# Patient Record
Sex: FEMALE | Race: BLACK OR AFRICAN AMERICAN | NOT HISPANIC OR LATINO | Employment: OTHER | URBAN - METROPOLITAN AREA
[De-identification: names, ages, dates, MRNs, and addresses within clinical notes are randomized per-mention and may not be internally consistent; named-entity substitution may affect disease eponyms.]

---

## 2017-06-12 LAB — CRC RECOMMENDATION EXT: NORMAL

## 2017-06-25 ENCOUNTER — HOSPITAL ENCOUNTER (EMERGENCY)
Facility: OTHER | Age: 52
Discharge: HOME | End: 2017-06-25
Attending: EMERGENCY MEDICINE
Payer: MEDICAID

## 2017-06-25 VITALS
WEIGHT: 252 LBS | TEMPERATURE: 99 F | BODY MASS INDEX: 41.99 KG/M2 | RESPIRATION RATE: 24 BRPM | SYSTOLIC BLOOD PRESSURE: 130 MMHG | HEIGHT: 65 IN | HEART RATE: 108 BPM | DIASTOLIC BLOOD PRESSURE: 74 MMHG | OXYGEN SATURATION: 99 %

## 2017-06-25 DIAGNOSIS — S90.851A FOREIGN BODY IN FOOT, RIGHT, INITIAL ENCOUNTER: Primary | ICD-10-CM

## 2017-06-25 PROCEDURE — 99283 EMERGENCY DEPT VISIT LOW MDM: CPT | Mod: 25

## 2017-06-25 PROCEDURE — 10120 INC&RMVL FB SUBQ TISS SMPL: CPT

## 2017-06-25 NOTE — ED NOTES
LOC: The patient is awake, alert and aware of environment with an appropriate affect, the patient is oriented x 3 and speaking appropriately.  APPEARANCE: Patient resting comfortably and in no acute distress, patient is clean and well groomed, patient's clothing is properly fastened.  SKIN: The skin is warm and dry, patient has normal skin turgor and moist mucus membranes, skin intact, no breakdown or brusing noted.  MUSKULOSKELETAL: Patient moving all extremities well, no obvious swelling or deformities noted.  RESPIRATORY: Airway is open and patent, respirations are spontaneous, patient has a normal effort and rate. Breath sounds are clear and equal bilaterally.  CARDIAC: Normal heart sounds. No peripheral edema.  ABDOMEN: Soft and non tender to palpation, no distention noted. Bowel sounds present.  NEURO: No neuro deficits, hand grasp equal, no drift noted, no facial droop noted. Speech is clear.

## 2017-06-25 NOTE — ED PROVIDER NOTES
"Encounter Date: 6/25/2017    SCRIBE #1 NOTE: I, Laina Headley, am scribing for, and in the presence of,  Dr. Genao. I have scribed the entire note.       History     Chief Complaint   Patient presents with    Foreign Body     C/o possible foreign body to sole of right foot s/p "stepping on something" while walking barefoot just pta. Hx of diabetes. Pt took Flexeril and Lortab pta for shoulder pain, seen at [facility] this AM for same. No obvious foreign body noted to foot, no redness or drainage.     Time seen by provider: 4:51 PM    This is a 52 y.o. female who presents with complaint of right foot foreign body. She reports onset of complaint was a few minutes ago. The patient states she was walking bare foot in her house when she stepped on something. She is uncertain of what she stepped on. The patient notes associated sharp pain to the sole of the foot. She denies any numbness, tingling, weakness in right foot, redness or bleeding from the right foot. The patient reports her family member attempted to remove the object but was unable to do so. She states the pain in the foot worsens with bearing. The patient denies the use of any medication for the pain.       The history is provided by the patient.     Review of patient's allergies indicates:   Allergen Reactions    Naprosyn [naproxen]      Past Medical History:   Diagnosis Date    Diabetes mellitus     Hypertension      No past surgical history on file.  No family history on file.  Social History   Substance Use Topics    Smoking status: Not on file    Smokeless tobacco: Not on file    Alcohol use Not on file     Review of Systems   Constitutional: Negative for chills and fever.   HENT: Negative for congestion and sore throat.    Eyes: Negative for redness and visual disturbance.   Respiratory: Negative for cough and shortness of breath.    Cardiovascular: Negative for chest pain and palpitations.   Gastrointestinal: Negative for abdominal pain, " diarrhea, nausea and vomiting.   Genitourinary: Negative for dysuria.   Musculoskeletal: Negative for back pain.        Right foot pain   Skin: Negative for rash.        Suspected foreign body in the right foot   Neurological: Negative for weakness and headaches.   Psychiatric/Behavioral: Negative for confusion.       Physical Exam     Initial Vitals [06/25/17 1645]   BP Pulse Resp Temp SpO2   130/74 108 (!) 24 98.6 °F (37 °C) 99 %      MAP       92.67         Physical Exam    Nursing note and vitals reviewed.  Constitutional: She appears well-developed and well-nourished. She is not diaphoretic. No distress.   HENT:   Head: Normocephalic and atraumatic.   Right Ear: External ear normal.   Left Ear: External ear normal.   Oropharynx is clear and intact. Moist mucus membranes   Eyes: EOM are normal.   Conjunctivae clear, pink, and intact.   Neck: Normal range of motion. Neck supple.   Cardiovascular: Intact distal pulses.   Pulmonary/Chest: No respiratory distress.   Musculoskeletal: Normal range of motion. She exhibits no edema or tenderness.   RLE: 2+ DP and PT pulses. Capillary refill < 2 sec. Sensation intact to light touch. 5/5 strength    Lymphadenopathy:     She has no cervical adenopathy.   Neurological: She is alert and oriented to person, place, and time. She has normal strength.   Skin: Skin is warm and dry. No rash noted.   No skin tenting. No lesions.         ED Course   Foreign Body  Date/Time: 6/25/2017 5:03 PM  Performed by: RETA CASAREZ  Authorized by: RETA CASAREZ   Body area: skin  General location: lower extremity  Location details: right foot  Patient sedated: no  Patient restrained: no  Patient cooperative: yes  Localization method: probed  Removal mechanism: scalpel  Tendon involvement: none  Depth: subcutaneous  Complexity: simple  1 objects recovered.  Objects recovered: unknown  Post-procedure assessment: foreign body removed  Patient tolerance: Patient tolerated the procedure  well with no immediate complications      Labs Reviewed - No data to display                     Scribe Attestation:   Scribe #1: I performed the above scribed service and the documentation accurately describes the services I performed. I attest to the accuracy of the note.    Attending Attestation:           Physician Attestation for Scribe:  Physician Attestation Statement for Scribe #1: I, Dr. Coronado, reviewed documentation, as scribed by Laina Headley in my presence, and it is both accurate and complete.         Attending ED Notes:   Urgent evaluation a 52-year-old female foreign-body sensation to foot.  Patient no vastly intact without focal neurologic deficits.  Patient is afebrile, nontoxic-appearing with stable vital signs.  Repeat heart rate by M.D. is 86.  No clinical evidence of infection, cellulitis or abscess formation.  Foreign-body is removed without complication.  The patient is extensively counseled on her diagnosis and treatment, discharged in good condition and directed to follow-up with podiatry in the next 24-48 hours.          ED Course     Clinical Impression:     1. Foreign body in foot, right, initial encounter                                 Shady Coronado MD  06/25/17 [id number]

## 2018-01-02 ENCOUNTER — HOSPITAL ENCOUNTER (EMERGENCY)
Facility: OTHER | Age: 53
Discharge: HOME | End: 2018-01-02
Attending: EMERGENCY MEDICINE
Payer: MEDICARE

## 2018-01-02 VITALS
HEART RATE: 97 BPM | BODY MASS INDEX: 42.49 KG/M2 | OXYGEN SATURATION: 99 % | HEIGHT: 65 IN | TEMPERATURE: 98 F | SYSTOLIC BLOOD PRESSURE: 184 MMHG | DIASTOLIC BLOOD PRESSURE: 76 MMHG | WEIGHT: 255 LBS | RESPIRATION RATE: 17 BRPM

## 2018-01-02 DIAGNOSIS — J20.8 VIRAL BRONCHITIS: Primary | ICD-10-CM

## 2018-01-02 DIAGNOSIS — T50.905A MEDICATION REACTION, INITIAL ENCOUNTER: ICD-10-CM

## 2018-01-02 DIAGNOSIS — R05.9 COUGH: ICD-10-CM

## 2018-01-02 PROCEDURE — 25000003 PHARM REV CODE 250: Performed by: PHYSICIAN ASSISTANT

## 2018-01-02 PROCEDURE — 99283 EMERGENCY DEPT VISIT LOW MDM: CPT

## 2018-01-02 RX ORDER — PANTOPRAZOLE SODIUM 20 MG/1
20 TABLET, DELAYED RELEASE ORAL DAILY
COMMUNITY
End: 2022-09-22 | Stop reason: SDUPTHER

## 2018-01-02 RX ORDER — DIPHENHYDRAMINE HCL 25 MG
50 CAPSULE ORAL
Status: COMPLETED | OUTPATIENT
Start: 2018-01-02 | End: 2018-01-02

## 2018-01-02 RX ORDER — DIPHENHYDRAMINE HCL 50 MG
50 CAPSULE ORAL EVERY 6 HOURS PRN
Qty: 20 CAPSULE | Refills: 0 | Status: SHIPPED | OUTPATIENT
Start: 2018-01-02 | End: 2022-07-20

## 2018-01-02 RX ORDER — ESCITALOPRAM OXALATE 20 MG/1
20 TABLET ORAL DAILY
COMMUNITY
End: 2018-03-04

## 2018-01-02 RX ORDER — SENNOSIDES 25 MG/1
TABLET, FILM COATED ORAL
Status: ON HOLD | COMMUNITY
End: 2018-03-05 | Stop reason: HOSPADM

## 2018-01-02 RX ADMIN — DIPHENHYDRAMINE HYDROCHLORIDE 50 MG: 25 CAPSULE ORAL at 06:01

## 2018-01-02 NOTE — ED PROVIDER NOTES
Encounter Date: 1/2/2018       History     Chief Complaint   Patient presents with    Itching     pt reports I took some Mucinex and now I'm itching all over my body. No rash noted denies any shortness of breath or dyspnea.     URI     pt also reports cough and cold symptoms.     Patient is a 52 y.o. female with a past medical history of hypertension, diabetes, presenting to the emergency department for evaluation of cough and itching.  The patient reports that for 1 week she's had a nonproductive cough with chest congestion.  She states that times she has episodes of coughing fits with posttussive emesis.  She denies shortness of breath or chest pain.  She states she used over-the-counter cough syrup with no relief.  She also states she's tried Tessalon Perles.  She states her PCP recommended she start taking Mucinex.  She states that she started 3 days ago, but has since developed generalized itching.  She denies a rash, but states she is concerned she is having a reaction to Mucinex.  She denies taking any medications today thus far.  She reports she is concerned for possible pneumonia.  She denies fever or chills. She denies new other meds, denies new lotions, soaps, or possible allergens.       The history is provided by the patient.     Review of patient's allergies indicates:   Allergen Reactions    Naprosyn [naproxen]      Past Medical History:   Diagnosis Date    Diabetes mellitus     Hypertension      Past Surgical History:   Procedure Laterality Date    HYSTERECTOMY       History reviewed. No pertinent family history.  Social History   Substance Use Topics    Smoking status: Never Smoker    Smokeless tobacco: Never Used    Alcohol use No     Review of Systems   Constitutional: Negative for activity change, appetite change, chills, fatigue and fever.   HENT: Negative for congestion, rhinorrhea and sore throat.    Eyes: Negative for photophobia and visual disturbance.   Respiratory: Positive for  cough. Negative for shortness of breath and wheezing.    Cardiovascular: Negative for chest pain.   Gastrointestinal: Negative for abdominal pain, diarrhea, nausea and vomiting.   Genitourinary: Negative for dysuria, hematuria and urgency.   Musculoskeletal: Negative for back pain, myalgias and neck pain.   Skin: Negative for color change and wound.        Itching    Neurological: Negative for weakness and headaches.   Psychiatric/Behavioral: Negative for agitation and confusion.       Physical Exam     Initial Vitals [01/02/18 1532]   BP Pulse Resp Temp SpO2   (!) 140/63 104 16 98.4 °F (36.9 °C) 99 %      MAP       88.67         Physical Exam    Nursing note and vitals reviewed.  Constitutional: She appears well-developed and well-nourished. She is not diaphoretic. She is cooperative.  Non-toxic appearance. She does not have a sickly appearance. She does not appear ill. No distress.   Well appearing,  female, unaccompanied in the ED. She is speaking in clear and full sentences, moving all extremities. No acute distress.    HENT:   Head: Normocephalic and atraumatic.   Right Ear: Hearing, tympanic membrane, external ear and ear canal normal.   Left Ear: Hearing, tympanic membrane, external ear and ear canal normal.   Nose: Nose normal.   Mouth/Throat: Oropharynx is clear and moist.   No involvement of the oral mucosa.  Swallowing and handling oral secretions.   Eyes: Conjunctivae and EOM are normal.   Neck: Normal range of motion. Neck supple.   Cardiovascular: Normal rate, regular rhythm and normal heart sounds.   Pulmonary/Chest: Breath sounds normal. No respiratory distress. She has no wheezes.   Musculoskeletal: Normal range of motion.   Neurological: She is alert and oriented to person, place, and time. GCS eye subscore is 4. GCS verbal subscore is 5. GCS motor subscore is 6.   Skin: Skin is warm.   No evidence of skin changes, lesions, urticaria, vesicles, skin sloughing.     Psychiatric: She  has a normal mood and affect. Her behavior is normal. Judgment and thought content normal.         ED Course   Procedures  Labs Reviewed - No data to display     Imaging Results          X-Ray Chest PA And Lateral (Final result)  Result time 01/02/18 17:57:01    Final result by Olena Ortega MD (01/02/18 17:57:01)                 Impression:      No acute cardiopulmonary process identified.      Electronically signed by: OLENA ORTEGA MD  Date:     01/02/18  Time:    17:57              Narrative:    Chest PA and lateral.  Comparison: 4/2010.    Cardiac silhouette is normal in size.  Mediastinal structures are midline.  Lungs are symmetrically expanded.  There is mild interstitial prominence.  Previously visualized abnormal airspace opacity within the left upper lung zone has completely resolved.  No evidence of new focal consolidative process, pneumothorax, or significant effusion.  Bones appear intact.  No free air visualized beneath the diaphragm.                             X-Rays:   Independently Interpreted Readings:   Chest X-Ray: Normal heart size.  No infiltrates.  No acute abnormalities.     Medical Decision Making:   Initial Assessment:   Urgent evaluation of a 52 y.o. female with a past medical history of HTN, DM, presenting to the emergency department complaining of cough and itching. Patient is afebrile, nontoxic appearing and hemodynamically stable. Physical exam reveals regular rate and rhythm, lungs are clear to auscultation bilaterally.  Oxygen saturation 99%.  Will plan to obtain chest x-ray.  No skin changes, lesions, urticaria.  Will plan to treat with oral Benadryl.  Independently Interpreted Test(s):   I have ordered and independently interpreted X-rays - see prior notes.  Clinical Tests:   Radiological Study: Reviewed and Ordered  ED Management:  CXR does not show an acute process. O2 sat is 99%. Signs and symptoms are likely secondary to a viral etiology, no antibiotics are warranted at  this time. The patient was counseled on symptomatic care and treatment. She was also advised to discontinue mucinex, given a prescription for benadryl. She is stable for discharge home. The patient was instructed to follow up with a primary care provider in 2 days or to return to the emergency department for worsening symptoms. The treatment plan was discussed with the patient who demonstrated understanding and comfort with plan. The patient's history, physical exam, and plan of care was discussed with and agreed upon with my supervising physician.    Other:   I have discussed this case with another health care provider.       <> Summary of the Discussion: Dr. Glass   This note was created using Dragon Medical Dictation. There may be typographical errors secondary to dictation.                    ED Course      Clinical Impression:     1. Viral bronchitis    2. Cough    3. Medication reaction, initial encounter         Disposition:   Disposition: Discharged  Condition: Stable                        Briana Caruso PA-C  01/02/18 [id number]

## 2018-01-02 NOTE — ED TRIAGE NOTES
"Pt states "i'm am itching like mad"  States it began last night.  States she started taking mucinex for the cold that she has and thinks it could be from that.  Also states wants to be checked for her cough as well.  States has been taking tessalon pearls and they aren't doing anything for me.  Pt unsure if she has a rash anywhere, states she's just itchy.    "

## 2018-03-04 ENCOUNTER — HOSPITAL ENCOUNTER (INPATIENT)
Facility: OTHER | Age: 53
LOS: 1 days | Discharge: HOME | DRG: 440 | End: 2018-03-05
Attending: EMERGENCY MEDICINE | Admitting: EMERGENCY MEDICINE
Payer: MEDICARE

## 2018-03-04 DIAGNOSIS — R73.9 HYPERGLYCEMIA: ICD-10-CM

## 2018-03-04 DIAGNOSIS — K57.90 DIVERTICULOSIS OF INTESTINE WITHOUT BLEEDING, UNSPECIFIED INTESTINAL TRACT LOCATION: ICD-10-CM

## 2018-03-04 DIAGNOSIS — D64.9 ANEMIA, UNSPECIFIED TYPE: ICD-10-CM

## 2018-03-04 DIAGNOSIS — K86.89 PANCREATIC MASS: Primary | ICD-10-CM

## 2018-03-04 DIAGNOSIS — I10 ESSENTIAL HYPERTENSION: ICD-10-CM

## 2018-03-04 DIAGNOSIS — N28.9 RENAL INSUFFICIENCY: ICD-10-CM

## 2018-03-04 DIAGNOSIS — R74.8 ELEVATED LIVER ENZYMES: ICD-10-CM

## 2018-03-04 DIAGNOSIS — K80.20 CHOLELITHIASIS WITHOUT CHOLECYSTITIS: ICD-10-CM

## 2018-03-04 PROBLEM — Z79.4 TYPE 2 DIABETES MELLITUS WITH COMPLICATION, WITH LONG-TERM CURRENT USE OF INSULIN: Status: ACTIVE | Noted: 2018-03-04

## 2018-03-04 PROBLEM — E78.5 HYPERLIPIDEMIA: Status: ACTIVE | Noted: 2018-03-04

## 2018-03-04 PROBLEM — E11.8 TYPE 2 DIABETES MELLITUS WITH COMPLICATION, WITH LONG-TERM CURRENT USE OF INSULIN: Status: ACTIVE | Noted: 2018-03-04

## 2018-03-04 LAB
ALBUMIN SERPL BCP-MCNC: 3.5 G/DL
ALP SERPL-CCNC: 463 U/L
ALT SERPL W/O P-5'-P-CCNC: 195 U/L
ANION GAP SERPL CALC-SCNC: 12 MMOL/L
AST SERPL-CCNC: 43 U/L
BACTERIA #/AREA URNS HPF: ABNORMAL /HPF
BASOPHILS # BLD AUTO: 0.03 K/UL
BASOPHILS NFR BLD: 0.4 %
BILIRUB SERPL-MCNC: 0.7 MG/DL
BILIRUB UR QL STRIP: NEGATIVE
BUN SERPL-MCNC: 24 MG/DL
CALCIUM SERPL-MCNC: 9.3 MG/DL
CHLORIDE SERPL-SCNC: 103 MMOL/L
CLARITY UR: CLEAR
CO2 SERPL-SCNC: 24 MMOL/L
COLOR UR: YELLOW
CREAT SERPL-MCNC: 1.3 MG/DL
DIFFERENTIAL METHOD: ABNORMAL
EOSINOPHIL # BLD AUTO: 0.2 K/UL
EOSINOPHIL NFR BLD: 2.3 %
ERYTHROCYTE [DISTWIDTH] IN BLOOD BY AUTOMATED COUNT: 15.9 %
EST. GFR  (AFRICAN AMERICAN): 54 ML/MIN/1.73 M^2
EST. GFR  (NON AFRICAN AMERICAN): 47 ML/MIN/1.73 M^2
GLUCOSE SERPL-MCNC: 245 MG/DL
GLUCOSE UR QL STRIP: ABNORMAL
HCT VFR BLD AUTO: 35.5 %
HGB BLD-MCNC: 10.9 G/DL
HGB UR QL STRIP: ABNORMAL
KETONES UR QL STRIP: NEGATIVE
LEUKOCYTE ESTERASE UR QL STRIP: NEGATIVE
LYMPHOCYTES # BLD AUTO: 2.9 K/UL
LYMPHOCYTES NFR BLD: 38.5 %
MCH RBC QN AUTO: 25.4 PG
MCHC RBC AUTO-ENTMCNC: 30.7 G/DL
MCV RBC AUTO: 83 FL
MICROSCOPIC COMMENT: ABNORMAL
MONOCYTES # BLD AUTO: 0.4 K/UL
MONOCYTES NFR BLD: 4.9 %
NEUTROPHILS # BLD AUTO: 4 K/UL
NEUTROPHILS NFR BLD: 53.5 %
NITRITE UR QL STRIP: NEGATIVE
PH UR STRIP: 5 [PH] (ref 5–8)
PLATELET # BLD AUTO: 301 K/UL
PMV BLD AUTO: 9.2 FL
POCT GLUCOSE: 397 MG/DL (ref 70–110)
POTASSIUM SERPL-SCNC: 3.6 MMOL/L
PROT SERPL-MCNC: 7.9 G/DL
PROT UR QL STRIP: NEGATIVE
RBC # BLD AUTO: 4.29 M/UL
RBC #/AREA URNS HPF: 7 /HPF (ref 0–4)
SODIUM SERPL-SCNC: 139 MMOL/L
SP GR UR STRIP: 1.02 (ref 1–1.03)
SQUAMOUS #/AREA URNS HPF: 11 /HPF
URN SPEC COLLECT METH UR: ABNORMAL
UROBILINOGEN UR STRIP-ACNC: NEGATIVE EU/DL
WBC # BLD AUTO: 7.54 K/UL
WBC #/AREA URNS HPF: 3 /HPF (ref 0–5)

## 2018-03-04 PROCEDURE — 25500020 PHARM REV CODE 255: Performed by: EMERGENCY MEDICINE

## 2018-03-04 PROCEDURE — 96361 HYDRATE IV INFUSION ADD-ON: CPT

## 2018-03-04 PROCEDURE — 96374 THER/PROPH/DIAG INJ IV PUSH: CPT

## 2018-03-04 PROCEDURE — 25000003 PHARM REV CODE 250: Performed by: NURSE PRACTITIONER

## 2018-03-04 PROCEDURE — 25000003 PHARM REV CODE 250: Performed by: EMERGENCY MEDICINE

## 2018-03-04 PROCEDURE — 63600175 PHARM REV CODE 636 W HCPCS: Performed by: EMERGENCY MEDICINE

## 2018-03-04 PROCEDURE — 99284 EMERGENCY DEPT VISIT MOD MDM: CPT | Mod: 25

## 2018-03-04 PROCEDURE — 80053 COMPREHEN METABOLIC PANEL: CPT

## 2018-03-04 PROCEDURE — 11000001 HC ACUTE MED/SURG PRIVATE ROOM

## 2018-03-04 PROCEDURE — 82962 GLUCOSE BLOOD TEST: CPT

## 2018-03-04 PROCEDURE — 85025 COMPLETE CBC W/AUTO DIFF WBC: CPT

## 2018-03-04 PROCEDURE — 81000 URINALYSIS NONAUTO W/SCOPE: CPT

## 2018-03-04 RX ORDER — IBUPROFEN 200 MG
24 TABLET ORAL
Status: DISCONTINUED | OUTPATIENT
Start: 2018-03-04 | End: 2018-03-05 | Stop reason: HOSPADM

## 2018-03-04 RX ORDER — HEPARIN SODIUM 5000 [USP'U]/ML
5000 INJECTION, SOLUTION INTRAVENOUS; SUBCUTANEOUS EVERY 8 HOURS
Status: DISCONTINUED | OUTPATIENT
Start: 2018-03-05 | End: 2018-03-05 | Stop reason: HOSPADM

## 2018-03-04 RX ORDER — NAPROXEN SODIUM 220 MG/1
81 TABLET, FILM COATED ORAL DAILY
Status: DISCONTINUED | OUTPATIENT
Start: 2018-03-05 | End: 2018-03-05 | Stop reason: HOSPADM

## 2018-03-04 RX ORDER — PANTOPRAZOLE SODIUM 20 MG/1
20 TABLET, DELAYED RELEASE ORAL DAILY
Status: DISCONTINUED | OUTPATIENT
Start: 2018-03-05 | End: 2018-03-04 | Stop reason: RX

## 2018-03-04 RX ORDER — SODIUM CHLORIDE 9 MG/ML
INJECTION, SOLUTION INTRAVENOUS CONTINUOUS
Status: DISCONTINUED | OUTPATIENT
Start: 2018-03-04 | End: 2018-03-05

## 2018-03-04 RX ORDER — ONDANSETRON 2 MG/ML
4 INJECTION INTRAMUSCULAR; INTRAVENOUS EVERY 8 HOURS PRN
Status: DISCONTINUED | OUTPATIENT
Start: 2018-03-04 | End: 2018-03-05 | Stop reason: HOSPADM

## 2018-03-04 RX ORDER — GLUCAGON 1 MG
1 KIT INJECTION
Status: DISCONTINUED | OUTPATIENT
Start: 2018-03-04 | End: 2018-03-05 | Stop reason: HOSPADM

## 2018-03-04 RX ORDER — LISINOPRIL 20 MG/1
20 TABLET ORAL DAILY
Status: DISCONTINUED | OUTPATIENT
Start: 2018-03-05 | End: 2018-03-05 | Stop reason: HOSPADM

## 2018-03-04 RX ORDER — LISINOPRIL 10 MG/1
20 TABLET ORAL
Status: COMPLETED | OUTPATIENT
Start: 2018-03-04 | End: 2018-03-04

## 2018-03-04 RX ORDER — LISINOPRIL 10 MG/1
20 TABLET ORAL
Status: DISCONTINUED | OUTPATIENT
Start: 2018-03-04 | End: 2018-03-04

## 2018-03-04 RX ORDER — FAMOTIDINE 20 MG/1
20 TABLET, FILM COATED ORAL 2 TIMES DAILY
Status: DISCONTINUED | OUTPATIENT
Start: 2018-03-04 | End: 2018-03-04 | Stop reason: SDUPTHER

## 2018-03-04 RX ORDER — IBUPROFEN 200 MG
16 TABLET ORAL
Status: DISCONTINUED | OUTPATIENT
Start: 2018-03-04 | End: 2018-03-05 | Stop reason: HOSPADM

## 2018-03-04 RX ORDER — GABAPENTIN 300 MG/1
300 CAPSULE ORAL 3 TIMES DAILY
Status: DISCONTINUED | OUTPATIENT
Start: 2018-03-05 | End: 2018-03-05 | Stop reason: HOSPADM

## 2018-03-04 RX ORDER — SIMVASTATIN 10 MG/1
20 TABLET, FILM COATED ORAL NIGHTLY
Status: DISCONTINUED | OUTPATIENT
Start: 2018-03-04 | End: 2018-03-05

## 2018-03-04 RX ORDER — SODIUM CHLORIDE 0.9 % (FLUSH) 0.9 %
5 SYRINGE (ML) INJECTION
Status: DISCONTINUED | OUTPATIENT
Start: 2018-03-04 | End: 2018-03-05 | Stop reason: HOSPADM

## 2018-03-04 RX ORDER — DIPHENHYDRAMINE HYDROCHLORIDE 50 MG/ML
25 INJECTION INTRAMUSCULAR; INTRAVENOUS ONCE
Status: COMPLETED | OUTPATIENT
Start: 2018-03-05 | End: 2018-03-05

## 2018-03-04 RX ORDER — INSULIN ASPART 100 [IU]/ML
1-10 INJECTION, SOLUTION INTRAVENOUS; SUBCUTANEOUS
Status: DISCONTINUED | OUTPATIENT
Start: 2018-03-04 | End: 2018-03-05 | Stop reason: HOSPADM

## 2018-03-04 RX ORDER — METHYLPREDNISOLONE SOD SUCC 125 MG
125 VIAL (EA) INJECTION
Status: COMPLETED | OUTPATIENT
Start: 2018-03-04 | End: 2018-03-04

## 2018-03-04 RX ORDER — PANTOPRAZOLE SODIUM 40 MG/1
40 TABLET, DELAYED RELEASE ORAL DAILY
Status: DISCONTINUED | OUTPATIENT
Start: 2018-03-05 | End: 2018-03-05 | Stop reason: HOSPADM

## 2018-03-04 RX ADMIN — LISINOPRIL 20 MG: 10 TABLET ORAL at 08:03

## 2018-03-04 RX ADMIN — IOHEXOL 100 ML: 350 INJECTION, SOLUTION INTRAVENOUS at 05:03

## 2018-03-04 RX ADMIN — SODIUM CHLORIDE: 0.9 INJECTION, SOLUTION INTRAVENOUS at 11:03

## 2018-03-04 RX ADMIN — SODIUM CHLORIDE 1000 ML: 0.9 INJECTION, SOLUTION INTRAVENOUS at 05:03

## 2018-03-04 RX ADMIN — METHYLPREDNISOLONE SODIUM SUCCINATE 125 MG: 125 INJECTION, POWDER, FOR SOLUTION INTRAMUSCULAR; INTRAVENOUS at 03:03

## 2018-03-04 NOTE — ED TRIAGE NOTES
Pt reports sitting in cloth chair at friends house and starting to itch. Pt reports no relief from benadryl, rubbing alcohol, cortisone cream, and triple antibiotic ointment. Pt reports itching to back, chest, and arms. Denies any new detergent or body products.

## 2018-03-04 NOTE — ED PROVIDER NOTES
"Encounter Date: 3/4/2018    SCRIBE #1 NOTE: I, Milliganana cristina Ortiz, am scribing for, and in the presence of, Dr. Genao.       History     Chief Complaint   Patient presents with    Rash     Pt c/o itching rash on trunl & extremities X 2 days. Pt reports taking benadryl with minimal relief.     Time seen by provider: 2:27 PM    This is a 52 y.o. female with hx of DM, HTN, and high cholesterol who presents with complaint of itching x 2 days. It is to SARTHAK, back, chest, and mildly to BLE. She reports having been in a sick friend's house and sitting on a cloth chair at onset. She states "I felt like stuff was crawling on me," but hadn't seen anything. There has been no relief with taking a shower or applying witch hazel, alcohol, hydrocortisone cream, and other OTC ointments to skin. No relief with benadryl. She denies any new medicine, new soaps/lotions, and recent illnesses. She states that the only new change is that she has begun to drink grapefruit juice. Pt has no fever, chills, N/V/D, bloody/tarry stools, abdominal pain, wound, rash, dysuria, discolored urine, difficulty urinating, trouble swallowing, voice change, facial swelling, and choking. Pt does admit to mild visual difficulties with R eye secondary to kenalog injection to the eye approximately 2 weeks ago. Pt denies any allergy to steroids, she is allergic to naprosyn. Pt was near kidney failure in July 2017 and has followed up with nephrologist. She states that it was secondary to ingestion of two prescription strength ibuprofen. Her blood sugar ran 178mg/dL this morning. Pt denies any smoking, drinking, or illicit drug use. She has a Fhx of HTN and DM.      The history is provided by the patient.     Review of patient's allergies indicates:   Allergen Reactions    Naprosyn [naproxen]      Past Medical History:   Diagnosis Date    Diabetes mellitus     Hypertension      Past Surgical History:   Procedure Laterality Date    HYSTERECTOMY       History " reviewed. No pertinent family history.  Social History   Substance Use Topics    Smoking status: Never Smoker    Smokeless tobacco: Never Used    Alcohol use No     Review of Systems   Constitutional: Negative for chills and fever.   HENT: Negative for congestion, facial swelling, sore throat, trouble swallowing and voice change.    Eyes: Negative for photophobia and redness.   Respiratory: Negative for cough, choking and shortness of breath.    Cardiovascular: Negative for chest pain.   Gastrointestinal: Negative for abdominal pain, blood in stool, diarrhea, nausea and vomiting.   Genitourinary: Negative for dysuria.        No discolored urine.   Musculoskeletal: Negative for back pain.   Skin: Negative for rash.        Itching to extremities, back and chest. No visualized rash.   Neurological: Negative for weakness, light-headedness and headaches.   Psychiatric/Behavioral: Negative for confusion.       Physical Exam     Initial Vitals [03/04/18 1420]   BP Pulse Resp Temp SpO2   (!) 164/74 95 18 98.2 °F (36.8 °C) 100 %      MAP       104         Physical Exam    Nursing note and vitals reviewed.  Constitutional: She appears well-developed and well-nourished. She is not diaphoretic. No distress.   HENT:   Head: Normocephalic and atraumatic.   Right Ear: External ear normal.   Left Ear: External ear normal.   Oropharynx is clear and intact.  Moist mucous membranes.   Eyes: Conjunctivae and EOM are normal. Pupils are equal, round, and reactive to light. Right eye exhibits no discharge. Left eye exhibits no discharge.   Neck: Normal range of motion. Neck supple.   Cardiovascular: Normal rate, regular rhythm and normal heart sounds.   Normal S1, S2. No murmurs, no rubs, no gallops.    Pulmonary/Chest: Breath sounds normal. No respiratory distress. She has no wheezes. She has no rhonchi. She has no rales.   Clear to ascultation bilaterally    Abdominal: Soft. Bowel sounds are normal. She exhibits no distension. There  is no tenderness. There is no rebound and no guarding.   Musculoskeletal: Normal range of motion. She exhibits no edema or tenderness.   Lymphadenopathy:     She has no cervical adenopathy.   Neurological: She is alert and oriented to person, place, and time. She has normal strength. No sensory deficit.   Skin: Skin is warm and dry. No rash noted. No erythema.   No rashes or lesions visualized.   Psychiatric: She has a normal mood and affect. Her behavior is normal.         ED Course   Procedures  Labs Reviewed   CBC W/ AUTO DIFFERENTIAL - Abnormal; Notable for the following:        Result Value    Hemoglobin 10.9 (*)     Hematocrit 35.5 (*)     MCH 25.4 (*)     MCHC 30.7 (*)     RDW 15.9 (*)     All other components within normal limits   COMPREHENSIVE METABOLIC PANEL - Abnormal; Notable for the following:     Glucose 245 (*)     BUN, Bld 24 (*)     Alkaline Phosphatase 463 (*)     AST 43 (*)      (*)     eGFR if  54 (*)     eGFR if non  47 (*)     All other components within normal limits   URINALYSIS - Abnormal; Notable for the following:     Glucose, UA Trace (*)     Occult Blood UA 1+ (*)     All other components within normal limits   URINALYSIS MICROSCOPIC - Abnormal; Notable for the following:     RBC, UA 7 (*)     Bacteria, UA Few (*)     All other components within normal limits   POCT GLUCOSE - Abnormal; Notable for the following:     POCT Glucose 397 (*)     All other components within normal limits              Imaging Results          CT Abdomen Pelvis With Contrast (Final result)  Result time 03/04/18 18:47:30    Final result by Jamison Harp MD (03/04/18 18:47:30)                 Impression:        Multiloculated cystic lesion at the level of pancreatic head as detailed above, 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  This causes mass effect on distal common bile duct with proximal biliary duct dilatation.    Cholelithiasis and sludge without evidence of acute  cholecystitis.    Mild diverticulosis coli without focal diverticulitis.    Few additional findings as above.    ______________________________________     Electronically signed by resident: PRAVEEN MCDONALD MD  Date:     03/04/18  Time:    18:21            As the supervising and teaching physician, I personally reviewed the images and resident's interpretation and I agree with the findings.          Electronically signed by: EDUARDO CONKLIN MD, MD  Date:     03/04/18  Time:    18:47              Narrative:    Procedure comments: The patient was surveyed from the lung bases through the pelvis after the administration of 100 cc Omni 350 IV contrast as well as oral contrast and data was reconstructed for coronal, sagittal, and axial images.    Comparison: None.    Indication: Elevated liver enzymes.  Pruritus    Findings:    The lung bases are unremarkable.  There is no pleural fluid present.  The visualized portions of the heart appear normal.    At the level of pancreatic head there is a multiloculated cystic lesion with internal thin septations, measuring approximately 4.4 x 3.3 cm.  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  There is marked atrophy of pancreatic head.  The pancreatic body and tail is unremarkable.  The pancreatic duct is not dilated.  There is mass effect on distal common bile duct from aforementioned lesion causing proximal common bile duct as well as diffuse mild intrahepatic biliary duct dilatation.    The liver is normal in size and attenuation with no focal hepatic abnormality.  The portal vein is patent.  The gallbladder shows multiple stones and layering sludge within its lumen.  No gallbladder wall thickening or pericholecystic fluid.      The stomach is mildly distended filled with food material. The spleen, and adrenal glands are unremarkable.    The kidneys are normal in size and location and concentrate and excrete contrast properly on delayed imaging.  There is no evidence of  hydronephrosis.  The ureters appear normal in course and caliber without evidence of ureteral dilatation. The urinary bladder, uterus demonstrate no significant abnormalities.  No abnormal adnexal masses are seen.    The abdominal aorta is normal in caliber with minimal atherosclerotic calcifications.    The visualized loops of small and large bowel show no evidence of obstruction or inflammation.  A few scattered colonic diverticula without focal diverticulitis. There is no ascites, free fluid, or intraperitoneal free air. There is no evidence of lymph node enlargement in the abdomen or pelvis. Small fat containing umbilical hernia.    The osseous structures demonstrate no acute process.                              Medical Decision Making:   Clinical Tests:   Lab Tests: Ordered and Reviewed  Radiological Study: Ordered  ED Management:  7:34 PM - Consulted and discussed pt with GI, Dr. Sood. Recommends f/u within 24 to 48 hours with their office for immediate surgical evaluation and treatment.  8:23 PM - Consulted and discussed pt with Marlo Morelos. PATRICIA. Will admit to Dr. Frank.  Other:   I have discussed this case with another health care provider.            Scribe Attestation:   Scribe #1: I performed the above scribed service and the documentation accurately describes the services I performed. I attest to the accuracy of the note.    Attending Attestation:           Physician Attestation for Scribe:  Physician Attestation Statement for Scribe #1: I, Dr. Genao, reviewed documentation, as scribed by Avril Ortiz in my presence, and it is both accurate and complete.         Attending ED Notes:   Emergent evaluation of 52-year-old female with complaint of generalized body itching.  Patient is afebrile, nontoxic-appearing with stable vital signs except for elevation in blood pressure and tachycardia.  No visible rashes or urticaria.  Concern for but not limited to possible uremia or hyperbilirubinemia.  On  CMP patient has alkaline phosphatase of 463.  AST of 43.  ALT of 15.  Blood glucose is 245.  This is no elevation of white blood cell count.  H&H is 10.9 and 35.5.  Urinary analysis reveals trace sugar and 1+ blood.  CT of abdomen and pelvis with contrast reveals multiloculated cystic lesion at the level of the pancreatic head with pancreatic head atrophy.  The lesion is causing a mass effect on the distal common bile duct with proximal biliary dilatation.  Patient also found have cholelithiasis without evidence of acute cholecystitis and diverticulosis without focal diverticulitis.  The patient is extensive the counseled on her diagnosis and treatment including all diagnostic, laboratory and physical exam findings and the patient is admitted in vision.    After admission his blood glucose was found to be 397.  No clinical or diagnostic evidence of diabetic ketoacidosis.  Patient is now nothing by mouth.  The hospitalist and myself and discussed treatment of patient's hyperglycemia.  We agree that we would administer IV fluids and then recheck patient's blood glucose.  Patient's blood glucose is still elevated he will administer insulin.             Clinical Impression:     1. Pancreatic mass                               Shady Coronado MD  03/04/18 8422

## 2018-03-05 VITALS
OXYGEN SATURATION: 97 % | RESPIRATION RATE: 17 BRPM | WEIGHT: 251.31 LBS | SYSTOLIC BLOOD PRESSURE: 186 MMHG | BODY MASS INDEX: 41.87 KG/M2 | HEART RATE: 92 BPM | TEMPERATURE: 96 F | DIASTOLIC BLOOD PRESSURE: 89 MMHG | HEIGHT: 65 IN

## 2018-03-05 LAB
ALBUMIN SERPL BCP-MCNC: 3.4 G/DL
ALP SERPL-CCNC: 431 U/L
ALT SERPL W/O P-5'-P-CCNC: 162 U/L
ANION GAP SERPL CALC-SCNC: 12 MMOL/L
AST SERPL-CCNC: 27 U/L
BASOPHILS # BLD AUTO: 0 K/UL
BASOPHILS NFR BLD: 0 %
BILIRUB SERPL-MCNC: 0.6 MG/DL
BUN SERPL-MCNC: 23 MG/DL
CALCIUM SERPL-MCNC: 9.5 MG/DL
CHLORIDE SERPL-SCNC: 106 MMOL/L
CHOLEST SERPL-MCNC: 181 MG/DL
CHOLEST/HDLC SERPL: 2.5 {RATIO}
CO2 SERPL-SCNC: 19 MMOL/L
CREAT SERPL-MCNC: 1.1 MG/DL
DIFFERENTIAL METHOD: ABNORMAL
EOSINOPHIL # BLD AUTO: 0 K/UL
EOSINOPHIL NFR BLD: 0 %
ERYTHROCYTE [DISTWIDTH] IN BLOOD BY AUTOMATED COUNT: 15.6 %
EST. GFR  (AFRICAN AMERICAN): >60 ML/MIN/1.73 M^2
EST. GFR  (NON AFRICAN AMERICAN): 57 ML/MIN/1.73 M^2
ESTIMATED AVG GLUCOSE: 192 MG/DL
GLUCOSE SERPL-MCNC: 329 MG/DL
HBA1C MFR BLD HPLC: 8.3 %
HCT VFR BLD AUTO: 35.3 %
HDLC SERPL-MCNC: 72 MG/DL
HDLC SERPL: 39.8 %
HGB BLD-MCNC: 11.1 G/DL
LDLC SERPL CALC-MCNC: 101 MG/DL
LYMPHOCYTES # BLD AUTO: 1.4 K/UL
LYMPHOCYTES NFR BLD: 14.1 %
MAGNESIUM SERPL-MCNC: 1.8 MG/DL
MCH RBC QN AUTO: 25.6 PG
MCHC RBC AUTO-ENTMCNC: 31.4 G/DL
MCV RBC AUTO: 81 FL
MONOCYTES # BLD AUTO: 0.2 K/UL
MONOCYTES NFR BLD: 1.8 %
NEUTROPHILS # BLD AUTO: 7.9 K/UL
NEUTROPHILS NFR BLD: 82.6 %
NONHDLC SERPL-MCNC: 109 MG/DL
PHOSPHATE SERPL-MCNC: 2.5 MG/DL
PLATELET # BLD AUTO: 299 K/UL
PMV BLD AUTO: 9.4 FL
POCT GLUCOSE: 239 MG/DL (ref 70–110)
POCT GLUCOSE: 254 MG/DL (ref 70–110)
POCT GLUCOSE: 265 MG/DL (ref 70–110)
POCT GLUCOSE: 303 MG/DL (ref 70–110)
POCT GLUCOSE: 372 MG/DL (ref 70–110)
POTASSIUM SERPL-SCNC: 3.8 MMOL/L
PROT SERPL-MCNC: 7.8 G/DL
RBC # BLD AUTO: 4.34 M/UL
SODIUM SERPL-SCNC: 137 MMOL/L
TRIGL SERPL-MCNC: 40 MG/DL
WBC # BLD AUTO: 9.58 K/UL

## 2018-03-05 PROCEDURE — 25000003 PHARM REV CODE 250: Performed by: NURSE PRACTITIONER

## 2018-03-05 PROCEDURE — 36415 COLL VENOUS BLD VENIPUNCTURE: CPT

## 2018-03-05 PROCEDURE — 83036 HEMOGLOBIN GLYCOSYLATED A1C: CPT

## 2018-03-05 PROCEDURE — 80053 COMPREHEN METABOLIC PANEL: CPT

## 2018-03-05 PROCEDURE — 25000003 PHARM REV CODE 250: Performed by: HOSPITALIST

## 2018-03-05 PROCEDURE — 80061 LIPID PANEL: CPT

## 2018-03-05 PROCEDURE — 99223 1ST HOSP IP/OBS HIGH 75: CPT | Mod: ,,, | Performed by: HOSPITALIST

## 2018-03-05 PROCEDURE — 84100 ASSAY OF PHOSPHORUS: CPT

## 2018-03-05 PROCEDURE — 63600175 PHARM REV CODE 636 W HCPCS: Performed by: NURSE PRACTITIONER

## 2018-03-05 PROCEDURE — 83735 ASSAY OF MAGNESIUM: CPT

## 2018-03-05 PROCEDURE — 85025 COMPLETE CBC W/AUTO DIFF WBC: CPT

## 2018-03-05 RX ORDER — PREDNISOLONE ACETATE 10 MG/ML
1 SUSPENSION/ DROPS OPHTHALMIC EVERY 4 HOURS
Status: DISCONTINUED | OUTPATIENT
Start: 2018-03-05 | End: 2018-03-05

## 2018-03-05 RX ORDER — HYDROCORTISONE 25 MG/G
CREAM TOPICAL 2 TIMES DAILY PRN
Status: DISCONTINUED | OUTPATIENT
Start: 2018-03-05 | End: 2018-03-05 | Stop reason: HOSPADM

## 2018-03-05 RX ORDER — INSULIN ASPART 100 [IU]/ML
20 INJECTION, SOLUTION INTRAVENOUS; SUBCUTANEOUS
COMMUNITY
Start: 2017-12-21 | End: 2022-10-21 | Stop reason: SDUPTHER

## 2018-03-05 RX ORDER — LISINOPRIL 40 MG/1
20 TABLET ORAL DAILY
COMMUNITY
Start: 2015-01-14 | End: 2022-09-08 | Stop reason: SDUPTHER

## 2018-03-05 RX ORDER — HYDROCHLOROTHIAZIDE 25 MG/1
25 TABLET ORAL DAILY
COMMUNITY
Start: 2017-12-21 | End: 2022-10-03 | Stop reason: SDUPTHER

## 2018-03-05 RX ORDER — HYDROCHLOROTHIAZIDE 12.5 MG/1
25 CAPSULE ORAL DAILY
Status: ON HOLD | COMMUNITY
Start: 2015-01-14 | End: 2018-03-05 | Stop reason: HOSPADM

## 2018-03-05 RX ORDER — PANTOPRAZOLE SODIUM 20 MG/1
20 TABLET, DELAYED RELEASE ORAL DAILY
Status: ON HOLD | COMMUNITY
Start: 2017-12-21 | End: 2018-03-05 | Stop reason: HOSPADM

## 2018-03-05 RX ORDER — DIPHENHYDRAMINE HCL 25 MG
25 CAPSULE ORAL EVERY 6 HOURS PRN
Status: DISCONTINUED | OUTPATIENT
Start: 2018-03-05 | End: 2018-03-05 | Stop reason: HOSPADM

## 2018-03-05 RX ORDER — HYDROCORTISONE 25 MG/G
CREAM TOPICAL 2 TIMES DAILY PRN
Qty: 3.5 G | Refills: 0 | Status: SHIPPED | OUTPATIENT
Start: 2018-03-05 | End: 2022-09-22

## 2018-03-05 RX ORDER — SIMVASTATIN 20 MG/1
20 TABLET, FILM COATED ORAL NIGHTLY
Status: ON HOLD | COMMUNITY
Start: 2017-12-21 | End: 2018-03-05 | Stop reason: HOSPADM

## 2018-03-05 RX ORDER — NAPROXEN SODIUM 220 MG/1
81 TABLET, FILM COATED ORAL DAILY
Status: ON HOLD | COMMUNITY
Start: 2017-12-21 | End: 2018-03-05 | Stop reason: HOSPADM

## 2018-03-05 RX ORDER — INSULIN GLARGINE 100 [IU]/ML
30 INJECTION, SOLUTION SUBCUTANEOUS 2 TIMES DAILY
Status: ON HOLD | COMMUNITY
Start: 2015-04-22 | End: 2018-03-05 | Stop reason: HOSPADM

## 2018-03-05 RX ORDER — HYDROGEN PEROXIDE 3 %
20 SOLUTION, NON-ORAL MISCELLANEOUS DAILY
COMMUNITY
End: 2022-07-20

## 2018-03-05 RX ORDER — PREDNISOLONE ACETATE 10 MG/ML
1 SUSPENSION/ DROPS OPHTHALMIC EVERY 4 HOURS
Status: DISCONTINUED | OUTPATIENT
Start: 2018-03-05 | End: 2018-03-05 | Stop reason: HOSPADM

## 2018-03-05 RX ORDER — GABAPENTIN 300 MG/1
300 CAPSULE ORAL 3 TIMES DAILY
Status: ON HOLD | COMMUNITY
Start: 2017-12-21 | End: 2018-03-05 | Stop reason: HOSPADM

## 2018-03-05 RX ORDER — HYDROXYZINE HYDROCHLORIDE 10 MG/1
10 TABLET, FILM COATED ORAL ONCE
Status: COMPLETED | OUTPATIENT
Start: 2018-03-05 | End: 2018-03-05

## 2018-03-05 RX ORDER — METFORMIN HYDROCHLORIDE 1000 MG/1
1000 TABLET ORAL 2 TIMES DAILY
COMMUNITY
Start: 2015-01-14 | End: 2022-11-30

## 2018-03-05 RX ORDER — LISINOPRIL 20 MG/1
20 TABLET ORAL DAILY
Status: ON HOLD | COMMUNITY
Start: 2017-12-21 | End: 2018-03-05 | Stop reason: HOSPADM

## 2018-03-05 RX ADMIN — PREDNISOLONE ACETATE 1 DROP: 10 SUSPENSION/ DROPS OPHTHALMIC at 01:03

## 2018-03-05 RX ADMIN — HEPARIN SODIUM 5000 UNITS: 5000 INJECTION, SOLUTION INTRAVENOUS; SUBCUTANEOUS at 01:03

## 2018-03-05 RX ADMIN — SODIUM CHLORIDE: 0.9 INJECTION, SOLUTION INTRAVENOUS at 02:03

## 2018-03-05 RX ADMIN — HYDROXYZINE HYDROCHLORIDE 10 MG: 10 TABLET, FILM COATED ORAL at 03:03

## 2018-03-05 RX ADMIN — HEPARIN SODIUM 5000 UNITS: 5000 INJECTION, SOLUTION INTRAVENOUS; SUBCUTANEOUS at 06:03

## 2018-03-05 RX ADMIN — PREDNISOLONE ACETATE 1 DROP: 10 SUSPENSION/ DROPS OPHTHALMIC at 06:03

## 2018-03-05 RX ADMIN — DIPHENHYDRAMINE HYDROCHLORIDE 25 MG: 50 INJECTION, SOLUTION INTRAMUSCULAR; INTRAVENOUS at 12:03

## 2018-03-05 RX ADMIN — HYDROCORTISONE: 25 CREAM TOPICAL at 12:03

## 2018-03-05 RX ADMIN — PREDNISOLONE ACETATE 1 DROP: 10 SUSPENSION/ DROPS OPHTHALMIC at 09:03

## 2018-03-05 RX ADMIN — ASPIRIN 81 MG CHEWABLE TABLET 81 MG: 81 TABLET CHEWABLE at 09:03

## 2018-03-05 RX ADMIN — DIPHENHYDRAMINE HYDROCHLORIDE 25 MG: 25 CAPSULE ORAL at 11:03

## 2018-03-05 RX ADMIN — LISINOPRIL 20 MG: 20 TABLET ORAL at 09:03

## 2018-03-05 RX ADMIN — PANTOPRAZOLE SODIUM 40 MG: 40 TABLET, DELAYED RELEASE ORAL at 09:03

## 2018-03-05 RX ADMIN — GABAPENTIN 300 MG: 300 CAPSULE ORAL at 09:03

## 2018-03-05 RX ADMIN — SODIUM CHLORIDE: 0.9 INJECTION, SOLUTION INTRAVENOUS at 06:03

## 2018-03-05 RX ADMIN — GABAPENTIN 300 MG: 300 CAPSULE ORAL at 02:03

## 2018-03-05 NOTE — PLAN OF CARE
CM met with pt for initial discharge planning assessment.   CM verified pt's pcp is Elif Hernández MD.  , and her pharmacy of choice is [facility] on [address].  Pt denies any CM needs for discharge.  Pt asked this CM if we could assist her with Medicaid. She has applied recently but wants to know if she actually has it now.  CM placed call to the Medicaid team here.    CM to follow.     03/05/18 0902   Discharge Assessment   Assessment Type Discharge Planning Assessment   Confirmed/corrected address and phone number on facesheet? Yes   Assessment information obtained from? Patient;Medical Record   Expected Length of Stay (days) 2   Communicated expected length of stay with patient/caregiver yes   Prior to hospitilization cognitive status: Alert/Oriented   Prior to hospitalization functional status: Independent   Current cognitive status: Alert/Oriented   Current Functional Status: Independent   Lives With alone   Able to Return to Prior Arrangements yes   Is patient able to care for self after discharge? Yes   Patient currently being followed by outpatient case management? No   Patient currently receives any other outside agency services? No   Equipment Currently Used at Home none   Do you have any problems affording any of your prescribed medications? TBD   Is the patient taking medications as prescribed? yes   Does the patient have transportation home? Yes   Transportation Available family or friend will provide   Does the patient receive services at the [facility]? No   Discharge Plan A Home   Discharge Plan B Home   Patient/Family In Agreement With Plan yes

## 2018-03-05 NOTE — CONSULTS
[facility]  Gastroenterology  Consult Note    Patient Name: Starla Wharton  MRN: [id number]  Admission Date: 3/4/2018  Hospital Length of Stay: 1 days  Code Status: Full Code   Attending Provider: Samson Frank MD   Consulting Provider: Errol Chaney MD  Primary Care Physician: Elif Hernández MD  Principal Problem:Pancreatic mass    Consults  Subjective:     HPI: 51 y/o woman c/o generalized pruritis x 2 days.  She says this started suddenly while sitting in a neighbor's chair.  The itching was moderate to severe in intensity and persisted so she went to the ED.  It has now resolved since receiving hydroxyzine.  A CT abdomen was done which showed a cystic lesion in the head of the pancreas.     Past Medical History:   Diagnosis Date    Diabetes mellitus     Hypertension        Past Surgical History:   Procedure Laterality Date    HYSTERECTOMY         Review of patient's allergies indicates:   Allergen Reactions    Naprosyn [naproxen]      Family History     None        Social History Main Topics    Smoking status: Never Smoker    Smokeless tobacco: Never Used    Alcohol use No    Drug use: No    Sexual activity: Not on file     Review of Systems   Constitutional: Negative for chills, fatigue and fever.   HENT: Negative for trouble swallowing.    Eyes: Negative for pain.   Respiratory: Negative for cough, chest tightness, shortness of breath and wheezing.    Cardiovascular: Negative for chest pain and leg swelling.   Gastrointestinal: Negative.    Genitourinary: Negative for dysuria.   Musculoskeletal: Negative for arthralgias.   Skin: Negative for color change.   Neurological: Negative for dizziness and headaches.     Objective:     Vital Signs (Most Recent):  Temp: 96.3 °F (35.7 °C) (03/05/18 1612)  Pulse: 92 (03/05/18 1612)  Resp: 17 (03/05/18 1612)  BP: (!) 186/89 (03/05/18 1612)  SpO2: 97 % (03/05/18 1612) Vital Signs (24h Range):  Temp:  [96.3 °F (35.7 °C)-98.7 °F (37.1  °C)] 96.3 °F (35.7 °C)  Pulse:  [] 92  Resp:  [17-18] 17  SpO2:  [96 %-99 %] 97 %  BP: (146-192)/(72-94) 186/89     Weight: 114 kg (251 lb 5.2 oz) (03/04/18 2248)  Body mass index is 41.82 kg/m².      Intake/Output Summary (Last 24 hours) at 03/05/18 1725  Last data filed at 03/05/18 0400   Gross per 24 hour   Intake             1000 ml   Output              600 ml   Net              400 ml       Lines/Drains/Airways     Peripheral Intravenous Line                 Peripheral IV - Single Lumen 03/04/18 1456 Left Antecubital 1 day                Physical Exam   Constitutional: She is oriented to person, place, and time. She appears well-developed and well-nourished.   HENT:   Head: Normocephalic and atraumatic.   Eyes: Conjunctivae are normal. No scleral icterus.   Cardiovascular: Normal rate and regular rhythm.    Pulmonary/Chest: Effort normal and breath sounds normal. She has no wheezes.   Abdominal: Soft. Bowel sounds are normal. She exhibits no distension and no mass. There is no tenderness. There is no rebound and no guarding.   Musculoskeletal: She exhibits no edema or deformity.   Neurological: She is alert and oriented to person, place, and time.   Psychiatric: She has a normal mood and affect. Her behavior is normal.       Significant Labs:  CBC:   Recent Labs  Lab 03/04/18  1457 03/05/18  0437   WBC 7.54 9.58   HGB 10.9* 11.1*   HCT 35.5* 35.3*    299     CMP:   Recent Labs  Lab 03/05/18  0437   *   CALCIUM 9.5   ALBUMIN 3.4*   PROT 7.8      K 3.8   CO2 19*      BUN 23*   CREATININE 1.1   ALKPHOS 431*   *   AST 27   BILITOT 0.6       Significant Imaging:  Imaging results within the past 24 hours have been reviewed.    Assessment/Plan:     Active Diagnoses:    Diagnosis Date Noted POA    PRINCIPAL PROBLEM:  Pancreatic mass [K86.9] 03/04/2018 Unknown    Essential hypertension [I10] 03/04/2018 Unknown    Type 2 diabetes mellitus with complication, with long-term  current use of insulin [E11.8, Z79.4] 03/04/2018 Not Applicable    Hyperlipidemia [E78.5] 03/04/2018 Unknown      Problems Resolved During this Admission:    Diagnosis Date Noted Date Resolved POA     Cystic pancreatic lesion.  There is some impingement of the CBD, but the bili is normal.  Mild elevation of the ALT and transaminases.  Will schedule outpatient EUS. The patient understands the importance of this.  It is possible the lesion is malignant or pre-malignant.    Pruritis. Unclear etiology, but now resolved.  Defer to hospitalist.    Discussed with Dr. Frank.      Thank you for your consult.     Errol Chaney MD  Gastroenterology  [facility]

## 2018-03-05 NOTE — NURSING
MD decided to discharge patient. Patient has no keys to house.  MD asked me to bring information on pop a lock and give suggestions to patient. I suggested besides pop a lock MD suggested, family and friends.  She said that she would walk the streets, screamed obscenities.  Nurse said patient will discharge.  MD said he will discharge

## 2018-03-05 NOTE — SUBJECTIVE & OBJECTIVE
Past Medical History:   Diagnosis Date    Diabetes mellitus     Hypertension        Past Surgical History:   Procedure Laterality Date    HYSTERECTOMY         Review of patient's allergies indicates:   Allergen Reactions    Naprosyn [naproxen]        No current facility-administered medications on file prior to encounter.      Current Outpatient Prescriptions on File Prior to Encounter   Medication Sig    aspirin 81 MG Chew Take 81 mg by mouth once daily.    gabapentin (NEURONTIN) 300 MG capsule Take 300 mg by mouth 3 (three) times daily.    hydrochlorothiazide (MICROZIDE) 12.5 mg capsule Take 25 mg by mouth once daily.     insulin regular 100 unit/mL Inj injection Inject into the skin 3 (three) times daily before meals.    lisinopril (PRINIVIL,ZESTRIL) 20 MG tablet Take 20 mg by mouth once daily.    simvastatin (ZOCOR) 20 MG tablet Take 20 mg by mouth every evening.    diphenhydrAMINE (BENADRYL) 50 MG capsule Take 1 capsule (50 mg total) by mouth every 6 (six) hours as needed for Itching or Allergies.    lidocaine 5 % Crea Apply topically.    pantoprazole (PROTONIX) 20 MG tablet Take 20 mg by mouth once daily.    ranitidine (ZANTAC) 75 MG tablet Take 75 mg by mouth 2 (two) times daily.    terbinafine (LAMISIL) 1 % cream Apply topically 2 (two) times daily.     Family History     None        Social History Main Topics    Smoking status: Never Smoker    Smokeless tobacco: Never Used    Alcohol use No    Drug use: No    Sexual activity: Not on file     Review of Systems   Constitutional: Positive for activity change and fatigue. Negative for appetite change and fever.   HENT: Negative for congestion, ear pain, rhinorrhea and sinus pressure.    Eyes: Negative for pain and discharge.   Respiratory: Negative for cough, chest tightness, shortness of breath and wheezing.    Cardiovascular: Negative for chest pain and leg swelling.   Gastrointestinal: Negative for abdominal distention, abdominal pain,  diarrhea, nausea and vomiting.   Endocrine: Negative for cold intolerance and heat intolerance.   Genitourinary: Negative for difficulty urinating, flank pain, frequency, hematuria and urgency.   Musculoskeletal: Negative for arthralgias, joint swelling and myalgias.   Allergic/Immunologic: Negative for environmental allergies and food allergies.        Patient complaining of full body itching   Neurological: Negative for dizziness, weakness, light-headedness and headaches.   Hematological: Does not bruise/bleed easily.   Psychiatric/Behavioral: Negative for agitation, behavioral problems and decreased concentration.     Objective:     Vital Signs (Most Recent):  Temp: 98.7 °F (37.1 °C) (03/04/18 2257)  Pulse: 104 (03/04/18 2259)  Resp: 18 (03/04/18 2257)  BP: (!) 184/80 (03/04/18 2259)  SpO2: 99 % (03/04/18 2259) Vital Signs (24h Range):  Temp:  [98.1 °F (36.7 °C)-98.7 °F (37.1 °C)] 98.7 °F (37.1 °C)  Pulse:  [] 104  Resp:  [18] 18  SpO2:  [97 %-100 %] 99 %  BP: (155-192)/(72-94) 184/80     Weight: 114 kg (251 lb 5.2 oz)  Body mass index is 41.82 kg/m².    Physical Exam   Constitutional: She is oriented to person, place, and time. She appears well-developed and well-nourished.   HENT:   Head: Normocephalic.   Eyes: Conjunctivae are normal. Right eye exhibits no discharge. Left eye exhibits no discharge.   Neck: Normal range of motion. Neck supple.   Cardiovascular: Regular rhythm, normal heart sounds and intact distal pulses.  Tachycardia present.    Pulses:       Radial pulses are 1+ on the right side, and 1+ on the left side.        Dorsalis pedis pulses are 1+ on the right side, and 1+ on the left side.   Pulmonary/Chest: Effort normal and breath sounds normal. Tachypnea noted. No respiratory distress.   Abdominal: Soft. Bowel sounds are normal. She exhibits no distension. There is no tenderness.   Musculoskeletal: Normal range of motion.   Neurological: She is alert and oriented to person, place, and  time. She has normal strength. GCS eye subscore is 4. GCS verbal subscore is 5. GCS motor subscore is 6.   Skin: Skin is warm and dry. Capillary refill takes 2 to 3 seconds.   No rash present. Patient is scratching throughout assessment.   Psychiatric: She has a normal mood and affect. Her speech is normal and behavior is normal. Thought content normal.           Significant Labs:   CBC:   Recent Labs  Lab 03/04/18  1457   WBC 7.54   HGB 10.9*   HCT 35.5*        CMP:   Recent Labs  Lab 03/04/18  1458      K 3.6      CO2 24   *   BUN 24*   CREATININE 1.3   CALCIUM 9.3   PROT 7.9   ALBUMIN 3.5   BILITOT 0.7   ALKPHOS 463*   AST 43*   *   ANIONGAP 12   EGFRNONAA 47*     POCT Glucose:   Recent Labs  Lab 03/04/18 2025   POCTGLUCOSE 397*       Significant Imaging: I have reviewed all pertinent imaging results/findings within the past 24 hours.

## 2018-03-05 NOTE — ASSESSMENT & PLAN NOTE
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  This causes mass effect on distal common bile duct with proximal biliary duct dilatation.  Cholelithiasis and sludge without evidence of acute cholecystitis.  Mild diverticulosis coli without focal diverticulitis.    Consult GI  IVF  Dilaudid for pain  Zofran for N/V  NPO

## 2018-03-05 NOTE — ED NOTES
"Pt c/o "itching" BUE's and chest per pt. Dr. Coronado made aware. MD states he was coming to speak with patient. Pt informed of plan of care.   "

## 2018-03-05 NOTE — HPI
"This is a 52 y.o. female with hx of DM, HTN, and high cholesterol who presents with complaint of itching x 2 days. It is to SARTHAK, back, chest, and mildly to BLE. She reports having been in a sick friend's house and sitting on a cloth chair at onset. She states "I felt like stuff was crawling on me," but hadn't seen anything. There has been no relief with taking a shower or applying witch hazel, alcohol, hydrocortisone cream, and other OTC ointments to skin. No relief with benadryl. She denies any new medicine, new soaps/lotions, and recent illnesses. She states that the only new change is that she has begun to drink grapefruit juice. Pt has no fever, chills, N/V/D, bloody/tarry stools, abdominal pain, wound, rash, dysuria, discolored urine, difficulty urinating, trouble swallowing, voice change, facial swelling, and choking. Pt does admit to mild visual difficulties with R eye secondary to kenalog injection to the eye approximately 2 weeks ago. Pt denies any allergy to steroids, she is allergic to naprosyn. Pt was near kidney failure in July 2017 and has followed up with nephrologist. She states that it was secondary to ingestion of two prescription strength ibuprofen. Her blood sugar ran 178mg/dL this morning. Pt denies any smoking, drinking, or illicit drug use. She has a Fhx of HTN and DM.  "

## 2018-03-05 NOTE — HOSPITAL COURSE
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    Patient admitted to the hospital due to concern for possible obstruction based on CT scan.  Patient treated with supportive care and kept NPO order.  However despite concern for obstruction patient with normal serum bilirubin and tolerating oral intake without issue.  Dr. Errol Chaney (gastroenterology) consulted who recommends outpatient follow-up for endoscopic ultrasound and biopsy with Dr. Demario Hilliard.  Patient discharged home in stable condition.

## 2018-03-05 NOTE — ED NOTES
Pt concerned that no one cares about her elevated blood sugar, explained to pt that she is NPO and do not want to over medicate her and bottom out her sugar. MD herrera

## 2018-03-05 NOTE — PLAN OF CARE
Patient reports continued itching and insomnia. BG monitored Q4H. No insulin given per verbal order. IVF infusing. Pt remains NPO since admission. Safety measures maintained. Will continue to monitor.

## 2018-03-05 NOTE — H&P
"[facility] Hospital Medicine  History & Physical    Patient Name: Starla Wharton  MRN: [id number]  Admission Date: 3/4/2018  Attending Physician: Samson Frank MD   Primary Care Provider: Primary Doctor No         Patient information was obtained from patient, past medical records and ER records.     Subjective:     Principal Problem:Pancreatic mass    Chief Complaint:   Chief Complaint   Patient presents with    Rash     Pt c/o itching rash on trunl & extremities X 2 days. Pt reports taking benadryl with minimal relief.        HPI: This is a 52 y.o. female with hx of DM, HTN, and high cholesterol who presents with complaint of itching x 2 days. It is to SARTHAK, back, chest, and mildly to BLE. She reports having been in a sick friend's house and sitting on a cloth chair at onset. She states "I felt like stuff was crawling on me," but hadn't seen anything. There has been no relief with taking a shower or applying witch hazel, alcohol, hydrocortisone cream, and other OTC ointments to skin. No relief with benadryl. She denies any new medicine, new soaps/lotions, and recent illnesses. She states that the only new change is that she has begun to drink grapefruit juice. Pt has no fever, chills, N/V/D, bloody/tarry stools, abdominal pain, wound, rash, dysuria, discolored urine, difficulty urinating, trouble swallowing, voice change, facial swelling, and choking. Pt does admit to mild visual difficulties with R eye secondary to kenalog injection to the eye approximately 2 weeks ago. Pt denies any allergy to steroids, she is allergic to naprosyn. Pt was near kidney failure in July 2017 and has followed up with nephrologist. She states that it was secondary to ingestion of two prescription strength ibuprofen. Her blood sugar ran 178mg/dL this morning. Pt denies any smoking, drinking, or illicit drug use. She has a Fhx of HTN and DM.    Past Medical History:   Diagnosis Date    Diabetes mellitus     " Hypertension        Past Surgical History:   Procedure Laterality Date    HYSTERECTOMY         Review of patient's allergies indicates:   Allergen Reactions    Naprosyn [naproxen]        No current facility-administered medications on file prior to encounter.      Current Outpatient Prescriptions on File Prior to Encounter   Medication Sig    aspirin 81 MG Chew Take 81 mg by mouth once daily.    gabapentin (NEURONTIN) 300 MG capsule Take 300 mg by mouth 3 (three) times daily.    hydrochlorothiazide (MICROZIDE) 12.5 mg capsule Take 25 mg by mouth once daily.     insulin regular 100 unit/mL Inj injection Inject into the skin 3 (three) times daily before meals.    lisinopril (PRINIVIL,ZESTRIL) 20 MG tablet Take 20 mg by mouth once daily.    simvastatin (ZOCOR) 20 MG tablet Take 20 mg by mouth every evening.    diphenhydrAMINE (BENADRYL) 50 MG capsule Take 1 capsule (50 mg total) by mouth every 6 (six) hours as needed for Itching or Allergies.    lidocaine 5 % Crea Apply topically.    pantoprazole (PROTONIX) 20 MG tablet Take 20 mg by mouth once daily.    ranitidine (ZANTAC) 75 MG tablet Take 75 mg by mouth 2 (two) times daily.    terbinafine (LAMISIL) 1 % cream Apply topically 2 (two) times daily.     Family History     None        Social History Main Topics    Smoking status: Never Smoker    Smokeless tobacco: Never Used    Alcohol use No    Drug use: No    Sexual activity: Not on file     Review of Systems   Constitutional: Positive for activity change and fatigue. Negative for appetite change and fever.   HENT: Negative for congestion, ear pain, rhinorrhea and sinus pressure.    Eyes: Negative for pain and discharge.   Respiratory: Negative for cough, chest tightness, shortness of breath and wheezing.    Cardiovascular: Negative for chest pain and leg swelling.   Gastrointestinal: Negative for abdominal distention, abdominal pain, diarrhea, nausea and vomiting.   Endocrine: Negative for cold  intolerance and heat intolerance.   Genitourinary: Negative for difficulty urinating, flank pain, frequency, hematuria and urgency.   Musculoskeletal: Negative for arthralgias, joint swelling and myalgias.   Allergic/Immunologic: Negative for environmental allergies and food allergies.        Patient complaining of full body itching   Neurological: Negative for dizziness, weakness, light-headedness and headaches.   Hematological: Does not bruise/bleed easily.   Psychiatric/Behavioral: Negative for agitation, behavioral problems and decreased concentration.     Objective:     Vital Signs (Most Recent):  Temp: 98.7 °F (37.1 °C) (03/04/18 2257)  Pulse: 104 (03/04/18 2259)  Resp: 18 (03/04/18 2257)  BP: (!) 184/80 (03/04/18 2259)  SpO2: 99 % (03/04/18 2259) Vital Signs (24h Range):  Temp:  [98.1 °F (36.7 °C)-98.7 °F (37.1 °C)] 98.7 °F (37.1 °C)  Pulse:  [] 104  Resp:  [18] 18  SpO2:  [97 %-100 %] 99 %  BP: (155-192)/(72-94) 184/80     Weight: 114 kg (251 lb 5.2 oz)  Body mass index is 41.82 kg/m².    Physical Exam   Constitutional: She is oriented to person, place, and time. She appears well-developed and well-nourished.   HENT:   Head: Normocephalic.   Eyes: Conjunctivae are normal. Right eye exhibits no discharge. Left eye exhibits no discharge.   Neck: Normal range of motion. Neck supple.   Cardiovascular: Regular rhythm, normal heart sounds and intact distal pulses.  Tachycardia present.    Pulses:       Radial pulses are 1+ on the right side, and 1+ on the left side.        Dorsalis pedis pulses are 1+ on the right side, and 1+ on the left side.   Pulmonary/Chest: Effort normal and breath sounds normal. Tachypnea noted. No respiratory distress.   Abdominal: Soft. Bowel sounds are normal. She exhibits no distension. There is no tenderness.   Musculoskeletal: Normal range of motion.   Neurological: She is alert and oriented to person, place, and time. She has normal strength. GCS eye subscore is 4. GCS verbal  subscore is 5. GCS motor subscore is 6.   Skin: Skin is warm and dry. Capillary refill takes 2 to 3 seconds.   No rash present. Patient is scratching throughout assessment.   Psychiatric: She has a normal mood and affect. Her speech is normal and behavior is normal. Thought content normal.           Significant Labs:   CBC:   Recent Labs  Lab 03/04/18  1457   WBC 7.54   HGB 10.9*   HCT 35.5*        CMP:   Recent Labs  Lab 03/04/18  1458      K 3.6      CO2 24   *   BUN 24*   CREATININE 1.3   CALCIUM 9.3   PROT 7.9   ALBUMIN 3.5   BILITOT 0.7   ALKPHOS 463*   AST 43*   *   ANIONGAP 12   EGFRNONAA 47*     POCT Glucose:   Recent Labs  Lab 03/04/18 2025   POCTGLUCOSE 397*       Significant Imaging: I have reviewed all pertinent imaging results/findings within the past 24 hours.    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  This causes mass effect on distal common bile duct with proximal biliary duct dilatation.  Cholelithiasis and sludge without evidence of acute cholecystitis.  Mild diverticulosis coli without focal diverticulitis.    Consult GI  IVF  Dilaudid for pain  Zofran for N/V  NPO        Hyperlipidemia    Lipid Panel pending    Hold simvastatin with elevated LFTs          Type 2 diabetes mellitus with complication, with long-term current use of insulin    A1c pending  Moderate dose SSI  Will hold other insulin for now due to NPO status          Essential hypertension    Hypertensive currently    Continue lisinopril            VTE Risk Mitigation         Ordered     heparin (porcine) injection 5,000 Units  Every 8 hours     Route:  Subcutaneous        03/04/18 2303     Medium Risk of VTE  Once      03/04/18 2303     Place YAIR hose  Until discontinued      03/04/18 2303     Place sequential compression device  Until discontinued      03/04/18 2303              João Morelos NP  Department of Hospital Medicine   [facility]

## 2018-03-06 NOTE — DISCHARGE SUMMARY
"[facility] Hospital Medicine  Discharge Summary      Patient Name: Starla Wharton  MRN: [id number]  Admission Date: 3/4/2018  Hospital Length of Stay: 1 days  Discharge Date and Time:  03/05/2018 6:01 PM  Attending Physician: Samson Frank MD   Discharging Provider: Samson Frank MD  Primary Care Provider: Elif Hernández MD      HPI:   This is a 52 y.o. female with hx of DM, HTN, and high cholesterol who presents with complaint of itching x 2 days. It is to SARTHAK, back, chest, and mildly to BLE. She reports having been in a sick friend's house and sitting on a cloth chair at onset. She states "I felt like stuff was crawling on me," but hadn't seen anything. There has been no relief with taking a shower or applying witch hazel, alcohol, hydrocortisone cream, and other OTC ointments to skin. No relief with benadryl. She denies any new medicine, new soaps/lotions, and recent illnesses. She states that the only new change is that she has begun to drink grapefruit juice. Pt has no fever, chills, N/V/D, bloody/tarry stools, abdominal pain, wound, rash, dysuria, discolored urine, difficulty urinating, trouble swallowing, voice change, facial swelling, and choking. Pt does admit to mild visual difficulties with R eye secondary to kenalog injection to the eye approximately 2 weeks ago. Pt denies any allergy to steroids, she is allergic to naprosyn. Pt was near kidney failure in July 2017 and has followed up with nephrologist. She states that it was secondary to ingestion of two prescription strength ibuprofen. Her blood sugar ran 178mg/dL this morning. Pt denies any smoking, drinking, or illicit drug use. She has a Fhx of HTN and DM.      Hospital Course:   Patient is a 52 year-old woman with hypertension, poorly controlled diabetes mellitus type II who presents with diffuse pruritis and found to have a multiloculated cystic lesion at the level of pancreatic head concerning for mucinous cystic " pancreatic tumor causing effect on distal common bile duct with proximal biliary duct dilatation.    Patient admitted to the hospital due to concern for possible obstruction based on CT scan.  Patient treated with supportive care and kept NPO order.  However despite concern for obstruction patient with normal serum bilirubin and tolerating oral intake without issue.  Dr. Errol Chaney (gastroenterology) consulted who recommends outpatient follow-up for endoscopic ultrasound and biopsy with Dr. Demario Hilliard.  Patient discharged home in stable condition.     Consults:   Consults         Status Ordering Provider     Inpatient consult to Gastroenterology  Once     Provider:  Errol Chaney MD    Completed RODRICK COYNE          Final Active Diagnoses:    Diagnosis Date Noted POA    PRINCIPAL PROBLEM:  Pancreatic mass [K86.9] 03/04/2018 Unknown    Essential hypertension [I10] 03/04/2018 Unknown    Type 2 diabetes mellitus with complication, with long-term current use of insulin [E11.8, Z79.4] 03/04/2018 Not Applicable    Hyperlipidemia [E78.5] 03/04/2018 Unknown      Problems Resolved During this Admission:    Diagnosis Date Noted Date Resolved POA       Discharged Condition: Stable    Disposition: Home or Self Care    Follow Up:  Follow-up Information     Schedule an appointment as soon as possible for a visit with Elif Hernández MD.    Specialty:  Internal Medicine  Why:  Re-establish outpatient care  Contact information:  [address] [place] [facility] [address]  [phone]             Schedule an appointment as soon as possible for a visit with Demario Hilliard MD.    Specialty:  Gastroenterology  Why:  Endoscopic ultrasound and biopsy of pancreatitic mass.  Contact information:  [address] [place] [address]  [id number]  [facility]  [place] [address]  [phone]                 Patient Instructions:     Diet diabetic     Activity as tolerated     Notify your health care  provider if you experience any of the following:  temperature >100.4     Notify your health care provider if you experience any of the following:  persistent nausea and vomiting or diarrhea     Notify your health care provider if you experience any of the following:  severe uncontrolled pain     Notify your health care provider if you experience any of the following:  difficulty breathing or increased cough     Notify your health care provider if you experience any of the following:  severe persistent headache     Notify your health care provider if you experience any of the following:  worsening rash     Notify your health care provider if you experience any of the following:  persistent dizziness, light-headedness, or visual disturbances     Notify your health care provider if you experience any of the following:  increased confusion or weakness         Medications:  Reconciled Home Medications:   Current Discharge Medication List      START taking these medications    Details   hydrocortisone 2.5 % cream Apply topically 2 (two) times daily as needed (apply for itching).  Qty: 3.5 g, Refills: 0         CONTINUE these medications which have CHANGED    Details   insulin detemir U-100 (LEVEMIR) 100 unit/mL injection Inject 40 Units into the skin 2 (two) times daily.  Refills: 0         CONTINUE these medications which have NOT CHANGED    Details   aspirin 81 MG Chew Take 81 mg by mouth once daily.      gabapentin (NEURONTIN) 300 MG capsule Take 300 mg by mouth 3 (three) times daily.      hydroCHLOROthiazide (HYDRODIURIL) 25 MG tablet Take 25 mg by mouth once daily.      insulin aspart U-100 (NOVOLOG) 100 unit/mL injection Inject 15 Units into the skin 3 (three) times daily before meals.      lisinopril (PRINIVIL,ZESTRIL) 40 MG tablet Take 40 mg by mouth once daily.      metFORMIN (GLUCOPHAGE) 1000 MG tablet Take 1,000 mg by mouth 2 (two) times daily.      simvastatin (ZOCOR) 20 MG tablet Take 20 mg by mouth every  evening.      diphenhydrAMINE (BENADRYL) 50 MG capsule Take 1 capsule (50 mg total) by mouth every 6 (six) hours as needed for Itching or Allergies.  Qty: 20 capsule, Refills: 0      esomeprazole (NEXIUM) 20 MG capsule Take 20 mg by mouth once daily.      pantoprazole (PROTONIX) 20 MG tablet Take 20 mg by mouth once daily.      terbinafine (LAMISIL) 1 % cream Apply topically 2 (two) times daily.         STOP taking these medications       hydrochlorothiazide (MICROZIDE) 12.5 mg capsule Comments:   Reason for Stopping:         hydroCHLOROthiazide (MICROZIDE) 12.5 mg capsule Comments:   Reason for Stopping:         insulin glargine (LANTUS SOLOSTAR) 100 unit/mL (3 mL) InPn pen Comments:   Reason for Stopping:         insulin regular 100 unit/mL Inj injection Comments:   Reason for Stopping:         insulin regular 100 unit/mL Inj injection Comments:   Reason for Stopping:         escitalopram oxalate (LEXAPRO) 20 MG tablet Comments:   Reason for Stopping:         insulin glargine (LANTUS) 100 unit/mL injection Comments:   Reason for Stopping:         lidocaine 5 % Crea Comments:   Reason for Stopping:         ranitidine (ZANTAC) 75 MG tablet Comments:   Reason for Stopping:               Indwelling Lines/Drains at time of discharge:   Lines/Drains/Airways          No matching active lines, drains, or airways          Time spent on the discharge of patient: 35 minutes  Patient was seen and examined on the date of discharge and determined to be suitable for discharge.         Samson Frank MD  Department of Hospital Medicine  [facility]

## 2022-07-20 ENCOUNTER — OFFICE VISIT (OUTPATIENT)
Dept: INTERNAL MEDICINE | Facility: CLINIC | Age: 57
End: 2022-07-20
Payer: MEDICARE

## 2022-07-20 DIAGNOSIS — Z00.00 HEALTH MAINTENANCE EXAMINATION: ICD-10-CM

## 2022-07-20 DIAGNOSIS — I10 ESSENTIAL HYPERTENSION: ICD-10-CM

## 2022-07-20 DIAGNOSIS — Z12.12 SCREENING FOR COLORECTAL CANCER: ICD-10-CM

## 2022-07-20 DIAGNOSIS — E55.9 VITAMIN D DEFICIENCY: ICD-10-CM

## 2022-07-20 DIAGNOSIS — E11.8 TYPE 2 DIABETES MELLITUS WITH COMPLICATION, WITH LONG-TERM CURRENT USE OF INSULIN: Primary | ICD-10-CM

## 2022-07-20 DIAGNOSIS — Z86.19 HISTORY OF SYPHILIS: ICD-10-CM

## 2022-07-20 DIAGNOSIS — Z12.31 SCREENING MAMMOGRAM FOR BREAST CANCER: ICD-10-CM

## 2022-07-20 DIAGNOSIS — K86.2 PANCREATIC CYST: ICD-10-CM

## 2022-07-20 DIAGNOSIS — Z79.4 TYPE 2 DIABETES MELLITUS WITH COMPLICATION, WITH LONG-TERM CURRENT USE OF INSULIN: Primary | ICD-10-CM

## 2022-07-20 DIAGNOSIS — Z12.11 SCREENING FOR COLORECTAL CANCER: ICD-10-CM

## 2022-07-20 DIAGNOSIS — E11.42 DIABETIC POLYNEUROPATHY ASSOCIATED WITH TYPE 2 DIABETES MELLITUS: ICD-10-CM

## 2022-07-20 DIAGNOSIS — Z23 NEED FOR VACCINATION: ICD-10-CM

## 2022-07-20 DIAGNOSIS — E78.5 HYPERLIPIDEMIA, UNSPECIFIED HYPERLIPIDEMIA TYPE: ICD-10-CM

## 2022-07-20 DIAGNOSIS — H92.01 RIGHT EAR PAIN: ICD-10-CM

## 2022-07-20 PROBLEM — H35.359 CYSTOID MACULAR DEGENERATION OF RETINA: Status: ACTIVE | Noted: 2022-07-20

## 2022-07-20 PROCEDURE — 99443 PR PHYSICIAN TELEPHONE EVALUATION 21-30 MIN: ICD-10-PCS | Mod: 95,,, | Performed by: STUDENT IN AN ORGANIZED HEALTH CARE EDUCATION/TRAINING PROGRAM

## 2022-07-20 PROCEDURE — 99443 PR PHYSICIAN TELEPHONE EVALUATION 21-30 MIN: CPT | Mod: 95,,, | Performed by: STUDENT IN AN ORGANIZED HEALTH CARE EDUCATION/TRAINING PROGRAM

## 2022-07-20 RX ORDER — GABAPENTIN 300 MG/1
300 CAPSULE ORAL 3 TIMES DAILY
Qty: 270 CAPSULE | Refills: 1 | Status: SHIPPED | OUTPATIENT
Start: 2022-07-20 | End: 2023-01-09 | Stop reason: SDUPTHER

## 2022-07-20 RX ORDER — ZOSTER VACCINE RECOMBINANT, ADJUVANTED 50 MCG/0.5
0.5 KIT INTRAMUSCULAR ONCE
Qty: 1 EACH | Refills: 0 | Status: SHIPPED | OUTPATIENT
Start: 2022-07-20 | End: 2022-07-23

## 2022-07-20 RX ORDER — ATORVASTATIN CALCIUM 40 MG/1
40 TABLET, FILM COATED ORAL DAILY
COMMUNITY
Start: 2022-07-15 | End: 2022-09-22 | Stop reason: SDUPTHER

## 2022-07-20 RX ORDER — PREDNISOLONE ACETATE 10 MG/ML
1 SUSPENSION/ DROPS OPHTHALMIC 2 TIMES DAILY
COMMUNITY
End: 2024-02-07

## 2022-07-20 RX ORDER — BROMFENAC SODIUM 0.81 MG/ML
1 SOLUTION/ DROPS OPHTHALMIC DAILY
COMMUNITY
End: 2024-02-07

## 2022-07-20 RX ORDER — PNEUMOCOCCAL 20-VALENT CONJUGATE VACCINE 2.2; 2.2; 2.2; 2.2; 2.2; 2.2; 2.2; 2.2; 2.2; 2.2; 2.2; 2.2; 2.2; 2.2; 2.2; 2.2; 4.4; 2.2; 2.2; 2.2 UG/.5ML; UG/.5ML; UG/.5ML; UG/.5ML; UG/.5ML; UG/.5ML; UG/.5ML; UG/.5ML; UG/.5ML; UG/.5ML; UG/.5ML; UG/.5ML; UG/.5ML; UG/.5ML; UG/.5ML; UG/.5ML; UG/.5ML; UG/.5ML; UG/.5ML; UG/.5ML
0.5 INJECTION, SUSPENSION INTRAMUSCULAR ONCE
Qty: 0.5 ML | Refills: 0 | Status: SHIPPED | OUTPATIENT
Start: 2022-07-20 | End: 2022-07-23

## 2022-07-20 NOTE — PROGRESS NOTES
The patient's location is:  Patient's Home   The chief complaint leading to consultation is:  Type 2 diabetes mellitus with complication, with long-term current use of insulin [E11.8, Z79.4]    Visit type: audio only    Time spent in discussion with the patient: 41 minutes  57 minutes of total time spent on the encounter. This includes time spent in discussion with the patient and time preparing for the patient appointment: review of tests, obtaining and/or reviewing separately obtained history, documenting clinical information in the electronic or other health record, independently interpreting results (not separately reported), and communicating results to the patient/family/caregiver or care coordination (not separately reported).     Each patient to whom he or she provides medical services by telemedicine is: (1) informed of the relationship between the physician and patient and the respective role of any other health care provider with respect to management of the patient; and (2) notified that he or she may decline to receive medical services by telemedicine and may withdraw from such care at any time.      Subjective:   Starla Wharton is a 57 y.o. female who presents for Type 2 diabetes mellitus with complication, with long-term current use of insulin [E11.8, Z79.4].       Patient is new to me, here to establish care.    Health maintenance -   FIT kit in 2020, normal.  Denies family history of colorectal cancer.   Mammogram >2 years ago. Due for repeat.  History of prior abnormal mammogram, biopsy, benign fatty tissue.  Family history of breast cancers, cousin.  Denies family history of ovarian cancers.  Hysterectomy due to fibroids.   Denies history of prior abnormal pap smears.  UTD on COVID19 primary and booster, Tdap vaccinations.  Due for COVID19 4th dose, shingles, PCV20 vaccinations.  Never smoker.  Drinks alcohol 2-5 times yearly, 1 drink per sitting.  Denies drug use.  Completed HIV and Hep C  screening.    T2DM -   Currently taking metformin, Novolog 20 units qAC, Levemir 62 units BID.  Taking ASA 81 mg daily  Checking blood glucose 1-2 times per day  Denies blood sugars < 70 mg/dL  Due for foot exam  Following with Dr. Ramsay routinely for ophathalmology, has appt AUG2022  Lab Results       Component                Value               Date                       HGBA1C                   10.8 (H)            07/06/2020                 HGBA1C                   8.3 (H)             03/05/2018              HTN -   Currently prescribed lisinopril.  Patient endorses taking medication as directed.  Denies side effects or concerns while taking medication.  Denies headaches, vision changes, CP, palpitations, or other concerning symptoms.  Due for micro albumin creatinine ratio.   BP Readings from Last 3 Encounters:  03/05/18 : (!) 186/89  01/02/18 : (!) 184/76  06/25/17 : 130/74    HLD -   Endorses taking atorvastatin as directed  Denies side effects or concerns while taking medication  Most Recent Lipid Panel Health Maintenance Topic Completion: Not Found  Lab Results       Component                Value               Date                       LDLCALC                  101.0               03/05/2018            Due for lipid recheck.    GERD -   Taking pantoprazole as directed    Previously taking gabapentin for neuropathy  Ran out of medication and was unable to obtain refill from prior PCP    Pancreatic cyst  Underwent biopsy with GI  Was told cyst has resolved and no longer    Completed 3 doses of PCN for syphilis     Would like to be evaluated for right ear concerns with ENT  Endorses occasionally discomfort in right ear/fullness  Feels like something is crawling in ear      Diabetes  She has type 2 diabetes mellitus. No MedicAlert identification noted. The initial diagnosis of diabetes was made 20 years ago. Pertinent negatives for hypoglycemia include no confusion, dizziness, headaches, hunger, mood changes,  nervousness/anxiousness, pallor, seizures, sleepiness, speech difficulty, sweats or tremors. Associated symptoms include foot paresthesias. Pertinent negatives for diabetes include no blurred vision, no chest pain, no fatigue, no foot ulcerations, no polydipsia, no polyphagia, no polyuria, no visual change, no weakness and no weight loss. Pertinent negatives for hypoglycemia complications include no blackouts, no hospitalization, no nocturnal hypoglycemia, no required assistance and no required glucagon injection. Symptoms are stable. Diabetic complications include autonomic neuropathy and retinopathy. Pertinent negatives for diabetic complications include no CVA, heart disease, peripheral neuropathy or PVD. Risk factors for coronary artery disease include family history and hypertension. Current diabetic treatment includes insulin injections and oral agent (monotherapy). She is compliant with treatment most of the time. She is currently taking insulin pre-breakfast, pre-lunch, pre-dinner and at bedtime. Insulin injections are given by patient. Rotation sites for injection include the abdominal wall. Her weight is stable. When asked about meal planning, she reported none. She has not had a previous visit with a dietitian. She never participates in exercise. She monitors blood glucose at home 1-2 x per day. Blood glucose monitoring compliance is inadequate. There is no change in her home blood glucose trend. She sees a podiatrist.Eye exam is current.     Diabetes Management Status    Statin: Taking  ACE/ARB: Taking    Screening or Prevention Patient's value Goal Complete/Controlled?   HgA1C Testing and Control   Lab Results   Component Value Date    HGBA1C 10.8 (H) 07/06/2020      [id number]/Less than 7% No   Lipid profile Most Recent Lipid Panel Health Maintenance Topic Completion: Not Found Annually No   LDL control Lab Results   Component Value Date    LDLCALC 101.0 03/05/2018    Annually/Less than 70 mg/dl  No    Nephropathy screening No results found for: MICALBCREAT  Lab Results   Component Value Date    CREATININE 1.1 03/05/2018     Lab Results   Component Value Date    PROTEINUA Negative 03/04/2018    Annually No   Blood pressure BP Readings from Last 1 Encounters:   03/05/18 (!) 186/89    Less than 140/90 No   Dilated retinal exam Seeing Dr. Ramsay  Annually Yes   Foot exam   Most Recent Foot Exam Date: Not Found Annually No        Review of Systems   Constitutional: Negative for fatigue and weight loss.   Eyes: Negative for blurred vision.   Cardiovascular: Negative for chest pain.   Endocrine: Negative for polydipsia, polyphagia and polyuria.   Skin: Negative for pallor.   Neurological: Negative for dizziness, tremors, seizures, speech difficulty, weakness and headaches.   Psychiatric/Behavioral: Negative for confusion. The patient is not nervous/anxious.          Current Outpatient Medications   Medication Instructions    aspirin 81 mg, Daily    diphenhydrAMINE (BENADRYL) 50 mg, Oral, Every 6 hours PRN    esomeprazole (NEXIUM) 20 mg, Oral, Daily    gabapentin (NEURONTIN) 300 mg, 3 times daily    hydroCHLOROthiazide (HYDRODIURIL) 25 mg, Oral, Daily    hydrocortisone 2.5 % cream Topical (Top), 2 times daily PRN    insulin aspart U-100 (NOVOLOG) 15 Units, Subcutaneous, 3 times daily before meals    insulin detemir U-100 (LEVEMIR) 40 Units, Subcutaneous, 2 times daily    lisinopriL (PRINIVIL,ZESTRIL) 40 mg, Oral, Daily    metFORMIN (GLUCOPHAGE) 1,000 mg, Oral, 2 times daily    pantoprazole (PROTONIX) 20 mg, Oral, Daily    simvastatin (ZOCOR) 20 mg, Nightly    terbinafine (LAMISIL) 1 % cream 2 times daily       Objective:   No home vitals reported.     Physical Exam  Psychiatric:         Attention and Perception: Attention normal.         Mood and Affect: Mood normal.         Speech: Speech normal.           Assessment/Plan:       Type 2 diabetes mellitus with complication, with long-term current use of  insulin  Continue metformin, Humalog, and Levemir   Referral to endocrinology  Referral to diabetes management   RTC in 2 months to follow up  -     Ambulatory referral/consult to Endocrinology; Future  -     CBC Auto Differential; Future  -     Hemoglobin A1C; Future  -     Comprehensive Metabolic Panel; Future  -     Microalbumin/Creatinine Ratio, Urine; Future  -     Ambulatory referral/consult to Diabetes Education; Future    Diabetic polyneuropathy associated with type 2 diabetes mellitus  Refilled gabapentin  RTC in 2 months to follow up  -     gabapentin (NEURONTIN) 300 MG capsule; Take 1 capsule (300 mg total) by mouth 3 (three) times daily.    Essential hypertension  Continue HCTZ and lisinopril  RTC in 2 months  -     TSH; Future  -     Microalbumin/Creatinine Ratio, Urine; Future    Hyperlipidemia, unspecified hyperlipidemia type  Continue atorvastatin  -     Lipid Panel; Future    Right ear pain  -     Ambulatory referral/consult to ENT; Future    History of syphilis  -     RPR; Future    Pancreatic cyst  Continue evaluation and management per GI.    Vitamin D deficiency  -     Vitamin D; Future    Health maintenance examination  Need for vaccination  PCV20 and Shingrix vaccines sent to [facility] pharmacy, will come to pharmacy on Friday to complete  -     varicella-zoster gE-AS01B, PF, (SHINGRIX, PF,) 50 mcg/0.5 mL injection; Inject 0.5 mLs into the muscle once. for 1 dose  -     pneumoc 20-jackeline conj-dip cr,PF, (PREVNAR 20, PF,) 0.5 mL Syrg injection; Inject 0.5 mLs into the muscle once. for 1 dose    Screening for colorectal cancer  -     Cologuard Screening (Multitarget Stool DNA); Future    Screening mammogram for breast cancer  -     Mammo Digital Screening Bilat w/ Prasanna; Future      Meghan Perez MD  07/20/2022

## 2022-07-22 ENCOUNTER — PATIENT MESSAGE (OUTPATIENT)
Dept: PHARMACY | Facility: CLINIC | Age: 57
End: 2022-07-22
Payer: MEDICARE

## 2022-07-22 ENCOUNTER — LAB VISIT (OUTPATIENT)
Dept: LAB | Facility: OTHER | Age: 57
End: 2022-07-22
Attending: STUDENT IN AN ORGANIZED HEALTH CARE EDUCATION/TRAINING PROGRAM
Payer: MEDICARE

## 2022-07-22 DIAGNOSIS — I10 ESSENTIAL HYPERTENSION: ICD-10-CM

## 2022-07-22 DIAGNOSIS — E11.8 TYPE 2 DIABETES MELLITUS WITH COMPLICATION, WITH LONG-TERM CURRENT USE OF INSULIN: ICD-10-CM

## 2022-07-22 DIAGNOSIS — Z00.00 HEALTH MAINTENANCE EXAMINATION: ICD-10-CM

## 2022-07-22 DIAGNOSIS — Z79.4 TYPE 2 DIABETES MELLITUS WITH COMPLICATION, WITH LONG-TERM CURRENT USE OF INSULIN: ICD-10-CM

## 2022-07-22 LAB
ALBUMIN/CREAT UR: 29 UG/MG (ref 0–30)
CREAT UR-MCNC: 86.3 MG/DL (ref 15–325)
MICROALBUMIN UR DL<=1MG/L-MCNC: 25 UG/ML

## 2022-07-22 PROCEDURE — 82570 ASSAY OF URINE CREATININE: CPT | Performed by: STUDENT IN AN ORGANIZED HEALTH CARE EDUCATION/TRAINING PROGRAM

## 2022-08-06 ENCOUNTER — TELEPHONE (OUTPATIENT)
Dept: ADMINISTRATIVE | Facility: OTHER | Age: 57
End: 2022-08-06
Payer: MEDICARE

## 2022-08-06 NOTE — TELEPHONE ENCOUNTER
LM for patient to contact our scheduling office to discuss rescheduling Diabetes Management appointment of 8-1-22. Contact number provided.

## 2022-08-23 ENCOUNTER — HOSPITAL ENCOUNTER (OUTPATIENT)
Dept: RADIOLOGY | Facility: OTHER | Age: 57
Discharge: HOME | End: 2022-08-23
Attending: STUDENT IN AN ORGANIZED HEALTH CARE EDUCATION/TRAINING PROGRAM
Payer: MEDICARE

## 2022-08-23 DIAGNOSIS — Z12.31 SCREENING MAMMOGRAM FOR BREAST CANCER: ICD-10-CM

## 2022-08-23 PROCEDURE — 77063 MAMMO DIGITAL SCREENING BILAT WITH TOMO: ICD-10-PCS | Mod: 26,,, | Performed by: RADIOLOGY

## 2022-08-23 PROCEDURE — 77067 MAMMO DIGITAL SCREENING BILAT WITH TOMO: ICD-10-PCS | Mod: 26,,, | Performed by: RADIOLOGY

## 2022-08-23 PROCEDURE — 77067 SCR MAMMO BI INCL CAD: CPT | Mod: TC

## 2022-08-23 PROCEDURE — 77063 BREAST TOMOSYNTHESIS BI: CPT | Mod: 26,,, | Performed by: RADIOLOGY

## 2022-08-23 PROCEDURE — 77063 BREAST TOMOSYNTHESIS BI: CPT | Mod: TC

## 2022-08-23 PROCEDURE — 77067 SCR MAMMO BI INCL CAD: CPT | Mod: 26,,, | Performed by: RADIOLOGY

## 2022-09-08 DIAGNOSIS — D64.9 ANEMIA, UNSPECIFIED TYPE: Primary | ICD-10-CM

## 2022-09-08 DIAGNOSIS — D53.9 NUTRITIONAL ANEMIA: ICD-10-CM

## 2022-09-08 RX ORDER — LISINOPRIL 40 MG/1
20 TABLET ORAL DAILY
Qty: 45 TABLET | Refills: 0 | Status: SHIPPED | OUTPATIENT
Start: 2022-09-08 | End: 2022-11-02 | Stop reason: SDUPTHER

## 2022-09-08 NOTE — TELEPHONE ENCOUNTER
No new care gaps identified.  [facility] Embedded Care Gaps. Reference number: [id number]. 9/08/2022   3:22:30 PM CDT

## 2022-09-08 NOTE — TELEPHONE ENCOUNTER
----- Message from Kelsie Alva sent at 9/8/2022  2:46 PM CDT -----  Regarding: Refill request  Type:  RX Refill Request    Who Called: JIA QUINTERO [[id number]]    Refill or New Rx: Refill      RX Name and Strength:lisinopril (PRINIVIL,ZESTRIL) 40 MG tablet    How is the patient currently taking it? (ex. 1XDay)     Is this a 30 day or 90 day RX: 90 days     Preferred Pharmacy with phone number:[facility]    Local or Mail Order: local     Ordering Provider: Agatha     Best Call Back Number: [phone]    Additional Information:

## 2022-09-22 ENCOUNTER — OFFICE VISIT (OUTPATIENT)
Dept: INTERNAL MEDICINE | Facility: CLINIC | Age: 57
End: 2022-09-22
Payer: MEDICARE

## 2022-09-22 VITALS — SYSTOLIC BLOOD PRESSURE: 140 MMHG | DIASTOLIC BLOOD PRESSURE: 70 MMHG

## 2022-09-22 DIAGNOSIS — E78.5 HYPERLIPIDEMIA, UNSPECIFIED HYPERLIPIDEMIA TYPE: ICD-10-CM

## 2022-09-22 DIAGNOSIS — D64.9 ANEMIA, UNSPECIFIED TYPE: ICD-10-CM

## 2022-09-22 DIAGNOSIS — Z79.4 TYPE 2 DIABETES MELLITUS WITH COMPLICATION, WITH LONG-TERM CURRENT USE OF INSULIN: Primary | ICD-10-CM

## 2022-09-22 DIAGNOSIS — E11.8 TYPE 2 DIABETES MELLITUS WITH COMPLICATION, WITH LONG-TERM CURRENT USE OF INSULIN: Primary | ICD-10-CM

## 2022-09-22 PROBLEM — N18.31 STAGE 3A CHRONIC KIDNEY DISEASE: Status: ACTIVE | Noted: 2022-09-22

## 2022-09-22 PROCEDURE — 3061F PR NEG MICROALBUMINURIA RESULT DOCUMENTED/REVIEW: ICD-10-PCS | Mod: CPTII,95,, | Performed by: STUDENT IN AN ORGANIZED HEALTH CARE EDUCATION/TRAINING PROGRAM

## 2022-09-22 PROCEDURE — 99443 PR PHYSICIAN TELEPHONE EVALUATION 21-30 MIN: ICD-10-PCS | Mod: 95,,, | Performed by: STUDENT IN AN ORGANIZED HEALTH CARE EDUCATION/TRAINING PROGRAM

## 2022-09-22 PROCEDURE — 3046F PR MOST RECENT HEMOGLOBIN A1C LEVEL > 9.0%: ICD-10-PCS | Mod: CPTII,95,, | Performed by: STUDENT IN AN ORGANIZED HEALTH CARE EDUCATION/TRAINING PROGRAM

## 2022-09-22 PROCEDURE — 3066F NEPHROPATHY DOC TX: CPT | Mod: CPTII,95,, | Performed by: STUDENT IN AN ORGANIZED HEALTH CARE EDUCATION/TRAINING PROGRAM

## 2022-09-22 PROCEDURE — 3077F SYST BP >= 140 MM HG: CPT | Mod: CPTII,95,, | Performed by: STUDENT IN AN ORGANIZED HEALTH CARE EDUCATION/TRAINING PROGRAM

## 2022-09-22 PROCEDURE — 4010F ACE/ARB THERAPY RXD/TAKEN: CPT | Mod: CPTII,95,, | Performed by: STUDENT IN AN ORGANIZED HEALTH CARE EDUCATION/TRAINING PROGRAM

## 2022-09-22 PROCEDURE — 4010F PR ACE/ARB THEARPY RXD/TAKEN: ICD-10-PCS | Mod: CPTII,95,, | Performed by: STUDENT IN AN ORGANIZED HEALTH CARE EDUCATION/TRAINING PROGRAM

## 2022-09-22 PROCEDURE — 3078F DIAST BP <80 MM HG: CPT | Mod: CPTII,95,, | Performed by: STUDENT IN AN ORGANIZED HEALTH CARE EDUCATION/TRAINING PROGRAM

## 2022-09-22 PROCEDURE — 3046F HEMOGLOBIN A1C LEVEL >9.0%: CPT | Mod: CPTII,95,, | Performed by: STUDENT IN AN ORGANIZED HEALTH CARE EDUCATION/TRAINING PROGRAM

## 2022-09-22 PROCEDURE — 1159F PR MEDICATION LIST DOCUMENTED IN MEDICAL RECORD: ICD-10-PCS | Mod: CPTII,95,, | Performed by: STUDENT IN AN ORGANIZED HEALTH CARE EDUCATION/TRAINING PROGRAM

## 2022-09-22 PROCEDURE — 3077F PR MOST RECENT SYSTOLIC BLOOD PRESSURE >= 140 MM HG: ICD-10-PCS | Mod: CPTII,95,, | Performed by: STUDENT IN AN ORGANIZED HEALTH CARE EDUCATION/TRAINING PROGRAM

## 2022-09-22 PROCEDURE — 99443 PR PHYSICIAN TELEPHONE EVALUATION 21-30 MIN: CPT | Mod: 95,,, | Performed by: STUDENT IN AN ORGANIZED HEALTH CARE EDUCATION/TRAINING PROGRAM

## 2022-09-22 PROCEDURE — 3078F PR MOST RECENT DIASTOLIC BLOOD PRESSURE < 80 MM HG: ICD-10-PCS | Mod: CPTII,95,, | Performed by: STUDENT IN AN ORGANIZED HEALTH CARE EDUCATION/TRAINING PROGRAM

## 2022-09-22 PROCEDURE — 3066F PR DOCUMENTATION OF TREATMENT FOR NEPHROPATHY: ICD-10-PCS | Mod: CPTII,95,, | Performed by: STUDENT IN AN ORGANIZED HEALTH CARE EDUCATION/TRAINING PROGRAM

## 2022-09-22 PROCEDURE — 3061F NEG MICROALBUMINURIA REV: CPT | Mod: CPTII,95,, | Performed by: STUDENT IN AN ORGANIZED HEALTH CARE EDUCATION/TRAINING PROGRAM

## 2022-09-22 PROCEDURE — 1159F MED LIST DOCD IN RCRD: CPT | Mod: CPTII,95,, | Performed by: STUDENT IN AN ORGANIZED HEALTH CARE EDUCATION/TRAINING PROGRAM

## 2022-09-22 RX ORDER — BLOOD-GLUCOSE SENSOR
EACH MISCELLANEOUS
Qty: 9 EACH | Refills: 3 | Status: SHIPPED | OUTPATIENT
Start: 2022-09-22 | End: 2022-10-21 | Stop reason: SDUPTHER

## 2022-09-22 RX ORDER — PANTOPRAZOLE SODIUM 20 MG/1
20 TABLET, DELAYED RELEASE ORAL DAILY
Qty: 90 TABLET | Refills: 1 | Status: SHIPPED | OUTPATIENT
Start: 2022-09-22 | End: 2023-03-18 | Stop reason: SDUPTHER

## 2022-09-22 RX ORDER — BLOOD-GLUCOSE TRANSMITTER
EACH MISCELLANEOUS
Qty: 1 EACH | Refills: 3 | Status: SHIPPED | OUTPATIENT
Start: 2022-09-22 | End: 2022-10-21 | Stop reason: SDUPTHER

## 2022-09-22 RX ORDER — ACETAMINOPHEN AND CODEINE PHOSPHATE 300; 30 MG/1; MG/1
TABLET ORAL
COMMUNITY
End: 2023-07-11 | Stop reason: ALTCHOICE

## 2022-09-22 RX ORDER — ATORVASTATIN CALCIUM 40 MG/1
40 TABLET, FILM COATED ORAL DAILY
Qty: 90 TABLET | Refills: 3 | Status: SHIPPED | OUTPATIENT
Start: 2022-09-22 | End: 2023-09-13 | Stop reason: SDUPTHER

## 2022-09-22 NOTE — TELEPHONE ENCOUNTER
No new care gaps identified.  [facility] Embedded Care Gaps. Reference number: [id number]. 9/22/2022   4:30:19 PM CDT

## 2022-09-22 NOTE — TELEPHONE ENCOUNTER
----- Message from Alecia Villalpando sent at 9/22/2022  2:44 PM CDT -----  Type: RX Refill Request    Who Called: self    Have you contacted your pharmacy: no    Refill or New Rx:refill    RX Name and Strength:pantoprazole (PROTONIX) 20 MG tablet    Preferred Pharmacy with phone number:[facility]   Phone:  [phone]  Fax:  [phone]    Local or Mail Order:Local    Would the patient rather a call back or a response via [facility]? call    Best Call Back Number:.[phone] (home)

## 2022-09-22 NOTE — PROGRESS NOTES
The patient's location is:  Patient's Home   The chief complaint leading to consultation is:  Type 2 diabetes mellitus with complication, with long-term current use of insulin [E11.8, Z79.4]    Visit type: audio only    Time spent in discussion with the patient: 12 minutes  27 minutes of total time spent on the encounter. This includes time spent in discussion with the patient and time preparing for the patient appointment: review of tests, obtaining and/or reviewing separately obtained history, documenting clinical information in the electronic or other health record, independently interpreting results (not separately reported), and communicating results to the patient/family/caregiver or care coordination (not separately reported).     Each patient to whom he or she provides medical services by telemedicine is: (1) informed of the relationship between the physician and patient and the respective role of any other health care provider with respect to management of the patient; and (2) notified that he or she may decline to receive medical services by telemedicine and may withdraw from such care at any time.      Subjective:   Starla Wharton is a 57 y.o. female who presents for Type 2 diabetes mellitus with complication, with long-term current use of insulin [E11.8, Z79.4].       Patient is established with me, here today for the following:    T2DM on insulin, peripheral neuropathy, retinopathy, CKD3, HLD, HTN, GERD     T2DM -   Currently taking metformin, Novolog 20 units qAC, Levemir 62 units BID.  Endorses occasionally forgets to take Novolog  Having extensive bruising and pain in abdomen due to multiple insulin injections  Denies signs/symptoms of infection in area of injections  Is interested in insulin pump therapy to reduce number of injections  Has appointment to establish with Dr. Molina in NOV2022  Denies family history of MEN or thyroid cancer  Denies personal history of pancreatitis  Had pancreatic cyst  that patient states resolved and was discharged from GI care  Taking ASA 81 mg daily  Checking blood glucose 1-2 times per day  Denies blood sugars < 70 mg/dL  Due for foot exam  Following with Dr. Ramsay routinely for ophathalmology, has appt AUG2022  Lab Results       Component                Value               Date                       HGBA1C                   12.4 (H)            07/22/2022                 HGBA1C                   10.8 (H)            07/06/2020                 HGBA1C                   8.3 (H)             03/05/2018              HLD -   Endorses taking atorvastatin as directed  Denies side effects or concerns while taking medication  : 07/22/2022  Lab Results       Component                Value               Date                       LDLCALC                  71.0                07/22/2022              Anemia -   Has not yet obtained follow up labs for anemia       Review of Systems   All other systems reviewed and are negative.      Current Outpatient Medications   Medication Instructions    acetaminophen-codeine 300-30mg (TYLENOL #3) 300-30 mg Tab acetaminophen 300 mg-codeine 30 mg tablet   TAKE 1 TABLET BY MOUTH EVERY 6 HOURS AS NEEDED FOR PAIN    aspirin 81 mg, Daily    atorvastatin (LIPITOR) 40 mg, Oral, Daily    blood-glucose sensor (DEXCOM G6 SENSOR) Kenzie Use daily for continuous glucose monitoring, change sensor every 10 days.    blood-glucose transmitter (DEXCOM G6 TRANSMITTER) Kenzie Use with Dexcom sensor, change every 90 days.    bromfenac (PROLENSA) 0.07 % Drop 1 drop, Ophthalmic, Daily    gabapentin (NEURONTIN) 300 mg, Oral, 3 times daily    hydroCHLOROthiazide (HYDRODIURIL) 25 mg, Oral, Daily    insulin aspart U-100 (NOVOLOG) 20 Units, Subcutaneous, 3 times daily before meals    insulin detemir U-100 (LEVEMIR) 40 Units, Subcutaneous, 2 times daily    lisinopriL (PRINIVIL,ZESTRIL) 20 mg, Oral, Daily    metFORMIN (GLUCOPHAGE) 1,000 mg, Oral, 2 times daily    pantoprazole (PROTONIX)  20 mg, Oral, Daily    prednisoLONE acetate (PRED FORTE) 1 % DrpS 1 drop, Right Eye, 2 times daily    semaglutide (OZEMPIC) 0.25 mg, Subcutaneous, Every 7 days       Objective:     Vitals:    09/22/22 1255   BP: (!) 140/70          Physical Exam  Psychiatric:         Attention and Perception: Attention normal.         Mood and Affect: Mood and affect normal.         Speech: Speech normal.         Assessment/Plan:       Type 2 diabetes mellitus with complication, with long-term current use of insulin  Continue metformin, Levemir, and Novolog  Start Ozempic 0.25 mg x1 month, increase to 0.5 mg after 1 month  Has establish care appt scheduled with endocrinology NOV2022  Start CGM  Schedule appt with diabetic education  RTC in 1 month for follow up  -     semaglutide (OZEMPIC) 0.25 mg or 0.5 mg(2 mg/1.5 mL) pen injector; Inject 0.25 mg into the skin every 7 days.  -     blood-glucose sensor (DEXCOM G6 SENSOR) Kenzie; Use daily for continuous glucose monitoring, change sensor every 10 days.  -     blood-glucose transmitter (DEXCOM G6 TRANSMITTER) Kenzie; Use with Dexcom sensor, change every 90 days.  -     Ambulatory referral/consult to Diabetes Education; Future  -     Hemoglobin A1C; Future    Hyperlipidemia, unspecified hyperlipidemia type  Continue current medications  RTC in 1 months for follow up  -     atorvastatin (LIPITOR) 40 MG tablet; Take 1 tablet (40 mg total) by mouth once daily.    Anemia, unspecified type  Obtain anemia labs ordered previously       Meghan Perez MD  09/22/2022

## 2022-10-03 DIAGNOSIS — I10 ESSENTIAL HYPERTENSION: ICD-10-CM

## 2022-10-03 DIAGNOSIS — Z79.4 TYPE 2 DIABETES MELLITUS WITH COMPLICATION, WITH LONG-TERM CURRENT USE OF INSULIN: Primary | ICD-10-CM

## 2022-10-03 DIAGNOSIS — E11.8 TYPE 2 DIABETES MELLITUS WITH COMPLICATION, WITH LONG-TERM CURRENT USE OF INSULIN: Primary | ICD-10-CM

## 2022-10-03 RX ORDER — HYDROCHLOROTHIAZIDE 25 MG/1
25 TABLET ORAL DAILY
Qty: 90 TABLET | Refills: 3 | Status: SHIPPED | OUTPATIENT
Start: 2022-10-03 | End: 2023-10-01 | Stop reason: SDUPTHER

## 2022-10-03 NOTE — TELEPHONE ENCOUNTER
----- Message from Danielle Culp sent at 10/3/2022  8:38 AM CDT -----  Regarding: refill request  Type: RX Refill Request    Who Called:     Have you contacted your pharmacy: no    Refill or New Rx: refill    RX Name and Strength:insulin detemir U-100 (LEVEMIR) 100 unit/mL injection  hydroCHLOROthiazide (HYDRODIURIL) 25 MG tablet    Preferred Pharmacy with phone number:[facility]   Phone:  [phone]  Fax:  [phone]    Local or Mail Order: local    Ordering Provider: Dr. Perez    Would the patient rather a call back or a response via [facility]? Call    Best Call Back Number: [phone]

## 2022-10-03 NOTE — TELEPHONE ENCOUNTER
No new care gaps identified.  [facility] Embedded Care Gaps. Reference number: [id number]. 10/03/2022   9:17:21 AM CDT

## 2022-10-03 NOTE — TELEPHONE ENCOUNTER
Requested medication has been pended and routed to Dr. Perez for approval. LOV 7/20/22 Upcoming Visits 10/20/22. Allergies have been verified. Thanks.

## 2022-10-05 DIAGNOSIS — E11.8 TYPE 2 DIABETES MELLITUS WITH COMPLICATION, WITH LONG-TERM CURRENT USE OF INSULIN: ICD-10-CM

## 2022-10-05 DIAGNOSIS — Z79.4 TYPE 2 DIABETES MELLITUS WITH COMPLICATION, WITH LONG-TERM CURRENT USE OF INSULIN: ICD-10-CM

## 2022-10-05 NOTE — TELEPHONE ENCOUNTER
No new care gaps identified.  [facility] Embedded Care Gaps. Reference number: [id number]. 10/05/2022   8:58:03 AM FATOUMATAT

## 2022-10-11 DIAGNOSIS — Z79.4 TYPE 2 DIABETES MELLITUS WITH COMPLICATION, WITH LONG-TERM CURRENT USE OF INSULIN: ICD-10-CM

## 2022-10-11 DIAGNOSIS — E11.8 TYPE 2 DIABETES MELLITUS WITH COMPLICATION, WITH LONG-TERM CURRENT USE OF INSULIN: ICD-10-CM

## 2022-10-11 NOTE — TELEPHONE ENCOUNTER
No new care gaps identified.  [facility] Embedded Care Gaps. Reference number: [id number]. 10/11/2022   8:55:01 AM FATOUMATAT

## 2022-10-20 ENCOUNTER — TELEPHONE (OUTPATIENT)
Dept: INTERNAL MEDICINE | Facility: CLINIC | Age: 57
End: 2022-10-20
Payer: MEDICARE

## 2022-10-20 NOTE — TELEPHONE ENCOUNTER
----- Message from Mariaa Burroughs sent at 10/20/2022  8:22 AM CDT -----  Regarding: CAll BAck  Name of Who is Calling:JIA QUINTERO [[id number]]              What is the request in detail: Patient requesting a call back about appointment been cancel on 10-.states she never cancel her appointment.. Patient also requesting for a appointment nothing available in system. Please assist              Can the clinic reply by [facility]: No              What Number to Call Back if not [facility]:[phone]

## 2022-10-21 ENCOUNTER — LAB VISIT (OUTPATIENT)
Dept: LAB | Facility: OTHER | Age: 57
End: 2022-10-21
Attending: STUDENT IN AN ORGANIZED HEALTH CARE EDUCATION/TRAINING PROGRAM
Payer: MEDICARE

## 2022-10-21 ENCOUNTER — OFFICE VISIT (OUTPATIENT)
Dept: INTERNAL MEDICINE | Facility: CLINIC | Age: 57
End: 2022-10-21
Payer: MEDICARE

## 2022-10-21 VITALS
SYSTOLIC BLOOD PRESSURE: 116 MMHG | DIASTOLIC BLOOD PRESSURE: 60 MMHG | HEIGHT: 66 IN | OXYGEN SATURATION: 96 % | BODY MASS INDEX: 39.53 KG/M2 | WEIGHT: 245.94 LBS | HEART RATE: 94 BPM

## 2022-10-21 DIAGNOSIS — Z91.89 ENCOUNTER FOR HEPATITIS C VIRUS SCREENING TEST FOR HIGH RISK PATIENT: ICD-10-CM

## 2022-10-21 DIAGNOSIS — Z79.4 TYPE 2 DIABETES MELLITUS WITH COMPLICATION, WITH LONG-TERM CURRENT USE OF INSULIN: Primary | ICD-10-CM

## 2022-10-21 DIAGNOSIS — Z11.4 SCREENING FOR HIV WITHOUT PRESENCE OF RISK FACTORS: ICD-10-CM

## 2022-10-21 DIAGNOSIS — D64.9 ANEMIA, UNSPECIFIED TYPE: ICD-10-CM

## 2022-10-21 DIAGNOSIS — Z79.4 TYPE 2 DIABETES MELLITUS WITH COMPLICATION, WITH LONG-TERM CURRENT USE OF INSULIN: ICD-10-CM

## 2022-10-21 DIAGNOSIS — E11.8 TYPE 2 DIABETES MELLITUS WITH COMPLICATION, WITH LONG-TERM CURRENT USE OF INSULIN: ICD-10-CM

## 2022-10-21 DIAGNOSIS — L30.9 DERMATITIS: ICD-10-CM

## 2022-10-21 DIAGNOSIS — Z11.59 ENCOUNTER FOR HEPATITIS C VIRUS SCREENING TEST FOR HIGH RISK PATIENT: ICD-10-CM

## 2022-10-21 DIAGNOSIS — D53.9 NUTRITIONAL ANEMIA: ICD-10-CM

## 2022-10-21 DIAGNOSIS — E11.8 TYPE 2 DIABETES MELLITUS WITH COMPLICATION, WITH LONG-TERM CURRENT USE OF INSULIN: Primary | ICD-10-CM

## 2022-10-21 LAB
ALBUMIN SERPL BCP-MCNC: 3.6 G/DL (ref 3.5–5.2)
ALP SERPL-CCNC: 95 U/L (ref 55–135)
ALT SERPL W/O P-5'-P-CCNC: 16 U/L (ref 10–44)
ANION GAP SERPL CALC-SCNC: 13 MMOL/L (ref 8–16)
AST SERPL-CCNC: 11 U/L (ref 10–40)
BASOPHILS # BLD AUTO: 0.03 K/UL (ref 0–0.2)
BASOPHILS NFR BLD: 0.4 % (ref 0–1.9)
BILIRUB SERPL-MCNC: 0.2 MG/DL (ref 0.1–1)
BUN SERPL-MCNC: 30 MG/DL (ref 6–20)
CALCIUM SERPL-MCNC: 9.5 MG/DL (ref 8.7–10.5)
CHLORIDE SERPL-SCNC: 104 MMOL/L (ref 95–110)
CO2 SERPL-SCNC: 24 MMOL/L (ref 23–29)
CREAT SERPL-MCNC: 1.4 MG/DL (ref 0.5–1.4)
DIFFERENTIAL METHOD: ABNORMAL
EOSINOPHIL # BLD AUTO: 0.1 K/UL (ref 0–0.5)
EOSINOPHIL NFR BLD: 1.2 % (ref 0–8)
ERYTHROCYTE [DISTWIDTH] IN BLOOD BY AUTOMATED COUNT: 14.4 % (ref 11.5–14.5)
EST. GFR  (NO RACE VARIABLE): 44 ML/MIN/1.73 M^2
ESTIMATED AVG GLUCOSE: 235 MG/DL (ref 68–131)
FERRITIN SERPL-MCNC: 125 NG/ML (ref 20–300)
FOLATE SERPL-MCNC: 13.9 NG/ML (ref 4–24)
GLUCOSE SERPL-MCNC: 197 MG/DL (ref 70–110)
HBA1C MFR BLD: 9.8 % (ref 4–5.6)
HCT VFR BLD AUTO: 34.7 % (ref 37–48.5)
HCV AB SERPL QL IA: NORMAL
HGB BLD-MCNC: 10.7 G/DL (ref 12–16)
HIV 1+2 AB+HIV1 P24 AG SERPL QL IA: NORMAL
IMM GRANULOCYTES # BLD AUTO: 0.02 K/UL (ref 0–0.04)
IMM GRANULOCYTES NFR BLD AUTO: 0.3 % (ref 0–0.5)
IRON SERPL-MCNC: 51 UG/DL (ref 30–160)
LYMPHOCYTES # BLD AUTO: 2.9 K/UL (ref 1–4.8)
LYMPHOCYTES NFR BLD: 41.3 % (ref 18–48)
MCH RBC QN AUTO: 25.7 PG (ref 27–31)
MCHC RBC AUTO-ENTMCNC: 30.8 G/DL (ref 32–36)
MCV RBC AUTO: 83 FL (ref 82–98)
MONOCYTES # BLD AUTO: 0.5 K/UL (ref 0.3–1)
MONOCYTES NFR BLD: 6.5 % (ref 4–15)
NEUTROPHILS # BLD AUTO: 3.5 K/UL (ref 1.8–7.7)
NEUTROPHILS NFR BLD: 50.3 % (ref 38–73)
NRBC BLD-RTO: 0 /100 WBC
PATH REV BLD -IMP: NORMAL
PLATELET # BLD AUTO: 312 K/UL (ref 150–450)
PMV BLD AUTO: 9 FL (ref 9.2–12.9)
POTASSIUM SERPL-SCNC: 3.8 MMOL/L (ref 3.5–5.1)
PROT SERPL-MCNC: 7.8 G/DL (ref 6–8.4)
RBC # BLD AUTO: 4.17 M/UL (ref 4–5.4)
RETICS/RBC NFR AUTO: 1.1 % (ref 0.5–2.5)
SATURATED IRON: 14 % (ref 20–50)
SODIUM SERPL-SCNC: 141 MMOL/L (ref 136–145)
TOTAL IRON BINDING CAPACITY: 361 UG/DL (ref 250–450)
TRANSFERRIN SERPL-MCNC: 244 MG/DL (ref 200–375)
VIT B12 SERPL-MCNC: 521 PG/ML (ref 210–950)
WBC # BLD AUTO: 6.93 K/UL (ref 3.9–12.7)

## 2022-10-21 PROCEDURE — 99214 PR OFFICE/OUTPT VISIT, EST, LEVL IV, 30-39 MIN: ICD-10-PCS | Mod: S$GLB,,, | Performed by: STUDENT IN AN ORGANIZED HEALTH CARE EDUCATION/TRAINING PROGRAM

## 2022-10-21 PROCEDURE — 82746 ASSAY OF FOLIC ACID SERUM: CPT | Performed by: STUDENT IN AN ORGANIZED HEALTH CARE EDUCATION/TRAINING PROGRAM

## 2022-10-21 PROCEDURE — 99499 UNLISTED E&M SERVICE: CPT | Mod: S$GLB,,, | Performed by: STUDENT IN AN ORGANIZED HEALTH CARE EDUCATION/TRAINING PROGRAM

## 2022-10-21 PROCEDURE — 84466 ASSAY OF TRANSFERRIN: CPT | Performed by: STUDENT IN AN ORGANIZED HEALTH CARE EDUCATION/TRAINING PROGRAM

## 2022-10-21 PROCEDURE — 83036 HEMOGLOBIN GLYCOSYLATED A1C: CPT | Performed by: STUDENT IN AN ORGANIZED HEALTH CARE EDUCATION/TRAINING PROGRAM

## 2022-10-21 PROCEDURE — 36415 COLL VENOUS BLD VENIPUNCTURE: CPT | Performed by: STUDENT IN AN ORGANIZED HEALTH CARE EDUCATION/TRAINING PROGRAM

## 2022-10-21 PROCEDURE — 85060 PATHOLOGIST REVIEW: ICD-10-PCS | Mod: ,,, | Performed by: PATHOLOGY

## 2022-10-21 PROCEDURE — 3008F PR BODY MASS INDEX (BMI) DOCUMENTED: ICD-10-PCS | Mod: CPTII,S$GLB,, | Performed by: STUDENT IN AN ORGANIZED HEALTH CARE EDUCATION/TRAINING PROGRAM

## 2022-10-21 PROCEDURE — 85060 BLOOD SMEAR INTERPRETATION: CPT | Mod: ,,, | Performed by: PATHOLOGY

## 2022-10-21 PROCEDURE — 82728 ASSAY OF FERRITIN: CPT | Performed by: STUDENT IN AN ORGANIZED HEALTH CARE EDUCATION/TRAINING PROGRAM

## 2022-10-21 PROCEDURE — 85045 AUTOMATED RETICULOCYTE COUNT: CPT | Performed by: STUDENT IN AN ORGANIZED HEALTH CARE EDUCATION/TRAINING PROGRAM

## 2022-10-21 PROCEDURE — 3074F PR MOST RECENT SYSTOLIC BLOOD PRESSURE < 130 MM HG: ICD-10-PCS | Mod: CPTII,S$GLB,, | Performed by: STUDENT IN AN ORGANIZED HEALTH CARE EDUCATION/TRAINING PROGRAM

## 2022-10-21 PROCEDURE — 99499 RISK ADDL DX/OHS AUDIT: ICD-10-PCS | Mod: S$GLB,,, | Performed by: STUDENT IN AN ORGANIZED HEALTH CARE EDUCATION/TRAINING PROGRAM

## 2022-10-21 PROCEDURE — 86803 HEPATITIS C AB TEST: CPT | Performed by: STUDENT IN AN ORGANIZED HEALTH CARE EDUCATION/TRAINING PROGRAM

## 2022-10-21 PROCEDURE — 3078F DIAST BP <80 MM HG: CPT | Mod: CPTII,S$GLB,, | Performed by: STUDENT IN AN ORGANIZED HEALTH CARE EDUCATION/TRAINING PROGRAM

## 2022-10-21 PROCEDURE — 82607 VITAMIN B-12: CPT | Performed by: STUDENT IN AN ORGANIZED HEALTH CARE EDUCATION/TRAINING PROGRAM

## 2022-10-21 PROCEDURE — 3078F PR MOST RECENT DIASTOLIC BLOOD PRESSURE < 80 MM HG: ICD-10-PCS | Mod: CPTII,S$GLB,, | Performed by: STUDENT IN AN ORGANIZED HEALTH CARE EDUCATION/TRAINING PROGRAM

## 2022-10-21 PROCEDURE — 99214 OFFICE O/P EST MOD 30 MIN: CPT | Mod: S$GLB,,, | Performed by: STUDENT IN AN ORGANIZED HEALTH CARE EDUCATION/TRAINING PROGRAM

## 2022-10-21 PROCEDURE — 87389 HIV-1 AG W/HIV-1&-2 AB AG IA: CPT | Performed by: STUDENT IN AN ORGANIZED HEALTH CARE EDUCATION/TRAINING PROGRAM

## 2022-10-21 PROCEDURE — 85025 COMPLETE CBC W/AUTO DIFF WBC: CPT | Performed by: STUDENT IN AN ORGANIZED HEALTH CARE EDUCATION/TRAINING PROGRAM

## 2022-10-21 PROCEDURE — 3074F SYST BP LT 130 MM HG: CPT | Mod: CPTII,S$GLB,, | Performed by: STUDENT IN AN ORGANIZED HEALTH CARE EDUCATION/TRAINING PROGRAM

## 2022-10-21 PROCEDURE — 80053 COMPREHEN METABOLIC PANEL: CPT | Performed by: STUDENT IN AN ORGANIZED HEALTH CARE EDUCATION/TRAINING PROGRAM

## 2022-10-21 PROCEDURE — 3008F BODY MASS INDEX DOCD: CPT | Mod: CPTII,S$GLB,, | Performed by: STUDENT IN AN ORGANIZED HEALTH CARE EDUCATION/TRAINING PROGRAM

## 2022-10-21 PROCEDURE — 99999 PR PBB SHADOW E&M-EST. PATIENT-LVL IV: CPT | Mod: PBBFAC,,, | Performed by: STUDENT IN AN ORGANIZED HEALTH CARE EDUCATION/TRAINING PROGRAM

## 2022-10-21 PROCEDURE — 99999 PR PBB SHADOW E&M-EST. PATIENT-LVL IV: ICD-10-PCS | Mod: PBBFAC,,, | Performed by: STUDENT IN AN ORGANIZED HEALTH CARE EDUCATION/TRAINING PROGRAM

## 2022-10-21 RX ORDER — DICLOFENAC SODIUM 10 MG/G
GEL TOPICAL
COMMUNITY
Start: 2022-09-02 | End: 2023-12-20

## 2022-10-21 RX ORDER — INDOMETHACIN 50 MG/1
CAPSULE ORAL
COMMUNITY
End: 2023-12-20

## 2022-10-21 RX ORDER — CICLOPIROX 80 MG/ML
SOLUTION TOPICAL
COMMUNITY
Start: 2022-01-05 | End: 2024-02-07

## 2022-10-21 RX ORDER — INSULIN LISPRO 100 [IU]/ML
INJECTION, SOLUTION INTRAVENOUS; SUBCUTANEOUS
COMMUNITY
End: 2022-10-21 | Stop reason: SDUPTHER

## 2022-10-21 RX ORDER — INSULIN ASPART 100 [IU]/ML
5 INJECTION, SOLUTION INTRAVENOUS; SUBCUTANEOUS
Qty: 13.5 ML | Refills: 1 | Status: SHIPPED | OUTPATIENT
Start: 2022-10-21 | End: 2022-10-21 | Stop reason: ALTCHOICE

## 2022-10-21 RX ORDER — INSULIN LISPRO 100 [IU]/ML
5 INJECTION, SOLUTION INTRAVENOUS; SUBCUTANEOUS DAILY
Qty: 4.5 ML | Refills: 1 | Status: SHIPPED | OUTPATIENT
Start: 2022-10-21 | End: 2022-11-30 | Stop reason: SDUPTHER

## 2022-10-21 RX ORDER — ERYTHROMYCIN 5 MG/G
OINTMENT OPHTHALMIC
COMMUNITY
End: 2023-01-23

## 2022-10-21 RX ORDER — BLOOD-GLUCOSE SENSOR
EACH MISCELLANEOUS
Qty: 9 EACH | Refills: 3 | Status: SHIPPED | OUTPATIENT
Start: 2022-10-21 | End: 2024-02-22 | Stop reason: SDUPTHER

## 2022-10-21 RX ORDER — TRIAMCINOLONE ACETONIDE 1 MG/G
CREAM TOPICAL 2 TIMES DAILY
Qty: 30 G | Refills: 0 | OUTPATIENT
Start: 2022-10-21 | End: 2023-04-04

## 2022-10-21 RX ORDER — DIFLUPREDNATE OPHTHALMIC 0.5 MG/ML
EMULSION OPHTHALMIC
COMMUNITY
End: 2023-01-23

## 2022-10-21 RX ORDER — BLOOD-GLUCOSE TRANSMITTER
EACH MISCELLANEOUS
Qty: 1 EACH | Refills: 3 | Status: SHIPPED | OUTPATIENT
Start: 2022-10-21 | End: 2024-02-22 | Stop reason: SDUPTHER

## 2022-10-21 NOTE — PROGRESS NOTES
Subjective:       Patient ID: Starla Wharton is a 57 y.o. female.    Chief Complaint: Type 2 diabetes mellitus with complication, with long-term current use of insulin [E11.8, Z79.4]    Patient is established with me, here today for the following:    T2DM on insulin, peripheral neuropathy, retinopathy, CKD3, HLD, HTN, GERD     T2DM -   Currently taking metformin, Novolog 20 units qAC, Levemir 62 units BID.  Started on Ozempic in SEP2022.   Endorses occasionally forgets to take Novolog  Having extensive bruising and pain in abdomen due to multiple insulin injections  Denies signs/symptoms of infection in area of injections  Is interested in insulin pump therapy to reduce number of injections  Has appointment to establish with Dr. Molina in NOV2022  Taking ASA 81 mg daily  Checking blood glucose 1-2 times per day  Denies blood sugars < 70 mg/dL  Due for foot exam  Following with Dr. Ramsay routinely for ophathalmology  Lab Results       Component                Value               Date                       HGBA1C                   12.4 (H)            07/22/2022                 HGBA1C                   10.8 (H)            07/06/2020                 HGBA1C                   8.3 (H)             03/05/2018        Due for HIV and hepatitis C screening   Review of Systems   Respiratory:  Negative for cough and shortness of breath.    Cardiovascular:  Negative for chest pain and palpitations.   Skin:  Positive for rash.   Neurological:  Negative for syncope.       Current Outpatient Medications   Medication Instructions    acetaminophen-codeine 300-30mg (TYLENOL #3) 300-30 mg Tab acetaminophen 300 mg-codeine 30 mg tablet   TAKE 1 TABLET BY MOUTH EVERY 6 HOURS AS NEEDED FOR PAIN    aspirin 81 mg, Daily    atorvastatin (LIPITOR) 40 mg, Oral, Daily    blood-glucose sensor (DEXCOM G6 SENSOR) Kenzie Use daily for continuous glucose monitoring, change sensor every 10 days.    blood-glucose transmitter (DEXCOM G6 TRANSMITTER) Kenzie  "Use with Dexcom sensor, change every 90 days.    bromfenac (PROLENSA) 0.07 % Drop 1 drop, Ophthalmic, Daily    ciprofloxacin HCl (CILOXAN) 0.3 % ophthalmic solution Place 1 drop 4 (four) times daily into the right eye for 4 days Then stop.    gabapentin (NEURONTIN) 300 mg, Oral, 3 times daily    hydroCHLOROthiazide (HYDRODIURIL) 25 mg, Oral, Daily    insulin aspart U-100 (NOVOLOG) 20 Units, Subcutaneous, 3 times daily before meals    LEVEMIR FLEXTOUCH U-100 INSULN 62 Units, Subcutaneous, 2 times daily    lisinopriL (PRINIVIL,ZESTRIL) 20 mg, Oral, Daily    metFORMIN (GLUCOPHAGE) 1,000 mg, Oral, 2 times daily    OZEMPIC 0.25 mg, Subcutaneous, Every 7 days    pantoprazole (PROTONIX) 20 mg, Oral, Daily    prednisoLONE acetate (PRED FORTE) 1 % DrpS 1 drop, Right Eye, 2 times daily     Objective:      Vitals:    10/21/22 1442   BP: 116/60   Pulse: 94   SpO2: 96%   Weight: 111.5 kg (245 lb 14.8 oz)   Height: 5' 6" (1.676 m)   PainSc:   6   PainLoc: Leg     Body mass index is 39.69 kg/m².    Physical Exam  Vitals reviewed.   Constitutional:       General: She is not in acute distress.     Appearance: Normal appearance. She is obese. She is not ill-appearing or diaphoretic.   HENT:      Head: Normocephalic and atraumatic.   Cardiovascular:      Rate and Rhythm: Normal rate and regular rhythm.      Heart sounds: Normal heart sounds. No murmur heard.    No friction rub. No gallop.   Pulmonary:      Effort: Pulmonary effort is normal. No respiratory distress.      Breath sounds: Normal breath sounds. No stridor. No wheezing, rhonchi or rales.   Abdominal:      General: Bowel sounds are normal. There is no distension.      Palpations: Abdomen is soft. There is no mass.      Tenderness: There is no abdominal tenderness. There is no guarding or rebound.          Comments: Hyperkeratotic skin. No erythema, edema, sores, wounds, or discharge.   Skin:     General: Skin is warm and dry.      Comments: Color WNL   Neurological:      " Mental Status: She is alert. Mental status is at baseline.   Psychiatric:         Mood and Affect: Mood normal.         Behavior: Behavior normal.       Assessment:       1. Type 2 diabetes mellitus with complication, with long-term current use of insulin    2. Dermatitis    3. Encounter for hepatitis C virus screening test for high risk patient    4. Screening for HIV without presence of risk factors        Plan:       Type 2 diabetes mellitus with complication, with long-term current use of insulin  Follow up with endocrinology and diabetes education as scheduled  Increase Ozempic to 0.5 mg weekly  RTC in 3 months for follow up  -     blood-glucose sensor (DEXCOM G6 SENSOR) Kenzie; Use daily for continuous glucose monitoring, change sensor every 10 days.  -     blood-glucose transmitter (DEXCOM G6 TRANSMITTER) Kenzie; Use with Dexcom sensor, change every 90 days.  -     insulin aspart U-100 (NOVOLOG) 100 unit/mL injection; Inject 5 Units into the skin 3 (three) times daily before meals.  -     insulin detemir U-100 (LEVEMIR FLEXTOUCH) 100 unit/mL (3 mL) SubQ InPn pen; Inject 58 Units into the skin 2 (two) times daily.  -     semaglutide (OZEMPIC) 0.25 mg or 0.5 mg(2 mg/1.5 mL) pen injector; Inject 0.5 mg into the skin every 7 days.  -     COMPREHENSIVE METABOLIC PANEL; Future    Dermatitis  Apply BID x14 days for hyperkeratotic skin  -     triamcinolone acetonide 0.1% (KENALOG) 0.1 % cream; Apply topically 2 (two) times daily. for 14 days    Encounter for hepatitis C virus screening test for high risk patient  -     HEPATITIS C ANTIBODY; Future    Screening for HIV without presence of risk factors  -     HIV 1/2 Ag/Ab (4th Gen); Future      Meghan Perez MD  10/21/2022

## 2022-10-21 NOTE — PATIENT INSTRUCTIONS
"  Decrease Levemir (insulin detemir) 58 units twice a day  Check fasting glucose every day, if your fasting glucose <140 for 2 days in a row, decrease you dose of Levemir another 2 units.   If fasting glucose is >140 continue your current dose.     Decrease Kwikpen (insulin lispro) to 5 units TID     Increase Ozempic 0.5 mg     "The Diabetic Code" by Dr. Alexander Mohan       "

## 2022-10-24 LAB — PATH REV BLD -IMP: NORMAL

## 2022-10-26 ENCOUNTER — PATIENT OUTREACH (OUTPATIENT)
Dept: ADMINISTRATIVE | Facility: HOSPITAL | Age: 57
End: 2022-10-26
Payer: MEDICARE

## 2022-10-27 NOTE — TELEPHONE ENCOUNTER
----- Message from Garth Duke MA sent at 10/27/2022  2:35 PM CDT -----  Regarding: RE: Patient Call Back  Pt stated she need PA started for Dexcom  ----- Message -----  From: Tejal Kramer  Sent: 10/27/2022   2:28 PM CDT  To: Ana [facility] Staff  Subject: Patient Call Back                                .Type: Patient Call Back    Who called: self     What is the request in detail: would like to speak with the nurse regarding current meds please call      Can the clinic reply by [facility]? No     Would the patient rather a call back or a response via [facility]?  Call     Best call back number: .[phone]

## 2022-10-27 NOTE — TELEPHONE ENCOUNTER
JIA QUINTERO (Key: [id number]) - [id number]  Dexcom G6 Transmitter       Status: PA Request    Created: October 27th, 2022    Sent: October 27th, 2022

## 2022-11-02 DIAGNOSIS — I10 ESSENTIAL HYPERTENSION: Primary | ICD-10-CM

## 2022-11-02 RX ORDER — LISINOPRIL 20 MG/1
20 TABLET ORAL DAILY
Qty: 90 TABLET | Refills: 1 | Status: SHIPPED | OUTPATIENT
Start: 2022-11-02 | End: 2023-05-02 | Stop reason: SDUPTHER

## 2022-11-02 NOTE — TELEPHONE ENCOUNTER
No new care gaps identified.  [facility] Embedded Care Gaps. Reference number: [id number]. 11/02/2022   8:43:07 AM FATOUMATAT

## 2022-11-09 ENCOUNTER — CLINICAL SUPPORT (OUTPATIENT)
Dept: DIABETES | Facility: CLINIC | Age: 57
End: 2022-11-09
Payer: MEDICARE

## 2022-11-09 DIAGNOSIS — E11.8 TYPE 2 DIABETES MELLITUS WITH COMPLICATION, WITH LONG-TERM CURRENT USE OF INSULIN: ICD-10-CM

## 2022-11-09 DIAGNOSIS — Z79.4 TYPE 2 DIABETES MELLITUS WITH COMPLICATION, WITH LONG-TERM CURRENT USE OF INSULIN: ICD-10-CM

## 2022-11-09 PROCEDURE — G0108 PR DIAB MANAGE TRN  PER INDIV: ICD-10-PCS | Mod: S$GLB,,, | Performed by: DIETITIAN, REGISTERED

## 2022-11-09 PROCEDURE — 99999 PR PBB SHADOW E&M-EST. PATIENT-LVL I: CPT | Mod: PBBFAC,,, | Performed by: DIETITIAN, REGISTERED

## 2022-11-09 PROCEDURE — 99999 PR PBB SHADOW E&M-EST. PATIENT-LVL I: ICD-10-PCS | Mod: PBBFAC,,, | Performed by: DIETITIAN, REGISTERED

## 2022-11-09 PROCEDURE — G0108 DIAB MANAGE TRN  PER INDIV: HCPCS | Mod: S$GLB,,, | Performed by: DIETITIAN, REGISTERED

## 2022-11-10 VITALS — HEIGHT: 66 IN | BODY MASS INDEX: 39.69 KG/M2

## 2022-11-10 NOTE — PROGRESS NOTES
Diabetes Care Specialist Progress Note  Author: Chantal Shrestha RD  Date: 11/10/2022    Program Intake  Reason for Diabetes Program Visit:: Initial Diabetes Assessment  Current diabetes risk level:: moderate  In the last 12 months, have you:: none  Permission to speak with others about care:: no    Lab Results   Component Value Date    HGBA1C 9.8 (H) 10/21/2022       Clinical    Patient Health Rating  Compared to other people your age, how would you rate your health?: Good    Problem Review  Reviewed Problem List with Patient: yes  Active comorbidities affecting diabetes self-care.: yes  Comorbidities: Neuropathy, Chronic Kidney Disease  Reviewed health maintenance: yes    Clinical Assessment  Current Diabetes Treatment: Insulin, Injectable  Have you ever experienced hypoglycemia (low blood sugar)?: no  Have you ever experienced hyperglycemia (high blood sugar)?: yes  In the last month, how often have you experienced high blood sugar?: once a day  Are you able to tell when your blood sugar is high?: No (comment)    Medication Information  How do you obtain your medications?: Patient drives  How many days a week do you miss your medications?: 3 or more  Do you sometimes have difficulty refilling your medications?: No  Medication adherence impacting ability to self-manage diabetes?: Yes    Labs  Do you have regular lab work to monitor your medications?: Yes  Type of Regular Lab Work: A1c, Cholesterol, Microalbumin, CBC, BMP  Where do you get your labs drawn?: [facility]  Lab Compliance Barriers: No    Nutritional Status  Diet: Regular  Meal Plan 24 Hour Recall: Breakfast, Lunch, Dinner, Snack    Additional Social History    Support  Does anyone support you with your diabetes care?: no  Who takes you to your medical appointments?: self    Access to Mass Media & Technology  Does the patient have access to any of the following devices or technologies?: Smart phone, Internet Access  Media or technology needs impacting  ability to self-manage diabetes?: No    Cognitive/Behavioral Health  Alert and Oriented: Yes  Difficulty Thinking: No  Requires Prompting: No  Requires assistance for routine expression?: No  Cognitive or behavioral barriers impacting ability to self-manage diabetes?: No    Culture/Baptist  Culture or Uatsdin beliefs that may impact ability to access healthcare: No    Communication  Language preference: English  Hearing Problems: No  Vision Problems: No  Communication needs impacting ability to self-manage diabetes?: No    Health Literacy  Preferred Learning Method: Face to Face, Demonstration  How often do you need to have someone help you read instructions, pamphlets, or written material from your doctor or pharmacy?: Never  Health literacy needs impacting ability to self-manage diabetes?: No      Diabetes Self-Management Skills Assessment    Diabetes Disease Process/Treatment Options  Patient/caregiver able to state what happens when someone has diabetes.: yes  Patient/caregiver knows what type of diabetes they have.: yes  Diabetes Type : Type II  Patient/caregiver able to identify at least three signs and symptoms of diabetes.: yes  Identified signs and symptoms:: fatigue, increased thirst  Patient able to identify at least three risk factors for diabetes.: yes  Identified risk factors:: age over 40, being overweight, family history  Diabetes Disease Process/Treatment Options: Skills Assessment Completed: Yes  Assessment indicates:: Adequate understanding  Area of need?: No    Nutrition/Healthy Eating  Challenges to healthy eating:: snacking between meals and at night, eating out, going to parties  Method of carbohydrate measurement:: no method  Patient can identify foods that impact blood sugar.: yes  Patient-identified foods:: soda, starches (bread, pasta, rice, cereal), sweets  Nutrition/Healthy Eating Skills Assessment Completed:: Yes  Assessment indicates:: Knowledge deficit, Instruction Needed  Area of  need?: Yes    Physical Activity/Exercise  Physical Activity/Exercise Skills Assessment Completed: : No  Deffered due to:: Time    Medications  Patient is able to describe current diabetes management routine.: yes  Diabetes management routine:: insulin, oral medications, injectable medications  Patient is able to identify current diabetes medications, dosages, and appropriate timing of medications.: yes  Patient understands the purpose of the medications taken for diabetes.: yes  Patient reports problems or concerns with current medication regimen.: yes  Medication regimen problems/concerns:: does not feel like regimen is working, other (see comments) (bruising/scaring at injection sites)  Medication Skills Assessment Completed:: Yes  Assessment indicates:: Knowledge deficit, Instruction Needed  Area of need?: Yes    Home Blood Glucose Monitoring  Patient states that blood sugar is checked at home daily.: yes  Monitoring Method:: home glucometer  How often do you check your blood sugar?: Other (comment) (once a week)  When do you check your blood sugar?: Before breakfast, Before bedtime  When you check what is your typical blood sugar range? : 150, 134, 170s, sometimes 200s  Blood glucose logs:: no, encouraged to keep logs, encouraged to bring logs to provider visits  Blood glucose logs reviewed today?: no  Home Blood Glucose Monitoring Skills Assessment Completed: : Yes  Assessment indicates:: Knowledge deficit, Instruction Needed  Area of need?: Yes    Acute Complications  Acute Complications Skills Assessment Completed: : No  Deffered due to:: Time    Chronic Complications  Patient can identify major chronic complications of diabetes.: yes  Stated chronic complications:: kidney disease, neuropathy/nerve damage  Patient can identify ways to prevent or delay diabetes complications.: no  Chronic Complications Skills Assessment Completed: : Yes  Assessment indicates:: Knowledge deficit, Instruction Needed  Area of  need?: Yes    Psychosocial/Coping  Psychosocial/Coping Skills Assessment Completed: : No  Deffered due to:: Time      Diabetes Self Support Plan         Assessment Summary and Plan    Based on today's diabetes care assessment, the following areas of need were identified:      Social 11/9/2022   Access to Mass Media/Tech No   Cognitive/Behavioral Health No   Culture/Mandaeism No   Communication No   Health Literacy No        Clinical 11/9/2022   Medication Adherence Yes   Lab Compliance No        Diabetes Self-Management Skills 11/9/2022   Diabetes Disease Process/Treatment Options No   Nutrition/Healthy Eating Yes -   Ed on what carbohydrates are, what foods contain carbs and importance of increasing fiber/reducing added sugar  Began ed on appropriate portion sizes of carbs   Label reading exercise demonstrating importance of reducing added sugar/eliminating sweet drinks  Began ed on the plate method: importance of increasing non-starchy veggies, choosing lean protein, healthy fats and portioned servings of complex cho's    Medication Yes - see care planning   Home Blood Glucose Monitoring Yes - see care planning   Chronic Complications Yes - pt became tearful and concerned about complications (kidney disease, vision, neuropathy) with continued uncontrolled DM. Discussed care schedule and great steps she has made to make appts that will improve her level of care. Encouraged continuing to make changes as she can to improve her health and goal of progressing toward better glycemic control.           Today's interventions were provided through individual discussion, instruction, and written materials were provided.      Patient verbalized understanding of instruction and written materials.  Pt was able to return back demonstration of instructions today. Patient understood key points, needs reinforcement and further instruction.     Diabetes Self-Management Care Plan:    Today's Diabetes Self-Management Care Plan was  developed with Starla SHANKAR's input. Starla SHANKAR has agreed to work toward the following goal(s) to improve his/her overall diabetes control.      Care Plan: Diabetes Management   Updates made since 10/11/2022 12:00 AM        Problem: Blood Glucose Self-Monitoring         Goal: Pt will follow-up on Dexcom CGM order.    Start Date: 11/9/2022   Expected End Date: 12/15/2022   Priority: Medium   Barriers: No Barriers Identified   Note:    11/9/22 - Pt reports not SMBG often (only once a week) d/t fingerstick fatigue. Got letter from insurance stating Dexcom was not covered and brought letter to visit. Explained it is not covered through her pharmacy benefit but is covered through DME. Will fill out paperwork and send to [employer] for approval and DME processing and discussed process with pt.        Task: Reviewed the importance of self-monitoring blood glucose and keeping logs. Completed 11/10/2022        Task: Discussed ways to minimize pain when monitoring blood glucose. Completed 11/10/2022        Problem: Medications         Goal: Patient Agrees to take Diabetes Medication(s) - Ozempic, Metformin, and MDI as prescribed.    Start Date: 11/10/2022   Expected End Date: 12/15/2022   Priority: High   Barriers: No Barriers Identified   Note:    11/9/22 - Pt has started Ozempic but sometimes missing insulin doses. Reviewed importance of taking Humalog before each meal (and sometimes her dinner meal is a bunch of snack foods). Is having a lot of difficulty with bruising and some scaring to abdomen where she usually takes injections. Educated on alternate injection sites and site rotation. Discussed would benefit from adding correction to Humalog before meal if BG is high. Reviewed taking set doses of Levemir BID about 12 hours apart. Her long-acting and rapid-acting are not balanced - she is scheduled to see endocrinologist later this month. Encouraged pt to bring log of BG and insulin doses for more effective insulin  adjustment. Pt verbalized interest in insulin pump therapy - discussed working on basic skills necessary (monitoring, taking insulin consistently, carb counting).        Task: Reviewed with patient all current diabetes medications and provided basic review of the purpose, dosage, frequency, side effects, and storage of both oral and injectable diabetes medications. Completed 11/10/2022          Follow Up Plan     Follow up in about 5 weeks (around 12/15/2022) for education follow-up/possible Dexcom .    Today's care plan and follow up schedule was discussed with patient.  Starla SHANKAR verbalized understanding of the care plan, goals, and agrees to follow up plan.        The patient was encouraged to communicate with his/her health care provider/physician and care team regarding his/her condition(s) and treatment.  I provided the patient with my contact information today and encouraged to contact me via phone or [facility] Portal as needed.     Length of Visit   Total Time: 75 Minutes

## 2022-11-15 ENCOUNTER — TELEPHONE (OUTPATIENT)
Dept: PHARMACY | Facility: CLINIC | Age: 57
End: 2022-11-15
Payer: MEDICARE

## 2022-11-30 ENCOUNTER — OFFICE VISIT (OUTPATIENT)
Dept: ENDOCRINOLOGY | Facility: CLINIC | Age: 57
End: 2022-11-30
Payer: MEDICARE

## 2022-11-30 VITALS
HEART RATE: 99 BPM | SYSTOLIC BLOOD PRESSURE: 147 MMHG | OXYGEN SATURATION: 100 % | HEIGHT: 66 IN | BODY MASS INDEX: 39.36 KG/M2 | DIASTOLIC BLOOD PRESSURE: 69 MMHG | WEIGHT: 244.94 LBS

## 2022-11-30 DIAGNOSIS — E66.01 CLASS 2 SEVERE OBESITY WITH SERIOUS COMORBIDITY AND BODY MASS INDEX (BMI) OF 39.0 TO 39.9 IN ADULT, UNSPECIFIED OBESITY TYPE: ICD-10-CM

## 2022-11-30 DIAGNOSIS — E11.65 TYPE 2 DIABETES MELLITUS WITH HYPERGLYCEMIA, WITH LONG-TERM CURRENT USE OF INSULIN: Primary | ICD-10-CM

## 2022-11-30 DIAGNOSIS — Z79.4 TYPE 2 DIABETES MELLITUS WITH HYPERGLYCEMIA, WITH LONG-TERM CURRENT USE OF INSULIN: Primary | ICD-10-CM

## 2022-11-30 DIAGNOSIS — Z79.4 TYPE 2 DIABETES MELLITUS WITH COMPLICATION, WITH LONG-TERM CURRENT USE OF INSULIN: ICD-10-CM

## 2022-11-30 DIAGNOSIS — I10 ESSENTIAL HYPERTENSION: ICD-10-CM

## 2022-11-30 DIAGNOSIS — E11.8 TYPE 2 DIABETES MELLITUS WITH COMPLICATION, WITH LONG-TERM CURRENT USE OF INSULIN: ICD-10-CM

## 2022-11-30 PROBLEM — E66.812 CLASS 2 SEVERE OBESITY WITH SERIOUS COMORBIDITY AND BODY MASS INDEX (BMI) OF 39.0 TO 39.9 IN ADULT: Status: ACTIVE | Noted: 2022-11-30

## 2022-11-30 PROCEDURE — 3008F BODY MASS INDEX DOCD: CPT | Mod: CPTII,S$GLB,, | Performed by: INTERNAL MEDICINE

## 2022-11-30 PROCEDURE — 3046F HEMOGLOBIN A1C LEVEL >9.0%: CPT | Mod: CPTII,S$GLB,, | Performed by: INTERNAL MEDICINE

## 2022-11-30 PROCEDURE — 3046F PR MOST RECENT HEMOGLOBIN A1C LEVEL > 9.0%: ICD-10-PCS | Mod: CPTII,S$GLB,, | Performed by: INTERNAL MEDICINE

## 2022-11-30 PROCEDURE — 3066F PR DOCUMENTATION OF TREATMENT FOR NEPHROPATHY: ICD-10-PCS | Mod: CPTII,S$GLB,, | Performed by: INTERNAL MEDICINE

## 2022-11-30 PROCEDURE — 3066F NEPHROPATHY DOC TX: CPT | Mod: CPTII,S$GLB,, | Performed by: INTERNAL MEDICINE

## 2022-11-30 PROCEDURE — 3008F PR BODY MASS INDEX (BMI) DOCUMENTED: ICD-10-PCS | Mod: CPTII,S$GLB,, | Performed by: INTERNAL MEDICINE

## 2022-11-30 PROCEDURE — 1159F MED LIST DOCD IN RCRD: CPT | Mod: CPTII,S$GLB,, | Performed by: INTERNAL MEDICINE

## 2022-11-30 PROCEDURE — 4010F ACE/ARB THERAPY RXD/TAKEN: CPT | Mod: CPTII,S$GLB,, | Performed by: INTERNAL MEDICINE

## 2022-11-30 PROCEDURE — 1159F PR MEDICATION LIST DOCUMENTED IN MEDICAL RECORD: ICD-10-PCS | Mod: CPTII,S$GLB,, | Performed by: INTERNAL MEDICINE

## 2022-11-30 PROCEDURE — 3078F PR MOST RECENT DIASTOLIC BLOOD PRESSURE < 80 MM HG: ICD-10-PCS | Mod: CPTII,S$GLB,, | Performed by: INTERNAL MEDICINE

## 2022-11-30 PROCEDURE — 3077F PR MOST RECENT SYSTOLIC BLOOD PRESSURE >= 140 MM HG: ICD-10-PCS | Mod: CPTII,S$GLB,, | Performed by: INTERNAL MEDICINE

## 2022-11-30 PROCEDURE — 3061F PR NEG MICROALBUMINURIA RESULT DOCUMENTED/REVIEW: ICD-10-PCS | Mod: CPTII,S$GLB,, | Performed by: INTERNAL MEDICINE

## 2022-11-30 PROCEDURE — 3061F NEG MICROALBUMINURIA REV: CPT | Mod: CPTII,S$GLB,, | Performed by: INTERNAL MEDICINE

## 2022-11-30 PROCEDURE — 99214 OFFICE O/P EST MOD 30 MIN: CPT | Mod: S$GLB,,, | Performed by: INTERNAL MEDICINE

## 2022-11-30 PROCEDURE — 99214 PR OFFICE/OUTPT VISIT, EST, LEVL IV, 30-39 MIN: ICD-10-PCS | Mod: S$GLB,,, | Performed by: INTERNAL MEDICINE

## 2022-11-30 PROCEDURE — 4010F PR ACE/ARB THEARPY RXD/TAKEN: ICD-10-PCS | Mod: CPTII,S$GLB,, | Performed by: INTERNAL MEDICINE

## 2022-11-30 PROCEDURE — 1160F RVW MEDS BY RX/DR IN RCRD: CPT | Mod: CPTII,S$GLB,, | Performed by: INTERNAL MEDICINE

## 2022-11-30 PROCEDURE — 3078F DIAST BP <80 MM HG: CPT | Mod: CPTII,S$GLB,, | Performed by: INTERNAL MEDICINE

## 2022-11-30 PROCEDURE — 3077F SYST BP >= 140 MM HG: CPT | Mod: CPTII,S$GLB,, | Performed by: INTERNAL MEDICINE

## 2022-11-30 PROCEDURE — 1160F PR REVIEW ALL MEDS BY PRESCRIBER/CLIN PHARMACIST DOCUMENTED: ICD-10-PCS | Mod: CPTII,S$GLB,, | Performed by: INTERNAL MEDICINE

## 2022-11-30 RX ORDER — INSULIN LISPRO 100 [IU]/ML
10 INJECTION, SOLUTION INTRAVENOUS; SUBCUTANEOUS
Qty: 15 ML | Refills: 11 | Status: SHIPPED | OUTPATIENT
Start: 2022-11-30 | End: 2023-06-15

## 2022-11-30 RX ORDER — METFORMIN HYDROCHLORIDE 500 MG/1
500 TABLET ORAL 2 TIMES DAILY WITH MEALS
Qty: 180 TABLET | Refills: 3 | Status: SHIPPED | OUTPATIENT
Start: 2022-11-30 | End: 2023-10-16

## 2022-11-30 RX ORDER — PEN NEEDLE, DIABETIC 30 GX3/16"
NEEDLE, DISPOSABLE MISCELLANEOUS
Qty: 400 EACH | Refills: 11 | Status: SHIPPED | OUTPATIENT
Start: 2022-11-30 | End: 2023-06-15

## 2022-11-30 NOTE — PROGRESS NOTES
Subjective:      Chief Complaint: Diabetes      HPI: Starla Wharton is a 57 y.o. female who is here for an initial evaluation for diabetes.    With regards to the diabetes:  Diagnosed with T2DM since many years ago.    Known complications:     Retinopathy? Yes, s/p lasers/injections    Nephropathy? Decreased GFR on last labs but MACR normal    Neuropathy? Yes    CAD? No    CVA? No    Current regimen:   Metformin    Basal insulin: levemir 58 units BID   Prandial insulin: novolog 2-3 units with meals.   Incretin: ozempic 0.25 mg/week    Missed doses? denies    Prior treatments:      Self-monitored glucose  # times a day testing: pending dexcom, for now checks few/day  Log reviewed: No    Pre breakfast: 174 yesterday. Often in the upper 100s, 200s.    Hypoglycemic events? None lately.    Current Symptoms  No   Yes  []    [x]  Polydipsia  []    [x]  Polyuria  []    [x]  Vision changes  [x]    []  Nausea  [x]    []  Diarrhea  [x]    []  Weight gain  [x]    []  Weight loss    Diabetes Management Status    Statin: Taking  ACE/ARB: Taking    Screening or Prevention Patient's value Goal Complete/Controlled?   HgA1C Testing and Control   Lab Results   Component Value Date    HGBA1C 9.8 (H) 10/21/2022      q6months/Less than 7% No     Lipid profile : 07/22/2022 Annually Yes     LDL control Lab Results   Component Value Date    LDLCALC 71.0 07/22/2022    Annually/Less than 100 mg/dl  Yes     Nephropathy screening Lab Results   Component Value Date    MICALBCREAT 29.0 07/22/2022     Lab Results   Component Value Date    CREATININE 1.4 10/21/2022     Lab Results   Component Value Date    PROTEINUA Negative 03/04/2018    Annually Yes     Blood pressure BP Readings from Last 1 Encounters:   11/30/22 (!) 147/69    Less than 140/90 No     Dilated retinal exam : 11/17/2022 Annually Yes     Foot exam   Most Recent Foot Exam Date: Not Found Annually No       Reviewed past medical, family, social history and updated as  appropriate.    Review of Systems  As above    Objective:     Vitals:    11/30/22 0949   BP: (!) 147/69   Pulse: 99     BP Readings from Last 5 Encounters:   11/30/22 (!) 147/69   10/21/22 116/60   09/22/22 (!) 140/70   03/05/18 (!) 186/89   01/02/18 (!) 184/76     Physical Exam  Vitals reviewed.   Constitutional:       General: She is not in acute distress.     Appearance: She is obese.   Cardiovascular:      Heart sounds: Normal heart sounds.   Pulmonary:      Effort: Pulmonary effort is normal.       Wt Readings from Last 5 Encounters:   11/30/22 0949 111.1 kg (244 lb 14.9 oz)   11/09/22 1525 (P) 111.3 kg (245 lb 6 oz)   10/21/22 1442 111.5 kg (245 lb 14.8 oz)   03/04/18 2248 114 kg (251 lb 5.2 oz)   03/04/18 1420 118.4 kg (261 lb)   01/02/18 1532 115.7 kg (255 lb)     Lab Results   Component Value Date    HGBA1C 9.8 (H) 10/21/2022    HGBA1C 12.4 (H) 07/22/2022    HGBA1C 10.8 (H) 07/06/2020    HGBA1C 8.3 (H) 03/05/2018     Lab Results   Component Value Date    CHOL 139 07/22/2022    CHOL 181 03/05/2018    HDL 51 07/22/2022    HDL 72 03/05/2018    LDLCALC 71.0 07/22/2022    LDLCALC 101.0 03/05/2018    TRIG 85 07/22/2022    TRIG 40 03/05/2018    CHOLHDL 36.7 07/22/2022    CHOLHDL 39.8 03/05/2018     Lab Results   Component Value Date     10/21/2022    K 3.8 10/21/2022     10/21/2022    CO2 24 10/21/2022     (H) 10/21/2022    BUN 30 (H) 10/21/2022    CREATININE 1.4 10/21/2022    CALCIUM 9.5 10/21/2022    PROT 7.8 10/21/2022    ALBUMIN 3.6 10/21/2022    BILITOT 0.2 10/21/2022    ALKPHOS 95 10/21/2022    AST 11 10/21/2022    ALT 16 10/21/2022    ANIONGAP 13 10/21/2022    ESTGFRAFRICA 58 (A) 07/22/2022    EGFRNONAA 50 (A) 07/22/2022    TSH 0.635 07/22/2022      Lab Results   Component Value Date    MICALBCREAT 29.0 07/22/2022       Assessment/Plan:     Type 2 diabetes mellitus with complication, with long-term current use of insulin  Complicated by retinopathy, CKD, neuropathy, as well as  hyperglycemia.    Reviewed goals of therapy - to get the best control we can without hypoglycemia. Goal A1C <7   last a1c well above goal   - keep f/u with DM education    Medication changes:   Increase humalog/novolog. 10 units TIDWM   continue levemir and ozempic    Reviewed patient's current insulin regimen. Clarified proper insulin dose and timing in relation to meals, etc. Insulin injection sites and proper rotation instructed.     -Advised frequent self blood glucose monitoring. Patient encouraged to document glucose results and bring them to every clinic visit    -Hypoglycemia precautions discussed. Instructed on precautions before driving.    -Close adherence to lifestyle changes recommended.    -Periodic follow ups for eye evaluations, foot care suggested.    Pt interested in pump. Discussed potential for omnipod 5 vs tslim x2 with control IQ. Encourage pt to check with insurance to see if either system would be covered. Recommend trying to get her basal/bolus regimen to a reasonable level before switching first.    CGM will help with adjusting insulin regimen, agree with trying for that system.    Essential hypertension  bp high today   continue regimen   f/u with PCP, monitor    Class 2 severe obesity with serious comorbidity and body mass index (BMI) of 39.0 to 39.9 in adult  Body mass index is 39.53 kg/m².   complicated by HTN, HLD, diabetes   continue glp1   encourage pt to work on healthy diet, increase exercise as tolerated.   monitor weight      Follow up in about 3 months (around 2/28/2023) for lab review, further monitoring.      Errol Molina MD  Endocrinology

## 2022-11-30 NOTE — ASSESSMENT & PLAN NOTE
Body mass index is 39.53 kg/m².   complicated by HTN, HLD, diabetes   continue glp1   encourage pt to work on healthy diet, increase exercise as tolerated.   monitor weight

## 2022-11-30 NOTE — ASSESSMENT & PLAN NOTE
Complicated by retinopathy, CKD, neuropathy, as well as hyperglycemia.    Reviewed goals of therapy - to get the best control we can without hypoglycemia. Goal A1C <7   last a1c well above goal   - keep f/u with DM education    Medication changes:   Increase humalog/novolog. 10 units TIDWM   continue levemir and ozempic    Reviewed patient's current insulin regimen. Clarified proper insulin dose and timing in relation to meals, etc. Insulin injection sites and proper rotation instructed.     -Advised frequent self blood glucose monitoring. Patient encouraged to document glucose results and bring them to every clinic visit    -Hypoglycemia precautions discussed. Instructed on precautions before driving.    -Close adherence to lifestyle changes recommended.    -Periodic follow ups for eye evaluations, foot care suggested.    Pt interested in pump. Discussed potential for omnipod 5 vs tslim x2 with control IQ. Encourage pt to check with insurance to see if either system would be covered. Recommend trying to get her basal/bolus regimen to a reasonable level before switching first.    CGM will help with adjusting insulin regimen, agree with trying for that system.

## 2022-11-30 NOTE — PATIENT INSTRUCTIONS
For diabetes:    Agree with dexcom  Continue ozempic once a week  Continue insulins. Increase novolog/humalog (mealtime insulin)    Basal Insulin  Take 58 units of levemir insulin twice daily    Check your glucose in the morning before breakfast and keep a log.  Goal AM glucose:       After 3-5 days, if your average glucose is above 140, increase your dose by 2 units  If your AM glucose is under 90, decrease by 2 units.    Meal-time insulin    Take 10 units of novolog with each meal (works best when taken about 5-10 minutes before you eat). If you skip a meal, skip this meal-time insulin.    This dose of insulin can be adjusted based on what your sugar is like at mealtime as well as what kind of meal you're about to eat:  If you're having a low-carb meal (like a salad), take 8 units instead  If you're having a high-carb meal (like pasta, with bread, and/or adding a pastry/dessert), take 12 units instead    This amount can then further be adjusted based on your pre-meal glucose:    Pre-meal glucose Adjustment to make   <70 Eat a meal   70-90 Decrease by 2 units    No change   150-249 Add 1 units   250-349 Add 2 units   350-449 Add 3 units   450+ Add 4 units       Check with insurance if they would cover Omnipod 5 or Tslim X2 insulin pumps.

## 2022-12-06 ENCOUNTER — PATIENT MESSAGE (OUTPATIENT)
Dept: INTERNAL MEDICINE | Facility: CLINIC | Age: 57
End: 2022-12-06
Payer: MEDICARE

## 2022-12-15 ENCOUNTER — CLINICAL SUPPORT (OUTPATIENT)
Dept: DIABETES | Facility: CLINIC | Age: 57
End: 2022-12-15
Payer: MEDICARE

## 2022-12-15 VITALS — BODY MASS INDEX: 38.9 KG/M2 | WEIGHT: 242.06 LBS | HEIGHT: 66 IN

## 2022-12-15 DIAGNOSIS — Z79.4 TYPE 2 DIABETES MELLITUS WITH COMPLICATION, WITH LONG-TERM CURRENT USE OF INSULIN: Primary | ICD-10-CM

## 2022-12-15 DIAGNOSIS — E11.8 TYPE 2 DIABETES MELLITUS WITH COMPLICATION, WITH LONG-TERM CURRENT USE OF INSULIN: Primary | ICD-10-CM

## 2022-12-15 PROCEDURE — G0108 DIAB MANAGE TRN  PER INDIV: HCPCS | Mod: S$GLB,,, | Performed by: DIETITIAN, REGISTERED

## 2022-12-15 PROCEDURE — G0108 PR DIAB MANAGE TRN  PER INDIV: ICD-10-PCS | Mod: S$GLB,,, | Performed by: DIETITIAN, REGISTERED

## 2022-12-15 NOTE — PROGRESS NOTES
"Diabetes Care Specialist Progress Note  Author: Chantal Shrestha RD  Date: 12/15/2022    Program Intake  Reason for Diabetes Program Visit:: Intervention  Type of Intervention:: Individual  Individual: Education  Education: Self-Management Skill Review, Nutrition and Meal Planning  Current diabetes risk level:: moderate  In the last 12 months, have you:: none    Lab Results   Component Value Date    HGBA1C 9.8 (H) 10/21/2022       Diabetes Self-Management Skills Assessment    Diabetes Disease Process/Treatment Options  Diabetes Disease Process/Treatment Options: Skills Assessment Completed: No  Deferred due to:: Time    Nutrition/Healthy Eating  Nutrition/Healthy Eating Skills Assessment Completed:: No  Deffered due to:: Time    Physical Activity/Exercise  Physical Activity/Exercise Skills Assessment Completed: : No  Deffered due to:: Time    Medications  Patient is able to describe current diabetes management routine.: yes  Diabetes management routine:: insulin, oral medications, injectable medications  Patient is able to identify current diabetes medications, dosages, and appropriate timing of medications.: yes  Patient understands the purpose of the medications taken for diabetes.: yes  Patient reports problems or concerns with current medication regimen.: yes  Medication regimen problems/concerns:: other (see comments) (Glucose improving with increase in Humalog but pt reports still "up and down")  Medication Skills Assessment Completed:: Yes  Assessment indicates:: Adequate understanding, Other (comment) (will need continued adjustment once more data available with CGM)  Area of need?: Yes    Home Blood Glucose Monitoring  Patient states that blood sugar is checked at home daily.: yes  Monitoring Method:: personal continuous glucose monitor  Personal CGM type:: Dexcom  Patient is able to use personal CGM appropriately.: no (here for training today)  Home Blood Glucose Monitoring Skills Assessment Completed: : " Yes  Assessment indicates:: Knowledge deficit, Instruction Needed  Area of need?: Yes    Acute Complications  Acute Complications Skills Assessment Completed: : No  Deffered due to:: Time    Chronic Complications  Chronic Complications Skills Assessment Completed: : No  Deferred due to:: Time    Psychosocial/Coping  Psychosocial/Coping Skills Assessment Completed: : No  Deffered due to:: Time      Assessment Summary and Plan    Based on today's diabetes care assessment, the following areas of need were identified:      Social 11/9/2022   Access to Mass Media/Tech No   Cognitive/Behavioral Health No   Culture/Hindu No   Communication No   Health Literacy No        Clinical 11/9/2022   Medication Adherence Yes   Lab Compliance No        Diabetes Self-Management Skills 12/15/2022   Diabetes Disease Process/Treatment Options -   Nutrition/Healthy Eating -   Medication Yes - see care planning   Home Blood Glucose Monitoring Yes - see care planning   Chronic Complications -          Today's interventions were provided through individual discussion, instruction, and written materials were provided.      Patient verbalized understanding of instruction and written materials.  Pt was able to return back demonstration of instructions today. Patient understood key points, needs reinforcement and further instruction.     Diabetes Self-Management Care Plan:    Today's Diabetes Self-Management Care Plan was developed with Starla SHANKAR's input. Starla SHANKAR has agreed to work toward the following goal(s) to improve his/her overall diabetes control.      Care Plan: Diabetes Management   Updates made since 11/15/2022 12:00 AM        Problem: Blood Glucose Self-Monitoring         Goal: Pt will use Dexcom G6 for continuous glucose monitoring and treatment decisions.    Start Date: 11/9/2022   Expected End Date: 12/15/2022   This Visit's Progress: On track   Priority: Medium   Barriers: No Barriers Identified   Note:    11/9/22 - Pt  "reports not SMBG often (only once a week) d/t fingerstick fatigue. Got letter from insurance stating Dexcom was not covered and brought letter to visit. Explained it is not covered through her pharmacy benefit but is covered through DME. Will fill out paperwork and send to [employer] for approval and DME processing and discussed process with pt.     12/15/22 -   DEXCOM G6 SENSOR START     Patient referred to clinic today for Dexcom G6 continuous glucose sensor system training.  Education provided using "Quick Start Guide" per Dexcom protocol.    Shane/ Overview:  5 min glucose reading updates, trending arrows, BG graph screens, battery life indicator, Blue Tooth Symbol.  Shane/ Menus: trend Graph, start sensor, enter BG, events, Alerts, Settings, Shutdown, Stop Sensor.   Sahne/ Screens and prompts  Alarm Settings:    * Low alert: 80    * High alert: 300    * Rise rate: off    * Fall Rate: off    Transmitter and Sensor Codes entered per prompts    Reviewed sensor site selection. Site selected and prepped using aseptic technique Inserted to left abdomen. Transmitter placed in pod and secured.  Practiced sensor pod/transmitter removal from site, and removal of transmitter from sensor pod.  Patient able to demonstrate without difficulty.  Encouraged to review manual or shane video prior to starting another sensor.   Reviewed problem-solving aspects of sensor transmission/ variables that can disrupt transmission. /shane range 20 feet, but the first 3 hrs keep within 3 feet of transmitter.  Pt instructed on lag time of interstitial fluid from CBG and was advised to tx hypoglycemia and dose insulin based on SMBG values.  Dexcom technical support contact number given and examples of when to contact them discussed.   Clarity shane and website reviewed w/ pt. Assisted pt w/ connecting to Clarity account for automatic uploads.         Problem: Medications         Goal: Patient Agrees to take Diabetes " Medication(s) - Ozempic, Metformin, and MDI as prescribed.    Start Date: 11/10/2022   Expected End Date: 12/15/2022   This Visit's Progress: On track   Priority: High   Barriers: No Barriers Identified   Note:    11/9/22 - Pt has started Ozempic but sometimes missing insulin doses. Reviewed importance of taking Humalog before each meal (and sometimes her dinner meal is a bunch of snack foods). Is having a lot of difficulty with bruising and some scaring to abdomen where she usually takes injections. Educated on alternate injection sites and site rotation. Discussed would benefit from adding correction to Humalog before meal if BG is high. Reviewed taking set doses of Levemir BID about 12 hours apart. Her long-acting and rapid-acting are not balanced - she is scheduled to see endocrinologist later this month. Encouraged pt to bring log of BG and insulin doses for more effective insulin adjustment. Pt verbalized interest in insulin pump therapy - discussed working on basic skills necessary (monitoring, taking insulin consistently, carb counting).     12/15/22 - Pt reports has been taking Humalog and Levemir as prescribed with changes made per Dr. Molina. No logs today. Reports better glucose readings but still a lot of variability. Discussed will be able to dose insulin based on CGM readings and will aid in more effective insulin dose changes.          Follow Up Plan     Follow up in about 15 days (around 12/30/2022) for Dexcom follow-up.    Today's care plan and follow up schedule was discussed with patient.  Starla SHANKAR verbalized understanding of the care plan, goals, and agrees to follow up plan.        The patient was encouraged to communicate with his/her health care provider/physician and care team regarding his/her condition(s) and treatment.  I provided the patient with my contact information today and encouraged to contact me via phone or [facility] Patient Portal as needed.     Length of Visit   Total Time:  60 Minutes

## 2022-12-30 ENCOUNTER — CLINICAL SUPPORT (OUTPATIENT)
Dept: DIABETES | Facility: CLINIC | Age: 57
End: 2022-12-30
Payer: MEDICARE

## 2022-12-30 VITALS — BODY MASS INDEX: 39.07 KG/M2 | HEIGHT: 66 IN

## 2022-12-30 DIAGNOSIS — E11.8 TYPE 2 DIABETES MELLITUS WITH COMPLICATION, WITH LONG-TERM CURRENT USE OF INSULIN: Primary | ICD-10-CM

## 2022-12-30 DIAGNOSIS — Z79.4 TYPE 2 DIABETES MELLITUS WITH COMPLICATION, WITH LONG-TERM CURRENT USE OF INSULIN: Primary | ICD-10-CM

## 2022-12-30 PROCEDURE — G0108 PR DIAB MANAGE TRN  PER INDIV: ICD-10-PCS | Mod: S$GLB,,, | Performed by: DIETITIAN, REGISTERED

## 2022-12-30 PROCEDURE — G0108 DIAB MANAGE TRN  PER INDIV: HCPCS | Mod: S$GLB,,, | Performed by: DIETITIAN, REGISTERED

## 2023-01-03 NOTE — PROGRESS NOTES
Diabetes Care Specialist Progress Note  Author: Chantal Shrestha RD  Date: 1/3/2023    Program Intake  Reason for Diabetes Program Visit:: Intervention  Type of Intervention:: Individual  Individual: Education  Education: Self-Management Skill Review  Current diabetes risk level:: moderate  In the last 12 months, have you:: none  Permission to speak with others about care:: no    Lab Results   Component Value Date    HGBA1C 9.8 (H) 10/21/2022       Diabetes Self-Management Skills Assessment    Diabetes Disease Process/Treatment Options  Diabetes Disease Process/Treatment Options: Skills Assessment Completed: No  Deferred due to:: Time    Nutrition/Healthy Eating  Challenges to healthy eating:: snacking between meals and at night, eating out, going to parties, portion control  Method of carbohydrate measurement:: eyeballing/guessing  Patient can identify foods that impact blood sugar.: yes  Patient-identified foods:: soda, starches (bread, pasta, rice, cereal), sweets  Nutrition/Healthy Eating Skills Assessment Completed:: Yes  Assessment indicates:: Knowledge deficit, Instruction Needed  Area of need?: Yes    Physical Activity/Exercise  Patient's daily activity level:: lightly active  Patient formally exercises outside of work.: no  Reasons for not exercising:: work schedule  Patient can identify forms of physical activity.: yes  Stated forms of physical activity:: any movement performed by muscles that uses energy, housework, manual activity at work  Patient can identify reasons why exercise/physical activity is important in diabetes management.: yes  Identified reasons:: lowers blood glucose, blood pressure, and cholesterol  Physical Activity/Exercise Skills Assessment Completed: : Yes  Assessment indicates:: Adequate understanding  Area of need?: No    Medications  Patient is able to describe current diabetes management routine.: yes  Diabetes management routine:: insulin, oral medications, injectable  medications, diet  Patient is able to identify current diabetes medications, dosages, and appropriate timing of medications.: yes  Patient understands the purpose of the medications taken for diabetes.: yes  Patient reports problems or concerns with current medication regimen.: yes  Medication regimen problems/concerns::  (reports taking increased doses but still having BGs 160-200 in am.)  Medication Skills Assessment Completed:: Yes  Assessment indicates:: Instruction Needed  Area of need?: Yes    Home Blood Glucose Monitoring  Patient states that blood sugar is checked at home daily.: yes  Monitoring Method:: personal continuous glucose monitor  Personal CGM type:: Dexcom  Patient is able to use personal CGM appropriately.: yes  CGM Report reviewed?: yes  Home Blood Glucose Monitoring Skills Assessment Completed: : Yes  Assessment indicates:: Adequate understanding  Area of need?: No    Acute Complications  Acute Complications Skills Assessment Completed: : No  Deffered due to:: Time    Chronic Complications  Patient can identify major chronic complications of diabetes.: yes  Stated chronic complications:: kidney disease, neuropathy/nerve damage, retinopathy  Patient can identify ways to prevent or delay diabetes complications.: yes  Stated ways to prevent complications:: controlling blood sugar, controlling cholesterol and triglycerides, having regular diabetic eye exams, checking feet daily and having routine comprehensive foot exams  Chronic Complications Skills Assessment Completed: : Yes  Assessment indicates:: Adequate understanding  Area of need?: No    Psychosocial/Coping  Patient can identify ways of coping with chronic disease.: yes  Patient-stated ways of coping with chronic disease:: support from loved ones, spiritual/Episcopal practices (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 Pt has been talking with  at [employer] and visiting family  members for support.)  Psychosocial/Coping Skills Assessment Completed: : Yes  Assessment indicates:: Adequate understanding  Area of need?: No      Assessment Summary and Plan    Based on today's diabetes care assessment, the following areas of need were identified:      Social 11/9/2022   Access to Mass Media/Tech No   Cognitive/Behavioral Health No   Culture/Taoism No   Communication No   Health Literacy No        Clinical 11/9/2022   Medication Adherence Yes   Lab Compliance No        Diabetes Self-Management Skills 12/30/2022   Diabetes Disease Process/Treatment Options -   Nutrition/Healthy Eating Yes - see care planning   Physical Activity/Exercise No   Medication Yes - see care planning   Home Blood Glucose Monitoring No   Chronic Complications No   Psychosocial/Coping No          Today's interventions were provided through individual discussion, instruction, and written materials were provided.      Patient verbalized understanding of instruction and written materials.  Pt was able to return back demonstration of instructions today. Patient understood key points, needs reinforcement and further instruction.     Diabetes Self-Management Care Plan:    Today's Diabetes Self-Management Care Plan was developed with Starla SHANKAR's input. Starla SHANKAR has agreed to work toward the following goal(s) to improve his/her overall diabetes control.      Care Plan: Diabetes Management   Updates made since 12/4/2022 12:00 AM        Problem: Blood Glucose Self-Monitoring         Goal: Pt will use Dexcom G6 for continuous glucose monitoring and treatment decisions.    Start Date: 11/9/2022   Expected End Date: 12/15/2022   This Visit's Progress: On track   Recent Progress: On track   Priority: Medium   Barriers: No Barriers Identified   Note:    11/9/22 - Pt reports not SMBG often (only once a week) d/t fingerstick fatigue. Got letter from insurance stating Dexcom was not covered and brought letter to visit. Explained it is not  "covered through her pharmacy benefit but is covered through DME. Will fill out paperwork and send to [employer] for approval and DME processing and discussed process with pt.     12/15/22 -   DEXCOM G6 SENSOR START     Patient referred to clinic today for Dexcom G6 continuous glucose sensor system training.  Education provided using "Quick Start Guide" per Dexcom protocol.    Shane/ Overview:  5 min glucose reading updates, trending arrows, BG graph screens, battery life indicator, Blue Tooth Symbol.  Shane/ Menus: trend Graph, start sensor, enter BG, events, Alerts, Settings, Shutdown, Stop Sensor.   Shane/ Screens and prompts  Alarm Settings:    * Low alert: 80    * High alert: 300    * Rise rate: off    * Fall Rate: off    Transmitter and Sensor Codes entered per prompts    Reviewed sensor site selection. Site selected and prepped using aseptic technique Inserted to left abdomen. Transmitter placed in pod and secured.  Practiced sensor pod/transmitter removal from site, and removal of transmitter from sensor pod.  Patient able to demonstrate without difficulty.  Encouraged to review manual or shane video prior to starting another sensor.   Reviewed problem-solving aspects of sensor transmission/ variables that can disrupt transmission. /shane range 20 feet, but the first 3 hrs keep within 3 feet of transmitter.  Pt instructed on lag time of interstitial fluid from CBG and was advised to tx hypoglycemia and dose insulin based on SMBG values.  Dexcom technical support contact number given and examples of when to contact them discussed.   Clarity shane and website reviewed w/ pt. Assisted pt w/ connecting to Clarity account for automatic uploads.      12/30/22 - Pt doing well with wearing and using Dexcom. Stated seeing the effect on BG has made her more aware and using it as a way to keep herself accountable. Forgot about changing sensor at first. Thoughts were all over the place with death " of the person she was caregiver for. But she was able to successfully start new sensor on her own earlier this week. Reviewed report with pt today. Pt reports highs on Dec 16, , and  were largely r/t to stress of her patients death (was with her when she ). Having some high PP spikes but may be r/t missed Humalog.        Problem: Medications         Goal: Patient Agrees to take Diabetes Medication(s) - Ozempic, Metformin, and MDI as prescribed.    Start Date: 11/10/2022   Expected End Date: 12/15/2022   Recent Progress: On track   Priority: High   Barriers: No Barriers Identified   Note:    22 - Pt has started Ozempic but sometimes missing insulin doses. Reviewed importance of taking Humalog before each meal (and sometimes her dinner meal is a bunch of snack foods). Is having a lot of difficulty with bruising and some scaring to abdomen where she usually takes injections. Educated on alternate injection sites and site rotation. Discussed would benefit from adding correction to Humalog before meal if BG is high. Reviewed taking set doses of Levemir BID about 12 hours apart. Her long-acting and rapid-acting are not balanced - she is scheduled to see endocrinologist later this month. Encouraged pt to bring log of BG and insulin doses for more effective insulin adjustment. Pt verbalized interest in insulin pump therapy - discussed working on basic skills necessary (monitoring, taking insulin consistently, carb counting).     12/15/22 - Pt reports has been taking Humalog and Levemir as prescribed with changes made per Dr. Molina. No logs today. Reports better glucose readings but still a lot of variability. Discussed will be able to dose insulin based on CGM readings and will aid in more effective insulin dose changes.     22 - Taking Levemir 58 units and Humalog 10 units + correction scale. Reports consistently waking up >/= 160s. Has not increased Levemir as indicated in Dr. Molina's AVS. Encouraged  increasing Levemir by 2 units if FBG in am is remaining >140 for 3-5 days. Pt is interested in working toward insulin pump therapy. Briefly discussed basic concepts of pump therapy and how pump works. Explained next step would be to work on CHO counting.        Problem: Healthy Eating         Goal: Pt will identify foods that are carbohydrates and use labels for counting carbs.    Start Date: 12/30/2022   Expected End Date: 1/26/2023   Priority: Medium   Barriers: No Barriers Identified   Note:    12/30/22 - Pt is interested in working toward insulin pump therapy. Discussed importance of learning to count carbohydrates in order to prepare for pump. Pt is willing to try. Provided basic carb counting education:    Ed on what carbohydrates are, what foods contain carbs and importance of reducing added sugar  Began ed on appropriate portion sizes of carbs for ~15g CHO  Label reading exercise demonstrating using serving size and total CHO grams in carb count  Began ed on the plate method: importance of increasing non-starchy veggies (cooked until soft d/t gastroparesis), choosing lean protein, healthy fats and portioned servings of complex cho's     Provided food log and encouraged practicing carb counting. Pt will write down foods, approx serving size and grams carb and bring log to next appt.        Task: Reviewed the sources and role of Carbohydrate, Protein, and Fat and how each nutrient impacts blood sugar. Completed 1/3/2023        Task: Provided visual examples using dry measuring cups, food models, and other familiar objects such as computer mouse, deck or cards, tennis ball etc. to help with visualization of portions. Completed 1/3/2023        Task: Explained how to count carbohydrates using the food label and the use of dry measuring cups for accurate carb counting. Completed 1/3/2023        Task: Recommended replacing beverages containing high sugar content with noncaloric/sugar free options and/or water.  Completed 1/3/2023        Task: Review the importance of balancing carbohydrates with each meal using portion control techniques to count servings of carbohydrate and label reading to identify serving size and amount of total carbs per serving. Completed 1/3/2023          Follow Up Plan     Follow up in about 27 days (around 1/26/2023) for carb counting follow-up.    Today's care plan and follow up schedule was discussed with patient.  Starla SHANKAR verbalized understanding of the care plan, goals, and agrees to follow up plan.        The patient was encouraged to communicate with his/her health care provider/physician and care team regarding his/her condition(s) and treatment.  I provided the patient with my contact information today and encouraged to contact me via phone or [facility] Patient Portal as needed.     Length of Visit   Total Time: 65 Minutes

## 2023-01-09 ENCOUNTER — TELEPHONE (OUTPATIENT)
Dept: INTERNAL MEDICINE | Facility: CLINIC | Age: 58
End: 2023-01-09
Payer: MEDICARE

## 2023-01-09 DIAGNOSIS — E11.42 DIABETIC POLYNEUROPATHY ASSOCIATED WITH TYPE 2 DIABETES MELLITUS: ICD-10-CM

## 2023-01-09 RX ORDER — GABAPENTIN 300 MG/1
300 CAPSULE ORAL 3 TIMES DAILY
Qty: 270 CAPSULE | Refills: 1 | Status: SHIPPED | OUTPATIENT
Start: 2023-01-09 | End: 2023-07-10 | Stop reason: SDUPTHER

## 2023-01-09 NOTE — TELEPHONE ENCOUNTER
No new care gaps identified.  [facility] Embedded Care Gaps. Reference number: [id number]. 1/09/2023   10:00:42 AM CST

## 2023-01-09 NOTE — TELEPHONE ENCOUNTER
Refill Routing Note   Medication(s) are not appropriate for processing by [facility] for the following reason(s):      - Outside of protocol    [facility] action(s):  Route          Medication reconciliation completed: No     Appointments  past 12m or future 3m with PCP    Date Provider   Last Visit   10/21/2022 Meghan Perez MD   Next Visit   1/23/2023 Meghan Perez MD   ED visits in past 90 days: 0        Note composed:10:31 AM 01/09/2023

## 2023-01-23 ENCOUNTER — OFFICE VISIT (OUTPATIENT)
Dept: INTERNAL MEDICINE | Facility: CLINIC | Age: 58
End: 2023-01-23
Payer: MEDICARE

## 2023-01-23 ENCOUNTER — LAB VISIT (OUTPATIENT)
Dept: LAB | Facility: OTHER | Age: 58
End: 2023-01-23
Attending: STUDENT IN AN ORGANIZED HEALTH CARE EDUCATION/TRAINING PROGRAM
Payer: MEDICARE

## 2023-01-23 VITALS
DIASTOLIC BLOOD PRESSURE: 58 MMHG | BODY MASS INDEX: 39.69 KG/M2 | SYSTOLIC BLOOD PRESSURE: 122 MMHG | HEART RATE: 88 BPM | HEIGHT: 66 IN | OXYGEN SATURATION: 94 % | WEIGHT: 246.94 LBS

## 2023-01-23 DIAGNOSIS — Z86.19 HISTORY OF SYPHILIS: ICD-10-CM

## 2023-01-23 DIAGNOSIS — I10 ESSENTIAL HYPERTENSION: ICD-10-CM

## 2023-01-23 DIAGNOSIS — Z79.4 TYPE 2 DIABETES MELLITUS WITH COMPLICATION, WITH LONG-TERM CURRENT USE OF INSULIN: Primary | ICD-10-CM

## 2023-01-23 DIAGNOSIS — D64.9 ANEMIA, UNSPECIFIED TYPE: ICD-10-CM

## 2023-01-23 DIAGNOSIS — E11.8 TYPE 2 DIABETES MELLITUS WITH COMPLICATION, WITH LONG-TERM CURRENT USE OF INSULIN: ICD-10-CM

## 2023-01-23 DIAGNOSIS — L30.9 DERMATITIS: ICD-10-CM

## 2023-01-23 DIAGNOSIS — E11.8 TYPE 2 DIABETES MELLITUS WITH COMPLICATION, WITH LONG-TERM CURRENT USE OF INSULIN: Primary | ICD-10-CM

## 2023-01-23 DIAGNOSIS — E78.5 HYPERLIPIDEMIA, UNSPECIFIED HYPERLIPIDEMIA TYPE: ICD-10-CM

## 2023-01-23 DIAGNOSIS — Z79.4 TYPE 2 DIABETES MELLITUS WITH COMPLICATION, WITH LONG-TERM CURRENT USE OF INSULIN: ICD-10-CM

## 2023-01-23 LAB
ALBUMIN SERPL BCP-MCNC: 3.5 G/DL (ref 3.5–5.2)
ALP SERPL-CCNC: 107 U/L (ref 55–135)
ALT SERPL W/O P-5'-P-CCNC: 15 U/L (ref 10–44)
ANION GAP SERPL CALC-SCNC: 8 MMOL/L (ref 8–16)
AST SERPL-CCNC: 13 U/L (ref 10–40)
BASOPHILS # BLD AUTO: 0.04 K/UL (ref 0–0.2)
BASOPHILS NFR BLD: 0.5 % (ref 0–1.9)
BILIRUB SERPL-MCNC: 0.6 MG/DL (ref 0.1–1)
BUN SERPL-MCNC: 37 MG/DL (ref 6–20)
CALCIUM SERPL-MCNC: 9.7 MG/DL (ref 8.7–10.5)
CHLORIDE SERPL-SCNC: 103 MMOL/L (ref 95–110)
CO2 SERPL-SCNC: 27 MMOL/L (ref 23–29)
CREAT SERPL-MCNC: 1.3 MG/DL (ref 0.5–1.4)
DIFFERENTIAL METHOD: ABNORMAL
EOSINOPHIL # BLD AUTO: 0.1 K/UL (ref 0–0.5)
EOSINOPHIL NFR BLD: 1.5 % (ref 0–8)
ERYTHROCYTE [DISTWIDTH] IN BLOOD BY AUTOMATED COUNT: 14.3 % (ref 11.5–14.5)
EST. GFR  (NO RACE VARIABLE): 48 ML/MIN/1.73 M^2
ESTIMATED AVG GLUCOSE: 214 MG/DL (ref 68–131)
GLUCOSE SERPL-MCNC: 177 MG/DL (ref 70–110)
HBA1C MFR BLD: 9.1 % (ref 4–5.6)
HCT VFR BLD AUTO: 37.6 % (ref 37–48.5)
HGB BLD-MCNC: 11.3 G/DL (ref 12–16)
IMM GRANULOCYTES # BLD AUTO: 0.02 K/UL (ref 0–0.04)
IMM GRANULOCYTES NFR BLD AUTO: 0.3 % (ref 0–0.5)
LYMPHOCYTES # BLD AUTO: 2.9 K/UL (ref 1–4.8)
LYMPHOCYTES NFR BLD: 39 % (ref 18–48)
MCH RBC QN AUTO: 24.8 PG (ref 27–31)
MCHC RBC AUTO-ENTMCNC: 30.1 G/DL (ref 32–36)
MCV RBC AUTO: 83 FL (ref 82–98)
MONOCYTES # BLD AUTO: 0.5 K/UL (ref 0.3–1)
MONOCYTES NFR BLD: 6.4 % (ref 4–15)
NEUTROPHILS # BLD AUTO: 3.9 K/UL (ref 1.8–7.7)
NEUTROPHILS NFR BLD: 52.3 % (ref 38–73)
NRBC BLD-RTO: 0 /100 WBC
PLATELET # BLD AUTO: 361 K/UL (ref 150–450)
PMV BLD AUTO: 9.4 FL (ref 9.2–12.9)
POTASSIUM SERPL-SCNC: 3.6 MMOL/L (ref 3.5–5.1)
PROT SERPL-MCNC: 8 G/DL (ref 6–8.4)
RBC # BLD AUTO: 4.56 M/UL (ref 4–5.4)
RPR SER QL: NORMAL
SODIUM SERPL-SCNC: 138 MMOL/L (ref 136–145)
TSH SERPL DL<=0.005 MIU/L-ACNC: 2.26 UIU/ML (ref 0.4–4)
WBC # BLD AUTO: 7.51 K/UL (ref 3.9–12.7)

## 2023-01-23 PROCEDURE — 83036 HEMOGLOBIN GLYCOSYLATED A1C: CPT | Performed by: STUDENT IN AN ORGANIZED HEALTH CARE EDUCATION/TRAINING PROGRAM

## 2023-01-23 PROCEDURE — 99214 PR OFFICE/OUTPT VISIT, EST, LEVL IV, 30-39 MIN: ICD-10-PCS | Mod: S$GLB,,, | Performed by: STUDENT IN AN ORGANIZED HEALTH CARE EDUCATION/TRAINING PROGRAM

## 2023-01-23 PROCEDURE — 3078F DIAST BP <80 MM HG: CPT | Mod: CPTII,S$GLB,, | Performed by: STUDENT IN AN ORGANIZED HEALTH CARE EDUCATION/TRAINING PROGRAM

## 2023-01-23 PROCEDURE — 36415 COLL VENOUS BLD VENIPUNCTURE: CPT | Performed by: STUDENT IN AN ORGANIZED HEALTH CARE EDUCATION/TRAINING PROGRAM

## 2023-01-23 PROCEDURE — 1159F PR MEDICATION LIST DOCUMENTED IN MEDICAL RECORD: ICD-10-PCS | Mod: CPTII,S$GLB,, | Performed by: STUDENT IN AN ORGANIZED HEALTH CARE EDUCATION/TRAINING PROGRAM

## 2023-01-23 PROCEDURE — 86592 SYPHILIS TEST NON-TREP QUAL: CPT | Performed by: STUDENT IN AN ORGANIZED HEALTH CARE EDUCATION/TRAINING PROGRAM

## 2023-01-23 PROCEDURE — 99999 PR PBB SHADOW E&M-EST. PATIENT-LVL IV: CPT | Mod: PBBFAC,,, | Performed by: STUDENT IN AN ORGANIZED HEALTH CARE EDUCATION/TRAINING PROGRAM

## 2023-01-23 PROCEDURE — 85025 COMPLETE CBC W/AUTO DIFF WBC: CPT | Performed by: STUDENT IN AN ORGANIZED HEALTH CARE EDUCATION/TRAINING PROGRAM

## 2023-01-23 PROCEDURE — 3074F PR MOST RECENT SYSTOLIC BLOOD PRESSURE < 130 MM HG: ICD-10-PCS | Mod: CPTII,S$GLB,, | Performed by: STUDENT IN AN ORGANIZED HEALTH CARE EDUCATION/TRAINING PROGRAM

## 2023-01-23 PROCEDURE — 84443 ASSAY THYROID STIM HORMONE: CPT | Performed by: STUDENT IN AN ORGANIZED HEALTH CARE EDUCATION/TRAINING PROGRAM

## 2023-01-23 PROCEDURE — 99999 PR PBB SHADOW E&M-EST. PATIENT-LVL IV: ICD-10-PCS | Mod: PBBFAC,,, | Performed by: STUDENT IN AN ORGANIZED HEALTH CARE EDUCATION/TRAINING PROGRAM

## 2023-01-23 PROCEDURE — 3078F PR MOST RECENT DIASTOLIC BLOOD PRESSURE < 80 MM HG: ICD-10-PCS | Mod: CPTII,S$GLB,, | Performed by: STUDENT IN AN ORGANIZED HEALTH CARE EDUCATION/TRAINING PROGRAM

## 2023-01-23 PROCEDURE — 99214 OFFICE O/P EST MOD 30 MIN: CPT | Mod: S$GLB,,, | Performed by: STUDENT IN AN ORGANIZED HEALTH CARE EDUCATION/TRAINING PROGRAM

## 2023-01-23 PROCEDURE — 3008F BODY MASS INDEX DOCD: CPT | Mod: CPTII,S$GLB,, | Performed by: STUDENT IN AN ORGANIZED HEALTH CARE EDUCATION/TRAINING PROGRAM

## 2023-01-23 PROCEDURE — 80053 COMPREHEN METABOLIC PANEL: CPT | Performed by: STUDENT IN AN ORGANIZED HEALTH CARE EDUCATION/TRAINING PROGRAM

## 2023-01-23 PROCEDURE — 99499 UNLISTED E&M SERVICE: CPT | Mod: S$GLB,,, | Performed by: STUDENT IN AN ORGANIZED HEALTH CARE EDUCATION/TRAINING PROGRAM

## 2023-01-23 PROCEDURE — 99499 RISK ADDL DX/OHS AUDIT: ICD-10-PCS | Mod: S$GLB,,, | Performed by: STUDENT IN AN ORGANIZED HEALTH CARE EDUCATION/TRAINING PROGRAM

## 2023-01-23 PROCEDURE — 1159F MED LIST DOCD IN RCRD: CPT | Mod: CPTII,S$GLB,, | Performed by: STUDENT IN AN ORGANIZED HEALTH CARE EDUCATION/TRAINING PROGRAM

## 2023-01-23 PROCEDURE — 3074F SYST BP LT 130 MM HG: CPT | Mod: CPTII,S$GLB,, | Performed by: STUDENT IN AN ORGANIZED HEALTH CARE EDUCATION/TRAINING PROGRAM

## 2023-01-23 PROCEDURE — 3008F PR BODY MASS INDEX (BMI) DOCUMENTED: ICD-10-PCS | Mod: CPTII,S$GLB,, | Performed by: STUDENT IN AN ORGANIZED HEALTH CARE EDUCATION/TRAINING PROGRAM

## 2023-01-23 RX ORDER — TRETINOIN 0.5 MG/G
CREAM TOPICAL NIGHTLY
Qty: 20 G | Refills: 2 | Status: SHIPPED | OUTPATIENT
Start: 2023-01-23 | End: 2023-06-15

## 2023-01-23 RX ORDER — SEMAGLUTIDE 1.34 MG/ML
1 INJECTION, SOLUTION SUBCUTANEOUS
Qty: 3 PEN | Refills: 3 | Status: SHIPPED | OUTPATIENT
Start: 2023-01-23 | End: 2023-04-24

## 2023-01-23 NOTE — PROGRESS NOTES
Subjective:       Patient ID: Starla Wharton is a 57 y.o. female.    Chief Complaint: Type 2 diabetes mellitus with complication, with long-term current use of insulin [E11.8, Z79.4]    Patient is established with me, here today for the following:     T2DM on insulin, peripheral neuropathy, retinopathy, CKD3, HLD, HTN, GERD      T2DM -   Currently taking metformin, Novolog 10 units and sliding scale qAC, Levemir 58 qAM and 60 units qHS, Ozempic 0.5 mg weekly.  Interested in insulin pump/closed loop therapy, plans to discuss [employer]   Following with Dr. Molina [facility] endocrinology  Taking ASA 81 mg daily  Still has hyperkeratotic skin changes to mid abdomen, no improvement with corticosteroid   Using Dexcom for CGM  Denies blood sugars < 70 mg/dL  Due for foot exam  Following with Dr. Ramsay routinely for ophthalmology  Lab Results       Component                Value               Date                       HGBA1C                   9.8 (H)             10/21/2022                 HGBA1C                   12.4 (H)            07/22/2022                 HGBA1C                   10.8 (H)            07/06/2020            Lab Results       Component                Value               Date                       MICALBCREAT              29.0                07/22/2022              Due for repeat testing of anemia  Ferritin and iron level WNL  Sat decreased  Folate and B12 WNL    HTN -   Currently prescribed HCTZ, lisinopril.  Patient endorses taking medication as directed.  Denies side effects or concerns while taking medication.  Denies headaches, vision changes, CP, palpitations, or other concerning symptoms.  Lab Results       Component                Value               Date                       MICALBCREAT              29.0                07/22/2022            BP Readings from Last 3 Encounters:  11/30/22 : (!) 147/69  10/21/22 : 116/60  09/22/22 : (!) 140/70    HLD -   Endorses taking atorvastatin as directed  Denies side  effects or concerns while taking medication  : 07/22/2022  Lab Results       Component                Value               Date                       LDLCALC                  71.0                07/22/2022              Would like repeat RPR to ensure resolution of infection    Review of Systems   Respiratory:  Negative for cough and shortness of breath.    Cardiovascular:  Negative for chest pain and palpitations.   Skin:  Positive for rash.   Neurological:  Negative for syncope.       Current Outpatient Medications   Medication Instructions    acetaminophen-codeine 300-30mg (TYLENOL #3) 300-30 mg Tab acetaminophen 300 mg-codeine 30 mg tablet   TAKE 1 TABLET BY MOUTH EVERY 6 HOURS AS NEEDED FOR PAIN    aspirin 81 mg, Daily    atorvastatin (LIPITOR) 40 mg, Oral, Daily    blood-glucose meter,continuous (DEXCOM G6 ) Misc 1 Device, Misc.(Non-Drug; Combo Route), Once    blood-glucose sensor (DEXCOM G6 SENSOR) Kenzie Use daily for continuous glucose monitoring, change sensor every 10 days.    blood-glucose transmitter (DEXCOM G6 TRANSMITTER) Kenzie Use with Dexcom sensor, change every 90 days.    bromfenac (PROLENSA) 0.07 % Drop 1 drop, Ophthalmic, Daily    ciclopirox (PENLAC) 8 % Soln ciclopirox 8 % topical solution   APPLY OVER NAIL AND SURROUNDING SKIN. APPLY DAILY OVER PREVIOUS COAT. AFTER SEVEN DAYS MAY REMOVE WITH ALCOHOL AND CONTINUE CYCLE    clotrimazole-betamethasone 1-0.05% (LOTRISONE) cream Topical (Top), 2 times daily    diclofenac sodium (VOLTAREN) 1 % Gel Topical (Top), As needed (PRN)    gabapentin (NEURONTIN) 300 mg, Oral, 3 times daily    HumaLOG KwikPen Insulin 10 Units, Subcutaneous, 3 times daily with meals, Plus correction scale. Max daily dose 50 units/day    hydroCHLOROthiazide (HYDRODIURIL) 25 mg, Oral, Daily    indomethacin (INDOCIN) 50 MG capsule indomethacin 50 mg capsule    LEVEMIR FLEXTOUCH U-100 INSULN 58 Units, Subcutaneous, 2 times daily    lisinopriL (PRINIVIL,ZESTRIL) 20 mg, Oral,  "Daily    metFORMIN (GLUCOPHAGE) 500 mg, Oral, 2 times daily with meals    OZEMPIC 1 mg, Subcutaneous, Every 7 days    pantoprazole (PROTONIX) 20 mg, Oral, Daily    pen needle, diabetic (BD ULTRA-FINE MAR PEN NEEDLE) 32 gauge x 5/32" Ndle Use to inject insulin 5 times daily    prednisoLONE acetate (PRED FORTE) 1 % DrpS 1 drop, Right Eye, 2 times daily    tretinoin (RETIN-A) 0.05 % cream Topical (Top), Nightly    triamcinolone acetonide 0.1% (KENALOG) 0.1 % cream Topical (Top), 2 times daily     Objective:      Vitals:    01/23/23 1432   BP: (!) 122/58   Pulse: 88   SpO2: (!) 94%   Weight: 112 kg (246 lb 14.6 oz)   Height: 5' 6" (1.676 m)   PainSc: 0-No pain     Body mass index is 39.85 kg/m².    Physical Exam  Vitals reviewed.   Constitutional:       General: She is not in acute distress.     Appearance: Normal appearance. She is obese. She is not ill-appearing or diaphoretic.   HENT:      Head: Normocephalic and atraumatic.   Cardiovascular:      Rate and Rhythm: Normal rate and regular rhythm.      Heart sounds: Normal heart sounds. No murmur heard.    No friction rub. No gallop.   Pulmonary:      Effort: Pulmonary effort is normal. No respiratory distress.      Breath sounds: Normal breath sounds. No stridor. No wheezing, rhonchi or rales.   Abdominal:      General: Bowel sounds are normal. There is no distension.      Palpations: Abdomen is soft. There is no mass.      Tenderness: There is no abdominal tenderness. There is no guarding or rebound.          Comments: Hyperkeratotic skin. No erythema, edema, sores, wounds, or discharge.   Skin:     General: Skin is warm and dry.      Comments: Color WNL   Neurological:      Mental Status: She is alert. Mental status is at baseline.   Psychiatric:         Mood and Affect: Mood normal.         Behavior: Behavior normal.       Assessment:       1. Type 2 diabetes mellitus with complication, with long-term current use of insulin    2. Dermatitis    3. Essential " hypertension    4. Hyperlipidemia, unspecified hyperlipidemia type    5. History of syphilis    6. Anemia, unspecified type        Plan:       Type 2 diabetes mellitus with complication, with long-term current use of insulin  Continue medication, evaluation, and management per endocrinology.  Continue metformin, Levemir, and Novolog  Increase Ozempic to 1 mg weekly  Continue Dexcom and follow up with diabetes education  Plans to discuss closed loop system with endocrinologist  RTC in 3 months  -     Comprehensive Metabolic Panel; Future  -     Hemoglobin A1C; Future  -     CBC Auto Differential; Future  -     semaglutide (OZEMPIC) 1 mg/dose (4 mg/3 mL); Inject 1 mg into the skin every 7 days.    Dermatitis  -     tretinoin (RETIN-A) 0.05 % cream; Apply topically every evening.    Essential hypertension  Continue current medications  RTC in 3 months for follow up  -     TSH; Future  -     Hemoglobin A1C; Future      Hyperlipidemia, unspecified hyperlipidemia type  Continue current medications  RTC in 3 months for follow up    History of syphilis  -     RPR; Future    Anemia, unspecified type  AoCD?        -     CBC Auto Differential; Future      Meghan Perez MD  1/23/2023

## 2023-01-25 ENCOUNTER — TELEPHONE (OUTPATIENT)
Dept: PHARMACY | Facility: CLINIC | Age: 58
End: 2023-01-25
Payer: MEDICARE

## 2023-01-26 ENCOUNTER — CLINICAL SUPPORT (OUTPATIENT)
Dept: DIABETES | Facility: CLINIC | Age: 58
End: 2023-01-26
Payer: MEDICARE

## 2023-01-26 VITALS — WEIGHT: 246.81 LBS | BODY MASS INDEX: 39.66 KG/M2 | HEIGHT: 66 IN

## 2023-01-26 DIAGNOSIS — Z79.4 TYPE 2 DIABETES MELLITUS WITH COMPLICATION, WITH LONG-TERM CURRENT USE OF INSULIN: Primary | ICD-10-CM

## 2023-01-26 DIAGNOSIS — E11.8 TYPE 2 DIABETES MELLITUS WITH COMPLICATION, WITH LONG-TERM CURRENT USE OF INSULIN: Primary | ICD-10-CM

## 2023-01-26 PROCEDURE — G0108 DIAB MANAGE TRN  PER INDIV: HCPCS | Mod: S$GLB,,, | Performed by: DIETITIAN, REGISTERED

## 2023-01-26 PROCEDURE — G0108 PR DIAB MANAGE TRN  PER INDIV: ICD-10-PCS | Mod: S$GLB,,, | Performed by: DIETITIAN, REGISTERED

## 2023-01-26 PROCEDURE — 99999 PR PBB SHADOW E&M-EST. PATIENT-LVL I: CPT | Mod: PBBFAC,,, | Performed by: DIETITIAN, REGISTERED

## 2023-01-26 PROCEDURE — 99999 PR PBB SHADOW E&M-EST. PATIENT-LVL I: ICD-10-PCS | Mod: PBBFAC,,, | Performed by: DIETITIAN, REGISTERED

## 2023-01-26 NOTE — PROGRESS NOTES
Diabetes Care Specialist Progress Note  Author: Chantal Shrestha RD  Date: 1/26/2023    Program Intake  Reason for Diabetes Program Visit:: Intervention  Type of Intervention:: Individual  Individual: Education  Education: Self-Management Skill Review, Insulin Pump Evaluation  Current diabetes risk level:: moderate  In the last 12 months, have you:: none  Permission to speak with others about care:: no    Lab Results   Component Value Date    HGBA1C 9.1 (H) 01/23/2023       Clinical     Nutritional Status  Meal Plan 24 Hour Recall: Breakfast, Lunch, Dinner, Snack (usually 2 meals daily)  Meal Plan 24 Hour Recall - Breakfast: Boost Glucose Control  Meal Plan 24 Hour Recall - Dinner: baked chicken, broccoli, creamed potatoes (1 cup), water  Meal Plan 24 Hour Recall - Snack: popcorn, diet coke      Diabetes Self-Management Skills Assessment    Diabetes Disease Process/Treatment Options  Diabetes Disease Process/Treatment Options: Skills Assessment Completed: No  Deferred due to:: Time    Nutrition/Healthy Eating  Challenges to healthy eating:: eating out, going to parties, snacking between meals and at night  Method of carbohydrate measurement:: plate method  Patient can identify foods that impact blood sugar.: yes  Patient-identified foods:: soda, starches (bread, pasta, rice, cereal), sweets, fruit/fruit juice, starchy vegetables (corn, peas, beans)  Nutrition/Healthy Eating Skills Assessment Completed:: Yes  Assessment indicates:: Knowledge deficit, Instruction Needed  Area of need?: Yes    Physical Activity/Exercise  Physical Activity/Exercise Skills Assessment Completed: : No  Deffered due to:: Time    Medications  Patient is able to describe current diabetes management routine.: yes  Diabetes management routine:: insulin, oral medications, injectable medications, diet  Patient is able to identify current diabetes medications, dosages, and appropriate timing of medications.: yes  Patient understands the purpose of  the medications taken for diabetes.: yes  Patient reports problems or concerns with current medication regimen.: yes  Medication regimen problems/concerns:: does not feel like regimen is working, other (see comments) (Pt is interested in changing to insulin pump therapy)  Medication Skills Assessment Completed:: Yes  Assessment indicates:: Instruction Needed  Area of need?: Yes    Home Blood Glucose Monitoring  Patient states that blood sugar is checked at home daily.: yes  Monitoring Method:: personal continuous glucose monitor  Personal CGM type:: Dexcom  Patient is able to use personal CGM appropriately.: yes  CGM Report reviewed?: yes  Home Blood Glucose Monitoring Skills Assessment Completed: : Yes  Assessment indicates:: Adequate understanding  Area of need?: No    Acute Complications  Acute Complications Skills Assessment Completed: : No  Deffered due to:: Time    Chronic Complications  Chronic Complications Skills Assessment Completed: : No  Deferred due to:: Time    Psychosocial/Coping  Patient can identify ways of coping with chronic disease.: yes  Patient-stated ways of coping with chronic disease:: support from loved ones, spiritual/Anglican practices  Psychosocial/Coping Skills Assessment Completed: : Yes  Assessment indicates:: Adequate understanding (Has been grieving loss of her patient - 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 Memorial was this past weekend. Has a group of 3 friends and they all support each other. Planning 2 trips with friends later this year.)  Area of need?: No      Assessment Summary and Plan    Based on today's diabetes care assessment, the following areas of need were identified:      Social 11/9/2022   Access to Mass Media/Tech No   Cognitive/Behavioral Health No   Culture/Druze No   Communication No   Health Literacy No        Clinical 11/9/2022   Medication Adherence Yes   Lab Compliance No        Diabetes Self-Management Skills 1/26/2023  "  Diabetes Disease Process/Treatment Options -   Nutrition/Healthy Eating Yes - see care planning   Physical Activity/Exercise -   Medication Yes - see care palnning   Home Blood Glucose Monitoring No   Chronic Complications -   Psychosocial/Coping No          Today's interventions were provided through individual discussion, instruction, and written materials were provided.      Patient verbalized understanding of instruction and written materials.  Pt was able to return back demonstration of instructions today. Patient understood key points, needs reinforcement and further instruction.     Diabetes Self-Management Care Plan:    Today's Diabetes Self-Management Care Plan was developed with Starla SHANKAR's input. Starla VONNIE has agreed to work toward the following goal(s) to improve his/her overall diabetes control.      Care Plan: Diabetes Management   Updates made since 12/27/2022 12:00 AM        Problem: Blood Glucose Self-Monitoring         Goal: Pt will use Dexcom G6 for continuous glucose monitoring and treatment decisions.    Start Date: 11/9/2022   Expected End Date: 12/15/2022   This Visit's Progress: Met   Recent Progress: On track   Priority: Medium   Barriers: No Barriers Identified   Note:    11/9/22 - Pt reports not SMBG often (only once a week) d/t fingerstick fatigue. Got letter from insurance stating Dexcom was not covered and brought letter to visit. Explained it is not covered through her pharmacy benefit but is covered through DME. Will fill out paperwork and send to [employer] for approval and DME processing and discussed process with pt.     12/15/22 -   DEXCOM G6 SENSOR START     Patient referred to clinic today for Dexcom G6 continuous glucose sensor system training.  Education provided using "Quick Start Guide" per Dexcom protocol.    Shane/ Overview:  5 min glucose reading updates, trending arrows, BG graph screens, battery life indicator, Blue Tooth Symbol.  Shane/ Menus: trend " Graph, start sensor, enter BG, events, Alerts, Settings, Shutdown, Stop Sensor.   Shane/ Screens and prompts  Alarm Settings:    * Low alert: 80    * High alert: 300    * Rise rate: off    * Fall Rate: off    Transmitter and Sensor Codes entered per prompts    Reviewed sensor site selection. Site selected and prepped using aseptic technique Inserted to left abdomen. Transmitter placed in pod and secured.  Practiced sensor pod/transmitter removal from site, and removal of transmitter from sensor pod.  Patient able to demonstrate without difficulty.  Encouraged to review manual or shane video prior to starting another sensor.   Reviewed problem-solving aspects of sensor transmission/ variables that can disrupt transmission. /shane range 20 feet, but the first 3 hrs keep within 3 feet of transmitter.  Pt instructed on lag time of interstitial fluid from CBG and was advised to tx hypoglycemia and dose insulin based on SMBG values.  Dexcom technical support contact number given and examples of when to contact them discussed.   Clarity shane and website reviewed w/ pt. Assisted pt w/ connecting to Clarity account for automatic uploads.      22 - Pt doing well with wearing and using Dexcom. Stated seeing the effect on BG has made her more aware and using it as a way to keep herself accountable. Forgot about changing sensor at first. 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 But she was able to successfully start new sensor on her own earlier this week. Reviewed report with pt today. Pt reports highs on Dec 16, 17, and  were largely r/t to stress of her patients death (was with her when she ). Having some high PP spikes but may be r/t missed Humalog.     23 - Likes using the Dexcom. Seeing readings and graph has helped her stay more accountable and make healthier choices. Encouraged continued efforts. Does have some skin discoloration on abdomen in the shape of  dexcom adhesive. Denies any itching, bumps or redness when taking off the sensors. Discussed even though doesn't seem to be an allergic reaction to tape, may be more sensitive. Encouraged trying barrier wipe or spray such as skinprep. Provided list of barrier product options.        Problem: Medications         Goal: Patient Agrees to take Diabetes Medication(s) - Ozempic, Metformin, and MDI as prescribed.    Start Date: 11/10/2022   Expected End Date: 12/15/2022   This Visit's Progress: On track   Recent Progress: On track   Priority: High   Barriers: No Barriers Identified   Note:    11/9/22 - Pt has started Ozempic but sometimes missing insulin doses. Reviewed importance of taking Humalog before each meal (and sometimes her dinner meal is a bunch of snack foods). Is having a lot of difficulty with bruising and some scaring to abdomen where she usually takes injections. Educated on alternate injection sites and site rotation. Discussed would benefit from adding correction to Humalog before meal if BG is high. Reviewed taking set doses of Levemir BID about 12 hours apart. Her long-acting and rapid-acting are not balanced - she is scheduled to see endocrinologist later this month. Encouraged pt to bring log of BG and insulin doses for more effective insulin adjustment. Pt verbalized interest in insulin pump therapy - discussed working on basic skills necessary (monitoring, taking insulin consistently, carb counting).     12/15/22 - Pt reports has been taking Humalog and Levemir as prescribed with changes made per Dr. Molina. No logs today. Reports better glucose readings but still a lot of variability. Discussed will be able to dose insulin based on CGM readings and will aid in more effective insulin dose changes.     12/30/22 - Taking Levemir 58 units and Humalog 10 units + correction scale. Reports consistently waking up >/= 160s. Has not increased Levemir as indicated in Dr. Molina's AVS. Encouraged increasing  Levemir by 2 units if FBG in am is remaining >140 for 3-5 days. Pt is interested in working toward insulin pump therapy. Briefly discussed basic concepts of pump therapy and how pump works. Explained next step would be to work on CHO counting.     1/26/23 - Pt doing well with taking medications at proper timing and consistently. Lost correction scale for Humalog. Reviewed adding correction based on scale to Humalog before each meal. Pt also recently saw Dr. Perez and Ozempic dose increased. Will take first 1mg dosage this Saturday.     Pt is interested in and working toward pump therapy. Today, discussed in detail and provided demonstration of 2 pumps that are compatible with her Dexcom G6 - TSlim with Control IQ and OmniPod5. Discussed and demonstrated differences (tubing vs pod), benefits of each and both being hybrid closed loop systems. Pt verbalized preference of tubingless system - Omnipod5. Encouraged pt to call insurance and discuss coverage and any copay costs she would have.        Problem: Healthy Eating         Goal: Pt will identify foods that are carbohydrates and use labels for counting carbs.    Start Date: 12/30/2022   Expected End Date: 1/26/2023   This Visit's Progress: On track   Priority: Medium   Barriers: No Barriers Identified   Note:    12/30/22 - Pt is interested in working toward insulin pump therapy. Discussed importance of learning to count carbohydrates in order to prepare for pump. Pt is willing to try. Provided basic carb counting education:    Ed on what carbohydrates are, what foods contain carbs and importance of reducing added sugar  Began ed on appropriate portion sizes of carbs for ~15g CHO  Label reading exercise demonstrating using serving size and total CHO grams in carb count  Began ed on the plate method: importance of increasing non-starchy veggies (cooked until soft d/t gastroparesis), choosing lean protein, healthy fats and portioned servings of complex cho's      Provided food log and encouraged practicing carb counting. Pt will write down foods, approx serving size and grams carb and bring log to next appt.     1/26/23 - Pt hasn't been practicing carb counting since last visit - attention has been focused on assisting Vianca's  since her passing. Has been getting more comfortable with identifying what foods are carbs and using plate method (pt shared photo of her plate with baked chicken, broccoli and ~1 cup potatoes - perfect!). Appropriately estimated carbs as 30g. Reviewed basic carb counting, portion sizes for ~15g CHO, and encouraged downloading/using Think[employer]use imelda for looking up carb grams.        Task: Reviewed the sources and role of Carbohydrate, Protein, and Fat and how each nutrient impacts blood sugar. Completed 1/3/2023        Task: Provided visual examples using dry measuring cups, food models, and other familiar objects such as computer mouse, deck or cards, tennis ball etc. to help with visualization of portions. Completed 1/3/2023        Task: Explained how to count carbohydrates using the food label and the use of dry measuring cups for accurate carb counting. Completed 1/3/2023        Task: Recommended replacing beverages containing high sugar content with noncaloric/sugar free options and/or water. Completed 1/3/2023        Task: Review the importance of balancing carbohydrates with each meal using portion control techniques to count servings of carbohydrate and label reading to identify serving size and amount of total carbs per serving. Completed 1/3/2023          Follow Up Plan     Follow up in about 6 weeks (around 3/8/2023) for dexcom report review and working toward insulin pump.    Today's care plan and follow up schedule was discussed with patient.  Starla SHANKAR verbalized understanding of the care plan, goals, and agrees to follow up plan.        The patient was encouraged to communicate with his/her health care provider/physician and  care team regarding his/her condition(s) and treatment.  I provided the patient with my contact information today and encouraged to contact me via phone or [facility] Patient Portal as needed.     Length of Visit   Total Time: 60 Minutes

## 2023-02-23 ENCOUNTER — TELEPHONE (OUTPATIENT)
Dept: INTERNAL MEDICINE | Facility: CLINIC | Age: 58
End: 2023-02-23
Payer: MEDICARE

## 2023-02-24 NOTE — TELEPHONE ENCOUNTER
----- Message from Chantal Shrestha RD sent at 2/23/2023  2:18 PM CST -----  Hi Dr. Perez,     Ms. Cha called me and asked about her labs - particularly she had questions about her low hgb. She said she has been feeling unusually cold and wanted to know if she needed to take a supplement. I noticed some iron studies were done in October; iron saturation was a bit low but otherwise fine. Would you suggest any supplement other than maybe a daily MVI?     Many thanks,   Chantal

## 2023-03-01 ENCOUNTER — LAB VISIT (OUTPATIENT)
Dept: LAB | Facility: OTHER | Age: 58
End: 2023-03-01
Attending: INTERNAL MEDICINE
Payer: MEDICARE

## 2023-03-01 DIAGNOSIS — Z79.4 TYPE 2 DIABETES MELLITUS WITH HYPERGLYCEMIA, WITH LONG-TERM CURRENT USE OF INSULIN: ICD-10-CM

## 2023-03-01 DIAGNOSIS — E11.65 TYPE 2 DIABETES MELLITUS WITH HYPERGLYCEMIA, WITH LONG-TERM CURRENT USE OF INSULIN: ICD-10-CM

## 2023-03-01 LAB
ANION GAP SERPL CALC-SCNC: 9 MMOL/L (ref 8–16)
BUN SERPL-MCNC: 27 MG/DL (ref 6–20)
CALCIUM SERPL-MCNC: 9.1 MG/DL (ref 8.7–10.5)
CHLORIDE SERPL-SCNC: 105 MMOL/L (ref 95–110)
CO2 SERPL-SCNC: 24 MMOL/L (ref 23–29)
CREAT SERPL-MCNC: 1.2 MG/DL (ref 0.5–1.4)
EST. GFR  (NO RACE VARIABLE): 52 ML/MIN/1.73 M^2
ESTIMATED AVG GLUCOSE: 189 MG/DL (ref 68–131)
GLUCOSE SERPL-MCNC: 123 MG/DL (ref 70–110)
HBA1C MFR BLD: 8.2 % (ref 4–5.6)
POTASSIUM SERPL-SCNC: 4 MMOL/L (ref 3.5–5.1)
SODIUM SERPL-SCNC: 138 MMOL/L (ref 136–145)

## 2023-03-01 PROCEDURE — 80048 BASIC METABOLIC PNL TOTAL CA: CPT | Performed by: INTERNAL MEDICINE

## 2023-03-01 PROCEDURE — 36415 COLL VENOUS BLD VENIPUNCTURE: CPT | Performed by: INTERNAL MEDICINE

## 2023-03-01 PROCEDURE — 83036 HEMOGLOBIN GLYCOSYLATED A1C: CPT | Performed by: INTERNAL MEDICINE

## 2023-03-08 ENCOUNTER — OFFICE VISIT (OUTPATIENT)
Dept: ENDOCRINOLOGY | Facility: CLINIC | Age: 58
End: 2023-03-08
Payer: MEDICARE

## 2023-03-08 ENCOUNTER — CLINICAL SUPPORT (OUTPATIENT)
Dept: DIABETES | Facility: CLINIC | Age: 58
End: 2023-03-08
Payer: MEDICARE

## 2023-03-08 VITALS
WEIGHT: 244 LBS | BODY MASS INDEX: 39.21 KG/M2 | HEART RATE: 91 BPM | HEIGHT: 66 IN | SYSTOLIC BLOOD PRESSURE: 141 MMHG | DIASTOLIC BLOOD PRESSURE: 63 MMHG

## 2023-03-08 DIAGNOSIS — Z79.4 TYPE 2 DIABETES MELLITUS WITH COMPLICATION, WITH LONG-TERM CURRENT USE OF INSULIN: ICD-10-CM

## 2023-03-08 DIAGNOSIS — E11.8 TYPE 2 DIABETES MELLITUS WITH COMPLICATION, WITH LONG-TERM CURRENT USE OF INSULIN: Primary | ICD-10-CM

## 2023-03-08 DIAGNOSIS — Z79.4 TYPE 2 DIABETES MELLITUS WITH HYPERGLYCEMIA, WITH LONG-TERM CURRENT USE OF INSULIN: Primary | ICD-10-CM

## 2023-03-08 DIAGNOSIS — Z79.4 TYPE 2 DIABETES MELLITUS WITH COMPLICATION, WITH LONG-TERM CURRENT USE OF INSULIN: Primary | ICD-10-CM

## 2023-03-08 DIAGNOSIS — E11.8 TYPE 2 DIABETES MELLITUS WITH COMPLICATION, WITH LONG-TERM CURRENT USE OF INSULIN: ICD-10-CM

## 2023-03-08 DIAGNOSIS — E11.65 TYPE 2 DIABETES MELLITUS WITH HYPERGLYCEMIA, WITH LONG-TERM CURRENT USE OF INSULIN: Primary | ICD-10-CM

## 2023-03-08 DIAGNOSIS — E66.01 CLASS 2 SEVERE OBESITY WITH SERIOUS COMORBIDITY AND BODY MASS INDEX (BMI) OF 39.0 TO 39.9 IN ADULT, UNSPECIFIED OBESITY TYPE: ICD-10-CM

## 2023-03-08 DIAGNOSIS — I10 ESSENTIAL HYPERTENSION: ICD-10-CM

## 2023-03-08 DIAGNOSIS — E78.5 HYPERLIPIDEMIA, UNSPECIFIED HYPERLIPIDEMIA TYPE: ICD-10-CM

## 2023-03-08 PROCEDURE — 1160F PR REVIEW ALL MEDS BY PRESCRIBER/CLIN PHARMACIST DOCUMENTED: ICD-10-PCS | Mod: CPTII,S$GLB,, | Performed by: INTERNAL MEDICINE

## 2023-03-08 PROCEDURE — 1160F RVW MEDS BY RX/DR IN RCRD: CPT | Mod: CPTII,S$GLB,, | Performed by: INTERNAL MEDICINE

## 2023-03-08 PROCEDURE — 1159F PR MEDICATION LIST DOCUMENTED IN MEDICAL RECORD: ICD-10-PCS | Mod: CPTII,S$GLB,, | Performed by: INTERNAL MEDICINE

## 2023-03-08 PROCEDURE — 3077F PR MOST RECENT SYSTOLIC BLOOD PRESSURE >= 140 MM HG: ICD-10-PCS | Mod: CPTII,S$GLB,, | Performed by: INTERNAL MEDICINE

## 2023-03-08 PROCEDURE — 3052F HG A1C>EQUAL 8.0%<EQUAL 9.0%: CPT | Mod: CPTII,S$GLB,, | Performed by: INTERNAL MEDICINE

## 2023-03-08 PROCEDURE — 99214 PR OFFICE/OUTPT VISIT, EST, LEVL IV, 30-39 MIN: ICD-10-PCS | Mod: 25,S$GLB,, | Performed by: INTERNAL MEDICINE

## 2023-03-08 PROCEDURE — 1159F MED LIST DOCD IN RCRD: CPT | Mod: CPTII,S$GLB,, | Performed by: INTERNAL MEDICINE

## 2023-03-08 PROCEDURE — G0108 DIAB MANAGE TRN  PER INDIV: HCPCS | Mod: S$GLB,,, | Performed by: FAMILY MEDICINE

## 2023-03-08 PROCEDURE — G0108 PR DIAB MANAGE TRN  PER INDIV: ICD-10-PCS | Mod: S$GLB,,, | Performed by: FAMILY MEDICINE

## 2023-03-08 PROCEDURE — 3077F SYST BP >= 140 MM HG: CPT | Mod: CPTII,S$GLB,, | Performed by: INTERNAL MEDICINE

## 2023-03-08 PROCEDURE — 4010F PR ACE/ARB THEARPY RXD/TAKEN: ICD-10-PCS | Mod: CPTII,S$GLB,, | Performed by: INTERNAL MEDICINE

## 2023-03-08 PROCEDURE — 3008F PR BODY MASS INDEX (BMI) DOCUMENTED: ICD-10-PCS | Mod: CPTII,S$GLB,, | Performed by: INTERNAL MEDICINE

## 2023-03-08 PROCEDURE — 3078F PR MOST RECENT DIASTOLIC BLOOD PRESSURE < 80 MM HG: ICD-10-PCS | Mod: CPTII,S$GLB,, | Performed by: INTERNAL MEDICINE

## 2023-03-08 PROCEDURE — 4010F ACE/ARB THERAPY RXD/TAKEN: CPT | Mod: CPTII,S$GLB,, | Performed by: INTERNAL MEDICINE

## 2023-03-08 PROCEDURE — 95251 CONT GLUC MNTR ANALYSIS I&R: CPT | Mod: S$GLB,,, | Performed by: INTERNAL MEDICINE

## 2023-03-08 PROCEDURE — 3078F DIAST BP <80 MM HG: CPT | Mod: CPTII,S$GLB,, | Performed by: INTERNAL MEDICINE

## 2023-03-08 PROCEDURE — 3052F PR MOST RECENT HEMOGLOBIN A1C LEVEL 8.0 - < 9.0%: ICD-10-PCS | Mod: CPTII,S$GLB,, | Performed by: INTERNAL MEDICINE

## 2023-03-08 PROCEDURE — 3008F BODY MASS INDEX DOCD: CPT | Mod: CPTII,S$GLB,, | Performed by: INTERNAL MEDICINE

## 2023-03-08 PROCEDURE — 99214 OFFICE O/P EST MOD 30 MIN: CPT | Mod: 25,S$GLB,, | Performed by: INTERNAL MEDICINE

## 2023-03-08 PROCEDURE — 95251 PR GLUCOSE MONITOR, 72 HOUR, PHYS INTERP: ICD-10-PCS | Mod: S$GLB,,, | Performed by: INTERNAL MEDICINE

## 2023-03-08 NOTE — ASSESSMENT & PLAN NOTE
Complicated by retinopathy, CKD, neuropathy, as well as hyperglycemia.    Reviewed goals of therapy - to get the best control we can without hypoglycemia. Goal A1C <7   last a1c improved but still above goal   - keep f/u with DM education    On CGM review, occasional low sugars. Often high after lunch, sometimes after dinner    Medication changes:   Decrease levemir, 55 units BID  Increase novolog. Pt only taking 2-3 units PRN, try to take 4 units TIDWM plus scale   continue ozempic, metformin    Reviewed patient's current insulin regimen. Clarified proper insulin dose and timing in relation to meals, etc. Insulin injection sites and proper rotation instructed.     -Advised frequent self blood glucose monitoring. Patient encouraged to document glucose results and bring them to every clinic visit    -Hypoglycemia precautions discussed. Instructed on precautions before driving.    -Close adherence to lifestyle changes recommended.    -Periodic follow ups for eye evaluations, foot care suggested.    Consider omnipod 5 if covered. Would likely need u200 insulin due to daily dose    Continue CGM

## 2023-03-08 NOTE — ASSESSMENT & PLAN NOTE
Lab Results   Component Value Date    LDLCALC 71.0 07/22/2022     LDL at goal  Continue statin  Monitor yearly

## 2023-03-08 NOTE — PATIENT INSTRUCTIONS
For diabetes:    Continue ozempic    Continue insulins    Basal Insulin  Take 55 units of levemir insulin twice a day    Check your glucose in the morning before breakfast and keep a log.  Goal AM glucose:       After 3-5 days, if your average glucose is above 140, increase your dose by 2 units  If your AM glucose is under 90, decrease by 2 units.    Meal-time insulin    Take 3 units of novolog with each meal (works best when taken about 5-10 minutes before you eat). If you skip a meal, skip this meal-time insulin.    This amount can then further be adjusted based on your pre-meal glucose:    Pre-meal glucose Adjustment to make   <70 Eat a meal   70-90 Decrease by 1 units    No change   150-249 Add 1 units   250-349 Add 2 units   350-449 Add 3 units   450+ Add 4 units

## 2023-03-08 NOTE — ASSESSMENT & PLAN NOTE
Body mass index is 39.38 kg/m².   complicated by HTN, HLD, diabetes   continue glp1   encourage pt to work on healthy diet, increase exercise as tolerated.   monitor weight

## 2023-03-08 NOTE — PROGRESS NOTES
Subjective:      Chief Complaint: Diabetes (DM2)    HPI: Starla Wharton is a 57 y.o. female who is here for an evaluation for diabetes.  Last seen 11/30/2022    With regards to the diabetes:  Diagnosed with T2DM since many years ago.    Known complications:     Retinopathy? Yes, s/p lasers/injections    Nephropathy? Decreased GFR on last labs but MACR normal    Neuropathy? Yes    CAD? No    CVA? No    Current regimen:   Metformin 500 mg BID   Basal insulin: levemir 58 units qAM, 60 units qPM   Prandial insulin: novolog 2-3 units with meals.   Incretin: ozempic 1 mg/week    Missed doses? denies    Prior treatments:      Self-monitored glucose  # times a day testing: dexcom CGM  Log reviewed: yes    CGM analysis:    -----------------------------  Dexcom Clarity  -----------------------------  Starla Wharton  YOB: 1965  Generated at: Wed, Mar 8, 2023 2:55 PM CST  Reporting period: Tue Feb 7, 2023 - Wed Mar 8, 2023  -----------------------------  Glucose Details  Average glucose: 175 mg/dL  Standard deviation: 50 mg/dL  GMI: 7.5%  -----------------------------  Time in Range  Very High: 7%  High: 35%  In Range: 56%  Low: <1%  Very Low: 1%    Target Range   mg/dL    -----------------------------  CGM Details  Sensor usage: 100%  Days with CGM data: 30/30    CGM interpretation:      Range:      Hypoglycemia: yes, occasional. 2/month or so.     Hyperglycemia: yes, often. Mostly after lunch. Average sugar also on the higher side    CGM recommendations: See assessment/plan below    Current Symptoms  No   Yes  [x]    []  Polydipsia  [x]    []  Polyuria  []    [x]  Vision changes  [x]    []  Nausea  [x]    []  Diarrhea  [x]    []  Weight gain  [x]    []  Weight loss    Diabetes Management Status    Statin: Taking  ACE/ARB: Taking    Screening or Prevention Patient's value Goal Complete/Controlled?   HgA1C Testing and Control   Lab Results   Component Value Date    HGBA1C 8.2 (H) 03/01/2023       q6months/Less than 7% No     Lipid profile : 07/22/2022 Annually Yes     LDL control Lab Results   Component Value Date    LDLCALC 71.0 07/22/2022    Annually/Less than 100 mg/dl  Yes     Nephropathy screening Lab Results   Component Value Date    MICALBCREAT 29.0 07/22/2022     Lab Results   Component Value Date    CREATININE 1.2 03/01/2023     Lab Results   Component Value Date    PROTEINUA Negative 03/04/2018    Annually Yes     Blood pressure BP Readings from Last 1 Encounters:   03/08/23 (!) 141/63    Less than 140/90 No     Dilated retinal exam : 02/03/2023 Annually Yes     Foot exam   Most Recent Foot Exam Date: Not Found Annually No       Reviewed past medical, family, social history and updated as appropriate.    Review of Systems  As above    Objective:     Vitals:    03/08/23 1446   BP: (!) 141/63   Pulse: 91       BP Readings from Last 5 Encounters:   03/08/23 (!) 141/63   01/23/23 (!) 122/58   11/30/22 (!) 147/69   10/21/22 116/60   09/22/22 (!) 140/70     Physical Exam  Vitals reviewed.   Constitutional:       General: She is not in acute distress.     Appearance: She is obese.   Cardiovascular:      Heart sounds: Normal heart sounds.   Pulmonary:      Effort: Pulmonary effort is normal.     Wt Readings from Last 5 Encounters:   03/08/23 1446 110.7 kg (244 lb)   01/26/23 1012 112 kg (246 lb 12.9 oz)   01/23/23 1432 112 kg (246 lb 14.6 oz)   12/15/22 1439 109.8 kg (242 lb 1 oz)   11/30/22 0949 111.1 kg (244 lb 14.9 oz)     Lab Results   Component Value Date    HGBA1C 8.2 (H) 03/01/2023    HGBA1C 9.1 (H) 01/23/2023    HGBA1C 9.8 (H) 10/21/2022    HGBA1C 12.4 (H) 07/22/2022     Lab Results   Component Value Date    CHOL 139 07/22/2022    CHOL 181 03/05/2018    HDL 51 07/22/2022    HDL 72 03/05/2018    LDLCALC 71.0 07/22/2022    LDLCALC 101.0 03/05/2018    TRIG 85 07/22/2022    TRIG 40 03/05/2018    CHOLHDL 36.7 07/22/2022    CHOLHDL 39.8 03/05/2018     Lab Results   Component Value Date      03/01/2023    K 4.0 03/01/2023     03/01/2023    CO2 24 03/01/2023     (H) 03/01/2023    BUN 27 (H) 03/01/2023    CREATININE 1.2 03/01/2023    CALCIUM 9.1 03/01/2023    PROT 8.0 01/23/2023    ALBUMIN 3.5 01/23/2023    BILITOT 0.6 01/23/2023    ALKPHOS 107 01/23/2023    AST 13 01/23/2023    ALT 15 01/23/2023    ANIONGAP 9 03/01/2023    ESTGFRAFRICA 58 (A) 07/22/2022    EGFRNONAA 50 (A) 07/22/2022    TSH 2.259 01/23/2023      Lab Results   Component Value Date    MICALBCREAT 29.0 07/22/2022     Assessment/Plan:     Type 2 diabetes mellitus with complication, with long-term current use of insulin  Complicated by retinopathy, CKD, neuropathy, as well as hyperglycemia.    Reviewed goals of therapy - to get the best control we can without hypoglycemia. Goal A1C <7   last a1c improved but still above goal   - keep f/u with DM education    On CGM review, occasional low sugars. Often high after lunch, sometimes after dinner    Medication changes:   Decrease levemir, 55 units BID  Increase novolog. Pt only taking 2-3 units PRN, try to take 4 units TIDWM plus scale   continue ozempic, metformin    Reviewed patient's current insulin regimen. Clarified proper insulin dose and timing in relation to meals, etc. Insulin injection sites and proper rotation instructed.     -Advised frequent self blood glucose monitoring. Patient encouraged to document glucose results and bring them to every clinic visit    -Hypoglycemia precautions discussed. Instructed on precautions before driving.    -Close adherence to lifestyle changes recommended.    -Periodic follow ups for eye evaluations, foot care suggested.    Consider omnipod 5 if covered. Would likely need u200 insulin due to daily dose    Continue CGM       Essential hypertension  bp minimally elevated today   was okay last check   continue regimen   f/u with PCP, monitor    Hyperlipidemia  Lab Results   Component Value Date    LDLCALC 71.0 07/22/2022     LDL at  goal  Continue statin  Monitor yearly      Class 2 severe obesity with serious comorbidity and body mass index (BMI) of 39.0 to 39.9 in adult  Body mass index is 39.38 kg/m².   complicated by HTN, HLD, diabetes   continue glp1   encourage pt to work on healthy diet, increase exercise as tolerated.   monitor weight        Follow up in about 4 months (around 7/8/2023) for further monitoring, lab review.      Errol Molina MD  Endocrinology

## 2023-03-09 ENCOUNTER — TELEPHONE (OUTPATIENT)
Dept: INTERNAL MEDICINE | Facility: CLINIC | Age: 58
End: 2023-03-09
Payer: MEDICARE

## 2023-03-09 DIAGNOSIS — R06.83 SNORING: ICD-10-CM

## 2023-03-09 DIAGNOSIS — E11.8 TYPE 2 DIABETES MELLITUS WITH COMPLICATION, WITH LONG-TERM CURRENT USE OF INSULIN: Primary | ICD-10-CM

## 2023-03-09 DIAGNOSIS — Z79.4 TYPE 2 DIABETES MELLITUS WITH COMPLICATION, WITH LONG-TERM CURRENT USE OF INSULIN: Primary | ICD-10-CM

## 2023-03-09 DIAGNOSIS — R15.9 INCONTINENCE OF FECES, UNSPECIFIED FECAL INCONTINENCE TYPE: Primary | ICD-10-CM

## 2023-03-09 NOTE — PROGRESS NOTES
Diabetes Care Specialist Progress Note  Author: Chantal Shrestha RD, CDE  Date: 3/9/2023    Program Intake  Reason for Diabetes Program Visit:: Intervention  Type of Intervention:: Individual  Individual: Education  Education: Self-Management Skill Review  Current diabetes risk level:: moderate  In the last 12 months, have you:: none    Lab Results   Component Value Date    HGBA1C 8.2 (H) 03/01/2023         Diabetes Self-Management Skills Assessment    Diabetes Disease Process/Treatment Options  Diabetes Disease Process/Treatment Options: Skills Assessment Completed: No  Deferred due to:: Time    Nutrition/Healthy Eating  Nutrition/Healthy Eating Skills Assessment Completed:: No  Deffered due to:: Time    Physical Activity/Exercise  Physical Activity/Exercise Skills Assessment Completed: : No  Deffered due to:: Time    Medications  Patient is able to describe current diabetes management routine.: yes  Diabetes management routine:: insulin, oral medications, injectable medications, diet (metformin 500mg BID, Ozempic 1mg weekly, Humalog 4 units + scale TID with meals, Levemir 55 units BID)  Patient is able to identify current diabetes medications, dosages, and appropriate timing of medications.: yes  Patient understands the purpose of the medications taken for diabetes.: yes  Patient reports problems or concerns with current medication regimen.: yes  Medication regimen problems/concerns:: concerned about side effects, other (see comments) (had a significant low last week - BG dropped rapidly and pt unsure why)  Medication Skills Assessment Completed:: Yes  Assessment indicates:: Instruction Needed  Area of need?: Yes    Home Blood Glucose Monitoring  Patient states that blood sugar is checked at home daily.: yes  Monitoring Method:: personal continuous glucose monitor  Personal CGM type:: Dexcom  Patient is able to use personal CGM appropriately.: yes  CGM Report reviewed?: yes  Home Blood Glucose Monitoring Skills  Assessment Completed: : Yes  Assessment indicates:: Adequate understanding  Area of need?: No    Acute Complications  Patient is able to identify types of acute complications: Yes  Patient Identified:: Hypoglycemia  Patient is able to state the basic meaning of hypoglycemia?: Yes  Able to state the blood sugar range for hypoglycemia?: yes  Patient stated range:: <70  Patient can identify general symptoms of hypoglycemia: yes  Patient identified:: shakiness, anxiety, dizziness, fatigue  Able to state proper treatment of hypoglycemia?: yes  Patient identified:: 1/2 can soda/fruit juice, 5-6 pieces of hard candy, 1 tablespoon sugar/honey  Acute Complications Skills Assessment Completed: : Yes  Assessment indicates:: Adequate understanding  Area of need?: No    Chronic Complications  Chronic Complications Skills Assessment Completed: : No  Deferred due to:: Time    Psychosocial/Coping  Psychosocial/Coping Skills Assessment Completed: : No  Deffered due to:: Time      Diabetes Self Support Plan         Assessment Summary and Plan    Based on today's diabetes care assessment, the following areas of need were identified:      Social 11/9/2022   Access to Mass Media/Tech No   Cognitive/Behavioral Health No   Culture/Anglican No   Communication No   Health Literacy No        Clinical 11/9/2022   Medication Adherence Yes   Lab Compliance No        Diabetes Self-Management Skills 3/8/2023   Diabetes Disease Process/Treatment Options -   Nutrition/Healthy Eating -   Physical Activity/Exercise -   Medication Yes - see care planning   Home Blood Glucose Monitoring No   Acute Complications No   Chronic Complications -   Psychosocial/Coping -          Today's interventions were provided through individual discussion, instruction, and written materials were provided.      Patient verbalized understanding of instruction and written materials.  Pt was able to return back demonstration of instructions today. Patient understood key  "points, needs reinforcement and further instruction.     Diabetes Self-Management Care Plan:    Today's Diabetes Self-Management Care Plan was developed with Starla SHANKAR's input. Starla SHANKAR has agreed to work toward the following goal(s) to improve his/her overall diabetes control.      Care Plan: Diabetes Management   Updates made since 2/7/2023 12:00 AM        Problem: Blood Glucose Self-Monitoring         Goal: Pt will use Dexcom G6 for continuous glucose monitoring and treatment decisions. Completed 3/9/2023   Start Date: 11/9/2022   Expected End Date: 12/15/2022   Recent Progress: Met   Priority: Medium   Barriers: No Barriers Identified   Note:    11/9/22 - Pt reports not SMBG often (only once a week) d/t fingerstick fatigue. Got letter from insurance stating Dexcom was not covered and brought letter to visit. Explained it is not covered through her pharmacy benefit but is covered through DME. Will fill out paperwork and send to [employer] for approval and DME processing and discussed process with pt.     12/15/22 -   DEXCOM G6 SENSOR START     Patient referred to clinic today for Dexcom G6 continuous glucose sensor system training.  Education provided using "Quick Start Guide" per Dexcom protocol.    Shane/ Overview:  5 min glucose reading updates, trending arrows, BG graph screens, battery life indicator, Blue Tooth Symbol.  Shane/ Menus: trend Graph, start sensor, enter BG, events, Alerts, Settings, Shutdown, Stop Sensor.   Shane/ Screens and prompts  Alarm Settings:    * Low alert: 80    * High alert: 300    * Rise rate: off    * Fall Rate: off    Transmitter and Sensor Codes entered per prompts    Reviewed sensor site selection. Site selected and prepped using aseptic technique Inserted to left abdomen. Transmitter placed in pod and secured.  Practiced sensor pod/transmitter removal from site, and removal of transmitter from sensor pod.  Patient able to demonstrate without difficulty.  " Encouraged to review manual or imelda video prior to starting another sensor.   Reviewed problem-solving aspects of sensor transmission/ variables that can disrupt transmission. /imelda range 20 feet, but the first 3 hrs keep within 3 feet of transmitter.  Pt instructed on lag time of interstitial fluid from CBG and was advised to tx hypoglycemia and dose insulin based on SMBG values.  Dexcom technical support contact number given and examples of when to contact them discussed.   Clarity imelda and website reviewed w/ pt. Assisted pt w/ connecting to Clarity account for automatic uploads.      22 - Pt doing well with wearing and using Dexcom. Stated seeing the effect on BG has made her more aware and using it as a way to keep herself accountable. Forgot about changing sensor at first. 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 But she was able to successfully start new sensor on her own earlier this week. Reviewed report with pt today. Pt reports highs on Dec 16, 17, and 18 were largely r/t to stress of her patients death (was with her when she ). Having some high PP spikes but may be r/t missed Humalog.     23 - Likes using the Dexcom. Seeing readings and graph has helped her stay more accountable and make healthier choices. Encouraged continued efforts. Does have some skin discoloration on abdomen in the shape of dexcom adhesive. Denies any itching, bumps or redness when taking off the sensors. Discussed even though doesn't seem to be an allergic reaction to tape, may be more sensitive. Encouraged trying barrier wipe or spray such as skinprep. Provided list of barrier product options.     3/8/23 - Due for changing transmitter soon. Encouraged pt to call DME ([facility]) to request refill and reviewed entering transmitter SN into imelda. Pt will call if needs assistance with entering new SN.       Problem: Medications         Goal: Patient Agrees to take Diabetes  Medication(s) - Ozempic, Metformin, and MDI as prescribed.    Start Date: 11/10/2022   Expected End Date: 12/15/2022   This Visit's Progress: On track   Recent Progress: On track   Priority: High   Barriers: No Barriers Identified   Note:    11/9/22 - Pt has started Ozempic but sometimes missing insulin doses. Reviewed importance of taking Humalog before each meal (and sometimes her dinner meal is a bunch of snack foods). Is having a lot of difficulty with bruising and some scaring to abdomen where she usually takes injections. Educated on alternate injection sites and site rotation. Discussed would benefit from adding correction to Humalog before meal if BG is high. Reviewed taking set doses of Levemir BID about 12 hours apart. Her long-acting and rapid-acting are not balanced - she is scheduled to see endocrinologist later this month. Encouraged pt to bring log of BG and insulin doses for more effective insulin adjustment. Pt verbalized interest in insulin pump therapy - discussed working on basic skills necessary (monitoring, taking insulin consistently, carb counting).     12/15/22 - Pt reports has been taking Humalog and Levemir as prescribed with changes made per Dr. Molina. No logs today. Reports better glucose readings but still a lot of variability. Discussed will be able to dose insulin based on CGM readings and will aid in more effective insulin dose changes.     12/30/22 - Taking Levemir 58 units and Humalog 10 units + correction scale. Reports consistently waking up >/= 160s. Has not increased Levemir as indicated in Dr. Molina's AVS. Encouraged increasing Levemir by 2 units if FBG in am is remaining >140 for 3-5 days. Pt is interested in working toward insulin pump therapy. Briefly discussed basic concepts of pump therapy and how pump works. Explained next step would be to work on CHO counting.     1/26/23 - Pt doing well with taking medications at proper timing and consistently. Lost correction scale  for Humalog. Reviewed adding correction based on scale to Humalog before each meal. Pt also recently saw Dr. Perez and Ozempic dose increased. Will take first 1mg dosage this Saturday.     Pt is interested in and working toward pump therapy. Today, discussed in detail and provided demonstration of 2 pumps that are compatible with her Dexcom G6 - TSlim with Control IQ and OmniPod5. Discussed and demonstrated differences (tubing vs pod), benefits of each and both being hybrid closed loop systems. Pt verbalized preference of tubingless system. Encouraged pt to call insurance and discuss coverage and any copay costs she would have.     3/8/23 - Missing some doses of Humalog at times. Reviewed timing, dosage, and using correction scale. Also - discussed hypo event from last week - Pt reports she had taken Humalog before going out to eat Sunday evening - ate large meal containing carb (fried crabs, shrimp, fried rice, and a dessert). BG stayed high for a while after and pt took additional Humalog about 1-2 hours after meal. BG came down some initially but then bottomed out later. Explained taking extra dose of Humalog was the cause of the hypo event. Stressed only take her Humalog before eating a meal and do not take extra after.       Problem: Healthy Eating         Goal: Pt will identify foods that are carbohydrates and use labels for counting carbs.    Start Date: 12/30/2022   Expected End Date: 1/26/2023   This Visit's Progress: No change   Recent Progress: On track   Priority: Medium   Barriers: No Barriers Identified   Note:    12/30/22 - Pt is interested in working toward insulin pump therapy. Discussed importance of learning to count carbohydrates in order to prepare for pump. Pt is willing to try. Provided basic carb counting education:    Ed on what carbohydrates are, what foods contain carbs and importance of reducing added sugar  Began ed on appropriate portion sizes of carbs for ~15g CHO  Label reading  exercise demonstrating using serving size and total CHO grams in carb count  Began ed on the plate method: importance of increasing non-starchy veggies (cooked until soft d/t gastroparesis), choosing lean protein, healthy fats and portioned servings of complex cho's     Provided food log and encouraged practicing carb counting. Pt will write down foods, approx serving size and grams carb and bring log to next appt.     1/26/23 - Pt hasn't been practicing carb counting since last visit - attention has been focused on assisting Vianca's  since her passing. Has been getting more comfortable with identifying what foods are carbs and using plate method (pt shared photo of her plate with baked chicken, broccoli and ~1 cup potatoes - perfect!). Appropriately estimated carbs as 30g. Reviewed basic carb counting, portion sizes for ~15g CHO, and encouraged downloading/using [employer] imelda for looking up carb grams.     3/8/23 - Reports she has not been carb counting but has been doing well with controlling portions of carb.          Follow Up Plan     Follow up in about 5 weeks (around 4/12/2023) for ongoing diabetes education and dexcom report review.    Today's care plan and follow up schedule was discussed with patient.  Starla SHANKAR verbalized understanding of the care plan, goals, and agrees to follow up plan.        The patient was encouraged to communicate with his/her health care provider/physician and care team regarding his/her condition(s) and treatment.  I provided the patient with my contact information today and encouraged to contact me via phone or [facility] Patient Portal as needed.     Length of Visit   Total Time: 60 Minutes

## 2023-03-10 ENCOUNTER — TELEPHONE (OUTPATIENT)
Dept: GASTROENTEROLOGY | Facility: CLINIC | Age: 58
End: 2023-03-10
Payer: MEDICARE

## 2023-03-10 ENCOUNTER — OFFICE VISIT (OUTPATIENT)
Dept: INTERNAL MEDICINE | Facility: CLINIC | Age: 58
End: 2023-03-10
Payer: MEDICARE

## 2023-03-10 VITALS
BODY MASS INDEX: 39.39 KG/M2 | HEART RATE: 91 BPM | WEIGHT: 244.06 LBS | DIASTOLIC BLOOD PRESSURE: 63 MMHG | SYSTOLIC BLOOD PRESSURE: 141 MMHG

## 2023-03-10 DIAGNOSIS — D63.8 ANEMIA OF CHRONIC DISEASE: ICD-10-CM

## 2023-03-10 DIAGNOSIS — E11.8 TYPE 2 DIABETES MELLITUS WITH COMPLICATION, WITH LONG-TERM CURRENT USE OF INSULIN: Primary | ICD-10-CM

## 2023-03-10 DIAGNOSIS — K31.84 GASTROPARESIS: ICD-10-CM

## 2023-03-10 DIAGNOSIS — Z79.4 TYPE 2 DIABETES MELLITUS WITH COMPLICATION, WITH LONG-TERM CURRENT USE OF INSULIN: Primary | ICD-10-CM

## 2023-03-10 DIAGNOSIS — R06.83 SNORING: ICD-10-CM

## 2023-03-10 PROCEDURE — 4010F ACE/ARB THERAPY RXD/TAKEN: CPT | Mod: CPTII,95,, | Performed by: STUDENT IN AN ORGANIZED HEALTH CARE EDUCATION/TRAINING PROGRAM

## 2023-03-10 PROCEDURE — 1159F PR MEDICATION LIST DOCUMENTED IN MEDICAL RECORD: ICD-10-PCS | Mod: CPTII,95,, | Performed by: STUDENT IN AN ORGANIZED HEALTH CARE EDUCATION/TRAINING PROGRAM

## 2023-03-10 PROCEDURE — 1159F MED LIST DOCD IN RCRD: CPT | Mod: CPTII,95,, | Performed by: STUDENT IN AN ORGANIZED HEALTH CARE EDUCATION/TRAINING PROGRAM

## 2023-03-10 PROCEDURE — 3052F PR MOST RECENT HEMOGLOBIN A1C LEVEL 8.0 - < 9.0%: ICD-10-PCS | Mod: CPTII,95,, | Performed by: STUDENT IN AN ORGANIZED HEALTH CARE EDUCATION/TRAINING PROGRAM

## 2023-03-10 PROCEDURE — 99442 PR PHYSICIAN TELEPHONE EVALUATION 11-20 MIN: CPT | Mod: 95,,, | Performed by: STUDENT IN AN ORGANIZED HEALTH CARE EDUCATION/TRAINING PROGRAM

## 2023-03-10 PROCEDURE — 3078F DIAST BP <80 MM HG: CPT | Mod: CPTII,95,, | Performed by: STUDENT IN AN ORGANIZED HEALTH CARE EDUCATION/TRAINING PROGRAM

## 2023-03-10 PROCEDURE — 3052F HG A1C>EQUAL 8.0%<EQUAL 9.0%: CPT | Mod: CPTII,95,, | Performed by: STUDENT IN AN ORGANIZED HEALTH CARE EDUCATION/TRAINING PROGRAM

## 2023-03-10 PROCEDURE — 3008F BODY MASS INDEX DOCD: CPT | Mod: CPTII,95,, | Performed by: STUDENT IN AN ORGANIZED HEALTH CARE EDUCATION/TRAINING PROGRAM

## 2023-03-10 PROCEDURE — 3077F PR MOST RECENT SYSTOLIC BLOOD PRESSURE >= 140 MM HG: ICD-10-PCS | Mod: CPTII,95,, | Performed by: STUDENT IN AN ORGANIZED HEALTH CARE EDUCATION/TRAINING PROGRAM

## 2023-03-10 PROCEDURE — 4010F PR ACE/ARB THEARPY RXD/TAKEN: ICD-10-PCS | Mod: CPTII,95,, | Performed by: STUDENT IN AN ORGANIZED HEALTH CARE EDUCATION/TRAINING PROGRAM

## 2023-03-10 PROCEDURE — 3008F PR BODY MASS INDEX (BMI) DOCUMENTED: ICD-10-PCS | Mod: CPTII,95,, | Performed by: STUDENT IN AN ORGANIZED HEALTH CARE EDUCATION/TRAINING PROGRAM

## 2023-03-10 PROCEDURE — 99442 PR PHYSICIAN TELEPHONE EVALUATION 11-20 MIN: ICD-10-PCS | Mod: 95,,, | Performed by: STUDENT IN AN ORGANIZED HEALTH CARE EDUCATION/TRAINING PROGRAM

## 2023-03-10 PROCEDURE — 3077F SYST BP >= 140 MM HG: CPT | Mod: CPTII,95,, | Performed by: STUDENT IN AN ORGANIZED HEALTH CARE EDUCATION/TRAINING PROGRAM

## 2023-03-10 PROCEDURE — 3078F PR MOST RECENT DIASTOLIC BLOOD PRESSURE < 80 MM HG: ICD-10-PCS | Mod: CPTII,95,, | Performed by: STUDENT IN AN ORGANIZED HEALTH CARE EDUCATION/TRAINING PROGRAM

## 2023-03-10 NOTE — TELEPHONE ENCOUNTER
Ms. Wharton calls back and I help her scheduling the consult with the sleep clinic. I provided pt with the phone number to [facility]astro to call to set up the consult because the first available will be on 6/12 in [place].

## 2023-03-10 NOTE — TELEPHONE ENCOUNTER
Referral placed to GI and sleep medicine, please assist with scheduling. Will evaluate further at follow up in APR2023, can schedule sooner PRN. Thank you!      ----- Message from Chantal Shrestha RD, CDE sent at 3/9/2023  4:30 PM CST -----  Hi Dr. Perez!    Ms Cha came for follow-up, and during our conversation she asked about getting a sleep study done (d/t snoring) and a referral to gastroenterology. She is having some significant GI s/s - she told me there are times when she loses control of her bowels in her sleep. I told her I would message you to inquire, but I know she has an appt coming up with you in April.     Many thanks!  Chantal

## 2023-03-10 NOTE — PROGRESS NOTES
The patient's location is:  [place]  The chief complaint leading to consultation is:  Type 2 diabetes mellitus with complication, with long-term current use of insulin [E11.8, Z79.4]    Visit type: audio only, connectivity issues    Time spent in discussion with the patient: 13 minutes  18 minutes of total time spent on the encounter. This includes time spent in discussion with the patient and time preparing for the patient appointment: review of tests, obtaining and/or reviewing separately obtained history, documenting clinical information in the electronic or other health record, independently interpreting results (not separately reported), and communicating results to the patient/family/caregiver or care coordination (not separately reported).     Each patient to whom he or she provides medical services by telemedicine is: (1) informed of the relationship between the physician and patient and the respective role of any other health care provider with respect to management of the patient; and (2) notified that he or she may decline to receive medical services by telemedicine and may withdraw from such care at any time.      Subjective:   Starla Wharton is a 57 y.o. female who presents for Type 2 diabetes mellitus with complication, with long-term current use of insulin [E11.8, Z79.4].       Patient is established with me, here today for the following:    T2DM on insulin, peripheral neuropathy, retinopathy, gastroparesis, CKD3, HLD, HTN, GERD, vitamin D deficiency     T2DM -   Currently taking   - Metformin 500 mg BID  - Novolog 2-3 units qAC,   - Levemir 58 qAM and 60 units qHS  - Ozempic 1 mg weekly  Is considering starting a Omnipod pump is insurance approved   Following with Dr. Molina for endocrinology  Following with Chantal for diabetes management  Taking ASA 81 mg daily  Using Dexcom for CGM  Due for foot exam  Following with Dr. Ramsay routinely for ophthalmology  Lab Results       Component                 Value               Date                       HGBA1C                   8.2 (H)             03/01/2023                 HGBA1C                   9.1 (H)             01/23/2023                 HGBA1C                   9.8 (H)             10/21/2022            Lab Results       Component                Value               Date                       MICALBCREAT              29.0                07/22/2022               Anemia improved on recent testing  Prior testing showed AoCD  Ferritin and iron level WNL  Folate and B12 WNL    Endorses history of gastroparesis  Can't eat red meat and some other foods with symptoms  With dietary excursions will have upset stomach and liquid diarrhea  When this occurs will have bowel incontinence while sleeping due to liquid stool  Denies bowel incontinence outside these episodes     Has follow up with Dr. Hernández for sleep medicine in [id number]  Interested in sleep apnea testing                 Review of Systems   All other systems reviewed and are negative.      Current Outpatient Medications   Medication Instructions    acetaminophen-codeine 300-30mg (TYLENOL #3) 300-30 mg Tab acetaminophen 300 mg-codeine 30 mg tablet   TAKE 1 TABLET BY MOUTH EVERY 6 HOURS AS NEEDED FOR PAIN    aspirin 81 mg, Daily    atorvastatin (LIPITOR) 40 mg, Oral, Daily    blood-glucose meter,continuous (DEXCOM G6 ) Misc 1 Device, Misc.(Non-Drug; Combo Route), Once    blood-glucose sensor (DEXCOM G6 SENSOR) Kenzie Use daily for continuous glucose monitoring, change sensor every 10 days.    blood-glucose transmitter (DEXCOM G6 TRANSMITTER) Kenzie Use with Dexcom sensor, change every 90 days.    bromfenac (PROLENSA) 0.07 % Drop 1 drop, Ophthalmic, Daily    bromfenac (PROLENSA) 0.07 % Drop Apply one drop daily to both eyes as directed    ciclopirox (PENLAC) 8 % Soln ciclopirox 8 % topical solution   APPLY OVER NAIL AND SURROUNDING SKIN. APPLY DAILY OVER PREVIOUS COAT. AFTER SEVEN DAYS MAY REMOVE WITH ALCOHOL  "AND CONTINUE CYCLE    clotrimazole-betamethasone 1-0.05% (LOTRISONE) cream Topical (Top), 2 times daily    diclofenac sodium (VOLTAREN) 1 % Gel Topical (Top), As needed (PRN)    gabapentin (NEURONTIN) 300 mg, Oral, 3 times daily    HumaLOG KwikPen Insulin 10 Units, Subcutaneous, 3 times daily with meals, Plus correction scale. Max daily dose 50 units/day    hydroCHLOROthiazide (HYDRODIURIL) 25 mg, Oral, Daily    indomethacin (INDOCIN) 50 MG capsule indomethacin 50 mg capsule    LEVEMIR FLEXPEN 58 Units, Subcutaneous, 2 times daily    lisinopriL (PRINIVIL,ZESTRIL) 20 mg, Oral, Daily    metFORMIN (GLUCOPHAGE) 500 mg, Oral, 2 times daily with meals    OZEMPIC 1 mg, Subcutaneous, Every 7 days    pantoprazole (PROTONIX) 20 mg, Oral, Daily    pen needle, diabetic (BD ULTRA-FINE MAR PEN NEEDLE) 32 gauge x 5/32" Ndle Use to inject insulin 5 times daily    prednisoLONE acetate (PRED FORTE) 1 % DrpS 1 drop, Right Eye, 2 times daily    tretinoin (RETIN-A) 0.05 % cream Topical (Top), Nightly    triamcinolone acetonide 0.1% (KENALOG) 0.1 % cream Topical (Top), 2 times daily       Objective:     Vitals:    03/10/23 1000   BP: (!) 141/63   Pulse: 91        Physical Exam  Psychiatric:         Attention and Perception: Attention normal.         Mood and Affect: Mood normal.         Speech: Speech normal.         Assessment/Plan:       Type 2 diabetes mellitus with complication, with long-term current use of insulin  Continue current medications.  Continue medication, evaluation, and management per endocrinology and DM management.  RTC in 6-8 weeks for follow up.    Gastroparesis  Referral placed to GI, will call insurance to find covered providers in the area    Anemia of chronic disease  Continue improved diabetes control     Snoring  Follow up with sleep medicine as scheduled        Meghan Perez MD  03/10/2023     "

## 2023-03-12 ENCOUNTER — PATIENT MESSAGE (OUTPATIENT)
Dept: ENDOSCOPY | Facility: HOSPITAL | Age: 58
End: 2023-03-12
Payer: MEDICARE

## 2023-03-13 ENCOUNTER — TELEPHONE (OUTPATIENT)
Dept: SURGERY | Facility: CLINIC | Age: 58
End: 2023-03-13
Payer: MEDICARE

## 2023-03-18 RX ORDER — PANTOPRAZOLE SODIUM 20 MG/1
20 TABLET, DELAYED RELEASE ORAL DAILY
Qty: 90 TABLET | Refills: 3 | Status: SHIPPED | OUTPATIENT
Start: 2023-03-18 | End: 2023-12-20 | Stop reason: ALTCHOICE

## 2023-03-18 NOTE — TELEPHONE ENCOUNTER
No new care gaps identified.  [facility] Embedded Care Gaps. Reference number: [id number]. 3/18/2023   5:08:42 AM CDT   PAST MEDICAL HISTORY:  HTN (hypertension)

## 2023-03-18 NOTE — TELEPHONE ENCOUNTER
Refill Decision Note   Starla Wharton  is requesting a refill authorization.  Brief Assessment and Rationale for Refill:  Approve     Medication Therapy Plan:       Medication Reconciliation Completed: No   Comments:     No Care Gaps recommended.     Note composed:9:40 AM 03/18/2023

## 2023-03-29 ENCOUNTER — PATIENT OUTREACH (OUTPATIENT)
Dept: ADMINISTRATIVE | Facility: HOSPITAL | Age: 58
End: 2023-03-29
Payer: MEDICARE

## 2023-03-29 ENCOUNTER — PATIENT MESSAGE (OUTPATIENT)
Dept: ADMINISTRATIVE | Facility: HOSPITAL | Age: 58
End: 2023-03-29
Payer: MEDICARE

## 2023-03-30 NOTE — PROGRESS NOTES
CRS Office Visit History and Physical    Referring Md:   Meghan Snider Md  [address]  [address]  [place] [address]    SUBJECTIVE:     Chief Complaint: Fecal incontinence    History of Present Illness:  The patient is new patient to this practice.   Course is as follows:  Patient is a 57 y.o. female presents with above.  Has bowel accidents at night, has gastroparesis. Accident is usually watery diarrhea, size of a puddle. Happens < 1x/week, maybe 1-2x/month.  Symptoms have been present for a couple years.  Certain foods are worse (smothered potatoes, red meat)  During daytime she doesn't have any accidents. Stool is stringy  Last Colonoscopy: 2017 (polyp, rec'd 5yr interval), normal Cologuard in 2021  Will use Imodium at night if she is traveling.    Prior abdominal surgery: Yes - cholecystectomy (2018), hysterectomy  Prior pelvic or anorectal surgery: No  Family history of colon/rectal cancer: No  Family history of IBD: No  Steroid or other immunosuppression: No  Blood thinners: No  Current stool softeners or fiber supplement: No  Active smoking: Yes - 2018  Prior deliveries: No      [place] (FI):  Leakage of solid stool:  Never (0)        Leakage of liquid stool:      Rarely (1)       Leakage of flatus:   Never (0)         Wear a pad:        Daily (4)  Alter life:     Sometimes (2)          Total: 7    Stool consistency/Hagan: 4/5  Bowel movements per month:  Daily   Leakage of urine: No  Trouble emptying your bladder: No  Feel something bulging through vagina? No    Review of patient's allergies indicates:   Allergen Reactions    Naprosyn [naproxen]        Past Medical History:   Diagnosis Date    Diabetes mellitus     Hypertension     Personal history of colonic polyps 06/12/2017     Past Surgical History:   Procedure Laterality Date    HYSTERECTOMY      fibroids    LAPAROSCOPIC CHOLECYSTECTOMY      MYOMECTOMY      PANCREAS BIOPSY      WISDOM TOOTH EXTRACTION       Family History   Problem  "Relation Age of Onset    Stomach cancer Father      Social History     Tobacco Use    Smoking status: Never    Smokeless tobacco: Never   Substance Use Topics    Alcohol use: No    Drug use: No        Review of Systems:  ROS    OBJECTIVE:     Vital Signs (Most Recent)  BP (!) 142/65 (BP Location: Left arm, Patient Position: Sitting, BP Method: Small (Automatic))   Pulse 85   Ht 5' 6" (1.676 m)   Wt 109.8 kg (242 lb 2.8 oz)   BMI 39.09 kg/m²     Physical Exam:  General: Black or  female in no distress   Neuro: Alert and oriented x 4.  Moves all extremities.     HEENT: No icterus.  Trachea midline  Respiratory: Respirations are even and unlabored  Cardiac: Regular rate  Extremities: Warm dry and intact  Skin: No rashes    Anorectal:   External exam: Perianal skin healthy, anal wink and sensation in tact, perineal body in tact, no prolapse with Valsalva  Digital exam revealed normal tone. No masses.  Good relaxation with Valsalva, no rectocele.      ASSESSMENT/PLAN:     58yo F w/ accidental bowel leakage    Trial of Colestipol  Colonoscopy with random biopsies  Briefly discussed Interstim    Marian Schneider MD  Staff Surgeon, Colon and Rectal Surgery  [facility]     "

## 2023-03-31 ENCOUNTER — OFFICE VISIT (OUTPATIENT)
Dept: SURGERY | Facility: CLINIC | Age: 58
End: 2023-03-31
Attending: COLON & RECTAL SURGERY
Payer: MEDICARE

## 2023-03-31 VITALS
HEART RATE: 85 BPM | SYSTOLIC BLOOD PRESSURE: 142 MMHG | BODY MASS INDEX: 38.92 KG/M2 | HEIGHT: 66 IN | DIASTOLIC BLOOD PRESSURE: 65 MMHG | WEIGHT: 242.19 LBS

## 2023-03-31 DIAGNOSIS — R15.9 INCONTINENCE OF FECES, UNSPECIFIED FECAL INCONTINENCE TYPE: ICD-10-CM

## 2023-03-31 PROCEDURE — 4010F PR ACE/ARB THEARPY RXD/TAKEN: ICD-10-PCS | Mod: CPTII,S$GLB,, | Performed by: COLON & RECTAL SURGERY

## 2023-03-31 PROCEDURE — 1160F RVW MEDS BY RX/DR IN RCRD: CPT | Mod: CPTII,S$GLB,, | Performed by: COLON & RECTAL SURGERY

## 2023-03-31 PROCEDURE — 99203 PR OFFICE/OUTPT VISIT, NEW, LEVL III, 30-44 MIN: ICD-10-PCS | Mod: S$GLB,,, | Performed by: COLON & RECTAL SURGERY

## 2023-03-31 PROCEDURE — 1159F MED LIST DOCD IN RCRD: CPT | Mod: CPTII,S$GLB,, | Performed by: COLON & RECTAL SURGERY

## 2023-03-31 PROCEDURE — 3052F PR MOST RECENT HEMOGLOBIN A1C LEVEL 8.0 - < 9.0%: ICD-10-PCS | Mod: CPTII,S$GLB,, | Performed by: COLON & RECTAL SURGERY

## 2023-03-31 PROCEDURE — 3008F PR BODY MASS INDEX (BMI) DOCUMENTED: ICD-10-PCS | Mod: CPTII,S$GLB,, | Performed by: COLON & RECTAL SURGERY

## 2023-03-31 PROCEDURE — 3077F PR MOST RECENT SYSTOLIC BLOOD PRESSURE >= 140 MM HG: ICD-10-PCS | Mod: CPTII,S$GLB,, | Performed by: COLON & RECTAL SURGERY

## 2023-03-31 PROCEDURE — 4010F ACE/ARB THERAPY RXD/TAKEN: CPT | Mod: CPTII,S$GLB,, | Performed by: COLON & RECTAL SURGERY

## 2023-03-31 PROCEDURE — 1160F PR REVIEW ALL MEDS BY PRESCRIBER/CLIN PHARMACIST DOCUMENTED: ICD-10-PCS | Mod: CPTII,S$GLB,, | Performed by: COLON & RECTAL SURGERY

## 2023-03-31 PROCEDURE — 3078F DIAST BP <80 MM HG: CPT | Mod: CPTII,S$GLB,, | Performed by: COLON & RECTAL SURGERY

## 2023-03-31 PROCEDURE — 3008F BODY MASS INDEX DOCD: CPT | Mod: CPTII,S$GLB,, | Performed by: COLON & RECTAL SURGERY

## 2023-03-31 PROCEDURE — 3077F SYST BP >= 140 MM HG: CPT | Mod: CPTII,S$GLB,, | Performed by: COLON & RECTAL SURGERY

## 2023-03-31 PROCEDURE — 3078F PR MOST RECENT DIASTOLIC BLOOD PRESSURE < 80 MM HG: ICD-10-PCS | Mod: CPTII,S$GLB,, | Performed by: COLON & RECTAL SURGERY

## 2023-03-31 PROCEDURE — 99999 PR PBB SHADOW E&M-EST. PATIENT-LVL IV: CPT | Mod: PBBFAC,,, | Performed by: COLON & RECTAL SURGERY

## 2023-03-31 PROCEDURE — 1159F PR MEDICATION LIST DOCUMENTED IN MEDICAL RECORD: ICD-10-PCS | Mod: CPTII,S$GLB,, | Performed by: COLON & RECTAL SURGERY

## 2023-03-31 PROCEDURE — 99203 OFFICE O/P NEW LOW 30 MIN: CPT | Mod: S$GLB,,, | Performed by: COLON & RECTAL SURGERY

## 2023-03-31 PROCEDURE — 3052F HG A1C>EQUAL 8.0%<EQUAL 9.0%: CPT | Mod: CPTII,S$GLB,, | Performed by: COLON & RECTAL SURGERY

## 2023-03-31 PROCEDURE — 99999 PR PBB SHADOW E&M-EST. PATIENT-LVL IV: ICD-10-PCS | Mod: PBBFAC,,, | Performed by: COLON & RECTAL SURGERY

## 2023-03-31 RX ORDER — MONTELUKAST SODIUM 4 MG/1
1 TABLET, CHEWABLE ORAL 2 TIMES DAILY
Qty: 180 TABLET | Refills: 3 | Status: SHIPPED | OUTPATIENT
Start: 2023-03-31 | End: 2023-10-16

## 2023-04-04 ENCOUNTER — HOSPITAL ENCOUNTER (EMERGENCY)
Facility: OTHER | Age: 58
Discharge: HOME | End: 2023-04-04
Attending: EMERGENCY MEDICINE
Payer: MEDICARE

## 2023-04-04 VITALS
DIASTOLIC BLOOD PRESSURE: 72 MMHG | WEIGHT: 234 LBS | BODY MASS INDEX: 38.99 KG/M2 | HEIGHT: 65 IN | TEMPERATURE: 99 F | OXYGEN SATURATION: 98 % | SYSTOLIC BLOOD PRESSURE: 144 MMHG | RESPIRATION RATE: 16 BRPM | HEART RATE: 80 BPM

## 2023-04-04 DIAGNOSIS — T78.40XA ALLERGIC REACTION, INITIAL ENCOUNTER: Primary | ICD-10-CM

## 2023-04-04 LAB
ANION GAP SERPL CALC-SCNC: 12 MMOL/L (ref 8–16)
BASOPHILS # BLD AUTO: 0.04 K/UL (ref 0–0.2)
BASOPHILS NFR BLD: 0.5 % (ref 0–1.9)
BUN SERPL-MCNC: 22 MG/DL (ref 6–20)
CALCIUM SERPL-MCNC: 9 MG/DL (ref 8.7–10.5)
CHLORIDE SERPL-SCNC: 103 MMOL/L (ref 95–110)
CO2 SERPL-SCNC: 23 MMOL/L (ref 23–29)
CREAT SERPL-MCNC: 1.3 MG/DL (ref 0.5–1.4)
DIFFERENTIAL METHOD: ABNORMAL
EOSINOPHIL # BLD AUTO: 0.1 K/UL (ref 0–0.5)
EOSINOPHIL NFR BLD: 1.2 % (ref 0–8)
ERYTHROCYTE [DISTWIDTH] IN BLOOD BY AUTOMATED COUNT: 14.7 % (ref 11.5–14.5)
EST. GFR  (NO RACE VARIABLE): 48 ML/MIN/1.73 M^2
GLUCOSE SERPL-MCNC: 132 MG/DL (ref 70–110)
HCT VFR BLD AUTO: 37.2 % (ref 37–48.5)
HGB BLD-MCNC: 11.5 G/DL (ref 12–16)
IMM GRANULOCYTES # BLD AUTO: 0.03 K/UL (ref 0–0.04)
IMM GRANULOCYTES NFR BLD AUTO: 0.4 % (ref 0–0.5)
LYMPHOCYTES # BLD AUTO: 3.4 K/UL (ref 1–4.8)
LYMPHOCYTES NFR BLD: 42.1 % (ref 18–48)
MCH RBC QN AUTO: 25.1 PG (ref 27–31)
MCHC RBC AUTO-ENTMCNC: 30.9 G/DL (ref 32–36)
MCV RBC AUTO: 81 FL (ref 82–98)
MONOCYTES # BLD AUTO: 0.4 K/UL (ref 0.3–1)
MONOCYTES NFR BLD: 5.1 % (ref 4–15)
NEUTROPHILS # BLD AUTO: 4.1 K/UL (ref 1.8–7.7)
NEUTROPHILS NFR BLD: 50.7 % (ref 38–73)
NRBC BLD-RTO: 0 /100 WBC
PLATELET # BLD AUTO: 304 K/UL (ref 150–450)
PMV BLD AUTO: 8.8 FL (ref 9.2–12.9)
POTASSIUM SERPL-SCNC: 3.8 MMOL/L (ref 3.5–5.1)
RBC # BLD AUTO: 4.59 M/UL (ref 4–5.4)
SODIUM SERPL-SCNC: 138 MMOL/L (ref 136–145)
WBC # BLD AUTO: 8.17 K/UL (ref 3.9–12.7)

## 2023-04-04 PROCEDURE — 63600175 PHARM REV CODE 636 W HCPCS: Performed by: EMERGENCY MEDICINE

## 2023-04-04 PROCEDURE — 80048 BASIC METABOLIC PNL TOTAL CA: CPT | Performed by: EMERGENCY MEDICINE

## 2023-04-04 PROCEDURE — 96374 THER/PROPH/DIAG INJ IV PUSH: CPT

## 2023-04-04 PROCEDURE — 96375 TX/PRO/DX INJ NEW DRUG ADDON: CPT

## 2023-04-04 PROCEDURE — 85025 COMPLETE CBC W/AUTO DIFF WBC: CPT | Performed by: EMERGENCY MEDICINE

## 2023-04-04 PROCEDURE — 99284 EMERGENCY DEPT VISIT MOD MDM: CPT | Mod: 25

## 2023-04-04 RX ORDER — TRIAMCINOLONE ACETONIDE 1 MG/G
OINTMENT TOPICAL 2 TIMES DAILY
Qty: 30 G | Refills: 0 | Status: SHIPPED | OUTPATIENT
Start: 2023-04-04 | End: 2023-06-15

## 2023-04-04 RX ORDER — DIPHENHYDRAMINE HYDROCHLORIDE 50 MG/ML
25 INJECTION INTRAMUSCULAR; INTRAVENOUS
Status: COMPLETED | OUTPATIENT
Start: 2023-04-04 | End: 2023-04-04

## 2023-04-04 RX ORDER — METHYLPREDNISOLONE SOD SUCC 125 MG
125 VIAL (EA) INJECTION
Status: COMPLETED | OUTPATIENT
Start: 2023-04-04 | End: 2023-04-04

## 2023-04-04 RX ADMIN — METHYLPREDNISOLONE SODIUM SUCCINATE 125 MG: 125 INJECTION, POWDER, FOR SOLUTION INTRAMUSCULAR; INTRAVENOUS at 07:04

## 2023-04-04 RX ADMIN — DIPHENHYDRAMINE HYDROCHLORIDE 25 MG: 50 INJECTION, SOLUTION INTRAMUSCULAR; INTRAVENOUS at 07:04

## 2023-04-04 NOTE — FIRST PROVIDER EVALUATION
Emergency Department TeleTriage Encounter Note      CHIEF COMPLAINT    Chief Complaint   Patient presents with    Allergic Reaction     Diffuse itching starting last night after taking colestipol last night.        VITAL SIGNS   Initial Vitals [04/04/23 1758]   BP Pulse Resp Temp SpO2   (!) 170/80 98 16 99 °F (37.2 °C) 100 %      MAP       --            ALLERGIES    Review of patient's allergies indicates:   Allergen Reactions    Naprosyn [naproxen]        PROVIDER TRIAGE NOTE  Patient presents with complaint of itching. Reports taking a new medications. Feels as if there are tiny bumps on the skin. Denied throat swelling or difficulty breathing.      Phy:   Constitutional: well nourished, well developed, appearing stated age, NAD   HEENT: NCAT, symmetrical lids, No obvious facial deformity.  Normal phonation. Normal Conjunctiva   Neck: NAROM   Respiratory: Normal effort.  No obvious use of accessory muscles   Musculoskeletal: Moved upper extremities well   Neuro: Alert, answers questions appropriately    Psych: appropriate mood and affect      Initial orders will be placed and care will be transferred to an alternate provider when patient is roomed for a full evaluation. Any additional orders and the final disposition will be determined by that provider.        ORDERS  Labs Reviewed - No data to display    ED Orders (720h ago, onward)      None              Virtual Visit Note: The provider triage portion of this emergency department evaluation and documentation was performed via [employer], a HIPAA-compliant telemedicine application, in concert with a tele-presenter in the room. A face to face patient evaluation with one of my colleagues will occur once the patient is placed in an emergency department room.      DISCLAIMER: This note was prepared with [employer] voice recognition transcription software. Garbled syntax, mangled pronouns, and other bizarre constructions may be attributed to that software system.

## 2023-04-05 NOTE — ED PROVIDER NOTES
Encounter Date: 4/4/2023    SCRIBE #1 NOTE: Anne CARRASCO, am scribing for, and in the presence of,  Parul Luque MD.     History     Chief Complaint   Patient presents with    Allergic Reaction     Diffuse itching starting last night after taking colestipol last night.      Time seen by provider: 7:17 PM    This is a 57 y.o. female who presents with complaint of itchiness and small raised bumps across her arms, legs, upper back, abdomen, and head since late last night. She notes that this occurred several hours after taking a new medication, Colestipol. She reports taking benadryl with minimal improvement in her symptoms. She denies any tongue or facial swelling.  Denies any wheezing or difficulty breathing.    This is the extent of the patient's complaints at this time.      The history is provided by the patient.   Review of patient's allergies indicates:   Allergen Reactions    Naprosyn [naproxen]      Past Medical History:   Diagnosis Date    Diabetes mellitus     Hypertension     Personal history of colonic polyps 06/12/2017     Past Surgical History:   Procedure Laterality Date    HYSTERECTOMY      fibroids    LAPAROSCOPIC CHOLECYSTECTOMY      MYOMECTOMY      PANCREAS BIOPSY      WISDOM TOOTH EXTRACTION       Family History   Problem Relation Age of Onset    Stomach cancer Father      Social History     Tobacco Use    Smoking status: Never    Smokeless tobacco: Never   Substance Use Topics    Alcohol use: No    Drug use: No     Review of Systems   Constitutional:  Negative for chills and fever.   HENT:  Negative for congestion, facial swelling, rhinorrhea and sore throat.    Respiratory:  Negative for chest tightness, shortness of breath and wheezing.    Cardiovascular:  Negative for chest pain and palpitations.   Gastrointestinal:  Negative for abdominal pain, diarrhea, nausea and vomiting.   Genitourinary:  Negative for dysuria and flank pain.   Musculoskeletal:  Negative for back pain and neck pain.    Skin:  Positive for rash. Negative for color change and wound.   Allergic/Immunologic: Negative for environmental allergies and food allergies.   Neurological:  Negative for dizziness, weakness and headaches.   Hematological:  Does not bruise/bleed easily.     Physical Exam     Initial Vitals [04/04/23 1758]   BP Pulse Resp Temp SpO2   (!) 170/80 98 16 99 °F (37.2 °C) 100 %      MAP       --         Physical Exam    Nursing note and vitals reviewed.  Constitutional: She appears well-developed and well-nourished.   HENT:   Head: Normocephalic and atraumatic.   No lip, facial, posterior oropharyngeal swelling.   Eyes: Conjunctivae are normal.   Neck: Neck supple.   Normal range of motion.  Cardiovascular:  Normal rate, regular rhythm and normal heart sounds.           Pulmonary/Chest: Breath sounds normal. No respiratory distress. She has no wheezes. She has no rales.   Abdominal: There is no abdominal tenderness.   Musculoskeletal:         General: Normal range of motion.      Cervical back: Normal range of motion and neck supple.     Neurological: She is alert and oriented to person, place, and time.   Ambulatory with steady gait.   Skin: Skin is warm and dry. Capillary refill takes less than 2 seconds.   Circumscribed raised erythematous plaques, consistent with urticaria, noted, most notably in the upper back/shoulder region and smaller plaques noted to the arms.       ED Course   Procedures  Labs Reviewed   CBC W/ AUTO DIFFERENTIAL - Abnormal; Notable for the following components:       Result Value    Hemoglobin 11.5 (*)     MCV 81 (*)     MCH 25.1 (*)     MCHC 30.9 (*)     RDW 14.7 (*)     MPV 8.8 (*)     All other components within normal limits   BASIC METABOLIC PANEL - Abnormal; Notable for the following components:    Glucose 132 (*)     BUN 22 (*)     eGFR 48 (*)     All other components within normal limits          Imaging Results    None          Medications   methylPREDNISolone sodium succinate  injection 125 mg (125 mg Intravenous Given 4/4/23 1958)   diphenhydrAMINE injection 25 mg (25 mg Intravenous Given 4/4/23 1958)     Medical Decision Making:   History:   Old Medical Records: I decided to obtain old medical records.  Initial Assessment:   7:17PM:  Patient is a 57-year-old female who presents to the emerge department with itching and urticaria, shortly after taking colestipol.  Patient appears well, nontoxic.  I do not appreciate any facial swelling or oropharyngeal involvement.  Will plan for steroids, Benadryl, will continue to follow and reassess.  Clinical Tests:   Lab Tests: Ordered and Reviewed     8:53 PM:  Patient doing well, feeling better.  Her itching has improved.  Her urticaria has improved as well.  Her labs otherwise unremarkable.  Will have her stop taking the cholesterol medication and call her doctor for an alternative option.  She does have diabetes and therefore will defer from prescribing any oral steroids but will plan to prescribe a topical steroid ointment along with continuing Benadryl to help with her symptoms.  I do not feel that further work up in the ED is indicated at this time.  I updated pt regarding results and I counseled pt regarding supportive care measures.  I have discussed with the pt ED return warnings and need for close PCP f/u.  Pt agreeable to plan and all questions answered.  I feel that pt is stable for discharge and management as an outpatient and no further intervention is needed at this time.  Pt is comfortable returning to the ED if needed.  Will DC home in stable condition.       Scribe Attestation:   Scribe #1: I performed the above scribed service and the documentation accurately describes the services I performed. I attest to the accuracy of the note.            Physician Attestation for Scribe: I, Parul Luque, reviewed documentation as scribed in my presence, which is both accurate and complete.       Clinical Impression:   Final  diagnoses:  [T78.40XA] Allergic reaction, initial encounter (Primary)        ED Disposition Condition    Discharge Stable          ED Prescriptions       Medication Sig Dispense Start Date End Date Auth. Provider    triamcinolone acetonide 0.1% (KENALOG) 0.1 % ointment Apply topically 2 (two) times daily. 30 g 4/4/2023 -- Parul Luque MD          Follow-up Information       Follow up With Specialties Details Why Contact Info    Meghan Perez MD Internal Medicine   [address] [place] [id number]  [phone]               Parul Luque MD  04/04/23 2057

## 2023-04-05 NOTE — DISCHARGE INSTRUCTIONS
Continue taking Benadryl as needed.  We have prescribed you a steroid ointment.  Please fill and take as directed.    Please return to the ER if you have chest pain, difficulty breathing, fevers, altered mental status, dizziness, weakness, or any other concerns.      Follow up with your primary care physician.

## 2023-04-05 NOTE — ED TRIAGE NOTES
C/o constant itching since last night after taking her colestipol. Denies known allergy to other meds. Mild redness noted to bilat arms and back of neck. Airway patent. Resp even unlabored. Skin warm and dry.

## 2023-04-12 ENCOUNTER — CLINICAL SUPPORT (OUTPATIENT)
Dept: DIABETES | Facility: CLINIC | Age: 58
End: 2023-04-12
Payer: MEDICARE

## 2023-04-12 DIAGNOSIS — Z79.4 TYPE 2 DIABETES MELLITUS WITH COMPLICATION, WITH LONG-TERM CURRENT USE OF INSULIN: ICD-10-CM

## 2023-04-12 DIAGNOSIS — E11.8 TYPE 2 DIABETES MELLITUS WITH COMPLICATION, WITH LONG-TERM CURRENT USE OF INSULIN: ICD-10-CM

## 2023-04-12 PROCEDURE — G0108 PR DIAB MANAGE TRN  PER INDIV: ICD-10-PCS | Mod: S$GLB,,, | Performed by: FAMILY MEDICINE

## 2023-04-12 PROCEDURE — G0108 DIAB MANAGE TRN  PER INDIV: HCPCS | Mod: S$GLB,,, | Performed by: FAMILY MEDICINE

## 2023-04-12 PROCEDURE — 99999 PR PBB SHADOW E&M-EST. PATIENT-LVL I: CPT | Mod: PBBFAC,,,

## 2023-04-12 PROCEDURE — 99999 PR PBB SHADOW E&M-EST. PATIENT-LVL I: ICD-10-PCS | Mod: PBBFAC,,,

## 2023-04-13 ENCOUNTER — TELEPHONE (OUTPATIENT)
Dept: ENDOSCOPY | Facility: HOSPITAL | Age: 58
End: 2023-04-13
Payer: MEDICARE

## 2023-04-13 DIAGNOSIS — Z12.11 SPECIAL SCREENING FOR MALIGNANT NEOPLASMS, COLON: Primary | ICD-10-CM

## 2023-04-13 RX ORDER — SODIUM, POTASSIUM,MAG SULFATES 17.5-3.13G
1 SOLUTION, RECONSTITUTED, ORAL ORAL DAILY
Qty: 1 KIT | Refills: 0 | Status: SHIPPED | OUTPATIENT
Start: 2023-04-13 | End: 2023-04-26

## 2023-04-14 ENCOUNTER — TELEPHONE (OUTPATIENT)
Dept: INTERNAL MEDICINE | Facility: CLINIC | Age: 58
End: 2023-04-14
Payer: MEDICARE

## 2023-04-14 DIAGNOSIS — E11.42 DIABETIC POLYNEUROPATHY ASSOCIATED WITH TYPE 2 DIABETES MELLITUS: Primary | ICD-10-CM

## 2023-04-14 NOTE — PROGRESS NOTES
Diabetes Care Specialist Progress Note  Author: Chantal Shrestha RD, CDE  Date: 4/14/2023    Program Intake  Reason for Diabetes Program Visit:: Intervention  Type of Intervention:: Individual  Individual: Education  Education: Self-Management Skill Review  Current diabetes risk level:: moderate  In the last 12 months, have you:: none    Lab Results   Component Value Date    HGBA1C 8.2 (H) 03/01/2023         Diabetes Self-Management Skills Assessment    Medications  Patient is able to describe current diabetes management routine.: yes  Diabetes management routine:: insulin, injectable medications, oral medications, diet (Humalog 10 units + scale AC, Levemir 55 units BID, Ozempic 1mg weekly. metformin 500mg BID)  Patient is able to identify current diabetes medications, dosages, and appropriate timing of medications.: yes  Patient understands the purpose of the medications taken for diabetes.: yes  Patient reports problems or concerns with current medication regimen.: no  Medication regimen problems/concerns:: other (see comments) (no hypos. trend remaining above goal. Pt reports all r/t dietary indiscretion.)  Medication Skills Assessment Completed:: Yes  Assessment indicates:: Adequate understanding  Area of need?: No    Home Blood Glucose Monitoring  Monitoring Method:: personal continuous glucose monitor  Personal CGM type:: Dexcom G6  Patient is able to use personal CGM appropriately.: yes  CGM Report reviewed?: yes  Home Blood Glucose Monitoring Skills Assessment Completed: : Yes  Assessment indicates:: Adequate understanding  Area of need?: No       Assessment Summary and Plan    Based on today's diabetes care assessment, the following areas of need were identified:      Social 11/9/2022   Access to Mass Media/Tech No   Cognitive/Behavioral Health No   Culture/Mandaen No   Communication No   Health Literacy No        Clinical 11/9/2022   Medication Adherence Yes   Lab Compliance No        Diabetes  Self-Management Skills 4/12/2023   Diabetes Disease Process/Treatment Options -   Nutrition/Healthy Eating Yes - see care planning   Physical Activity/Exercise -   Medication Yes - see care planning   Home Blood Glucose Monitoring No   Acute Complications -   Chronic Complications -   Psychosocial/Coping -          Today's interventions were provided through individual discussion, instruction, and written materials were provided.      Patient verbalized understanding of instruction and written materials.  Pt was able to return back demonstration of instructions today. Patient understood key points, needs reinforcement and further instruction.     Diabetes Self-Management Care Plan:    Today's Diabetes Self-Management Care Plan was developed with Starla SHANKAR's input. Starla SHANKAR has agreed to work toward the following goal(s) to improve his/her overall diabetes control.      Care Plan: Diabetes Management   Updates made since 3/15/2023 12:00 AM        Problem: Medications         Goal: Patient Agrees to take Diabetes Medication(s) - Ozempic, Metformin, and MDI as prescribed.    Start Date: 11/10/2022   Expected End Date: 12/15/2022   This Visit's Progress: Met   Recent Progress: On track   Priority: High   Barriers: No Barriers Identified   Note:    11/9/22 - Pt has started Ozempic but sometimes missing insulin doses. Reviewed importance of taking Humalog before each meal (and sometimes her dinner meal is a bunch of snack foods). Is having a lot of difficulty with bruising and some scaring to abdomen where she usually takes injections. Educated on alternate injection sites and site rotation. Discussed would benefit from adding correction to Humalog before meal if BG is high. Reviewed taking set doses of Levemir BID about 12 hours apart. Her long-acting and rapid-acting are not balanced - she is scheduled to see endocrinologist later this month. Encouraged pt to bring log of BG and insulin doses for more effective insulin  adjustment. Pt verbalized interest in insulin pump therapy - discussed working on basic skills necessary (monitoring, taking insulin consistently, carb counting).     12/15/22 - Pt reports has been taking Humalog and Levemir as prescribed with changes made per Dr. Molina. No logs today. Reports better glucose readings but still a lot of variability. Discussed will be able to dose insulin based on CGM readings and will aid in more effective insulin dose changes.     12/30/22 - Taking Levemir 58 units and Humalog 10 units + correction scale. Reports consistently waking up >/= 160s. Has not increased Levemir as indicated in Dr. Molina's AVS. Encouraged increasing Levemir by 2 units if FBG in am is remaining >140 for 3-5 days. Pt is interested in working toward insulin pump therapy. Briefly discussed basic concepts of pump therapy and how pump works. Explained next step would be to work on CHO counting.     1/26/23 - Pt doing well with taking medications at proper timing and consistently. Lost correction scale for Humalog. Reviewed adding correction based on scale to Humalog before each meal. Pt also recently saw Dr. Peerz and Ozempic dose increased. Will take first 1mg dosage this Saturday.     Pt is interested in and working toward pump therapy. Today, discussed in detail and provided demonstration of 2 pumps that are compatible with her Dexcom G6 - TSlim with Control IQ and OmniPod5. Discussed and demonstrated differences (tubing vs pod), benefits of each and both being hybrid closed loop systems. Pt verbalized preference of tubingless system. Encouraged pt to call insurance and discuss coverage and any copay costs she would have.     3/8/23 - Missing some doses of Humalog at times. Reviewed timing, dosage, and using correction scale. Also - discussed hypo event from last week - Pt reports she had taken Humalog before going out to eat Sunday evening - ate large meal containing carb (fried crabs, shrimp, fried  rice, and a dessert). BG stayed high for a while after and pt took additional Humalog about 1-2 hours after meal. BG came down some initially but then bottomed out later. Explained taking extra dose of Humalog was the cause of the hypo event. Stressed only take her Humalog before eating a meal and do not take extra after.    4/12/23 - Pt reports consistently taking MDI, ozempic and metformin as prescribed. Glycemic control much better than it had been in the past; however, avg glucose remaining high and trend remaining around 200. Pt feels diet indiscretion over past week with multiple birthday celebrations/daiquiris/etc is the cause. Pt had previously indicated interest in insulin pump therapy - today asked if this is still something pt is interested in. She voiced uncertain she will like adding another device to wear but really likes the idea of fewer needle sticks. Reviewed basic overview of pump therapy expectations. Pt is willing to work on basic carb counting.        Problem: Healthy Eating         Goal: Pt will identify foods that are carbohydrates and use labels for counting carbs.    Start Date: 12/30/2022   Expected End Date: 1/26/2023   This Visit's Progress: No change   Recent Progress: No change   Priority: Medium   Barriers: No Barriers Identified   Note:    12/30/22 - Pt is interested in working toward insulin pump therapy. Discussed importance of learning to count carbohydrates in order to prepare for pump. Pt is willing to try. Provided basic carb counting education:    Ed on what carbohydrates are, what foods contain carbs and importance of reducing added sugar  Began ed on appropriate portion sizes of carbs for ~15g CHO  Label reading exercise demonstrating using serving size and total CHO grams in carb count  Began ed on the plate method: importance of increasing non-starchy veggies (cooked until soft d/t gastroparesis), choosing lean protein, healthy fats and portioned servings of complex cho's      Provided food log and encouraged practicing carb counting. Pt will write down foods, approx serving size and grams carb and bring log to next appt.     1/26/23 - Pt hasn't been practicing carb counting since last visit - attention has been focused on assisting Vianca's  since her passing. Has been getting more comfortable with identifying what foods are carbs and using plate method (pt shared photo of her plate with baked chicken, broccoli and ~1 cup potatoes - perfect!). Appropriately estimated carbs as 30g. Reviewed basic carb counting, portion sizes for ~15g CHO, and encouraged downloading/using [employer] imelda for looking up carb grams.     3/8/23 - Reports she has not been carb counting but has been doing well with controlling portions of carb.     4/12/23 - Still has not been practicing carb counting. Reviewed today foods that count as carbs, serving sizes for ~15g, label reading, using imelda for looking up foods she is unsure of and adding up carbs at meals. Also, pt continues to have some problems with gastroparesis s/s - reviewed gastroparesis nutrition therapy guidelines.          Follow Up Plan     Follow up in about 6 weeks (around 5/24/2023) for pattern management and possibly working toward pump therapy.    Today's care plan and follow up schedule was discussed with patient.  Starla SHANKAR verbalized understanding of the care plan, goals, and agrees to follow up plan.        The patient was encouraged to communicate with his/her health care provider/physician and care team regarding his/her condition(s) and treatment.  I provided the patient with my contact information today and encouraged to contact me via phone or [facility] Portal as needed.     Length of Visit   Total Time: 60 Minutes

## 2023-04-24 ENCOUNTER — OFFICE VISIT (OUTPATIENT)
Dept: INTERNAL MEDICINE | Facility: CLINIC | Age: 58
End: 2023-04-24
Payer: MEDICARE

## 2023-04-24 VITALS
HEART RATE: 78 BPM | OXYGEN SATURATION: 98 % | SYSTOLIC BLOOD PRESSURE: 132 MMHG | WEIGHT: 244.81 LBS | DIASTOLIC BLOOD PRESSURE: 60 MMHG | BODY MASS INDEX: 40.74 KG/M2

## 2023-04-24 DIAGNOSIS — I10 PRIMARY HYPERTENSION: ICD-10-CM

## 2023-04-24 DIAGNOSIS — Z00.00 HEALTH MAINTENANCE EXAMINATION: ICD-10-CM

## 2023-04-24 DIAGNOSIS — E55.9 VITAMIN D DEFICIENCY: ICD-10-CM

## 2023-04-24 DIAGNOSIS — D64.9 ANEMIA, UNSPECIFIED TYPE: ICD-10-CM

## 2023-04-24 DIAGNOSIS — E78.2 MIXED HYPERLIPIDEMIA: ICD-10-CM

## 2023-04-24 DIAGNOSIS — Z91.09 ENVIRONMENTAL ALLERGIES: Primary | ICD-10-CM

## 2023-04-24 DIAGNOSIS — Z23 NEED FOR VACCINATION: ICD-10-CM

## 2023-04-24 DIAGNOSIS — E11.8 TYPE 2 DIABETES MELLITUS WITH COMPLICATION, WITH LONG-TERM CURRENT USE OF INSULIN: ICD-10-CM

## 2023-04-24 DIAGNOSIS — M72.2 PLANTAR FASCIITIS: ICD-10-CM

## 2023-04-24 DIAGNOSIS — Z79.4 TYPE 2 DIABETES MELLITUS WITH COMPLICATION, WITH LONG-TERM CURRENT USE OF INSULIN: ICD-10-CM

## 2023-04-24 PROCEDURE — 3078F DIAST BP <80 MM HG: CPT | Mod: CPTII,S$GLB,, | Performed by: STUDENT IN AN ORGANIZED HEALTH CARE EDUCATION/TRAINING PROGRAM

## 2023-04-24 PROCEDURE — 1159F PR MEDICATION LIST DOCUMENTED IN MEDICAL RECORD: ICD-10-PCS | Mod: CPTII,S$GLB,, | Performed by: STUDENT IN AN ORGANIZED HEALTH CARE EDUCATION/TRAINING PROGRAM

## 2023-04-24 PROCEDURE — 3075F PR MOST RECENT SYSTOLIC BLOOD PRESS GE 130-139MM HG: ICD-10-PCS | Mod: CPTII,S$GLB,, | Performed by: STUDENT IN AN ORGANIZED HEALTH CARE EDUCATION/TRAINING PROGRAM

## 2023-04-24 PROCEDURE — 3008F PR BODY MASS INDEX (BMI) DOCUMENTED: ICD-10-PCS | Mod: CPTII,S$GLB,, | Performed by: STUDENT IN AN ORGANIZED HEALTH CARE EDUCATION/TRAINING PROGRAM

## 2023-04-24 PROCEDURE — 4010F ACE/ARB THERAPY RXD/TAKEN: CPT | Mod: CPTII,S$GLB,, | Performed by: STUDENT IN AN ORGANIZED HEALTH CARE EDUCATION/TRAINING PROGRAM

## 2023-04-24 PROCEDURE — 1159F MED LIST DOCD IN RCRD: CPT | Mod: CPTII,S$GLB,, | Performed by: STUDENT IN AN ORGANIZED HEALTH CARE EDUCATION/TRAINING PROGRAM

## 2023-04-24 PROCEDURE — 3078F PR MOST RECENT DIASTOLIC BLOOD PRESSURE < 80 MM HG: ICD-10-PCS | Mod: CPTII,S$GLB,, | Performed by: STUDENT IN AN ORGANIZED HEALTH CARE EDUCATION/TRAINING PROGRAM

## 2023-04-24 PROCEDURE — 99214 OFFICE O/P EST MOD 30 MIN: CPT | Mod: S$GLB,,, | Performed by: STUDENT IN AN ORGANIZED HEALTH CARE EDUCATION/TRAINING PROGRAM

## 2023-04-24 PROCEDURE — 3052F PR MOST RECENT HEMOGLOBIN A1C LEVEL 8.0 - < 9.0%: ICD-10-PCS | Mod: CPTII,S$GLB,, | Performed by: STUDENT IN AN ORGANIZED HEALTH CARE EDUCATION/TRAINING PROGRAM

## 2023-04-24 PROCEDURE — 99214 PR OFFICE/OUTPT VISIT, EST, LEVL IV, 30-39 MIN: ICD-10-PCS | Mod: S$GLB,,, | Performed by: STUDENT IN AN ORGANIZED HEALTH CARE EDUCATION/TRAINING PROGRAM

## 2023-04-24 PROCEDURE — 3008F BODY MASS INDEX DOCD: CPT | Mod: CPTII,S$GLB,, | Performed by: STUDENT IN AN ORGANIZED HEALTH CARE EDUCATION/TRAINING PROGRAM

## 2023-04-24 PROCEDURE — 4010F PR ACE/ARB THEARPY RXD/TAKEN: ICD-10-PCS | Mod: CPTII,S$GLB,, | Performed by: STUDENT IN AN ORGANIZED HEALTH CARE EDUCATION/TRAINING PROGRAM

## 2023-04-24 PROCEDURE — 3075F SYST BP GE 130 - 139MM HG: CPT | Mod: CPTII,S$GLB,, | Performed by: STUDENT IN AN ORGANIZED HEALTH CARE EDUCATION/TRAINING PROGRAM

## 2023-04-24 PROCEDURE — 99999 PR PBB SHADOW E&M-EST. PATIENT-LVL IV: CPT | Mod: PBBFAC,,, | Performed by: STUDENT IN AN ORGANIZED HEALTH CARE EDUCATION/TRAINING PROGRAM

## 2023-04-24 PROCEDURE — 99999 PR PBB SHADOW E&M-EST. PATIENT-LVL IV: ICD-10-PCS | Mod: PBBFAC,,, | Performed by: STUDENT IN AN ORGANIZED HEALTH CARE EDUCATION/TRAINING PROGRAM

## 2023-04-24 PROCEDURE — 3052F HG A1C>EQUAL 8.0%<EQUAL 9.0%: CPT | Mod: CPTII,S$GLB,, | Performed by: STUDENT IN AN ORGANIZED HEALTH CARE EDUCATION/TRAINING PROGRAM

## 2023-04-24 RX ORDER — AZELASTINE 1 MG/ML
2 SPRAY, METERED NASAL 2 TIMES DAILY
Qty: 30 ML | Refills: 2 | Status: SHIPPED | OUTPATIENT
Start: 2023-04-24 | End: 2023-04-24 | Stop reason: SDUPTHER

## 2023-04-24 RX ORDER — BENZONATATE 200 MG/1
200 CAPSULE ORAL 3 TIMES DAILY PRN
Qty: 40 CAPSULE | Refills: 0 | Status: SHIPPED | OUTPATIENT
Start: 2023-04-24 | End: 2023-06-15

## 2023-04-24 RX ORDER — AZELASTINE 1 MG/ML
2 SPRAY, METERED NASAL 2 TIMES DAILY
Qty: 30 ML | Refills: 2 | Status: SHIPPED | OUTPATIENT
Start: 2023-04-24 | End: 2023-06-15

## 2023-04-24 RX ORDER — SEMAGLUTIDE 2.68 MG/ML
2 INJECTION, SOLUTION SUBCUTANEOUS
Qty: 9 ML | Refills: 3 | Status: SHIPPED | OUTPATIENT
Start: 2023-04-24 | End: 2023-10-16

## 2023-04-24 NOTE — PROGRESS NOTES
Subjective:       Patient ID: Starla Wharton is a 58 y.o. female.    Chief Complaint: Environmental allergies [Z91.09]    Patient is established with me, here today for the following:     T2DM on insulin, peripheral neuropathy, retinopathy, gastroparesis, CKD3, HLD, HTN, GERD, vitamin D deficiency      Endorses rhinorrhea, sinus congestion, and non-productive cough starting yesterday.  Denies fever, chills, sore throat, SOB, CP, vomiting, and diarrhea.   Has tried diabetic safe cough medicine with some improvement.    T2DM -   Currently taking   - Metformin 500 mg BID  - Novolog 2-3 units qAC   - Levemir 60 qAM and 60 units qHS, supposed to be taking 55 units BID  - Ozempic 1 mg weekly  Following with Dr. Molina for endocrinology  Following with Chantal for diabetes management  Taking ASA 81 mg daily  Using Dexcom for CGM  Due for foot exam  UTD on eye exam, last done FEB2023.  Following with Dr. Ramsay routinely for ophthalmology  Lab Results       Component                Value               Date                       HGBA1C                   8.2 (H)             03/01/2023                 HGBA1C                   9.1 (H)             01/23/2023                 HGBA1C                   9.8 (H)             10/21/2022            Lab Results       Component                Value               Date                       MICALBCREAT              29.0                07/22/2022               HTN -   Currently prescribed HCTZ, lisinopril.  Patient endorses taking medication as directed.  Denies side effects or concerns while taking medication.  Denies headaches, vision changes, CP, palpitations, or other concerning symptoms.  Lab Results       Component                Value               Date                       MICALBCREAT              29.0                07/22/2022            BP Readings from Last 3 Encounters:  04/24/23 : 132/60  04/04/23 : (!) 144/72  03/31/23 : (!) 142/65  Has follow up with Dr. Hernández for sleep  medicine in JUNE2023  Interested in sleep apnea testing    Podiatrist diagnosed her with left sided plantar fascitis   Wants to see orthopedist for pain, has appointment scheduled with orthopedist     Anemia -  Due for recheck of anemia   Lab Results       Component                Value               Date                       IRON                     51                  10/21/2022                 TRANSFERRIN              244                 10/21/2022                 TIBC                     361                 10/21/2022                 FESATURATED              14 (L)              10/21/2022             Lab Results       Component                Value               Date                       FERRITIN                 125                 10/21/2022            Lab Results       Component                Value               Date                       [id number]               521                 10/21/2022            Lab Results       Component                Value               Date                       FOLATE                   13.9                10/21/2022              HLD -   Endorses taking atorvastatin as directed  Denies side effects or concerns while taking medication  : 07/22/2022  Lab Results       Component                Value               Date                       LDLCALC                  71.0                07/22/2022              Vitamin D deficiency -  Not currently taking vitamin D supplementation.  Lab Results       Component                Value               Date                       [id number]             30                  07/22/2022              Health maintenance -   Cologuard negative SEP2021.   Denies family history of colorectal cancer.  Mammogram BI-RADS 1 in AUG2022.  History of prior abnormal mammogram.  Biopsy showed benign fatty tissue.  Family history of breast cancer.  Denies family history of ovarian cancer.  Hysterectomy due to fibroids.   Denies history of prior abnormal pap  smears.  Denies family history of osteoporosis.  Family history of cardiac disease.  UTD on Tdap, COVID primary/booster, shingles 1st dose vaccinations.  Due for COVID bivalent, shingles 2nd dose vaccinations.  Never smoker.  Drinks alcohol 2-5 times yearly, 1 drink per sitting.  Denies drug use.  Completed HIV and hepatitis C screening.       Review of Systems   Constitutional:  Negative for chills, fatigue and fever.   Respiratory:  Negative for cough and shortness of breath.    Cardiovascular:  Negative for chest pain and palpitations.   Gastrointestinal:  Negative for abdominal pain, constipation, diarrhea, nausea and vomiting.   Musculoskeletal:  Positive for arthralgias and myalgias.       Current Outpatient Medications   Medication Instructions    acetaminophen-codeine 300-30mg (TYLENOL #3) 300-30 mg Tab acetaminophen 300 mg-codeine 30 mg tablet   TAKE 1 TABLET BY MOUTH EVERY 6 HOURS AS NEEDED FOR PAIN    aspirin 81 mg, Daily    atorvastatin (LIPITOR) 40 mg, Oral, Daily    azelastine (ASTELIN) 274 mcg, Nasal, 2 times daily    benzonatate (TESSALON) 200 mg, Oral, 3 times daily PRN    blood-glucose meter,continuous (DEXCOM G6 ) Misc 1 Device, Misc.(Non-Drug; Combo Route), Once    blood-glucose sensor (DEXCOM G6 SENSOR) Kenzie Use daily for continuous glucose monitoring, change sensor every 10 days.    blood-glucose transmitter (DEXCOM G6 TRANSMITTER) Kenzie Use with Dexcom sensor, change every 90 days.    bromfenac (PROLENSA) 0.07 % Drop 1 drop, Ophthalmic, Daily    bromfenac (PROLENSA) 0.07 % Drop Apply one drop daily to both eyes as directed    ciclopirox (PENLAC) 8 % Soln ciclopirox 8 % topical solution   APPLY OVER NAIL AND SURROUNDING SKIN. APPLY DAILY OVER PREVIOUS COAT. AFTER SEVEN DAYS MAY REMOVE WITH ALCOHOL AND CONTINUE CYCLE    colestipoL (COLESTID) 1 g, Oral, 2 times daily    diclofenac sodium (VOLTAREN) 1 % Gel Topical (Top), As needed (PRN)    gabapentin (NEURONTIN) 300 mg, Oral, 3 times daily  "   HumaLOG KwikPen Insulin 10 Units, Subcutaneous, 3 times daily with meals, Plus correction scale. Max daily dose 50 units/day    hydroCHLOROthiazide (HYDRODIURIL) 25 mg, Oral, Daily    indomethacin (INDOCIN) 50 MG capsule indomethacin 50 mg capsule    LEVEMIR FLEXPEN 58 Units, Subcutaneous, 2 times daily    lisinopriL (PRINIVIL,ZESTRIL) 20 mg, Oral, Daily    metFORMIN (GLUCOPHAGE) 500 mg, Oral, 2 times daily with meals    OZEMPIC 2 mg, Subcutaneous, Every 7 days    pantoprazole (PROTONIX) 20 mg, Oral, Daily    pen needle, diabetic (BD ULTRA-FINE MAR PEN NEEDLE) 32 gauge x 5/32" Ndle Use to inject insulin 5 times daily    prednisoLONE acetate (PRED FORTE) 1 % DrpS 1 drop, Right Eye, 2 times daily    sodium,potassium,mag sulfates (SUPREP BOWEL PREP KIT) 17.5-3.13-1.6 gram SolR 177 mLs, Oral, Daily    tretinoin (RETIN-A) 0.05 % cream Topical (Top), Nightly    triamcinolone acetonide 0.1% (KENALOG) 0.1 % ointment Topical (Top), 2 times daily     Objective:      Vitals:    04/24/23 1042   BP: 132/60   Pulse: 78   SpO2: 98%   Weight: 111 kg (244 lb 13.1 oz)   PainSc: 0-No pain     Body mass index is 40.74 kg/m².    Physical Exam  Vitals reviewed.   Constitutional:       General: She is not in acute distress.     Appearance: She is not ill-appearing or diaphoretic.   Cardiovascular:      Rate and Rhythm: Normal rate and regular rhythm.      Heart sounds: Normal heart sounds. No murmur heard.    No friction rub. No gallop.   Pulmonary:      Effort: Pulmonary effort is normal. No respiratory distress.      Breath sounds: Normal breath sounds. No stridor. No wheezing, rhonchi or rales.   Skin:     General: Skin is warm and dry.      Comments: Color WNL   Neurological:      Mental Status: She is alert. Mental status is at baseline.   Psychiatric:         Mood and Affect: Mood normal.         Behavior: Behavior normal.       Assessment:       1. Environmental allergies    2. Type 2 diabetes mellitus with complication, with " long-term current use of insulin    3. Primary hypertension    4. Plantar fasciitis    5. Anemia, unspecified type    6. Mixed hyperlipidemia    7. Vitamin D deficiency    8. Health maintenance examination    9. Need for vaccination        Plan:       Environmental allergies  For body aches, headaches, and/or fevers:   - Tylenol 1,000 mg every 8 hours or 500 mg every 4 hours.    For chest congestion try Mucinex 1,200 mg twice daily (must be well hydrated for the medication to work).   Sore throat can also be treated with warm tea with honey and salt water gargling (8 oz warm water with 1/4 tsp salt).   Try nasal saline rinses using only sterile or distilled water 1-2 times daily.   Sent tessalon perles for cough to pharmacy.   Sent azelastine for sinus congestion to pharmacy.    Return to clinic in 7-10 days if symptoms worsening or unimproved.  -     azelastine (ASTELIN) 137 mcg (0.1 %) nasal spray; 2 sprays (274 mcg total) by Nasal route 2 (two) times daily.  -     benzonatate (TESSALON) 200 MG capsule; Take 1 capsule (200 mg total) by mouth 3 (three) times daily as needed for Cough.    Type 2 diabetes mellitus with complication, with long-term current use of insulin  Decrease insulin determir to 55 units BID  Continue Novolog  Increase Ozempic to 2 mg weekly  Continue metformin  Continue follow up with diabetic educator and endocrinologist  RTC in 3 months for follow up  -     semaglutide (OZEMPIC) 2 mg/dose (8 mg/3 mL) PnIj; Inject 2 mg into the skin every 7 days.  -     Hemoglobin A1C; Future  -     Microalbumin/Creatinine Ratio, Urine; Future    Primary hypertension  Continue current medications.  RTC in 3 months for follow up.  -     Comprehensive Metabolic Panel; Future  -     CBC Auto Differential; Future    Plantar fasciitis  Continue evaluation and management per podiatry and orthopedist.    Anemia, unspecified type  -     CBC Auto Differential; Future  -     Iron and TIBC; Future  -     Ferritin;  Future    Mixed hyperlipidemia  Continue current medications.  RTC in 3 months for follow up.  -     Lipid Panel; Future    Vitamin D deficiency  -     Vitamin D; Future    Health maintenance examination  Reviewed and discussed age appropriate screenings and immunizations.  -     Comprehensive Metabolic Panel; Future  -     Lipid Panel; Future  -     Hemoglobin A1C; Future  -     CBC Auto Differential; Future  -     Microalbumin/Creatinine Ratio, Urine; Future  -     Vitamin D; Future  -     Iron and TIBC; Future  -     Ferritin; Future    Need for vaccination  Provided written Rx for shingles 2nd dose vaccination, advised to obtain at [facility] pharmacy on 2nd floor or preferred pharmacy.      Meghan Perez MD  4/24/2023

## 2023-04-24 NOTE — PATIENT INSTRUCTIONS
For body aches, headaches, and/or fevers:   - Tylenol 1,000 mg every 8 hours or 500 mg every 4 hours.    For chest congestion try Mucinex 1,200 mg twice daily (must be well hydrated for the medication to work).   Sore throat can also be treated with warm tea with honey and salt water gargling (8 oz warm water with 1/4 tsp salt).   Try nasal saline rinses using only sterile or distilled water 1-2 times daily.   Sent tessalon perles for cough to pharmacy.   Sent azelastine for sinus congestion to pharmacy.    Return to clinic in 7-10 days if symptoms worsening or unimproved.

## 2023-04-25 ENCOUNTER — TELEPHONE (OUTPATIENT)
Dept: INTERNAL MEDICINE | Facility: CLINIC | Age: 58
End: 2023-04-25
Payer: MEDICARE

## 2023-04-25 ENCOUNTER — TELEPHONE (OUTPATIENT)
Dept: ORTHOPEDICS | Facility: CLINIC | Age: 58
End: 2023-04-25
Payer: MEDICARE

## 2023-04-25 DIAGNOSIS — M72.2 PLANTAR FASCIITIS: Primary | ICD-10-CM

## 2023-04-25 NOTE — TELEPHONE ENCOUNTER
----- Message from Garth Duke MA sent at 4/25/2023 10:20 AM CDT -----  Pt stated DX  code for  Ortho referral needs to be changed to plantar fasciitis  ----- Message -----  From: Larissa Norman  Sent: 4/25/2023   9:25 AM CDT  To: Ana [facility] Staff    Who Called: Patient    What is the request in detail: Requesting call back to discuss her referral to ortho. Patient's referral sends scheduling to [facility] who has already explained that they will not see her for what is on her referral. Please advise.     Can the clinic reply by [facility]? No    Best Call Back Number: [phone]      Additional Information: Please make appointment or make adjustments to her referral.     Symptom Screen outcome:

## 2023-04-28 ENCOUNTER — TELEPHONE (OUTPATIENT)
Dept: ENDOSCOPY | Facility: HOSPITAL | Age: 58
End: 2023-04-28
Payer: MEDICARE

## 2023-05-01 ENCOUNTER — TELEPHONE (OUTPATIENT)
Dept: INTERNAL MEDICINE | Facility: CLINIC | Age: 58
End: 2023-05-01
Payer: MEDICARE

## 2023-05-01 NOTE — TELEPHONE ENCOUNTER
----- Message from Garth Duke MA sent at 5/1/2023  2:02 PM CDT -----  Pt would like antibiotics sent to pharmacy pt stated she has an sinus infection  ----- Message -----  From: Jaz Brandon  Sent: 5/1/2023   9:35 AM CDT  To: Ana [facility] Staff    Name of Who is Calling:JIA QUINTERO [[id number]]              What is the request in detail:Requesting a call back to get antibiotics.               Can the clinic reply by [facility]:no              [facility][phone]

## 2023-05-02 DIAGNOSIS — I10 ESSENTIAL HYPERTENSION: ICD-10-CM

## 2023-05-02 RX ORDER — LISINOPRIL 20 MG/1
20 TABLET ORAL DAILY
Qty: 90 TABLET | Refills: 1 | Status: SHIPPED | OUTPATIENT
Start: 2023-05-02 | End: 2023-10-30 | Stop reason: SDUPTHER

## 2023-05-02 NOTE — TELEPHONE ENCOUNTER
Refill Encounter    PCP Visits: Recent Visits  Date Type Provider Dept   04/24/23 Office Visit Meghan Perez MD [facility]   03/10/23 Office Visit Meghan Perez MD [facility]   01/23/23 Office Visit Meghan Perez MD [facility]   10/21/22 Office Visit Meghan Perez MD [facility]   09/22/22 Office Visit Meghan Perez MD [facility]   07/20/22 Office Visit Meghan Perez MD [facility]   Showing recent visits within past 360 days and meeting all other requirements  Future Appointments  Date Type Provider Dept   06/29/23 Appointment Meghan Perez MD [facility]   Showing future appointments within next 720 days and meeting all other requirements     Last 3 Blood Pressure:   BP Readings from Last 3 Encounters:   04/24/23 132/60   04/04/23 (!) 144/72   03/31/23 (!) 142/65     Preferred Pharmacy:   [facility]  [address] [address]  Phone: [phone] Fax: [phone]    [id number] - [place], [place] - [address] [facility] [address]  [address]  [facility] [address]  Phone: [phone] Fax: [phone]    Requested RX:  Requested Prescriptions     Pending Prescriptions Disp Refills    lisinopriL (PRINIVIL,ZESTRIL) 20 MG tablet 90 tablet 1     Sig: Take 1 tablet (20 mg total) by mouth once daily.      RX Route: Normal

## 2023-05-02 NOTE — TELEPHONE ENCOUNTER
Care Due:                  Date            Visit Type   Department     Provider  --------------------------------------------------------------------------------                                EP -                              PRIMARY      [facility] INTERNAL  Last Visit: 04-      CARE ([facility])   PATY Perez                              EP -                              PRIMARY      [facility] INTERNAL  Next Visit: 06-      CARE ([facility])   PATY Perez                                                            Last  Test          Frequency    Reason                     Performed    Due Date  --------------------------------------------------------------------------------    Lipid Panel.  12 months..  atorvastatin.............  07- 07-    [facility] Embedded Care Due Messages. Reference number: [id number].   5/02/2023 12:02:48 PM CDT

## 2023-05-16 ENCOUNTER — TELEPHONE (OUTPATIENT)
Dept: ENDOSCOPY | Facility: HOSPITAL | Age: 58
End: 2023-05-16
Payer: MEDICARE

## 2023-05-16 ENCOUNTER — OFFICE VISIT (OUTPATIENT)
Dept: ORTHOPEDICS | Facility: CLINIC | Age: 58
End: 2023-05-16
Payer: MEDICARE

## 2023-05-16 ENCOUNTER — HOSPITAL ENCOUNTER (OUTPATIENT)
Dept: RADIOLOGY | Facility: HOSPITAL | Age: 58
Discharge: HOME | End: 2023-05-16
Attending: NURSE PRACTITIONER
Payer: MEDICARE

## 2023-05-16 VITALS — HEIGHT: 65 IN | WEIGHT: 240.31 LBS | BODY MASS INDEX: 40.04 KG/M2

## 2023-05-16 DIAGNOSIS — M21.41 FLAT FEET, BILATERAL: Primary | ICD-10-CM

## 2023-05-16 DIAGNOSIS — M21.42 FLAT FEET, BILATERAL: Primary | ICD-10-CM

## 2023-05-16 DIAGNOSIS — M72.2 PLANTAR FASCIITIS OF LEFT FOOT: ICD-10-CM

## 2023-05-16 DIAGNOSIS — M72.2 PLANTAR FASCIITIS: ICD-10-CM

## 2023-05-16 DIAGNOSIS — M21.611 BUNION OF GREAT TOE OF RIGHT FOOT: ICD-10-CM

## 2023-05-16 PROCEDURE — 3052F HG A1C>EQUAL 8.0%<EQUAL 9.0%: CPT | Mod: CPTII,,, | Performed by: NURSE PRACTITIONER

## 2023-05-16 PROCEDURE — 73630 X-RAY EXAM OF FOOT: CPT | Mod: TC,LT

## 2023-05-16 PROCEDURE — 4010F PR ACE/ARB THEARPY RXD/TAKEN: ICD-10-PCS | Mod: CPTII,,, | Performed by: NURSE PRACTITIONER

## 2023-05-16 PROCEDURE — 1159F MED LIST DOCD IN RCRD: CPT | Mod: CPTII,,, | Performed by: NURSE PRACTITIONER

## 2023-05-16 PROCEDURE — 99204 OFFICE O/P NEW MOD 45 MIN: CPT | Mod: ,,, | Performed by: NURSE PRACTITIONER

## 2023-05-16 PROCEDURE — 3052F PR MOST RECENT HEMOGLOBIN A1C LEVEL 8.0 - < 9.0%: ICD-10-PCS | Mod: CPTII,,, | Performed by: NURSE PRACTITIONER

## 2023-05-16 PROCEDURE — 1160F PR REVIEW ALL MEDS BY PRESCRIBER/CLIN PHARMACIST DOCUMENTED: ICD-10-PCS | Mod: CPTII,,, | Performed by: NURSE PRACTITIONER

## 2023-05-16 PROCEDURE — 99999 PR PBB SHADOW E&M-EST. PATIENT-LVL V: ICD-10-PCS | Mod: PBBFAC,,, | Performed by: NURSE PRACTITIONER

## 2023-05-16 PROCEDURE — 1160F RVW MEDS BY RX/DR IN RCRD: CPT | Mod: CPTII,,, | Performed by: NURSE PRACTITIONER

## 2023-05-16 PROCEDURE — 1159F PR MEDICATION LIST DOCUMENTED IN MEDICAL RECORD: ICD-10-PCS | Mod: CPTII,,, | Performed by: NURSE PRACTITIONER

## 2023-05-16 PROCEDURE — 73630 XR FOOT COMPLETE 3 VIEW LEFT: ICD-10-PCS | Mod: 26,LT,, | Performed by: RADIOLOGY

## 2023-05-16 PROCEDURE — 4010F ACE/ARB THERAPY RXD/TAKEN: CPT | Mod: CPTII,,, | Performed by: NURSE PRACTITIONER

## 2023-05-16 PROCEDURE — 3008F BODY MASS INDEX DOCD: CPT | Mod: CPTII,,, | Performed by: NURSE PRACTITIONER

## 2023-05-16 PROCEDURE — 73630 X-RAY EXAM OF FOOT: CPT | Mod: 26,LT,, | Performed by: RADIOLOGY

## 2023-05-16 PROCEDURE — 3008F PR BODY MASS INDEX (BMI) DOCUMENTED: ICD-10-PCS | Mod: CPTII,,, | Performed by: NURSE PRACTITIONER

## 2023-05-16 PROCEDURE — 99204 PR OFFICE/OUTPT VISIT, NEW, LEVL IV, 45-59 MIN: ICD-10-PCS | Mod: ,,, | Performed by: NURSE PRACTITIONER

## 2023-05-16 PROCEDURE — 99999 PR PBB SHADOW E&M-EST. PATIENT-LVL V: CPT | Mod: PBBFAC,,, | Performed by: NURSE PRACTITIONER

## 2023-05-16 NOTE — PROGRESS NOTES
SUBJECTIVE:     Chief Complaint & History of Present Illness:  Starla Wharton is a New 58 y.o. year old female patient here for intermittent left foot/ankle pain which has been present for several months.  There is not a history of recent injury.  She fractured her 5th metatarsal on the left foot many years ago and was treated non operative.  She has seen a podiatrist for her plantar fasciitis where she was treated inside of a boot for several months but decided to take herself out of the boot as it caused increased pain in her heel.  This podiatrist is also treating her for toenail fungus.  She does wear special inserts in her diabetic shoes but admits she does not wear the shoes all the time.  She has spoken with her primary care doctor and requested to see an orthopedic doctor at [facility] for her plantar fasciitis, flat feet, and bunion which is on the right foot.    Currently she reports pain in her left foot that worsens with activity.  She denies any morning tenderness and reports she has not done any recent physical therapy.  She does not do any special stretching exercises.  She reports she has chronic diabetic neuropathy.  She also has been using Voltaren gel which gives minimal relief.    Review of patient's allergies indicates:   Allergen Reactions    Naprosyn [naproxen]          Current Outpatient Medications   Medication Sig Dispense Refill    acetaminophen-codeine 300-30mg (TYLENOL #3) 300-30 mg Tab acetaminophen 300 mg-codeine 30 mg tablet   TAKE 1 TABLET BY MOUTH EVERY 6 HOURS AS NEEDED FOR PAIN      aspirin 81 MG Chew Take 81 mg by mouth once daily.      atorvastatin (LIPITOR) 40 MG tablet Take 1 tablet (40 mg total) by mouth once daily. 90 tablet 3    azelastine (ASTELIN) 137 mcg (0.1 %) nasal spray 2 sprays (274 mcg total) by Nasal route 2 (two) times daily. 30 mL 2    benzonatate (TESSALON) 200 MG capsule Take 1 capsule (200 mg total) by mouth 3 (three) times daily as needed for Cough. 40  "capsule 0    blood-glucose meter,continuous (DEXCOM G6 ) Misc 1 Device by Misc.(Non-Drug; Combo Route) route once. for 1 dose 1 each 0    blood-glucose sensor (DEXCOM G6 SENSOR) Kenzie Use daily for continuous glucose monitoring, change sensor every 10 days. 9 each 3    blood-glucose transmitter (DEXCOM G6 TRANSMITTER) Kenzie Use with Dexcom sensor, change every 90 days. 1 each 3    bromfenac (PROLENSA) 0.07 % Drop Apply 1 drop to eye once daily at 6am.      bromfenac (PROLENSA) 0.07 % Drop Apply one drop daily to both eyes as directed 3 mL 3    ciclopirox (PENLAC) 8 % Soln ciclopirox 8 % topical solution   APPLY OVER NAIL AND SURROUNDING SKIN. APPLY DAILY OVER PREVIOUS COAT. AFTER SEVEN DAYS MAY REMOVE WITH ALCOHOL AND CONTINUE CYCLE      colestipoL (COLESTID) 1 gram Tab Take 1 tablet (1 g total) by mouth 2 (two) times daily. 180 tablet 3    diclofenac sodium (VOLTAREN) 1 % Gel Apply topically as needed.      gabapentin (NEURONTIN) 300 MG capsule Take 1 capsule (300 mg total) by mouth 3 (three) times daily. 270 capsule 1    hydroCHLOROthiazide (HYDRODIURIL) 25 MG tablet Take 1 tablet (25 mg total) by mouth once daily. 90 tablet 3    indomethacin (INDOCIN) 50 MG capsule indomethacin 50 mg capsule      insulin detemir U-100, Levemir, 100 unit/mL (3 mL) SubQ InPn pen Inject 58 Units into the skin 2 (two) times daily. 105 mL 1    insulin lispro (HUMALOG KWIKPEN INSULIN) 100 unit/mL pen Inject 10 Units into the skin 3 (three) times daily with meals. Plus correction scale. Max daily dose 50 units/day 15 mL 11    lisinopriL (PRINIVIL,ZESTRIL) 20 MG tablet Take 1 tablet (20 mg total) by mouth once daily. 90 tablet 1    metFORMIN (GLUCOPHAGE) 500 MG tablet Take 1 tablet (500 mg total) by mouth 2 (two) times daily with meals. 180 tablet 3    pantoprazole (PROTONIX) 20 MG tablet Take 1 tablet (20 mg total) by mouth once daily. 90 tablet 3    pen needle, diabetic (BD ULTRA-FINE MAR PEN NEEDLE) 32 gauge x 5/32" Ndle Use to " "inject insulin 5 times daily 400 each 11    prednisoLONE acetate (PRED FORTE) 1 % DrpS Place 1 drop into the right eye 2 (two) times a day.      semaglutide (OZEMPIC) 2 mg/dose (8 mg/3 mL) PnIj Inject 2 mg into the skin every 7 days. 9 mL 3    tretinoin (RETIN-A) 0.05 % cream Apply topically every evening. (Patient not taking: Reported on 4/24/2023) 20 g 2    triamcinolone acetonide 0.1% (KENALOG) 0.1 % ointment Apply topically 2 (two) times daily. 30 g 0     No current facility-administered medications for this visit.       Past Medical History:   Diagnosis Date    Diabetes mellitus     Hypertension     Personal history of colonic polyps 06/12/2017       Past Surgical History:   Procedure Laterality Date    HYSTERECTOMY      fibroids    LAPAROSCOPIC CHOLECYSTECTOMY      MYOMECTOMY      PANCREAS BIOPSY      WISDOM TOOTH EXTRACTION         Family History   Problem Relation Age of Onset    Stomach cancer Father          Review of Systems:  ROS:  Constitutional: no fever or chills  Eyes: no visual changes  ENT: no nasal congestion or sore throat  Respiratory: no cough or shortness of breath  Cardiovascular: no chest pain or palpitations  Gastrointestinal: no nausea or vomiting, tolerating diet  Genitourinary: no hematuria or dysuria  Integument/Breast: no rash or pruritis  Hematologic/Lymphatic: no easy bruising or lymphadenopathy  Musculoskeletal:  left foot pain  Neurological: no seizures or tremors  Behavioral/Psych: no auditory or visual hallucinations  Endocrine: no heat or cold intolerance      OBJECTIVE:     PHYSICAL EXAM:  Vital Signs (Most Recent)  There were no vitals filed for this visit.  Height: 5' 5" (165.1 cm) Weight: 109 kg (240 lb 4.8 oz),   Estimated body mass index is 39.99 kg/m² as calculated from the following:    Height as of this encounter: 5' 5" (1.651 m).    Weight as of this encounter: 109 kg (240 lb 4.8 oz).   General Appearance: Well nourished, well developed, in no acute distress.  HENT: " Normal cephalic, oropharynx pink and moist  Eyes: PERRLA bilaterally and EOM x 4  Respiratory: Even and unlabored  Skin: Warm and Dry.   Psychiatric: AAO x 4, Mood & affect are normal.    left  Foot/Ankle    General appearance: no acute distress, alert/oriented x3, appropriate mood and affect, looks stated age, and well nourished  The examination was performed out of splint/cast  Skin: normal  Swelling: none  Warmth: no warmth  Tenderness: diffuse  ROM: normal  Strength: normal  Gait: normal  Stability: stable to testing and Cotton test: negative  Crepitus: no  Neurological Exam: normal  Vascular Exam: normal and pulse present    Patient has bilateral flat feet.  There is a bunion on the right foot.      RADIOGRAPHS:  Left foot xray was obtained, findings show no acute fracture seen.  Radiology note she has degenerative joint disease of the midfoot as well as osteopenia.  All radiographs were personally reviewed by me.    ASSESSMENT/PLAN:       ICD-10-CM ICD-9-CM   1. Flat feet, bilateral  M21.41 734    M21.42    2. Plantar fasciitis of left foot  M72.2 728.71   3. Bunion of great toe of right foot  M21.611 727.1       Plan:  -Starla Wharton presents to clinic today with c/c left foot/ankle pain for the past several months.  Previously treated by podiatrist in the boot to no avail.  -X-ray as above.  I advised the patient we can either treat her back in the boot or she can obtain proper arch supports.  Patient reports that she does not want to go back into a boot but she is willing to obtain shoe inserts to see if this helps.  In the meantime she would like to see a physician regarding her flat feet, and the bunion for evaluation of a bunionectomy.    I will refer the patient to Dr. Clarke for further treatment and evaluation.

## 2023-05-17 ENCOUNTER — TELEPHONE (OUTPATIENT)
Dept: ORTHOPEDICS | Facility: CLINIC | Age: 58
End: 2023-05-17
Payer: MEDICARE

## 2023-05-17 NOTE — TELEPHONE ENCOUNTER
Spoke to pt and r/s appt to 5/23----- Message from Bam Johnson sent at 5/17/2023 11:54 AM CDT -----  Regarding: Appt  Contact: @[phone]  Patient is calling to reschedule an appt she had scheduled. The patient reschedule the appt to 06/02/2023. Patient would like a sooner appt but there's nothing available. Please call and advise patient @[phone]

## 2023-05-18 ENCOUNTER — ANESTHESIA (OUTPATIENT)
Dept: ENDOSCOPY | Facility: HOSPITAL | Age: 58
End: 2023-05-18
Payer: MEDICARE

## 2023-05-18 ENCOUNTER — HOSPITAL ENCOUNTER (OUTPATIENT)
Facility: HOSPITAL | Age: 58
Discharge: HOME | End: 2023-05-18
Attending: COLON & RECTAL SURGERY | Admitting: COLON & RECTAL SURGERY
Payer: MEDICARE

## 2023-05-18 ENCOUNTER — ANESTHESIA EVENT (OUTPATIENT)
Dept: ENDOSCOPY | Facility: HOSPITAL | Age: 58
End: 2023-05-18
Payer: MEDICARE

## 2023-05-18 VITALS
WEIGHT: 240 LBS | OXYGEN SATURATION: 100 % | TEMPERATURE: 98 F | BODY MASS INDEX: 38.57 KG/M2 | HEIGHT: 66 IN | SYSTOLIC BLOOD PRESSURE: 115 MMHG | RESPIRATION RATE: 16 BRPM | HEART RATE: 91 BPM | DIASTOLIC BLOOD PRESSURE: 55 MMHG

## 2023-05-18 DIAGNOSIS — Z12.11 SCREENING FOR MALIGNANT NEOPLASM OF COLON: Primary | ICD-10-CM

## 2023-05-18 LAB — POCT GLUCOSE: 152 MG/DL (ref 70–110)

## 2023-05-18 PROCEDURE — 27201012 HC FORCEPS, HOT/COLD, DISP: Performed by: COLON & RECTAL SURGERY

## 2023-05-18 PROCEDURE — E9220 PRA ENDO ANESTHESIA: HCPCS | Mod: PT,,, | Performed by: NURSE ANESTHETIST, CERTIFIED REGISTERED

## 2023-05-18 PROCEDURE — 88305 TISSUE EXAM BY PATHOLOGIST: ICD-10-PCS | Mod: 26,,, | Performed by: STUDENT IN AN ORGANIZED HEALTH CARE EDUCATION/TRAINING PROGRAM

## 2023-05-18 PROCEDURE — 45380 COLONOSCOPY AND BIOPSY: CPT | Mod: PT | Performed by: COLON & RECTAL SURGERY

## 2023-05-18 PROCEDURE — 37000009 HC ANESTHESIA EA ADD 15 MINS: Performed by: COLON & RECTAL SURGERY

## 2023-05-18 PROCEDURE — 88305 TISSUE EXAM BY PATHOLOGIST: CPT | Mod: 26,,, | Performed by: STUDENT IN AN ORGANIZED HEALTH CARE EDUCATION/TRAINING PROGRAM

## 2023-05-18 PROCEDURE — 25000003 PHARM REV CODE 250: Performed by: NURSE ANESTHETIST, CERTIFIED REGISTERED

## 2023-05-18 PROCEDURE — E9220 PRA ENDO ANESTHESIA: ICD-10-PCS | Mod: PT,,, | Performed by: NURSE ANESTHETIST, CERTIFIED REGISTERED

## 2023-05-18 PROCEDURE — 45380 COLONOSCOPY AND BIOPSY: CPT | Mod: PT,,, | Performed by: COLON & RECTAL SURGERY

## 2023-05-18 PROCEDURE — 45380 PR COLONOSCOPY,BIOPSY: ICD-10-PCS | Mod: PT,,, | Performed by: COLON & RECTAL SURGERY

## 2023-05-18 PROCEDURE — 37000008 HC ANESTHESIA 1ST 15 MINUTES: Performed by: COLON & RECTAL SURGERY

## 2023-05-18 PROCEDURE — 63600175 PHARM REV CODE 636 W HCPCS: Performed by: NURSE ANESTHETIST, CERTIFIED REGISTERED

## 2023-05-18 PROCEDURE — 88305 TISSUE EXAM BY PATHOLOGIST: CPT | Performed by: STUDENT IN AN ORGANIZED HEALTH CARE EDUCATION/TRAINING PROGRAM

## 2023-05-18 RX ORDER — PROPOFOL 10 MG/ML
VIAL (ML) INTRAVENOUS
Status: DISCONTINUED | OUTPATIENT
Start: 2023-05-18 | End: 2023-05-18

## 2023-05-18 RX ORDER — SODIUM CHLORIDE 9 MG/ML
INJECTION, SOLUTION INTRAVENOUS CONTINUOUS
Status: DISCONTINUED | OUTPATIENT
Start: 2023-05-18 | End: 2023-05-18 | Stop reason: HOSPADM

## 2023-05-18 RX ORDER — PROPOFOL 10 MG/ML
VIAL (ML) INTRAVENOUS CONTINUOUS PRN
Status: DISCONTINUED | OUTPATIENT
Start: 2023-05-18 | End: 2023-05-18

## 2023-05-18 RX ORDER — LIDOCAINE HYDROCHLORIDE 20 MG/ML
INJECTION INTRAVENOUS
Status: DISCONTINUED | OUTPATIENT
Start: 2023-05-18 | End: 2023-05-18

## 2023-05-18 RX ADMIN — LIDOCAINE HYDROCHLORIDE 30 MG: 20 INJECTION INTRAVENOUS at 11:05

## 2023-05-18 RX ADMIN — PROPOFOL 70 MG: 10 INJECTION, EMULSION INTRAVENOUS at 11:05

## 2023-05-18 RX ADMIN — SODIUM CHLORIDE: 9 INJECTION, SOLUTION INTRAVENOUS at 10:05

## 2023-05-18 RX ADMIN — Medication 150 MCG/KG/MIN: at 11:05

## 2023-05-18 NOTE — TRANSFER OF CARE
"Anesthesia Transfer of Care Note    Patient: Starla Wharton    Procedure(s) Performed: Procedure(s) (LRB):  COLONOSCOPY (N/A)    Patient location: PACU    Anesthesia Type: general    Transport from OR: Transported from OR on room air with adequate spontaneous ventilation    Post pain: adequate analgesia    Post assessment: no apparent anesthetic complications and tolerated procedure well    Post vital signs: stable    Level of consciousness: awake, alert and oriented    Nausea/Vomiting: no nausea/vomiting    Complications: none    Transfer of care protocol was followed      Last vitals:   Visit Vitals  BP 89/64(BP Location: Left arm, Patient Position: Lying)   Pulse 98   Temp 98.1   Resp 16   Ht 5' 6" (1.676 m)   Wt 108.9 kg (240 lb)   SpO2 100%   Breastfeeding No   BMI 38.74 kg/m²     "

## 2023-05-18 NOTE — ANESTHESIA POSTPROCEDURE EVALUATION
Anesthesia Post Evaluation    Patient: Starla Wharton    Procedure(s) Performed: Procedure(s) (LRB):  COLONOSCOPY (N/A)    Final Anesthesia Type: general      Patient location during evaluation: PACU  Patient participation: Yes- Able to Participate  Level of consciousness: awake and alert and oriented  Pain management: adequate  Airway patency: patent    PONV status at discharge: No PONV  Anesthetic complications: no      Cardiovascular status: blood pressure returned to baseline and hemodynamically stable  Respiratory status: unassisted  Hydration status: euvolemic  Follow-up not needed.          Vitals Value Taken Time   /55 05/18/23 1159   Temp 36.5 °C (97.7 °F) 05/18/23 1127   Pulse 91 05/18/23 1151   Resp 16 05/18/23 1151   SpO2 100 % 05/18/23 1151         Event Time   Out of Recovery 12:09:23         Pain/Cole Score: Cole Score: 10 (5/18/2023 11:38 AM)

## 2023-05-18 NOTE — ANESTHESIA PREPROCEDURE EVALUATION
05/18/2023  Starla Wharton is a 58 y.o., female.    Past Medical History:   Diagnosis Date    Diabetes mellitus     Hypertension     Personal history of colonic polyps 06/12/2017     Past Surgical History:   Procedure Laterality Date    HYSTERECTOMY      fibroids    LAPAROSCOPIC CHOLECYSTECTOMY      MYOMECTOMY      PANCREAS BIOPSY      WISDOM TOOTH EXTRACTION         Pre-op Assessment    I have reviewed the Patient Summary Reports.     I have reviewed the Nursing Notes. I have reviewed the NPO Status.   I have reviewed the Medications.     Review of Systems  Anesthesia Hx:  No problems with previous Anesthesia  Denies Family Hx of Anesthesia complications.   Denies Personal Hx of Anesthesia complications.   Hematology/Oncology:  Hematology Normal   Oncology Normal     EENT/Dental:EENT/Dental Normal   Cardiovascular:   Hypertension    Pulmonary:  Pulmonary Normal    Renal/:   Chronic Renal Disease    Hepatic/GI:   Bowel Prep.    Musculoskeletal:  Musculoskeletal Normal    Neurological:   Neuromuscular Disease,    Endocrine:   Diabetes BMI 40 Morbid Obesity / BMI > 40  Dermatological:  Skin Normal    Psych:  Psychiatric Normal           Physical Exam  General: Well nourished    Airway:  Mallampati: II   Mouth Opening: Normal  TM Distance: Normal  Tongue: Normal  Neck ROM: Normal ROM    Dental:  Intact        Anesthesia Plan  Type of Anesthesia, risks & benefits discussed:    Anesthesia Type: Gen Natural Airway  Intra-op Monitoring Plan: Standard ASA Monitors  Informed Consent: Informed consent signed with the Patient and all parties understand the risks and agree with anesthesia plan.  All questions answered.   ASA Score: 3  Day of Surgery Review of History & Physical: H&P Update referred to the surgeon/provider.I have interviewed and examined the patient. I have reviewed the patient's H&P dated:  There are no significant changes.     Ready For Surgery From Anesthesia Perspective.     .

## 2023-05-18 NOTE — ANESTHESIA PREPROCEDURE EVALUATION
05/18/2023  Starla Wharton is a 58 y.o., female.      Patient Name: Starla Wharton  YOB: 1965  MRN: [id number]  [facility]: [id number]      Code Status: Prior   Date of Procedure: 5/18/2023  Anesthesia: Choice Procedure: Procedure(s) (LRB):  COLONOSCOPY (N/A)  Pre-Operative Diagnosis: Special screening for malignant neoplasms, colon [Z12.11]  Proceduralist: Surgeon(s) and Role:     * Marian Schneider MD - Primary        SUBJECTIVE:   Starla Wharton is a 58 y.o. female who  has a past medical history of Diabetes mellitus, Hypertension, and Personal history of colonic polyps (06/12/2017). No notes on file    ALLERGIES:     Review of patient's allergies indicates:   Allergen Reactions    Naprosyn [naproxen]      MEDICATIONS:     Current Facility-Administered Medications   Medication Dose Route Frequency Provider Last Rate Last Admin    0.9%  NaCl infusion   Intravenous Continuous Marian Schneider MD              History:     Patient Active Problem List   Diagnosis    Pancreatic cyst    Essential hypertension    Type 2 diabetes mellitus with complication, with long-term current use of insulin    Hyperlipidemia    Cystoid macular degeneration of retina    Diabetic polyneuropathy associated with type 2 diabetes mellitus    Vitamin D deficiency    History of syphilis    Stage 3a chronic kidney disease    Anemia of chronic disease    Class 2 severe obesity with serious comorbidity and body mass index (BMI) of 39.0 to 39.9 in adult    Gastroparesis    Snoring    Plantar fasciitis    Environmental allergies     Surgical History:    has a past surgical history that includes Hysterectomy; Laparoscopic cholecystectomy; [place] tooth extraction; Pancreas Biopsy; and Myomectomy.   Social History:    has no history on file for sexual activity.  reports that she has never smoked. She has never used  smokeless tobacco. She reports that she does not drink alcohol and does not use drugs.     Pre-op Assessment    I have reviewed the Patient Summary Reports.     I have reviewed the Nursing Notes. I have reviewed the NPO Status.   I have reviewed the Medications.     Review of Systems  Anesthesia Hx:  No problems with previous Anesthesia  Denies Family Hx of Anesthesia complications.   Denies Personal Hx of Anesthesia complications.   Hematology/Oncology:  Hematology Normal        Cardiovascular:   Hypertension    Renal/:   Chronic Renal Disease    Hepatic/GI:   Bowel Prep.    Musculoskeletal:  Musculoskeletal Normal    Neurological:  Neurology Normal    Endocrine:   Diabetes    Dermatological:  Skin Normal        Physical Exam  General: Cooperative, Alert and Oriented    Airway:  Mallampati: II   Mouth Opening: Normal  TM Distance: Normal  Tongue: Normal  Neck ROM: Normal ROM    Dental:  Intact        Anesthesia Plan  Type of Anesthesia, risks & benefits discussed:    Anesthesia Type: Gen Natural Airway  Intra-op Monitoring Plan: Standard ASA Monitors  Induction:  IV  Informed Consent: Informed consent signed with the Patient and all parties understand the risks and agree with anesthesia plan.  All questions answered.   ASA Score: 3  Day of Surgery Review of History & Physical: H&P Update referred to the surgeon/provider.I have interviewed and examined the patient. I have reviewed the patient's H&P dated: There are no significant changes.     Ready For Surgery From Anesthesia Perspective.     .

## 2023-05-18 NOTE — [employer]ATION PATIENT INSTRUCTIONS
Discharge Summary/Instructions after an Endoscopic Procedure  Patient Name: Starla Wharton  Patient MRN: [id number]  Patient YOB: 1965  Thursday, May 18, 2023  Marian Schneider MD  Dear patient,  As a result of recent federal legislation (The Federal Cures Act), you may   receive lab or pathology results from your procedure in your [facility]   account before your physician is able to contact you. Your physician or   their representative will relay the results to you with their   recommendations at their soonest availability.  Thank you,  RESTRICTIONS:  During your procedure today, you received medications for sedation.  These   medications may affect your judgment, balance and coordination.  Therefore,   for 24 hours, you have the following restrictions:   - DO NOT drive a car, operate machinery, make legal/financial decisions,   sign important papers or drink alcohol.    ACTIVITY:  Today: no heavy lifting, straining or running due to procedural   sedation/anesthesia.  The following day: return to full activity including work.  DIET:  Eat and drink normally unless instructed otherwise.     TREATMENT FOR COMMON SIDE EFFECTS:  - Mild abdominal pain, nausea, belching, bloating or excessive gas:  rest,   eat lightly and use a heating pad.  - Sore Throat: treat with throat lozenges and/or gargle with warm salt   water.  - Because air was used during the procedure, expelling large amounts of air   from your rectum or belching is normal.  - If a bowel prep was taken, you may not have a bowel movement for 1-3 days.    This is normal.  SYMPTOMS TO WATCH FOR AND REPORT TO YOUR PHYSICIAN:  1. Abdominal pain or bloating, other than gas cramps.  2. Chest pain.  3. Back pain.  4. Signs of infection such as: chills or fever occurring within 24 hours   after the procedure.  5. Rectal bleeding, which would show as bright red, maroon, or black stools.   (A tablespoon of blood from the rectum is not serious, especially  if   hemorrhoids are present.)  6. Vomiting.  7. Weakness or dizziness.  GO DIRECTLY TO THE NEAREST EMERGENCY ROOM IF YOU HAVE ANY OF THE FOLLOWING:      Difficulty breathing              Chills and/or fever over 101 F   Persistent vomiting and/or vomiting blood   Severe abdominal pain   Severe chest pain   Black, tarry stools   Bleeding- more than one tablespoon   Any other symptom or condition that you feel may need urgent attention  Your doctor recommends these additional instructions:  If any biopsies were taken, your doctors clinic will contact you in 1 to 2   weeks with any results.  - Discharge patient to home.   - Resume previous diet.   - Continue present medications.   - Await pathology results.   - Repeat colonoscopy in 5 years for surveillance.   - Return to my office at appointment to be scheduled.   - Written discharge instructions were provided to the patient.   - The signs and symptoms of potential delayed complications were discussed   with the patient.   - Patient has a contact number available for emergencies.   - Return to normal activities tomorrow.  For questions, problems or results please call your physician - Marian Schneider MD at Work:  [phone].  [facility] [place], EMERGENCY ROOM PHONE NUMBER: [phone]  IF A COMPLICATION OR EMERGENCY SITUATION ARISES AND YOU ARE UNABLE TO REACH   YOUR PHYSICIAN - GO DIRECTLY TO THE EMERGENCY ROOM.  Marian Schneider MD  5/18/2023 11:22:44 AM  This report has been verified and signed electronically.  Dear patient,  As a result of recent federal legislation (The Federal Cures Act), you may   receive lab or pathology results from your procedure in [facility]   account before your physician is able to contact you. Your physician or   their representative will relay the results to you with their   recommendations at their soonest availability.  Thank you,  PROVATION

## 2023-05-23 ENCOUNTER — OFFICE VISIT (OUTPATIENT)
Dept: ORTHOPEDICS | Facility: CLINIC | Age: 58
End: 2023-05-23
Payer: MEDICARE

## 2023-05-23 ENCOUNTER — TELEPHONE (OUTPATIENT)
Dept: ORTHOPEDICS | Facility: CLINIC | Age: 58
End: 2023-05-23

## 2023-05-23 ENCOUNTER — HOSPITAL ENCOUNTER (OUTPATIENT)
Dept: RADIOLOGY | Facility: HOSPITAL | Age: 58
Discharge: HOME | End: 2023-05-23
Attending: ORTHOPAEDIC SURGERY
Payer: MEDICARE

## 2023-05-23 VITALS — HEIGHT: 66 IN | WEIGHT: 240.06 LBS | BODY MASS INDEX: 38.58 KG/M2

## 2023-05-23 DIAGNOSIS — M20.21 HALLUX RIGIDUS, RIGHT FOOT: Primary | ICD-10-CM

## 2023-05-23 DIAGNOSIS — M21.611 BUNION OF GREAT TOE OF RIGHT FOOT: ICD-10-CM

## 2023-05-23 DIAGNOSIS — E11.42 DIABETIC POLYNEUROPATHY ASSOCIATED WITH TYPE 2 DIABETES MELLITUS: ICD-10-CM

## 2023-05-23 DIAGNOSIS — M19.072 ARTHRITIS OF LEFT FOOT: ICD-10-CM

## 2023-05-23 PROCEDURE — 4010F ACE/ARB THERAPY RXD/TAKEN: CPT | Mod: CPTII,S$GLB,, | Performed by: ORTHOPAEDIC SURGERY

## 2023-05-23 PROCEDURE — 3008F PR BODY MASS INDEX (BMI) DOCUMENTED: ICD-10-PCS | Mod: CPTII,S$GLB,, | Performed by: ORTHOPAEDIC SURGERY

## 2023-05-23 PROCEDURE — 99999 PR PBB SHADOW E&M-EST. PATIENT-LVL IV: ICD-10-PCS | Mod: PBBFAC,,, | Performed by: ORTHOPAEDIC SURGERY

## 2023-05-23 PROCEDURE — 99999 PR PBB SHADOW E&M-EST. PATIENT-LVL IV: CPT | Mod: PBBFAC,,, | Performed by: ORTHOPAEDIC SURGERY

## 2023-05-23 PROCEDURE — 73630 XR FOOT COMPLETE 3 VIEW RIGHT: ICD-10-PCS | Mod: 26,RT,, | Performed by: RADIOLOGY

## 2023-05-23 PROCEDURE — 3052F PR MOST RECENT HEMOGLOBIN A1C LEVEL 8.0 - < 9.0%: ICD-10-PCS | Mod: CPTII,S$GLB,, | Performed by: ORTHOPAEDIC SURGERY

## 2023-05-23 PROCEDURE — 99214 OFFICE O/P EST MOD 30 MIN: CPT | Mod: S$GLB,,, | Performed by: ORTHOPAEDIC SURGERY

## 2023-05-23 PROCEDURE — 73630 X-RAY EXAM OF FOOT: CPT | Mod: 26,RT,, | Performed by: RADIOLOGY

## 2023-05-23 PROCEDURE — 1159F PR MEDICATION LIST DOCUMENTED IN MEDICAL RECORD: ICD-10-PCS | Mod: CPTII,S$GLB,, | Performed by: ORTHOPAEDIC SURGERY

## 2023-05-23 PROCEDURE — 3008F BODY MASS INDEX DOCD: CPT | Mod: CPTII,S$GLB,, | Performed by: ORTHOPAEDIC SURGERY

## 2023-05-23 PROCEDURE — 99214 PR OFFICE/OUTPT VISIT, EST, LEVL IV, 30-39 MIN: ICD-10-PCS | Mod: S$GLB,,, | Performed by: ORTHOPAEDIC SURGERY

## 2023-05-23 PROCEDURE — 4010F PR ACE/ARB THEARPY RXD/TAKEN: ICD-10-PCS | Mod: CPTII,S$GLB,, | Performed by: ORTHOPAEDIC SURGERY

## 2023-05-23 PROCEDURE — 1159F MED LIST DOCD IN RCRD: CPT | Mod: CPTII,S$GLB,, | Performed by: ORTHOPAEDIC SURGERY

## 2023-05-23 PROCEDURE — 73630 X-RAY EXAM OF FOOT: CPT | Mod: TC,RT

## 2023-05-23 PROCEDURE — 3052F HG A1C>EQUAL 8.0%<EQUAL 9.0%: CPT | Mod: CPTII,S$GLB,, | Performed by: ORTHOPAEDIC SURGERY

## 2023-05-23 NOTE — PROGRESS NOTES
Subjective:      Patient ID: Starla Wharton is a 58 y.o. female.    Chief Complaint: Pain of the Left Foot and Pain of the Right Foot      HPI:  This is a 58-year-old female with insulin-dependent diabetes and neuropathy who presents with complaints of both feet.  She reports bilateral flat feet with pain of the left midfoot as well as plantar left foot pain that was diagnosis plantar fasciitis and also with pain due to a prominence about her right big toe MTP joint.  She states she is had previous surgery on her right big toe.  She reports pain mainly with weight-bearing activities in the left side.  She has been wearing some over the counter orthotics.  On the right she reports pain due to the prominence on the dorsum of her right big toe MTP joint with closed shoes.    Past Medical History:   Diagnosis Date    Diabetes mellitus     Hypertension     Personal history of colonic polyps 06/12/2017       Current Outpatient Medications:     acetaminophen-codeine 300-30mg (TYLENOL #3) 300-30 mg Tab, acetaminophen 300 mg-codeine 30 mg tablet  TAKE 1 TABLET BY MOUTH EVERY 6 HOURS AS NEEDED FOR PAIN, Disp: , Rfl:     aspirin 81 MG Chew, Take 81 mg by mouth once daily., Disp: , Rfl:     atorvastatin (LIPITOR) 40 MG tablet, Take 1 tablet (40 mg total) by mouth once daily., Disp: 90 tablet, Rfl: 3    azelastine (ASTELIN) 137 mcg (0.1 %) nasal spray, 2 sprays (274 mcg total) by Nasal route 2 (two) times daily., Disp: 30 mL, Rfl: 2    benzonatate (TESSALON) 200 MG capsule, Take 1 capsule (200 mg total) by mouth 3 (three) times daily as needed for Cough., Disp: 40 capsule, Rfl: 0    blood-glucose sensor (DEXCOM G6 SENSOR) Kenzie, Use daily for continuous glucose monitoring, change sensor every 10 days., Disp: 9 each, Rfl: 3    blood-glucose transmitter (DEXCOM G6 TRANSMITTER) Kenzie, Use with Dexcom sensor, change every 90 days., Disp: 1 each, Rfl: 3    bromfenac (PROLENSA) 0.07 % Drop, Apply 1 drop to eye once daily at 6am., Disp:  ", Rfl:     bromfenac (PROLENSA) 0.07 % Drop, Apply one drop daily to both eyes as directed, Disp: 3 mL, Rfl: 3    ciclopirox (PENLAC) 8 % Soln, ciclopirox 8 % topical solution  APPLY OVER NAIL AND SURROUNDING SKIN. APPLY DAILY OVER PREVIOUS COAT. AFTER SEVEN DAYS MAY REMOVE WITH ALCOHOL AND CONTINUE CYCLE, Disp: , Rfl:     colestipoL (COLESTID) 1 gram Tab, Take 1 tablet (1 g total) by mouth 2 (two) times daily., Disp: 180 tablet, Rfl: 3    diclofenac sodium (VOLTAREN) 1 % Gel, Apply topically as needed., Disp: , Rfl:     gabapentin (NEURONTIN) 300 MG capsule, Take 1 capsule (300 mg total) by mouth 3 (three) times daily., Disp: 270 capsule, Rfl: 1    hydroCHLOROthiazide (HYDRODIURIL) 25 MG tablet, Take 1 tablet (25 mg total) by mouth once daily., Disp: 90 tablet, Rfl: 3    indomethacin (INDOCIN) 50 MG capsule, indomethacin 50 mg capsule, Disp: , Rfl:     insulin detemir U-100, Levemir, 100 unit/mL (3 mL) SubQ InPn pen, Inject 58 Units into the skin 2 (two) times daily. (Patient taking differently: Inject 55 Units into the skin 2 (two) times daily.), Disp: 105 mL, Rfl: 1    insulin lispro (HUMALOG KWIKPEN INSULIN) 100 unit/mL pen, Inject 10 Units into the skin 3 (three) times daily with meals. Plus correction scale. Max daily dose 50 units/day, Disp: 15 mL, Rfl: 11    lisinopriL (PRINIVIL,ZESTRIL) 20 MG tablet, Take 1 tablet (20 mg total) by mouth once daily., Disp: 90 tablet, Rfl: 1    metFORMIN (GLUCOPHAGE) 500 MG tablet, Take 1 tablet (500 mg total) by mouth 2 (two) times daily with meals., Disp: 180 tablet, Rfl: 3    pantoprazole (PROTONIX) 20 MG tablet, Take 1 tablet (20 mg total) by mouth once daily., Disp: 90 tablet, Rfl: 3    pen needle, diabetic (BD ULTRA-FINE MAR PEN NEEDLE) 32 gauge x 5/32" Ndle, Use to inject insulin 5 times daily, Disp: 400 each, Rfl: 11    prednisoLONE acetate (PRED FORTE) 1 % DrpS, Place 1 drop into the right eye 2 (two) times a day., Disp: , Rfl:     semaglutide (OZEMPIC) 2 mg/dose (8 " "mg/3 mL) PnIj, Inject 2 mg into the skin every 7 days., Disp: 9 mL, Rfl: 3    tretinoin (RETIN-A) 0.05 % cream, Apply topically every evening., Disp: 20 g, Rfl: 2    triamcinolone acetonide 0.1% (KENALOG) 0.1 % ointment, Apply topically 2 (two) times daily., Disp: 30 g, Rfl: 0    blood-glucose meter,continuous (DEXCOM G6 ) Misc, 1 Device by Misc.(Non-Drug; Combo Route) route once. for 1 dose, Disp: 1 each, Rfl: 0  Review of patient's allergies indicates:   Allergen Reactions    Naprosyn [naproxen]        Ht 5' 6" (1.676 m)   Wt 108.9 kg (240 lb 1.3 oz)   BMI 38.75 kg/m²     ROS:  Negative for chest pain, shortness of breath, fevers, or unexplained weight loss      Objective:    Ortho Exam       This is a well-developed well-nourished female who walks in with a slight antalgic gait.  On standing inspection she has bilateral pes planus alignment of her feet worse on the left than on the right.  She has a noticeable prominence on the dorsum of her right big toe MTP joint.  She has normally aligned 1st ray with mild hammering of her lesser toes She has difficulty doing a double limb heel rise on both feet.  On sitting exam on the left side she has functional motion of her ankle and subtalar joint.  She has she has tenderness about the midfoot on the left and some mild tenderness in the heels bilaterally.  On the right side she also has functional motion of her ankle and subtalar joint with some mild midfoot tenderness.  She has significant bony prominence and decreased dorsiflexion of the right big toe MTP joint.  She has palpable distal pulses bilaterally and has protective sensation.      Assessment:     Imaging:  I reviewed previous left foot x-ray and I ordered reviewed new right foot x-ray.  The left foot x-ray reveals significant collapse of the longitudinal arch with midfoot arthritis.  The right foot x-ray also reveals significant collapse of the longitudinal arch with midfoot arthritic changes and " severe degenerative arthritis of the MTP joint with significant dorsal osteophyte formation.        1. Hallux rigidus, right foot    2. Arthritis of left foot, midfoot    3. Diabetic polyneuropathy associated with type 2 diabetes mellitus          Plan:       Orders Placed This Encounter    HME - OTHER    X-Ray Foot Complete Right     Recommendation:  She would like to have the right big toe addressed surgically and I would recommend performing cheilectomy.  Although she has significant degenerative changes on x-ray her pain seems to be mainly due to the bony prominences dorsally.  With regards to her bilateral flat feet and midfoot arthritis I would recommend custom orthotics.  She is going to proceed with a cheilectomy of the right big toe and will return for preoperative H&P consent.

## 2023-05-23 NOTE — TELEPHONE ENCOUNTER
Attempted to contact pt, no answer. Lvm         ----- Message from Radha Sharif sent at 5/23/2023  2:25 PM CDT -----  Regarding: pt advice  Contact: [phone]  Pt stated provider faxed paper to [employer] to get insert for shoes. Pt stated they told her provider needs to fax it to [facility] then [facility] will fax them. And they told her doctor has to put in 6 units on there. Pls call to discuss.

## 2023-05-24 ENCOUNTER — CLINICAL SUPPORT (OUTPATIENT)
Dept: DIABETES | Facility: CLINIC | Age: 58
End: 2023-05-24
Payer: MEDICARE

## 2023-05-24 ENCOUNTER — TELEPHONE (OUTPATIENT)
Dept: INTERNAL MEDICINE | Facility: CLINIC | Age: 58
End: 2023-05-24
Payer: MEDICARE

## 2023-05-24 DIAGNOSIS — Z79.4 TYPE 2 DIABETES MELLITUS WITH COMPLICATION, WITH LONG-TERM CURRENT USE OF INSULIN: Primary | ICD-10-CM

## 2023-05-24 DIAGNOSIS — E11.8 TYPE 2 DIABETES MELLITUS WITH COMPLICATION, WITH LONG-TERM CURRENT USE OF INSULIN: Primary | ICD-10-CM

## 2023-05-24 LAB
FINAL PATHOLOGIC DIAGNOSIS: NORMAL
GROSS: NORMAL
Lab: NORMAL

## 2023-05-24 PROCEDURE — G0108 DIAB MANAGE TRN  PER INDIV: HCPCS | Mod: S$GLB,,, | Performed by: FAMILY MEDICINE

## 2023-05-24 PROCEDURE — G0108 PR DIAB MANAGE TRN  PER INDIV: ICD-10-PCS | Mod: S$GLB,,, | Performed by: FAMILY MEDICINE

## 2023-05-24 NOTE — TELEPHONE ENCOUNTER
----- Message from Lara Mays sent at 5/24/2023  2:34 PM CDT -----  Contact: Self/[phone]  Pt said that she was speaking with Prudence and the call was disconnected . Please advise

## 2023-05-24 NOTE — TELEPHONE ENCOUNTER
----- Message from Chantal Shrestha RD, CDE sent at 5/24/2023 11:11 AM CDT -----  Hi!    This patient has been seeing Dr. Molina. Pt is scheduled with Jaja for 8/29 BUT she said she was not contacted and she won't be able to keep that appt. Can you please assist with getting her scheduled with Prudence? Pt is interested in Omnipod. I think she would be a good candidate for insulin pump, although her current insulin needs suggest she would need to change the pod every 2 days.     Many thanks!  Chantal

## 2023-05-24 NOTE — TELEPHONE ENCOUNTER
Spoke to pt. Pt does not want to schedule an appt with Ms Ramirez until she can speak to Ms Ramirez directly to ask her some questions regarding her qualifications and her knowledge of endocrinology.

## 2023-05-30 ENCOUNTER — TELEPHONE (OUTPATIENT)
Dept: ORTHOPEDICS | Facility: CLINIC | Age: 58
End: 2023-05-30
Payer: MEDICARE

## 2023-05-30 NOTE — TELEPHONE ENCOUNTER
Attempted to contact Argelia ho/[facility] no answer. Lvm           ----- Message from Edis Arauz sent at 5/30/2023 10:44 AM CDT -----  Contact: Argelia  Type:  Patient Call          Who Called: [facility] - Argelia         Does the patient know what this is regarding?: requesting a yanni back to discuss the order that was sent over ; please advise           Best Call Back Number:[phone]            Additional Information:

## 2023-06-12 ENCOUNTER — PATIENT OUTREACH (OUTPATIENT)
Dept: ADMINISTRATIVE | Facility: HOSPITAL | Age: 58
End: 2023-06-12
Payer: MEDICARE

## 2023-06-12 NOTE — PROGRESS NOTES
[facility] [place] a1c gap report. Ms. Pollock is scheduled to complete a1c on 6/26/2023

## 2023-06-15 ENCOUNTER — OFFICE VISIT (OUTPATIENT)
Dept: INTERNAL MEDICINE | Facility: CLINIC | Age: 58
End: 2023-06-15
Payer: MEDICARE

## 2023-06-15 VITALS
WEIGHT: 244.5 LBS | DIASTOLIC BLOOD PRESSURE: 76 MMHG | SYSTOLIC BLOOD PRESSURE: 128 MMHG | HEIGHT: 66 IN | HEART RATE: 86 BPM | BODY MASS INDEX: 39.29 KG/M2 | OXYGEN SATURATION: 98 %

## 2023-06-15 DIAGNOSIS — E11.8 TYPE 2 DIABETES MELLITUS WITH COMPLICATION, WITH LONG-TERM CURRENT USE OF INSULIN: Primary | ICD-10-CM

## 2023-06-15 DIAGNOSIS — E78.2 MIXED HYPERLIPIDEMIA: ICD-10-CM

## 2023-06-15 DIAGNOSIS — E11.65 TYPE 2 DIABETES MELLITUS WITH HYPERGLYCEMIA, WITH LONG-TERM CURRENT USE OF INSULIN: ICD-10-CM

## 2023-06-15 DIAGNOSIS — K86.2 PANCREATIC CYST: ICD-10-CM

## 2023-06-15 DIAGNOSIS — E66.01 CLASS 2 SEVERE OBESITY WITH SERIOUS COMORBIDITY AND BODY MASS INDEX (BMI) OF 39.0 TO 39.9 IN ADULT, UNSPECIFIED OBESITY TYPE: ICD-10-CM

## 2023-06-15 DIAGNOSIS — Z79.4 TYPE 2 DIABETES MELLITUS WITH HYPERGLYCEMIA, WITH LONG-TERM CURRENT USE OF INSULIN: ICD-10-CM

## 2023-06-15 DIAGNOSIS — Z79.4 TYPE 2 DIABETES MELLITUS WITH COMPLICATION, WITH LONG-TERM CURRENT USE OF INSULIN: Primary | ICD-10-CM

## 2023-06-15 DIAGNOSIS — E11.42 DIABETIC POLYNEUROPATHY ASSOCIATED WITH TYPE 2 DIABETES MELLITUS: ICD-10-CM

## 2023-06-15 DIAGNOSIS — N18.31 STAGE 3A CHRONIC KIDNEY DISEASE: ICD-10-CM

## 2023-06-15 DIAGNOSIS — I10 ESSENTIAL HYPERTENSION: ICD-10-CM

## 2023-06-15 PROCEDURE — 3008F BODY MASS INDEX DOCD: CPT | Mod: CPTII,S$GLB,, | Performed by: NURSE PRACTITIONER

## 2023-06-15 PROCEDURE — 3052F HG A1C>EQUAL 8.0%<EQUAL 9.0%: CPT | Mod: CPTII,S$GLB,, | Performed by: NURSE PRACTITIONER

## 2023-06-15 PROCEDURE — 1160F RVW MEDS BY RX/DR IN RCRD: CPT | Mod: CPTII,S$GLB,, | Performed by: NURSE PRACTITIONER

## 2023-06-15 PROCEDURE — 99215 PR OFFICE/OUTPT VISIT, EST, LEVL V, 40-54 MIN: ICD-10-PCS | Mod: S$GLB,,, | Performed by: NURSE PRACTITIONER

## 2023-06-15 PROCEDURE — 1159F MED LIST DOCD IN RCRD: CPT | Mod: CPTII,S$GLB,, | Performed by: NURSE PRACTITIONER

## 2023-06-15 PROCEDURE — 1159F PR MEDICATION LIST DOCUMENTED IN MEDICAL RECORD: ICD-10-PCS | Mod: CPTII,S$GLB,, | Performed by: NURSE PRACTITIONER

## 2023-06-15 PROCEDURE — 99999 PR PBB SHADOW E&M-EST. PATIENT-LVL IV: ICD-10-PCS | Mod: PBBFAC,,, | Performed by: NURSE PRACTITIONER

## 2023-06-15 PROCEDURE — 4010F ACE/ARB THERAPY RXD/TAKEN: CPT | Mod: CPTII,S$GLB,, | Performed by: NURSE PRACTITIONER

## 2023-06-15 PROCEDURE — 99215 OFFICE O/P EST HI 40 MIN: CPT | Mod: S$GLB,,, | Performed by: NURSE PRACTITIONER

## 2023-06-15 PROCEDURE — 3078F DIAST BP <80 MM HG: CPT | Mod: CPTII,S$GLB,, | Performed by: NURSE PRACTITIONER

## 2023-06-15 PROCEDURE — 1160F PR REVIEW ALL MEDS BY PRESCRIBER/CLIN PHARMACIST DOCUMENTED: ICD-10-PCS | Mod: CPTII,S$GLB,, | Performed by: NURSE PRACTITIONER

## 2023-06-15 PROCEDURE — 99999 PR PBB SHADOW E&M-EST. PATIENT-LVL IV: CPT | Mod: PBBFAC,,, | Performed by: NURSE PRACTITIONER

## 2023-06-15 PROCEDURE — 3078F PR MOST RECENT DIASTOLIC BLOOD PRESSURE < 80 MM HG: ICD-10-PCS | Mod: CPTII,S$GLB,, | Performed by: NURSE PRACTITIONER

## 2023-06-15 PROCEDURE — 3074F PR MOST RECENT SYSTOLIC BLOOD PRESSURE < 130 MM HG: ICD-10-PCS | Mod: CPTII,S$GLB,, | Performed by: NURSE PRACTITIONER

## 2023-06-15 PROCEDURE — 3074F SYST BP LT 130 MM HG: CPT | Mod: CPTII,S$GLB,, | Performed by: NURSE PRACTITIONER

## 2023-06-15 PROCEDURE — 4010F PR ACE/ARB THEARPY RXD/TAKEN: ICD-10-PCS | Mod: CPTII,S$GLB,, | Performed by: NURSE PRACTITIONER

## 2023-06-15 PROCEDURE — 3008F PR BODY MASS INDEX (BMI) DOCUMENTED: ICD-10-PCS | Mod: CPTII,S$GLB,, | Performed by: NURSE PRACTITIONER

## 2023-06-15 PROCEDURE — 3052F PR MOST RECENT HEMOGLOBIN A1C LEVEL 8.0 - < 9.0%: ICD-10-PCS | Mod: CPTII,S$GLB,, | Performed by: NURSE PRACTITIONER

## 2023-06-15 RX ORDER — PEN NEEDLE, DIABETIC 30 GX3/16"
NEEDLE, DISPOSABLE MISCELLANEOUS
Qty: 400 EACH | Refills: 3
Start: 2023-06-15

## 2023-06-15 RX ORDER — INSULIN LISPRO 100 [IU]/ML
INJECTION, SOLUTION INTRAVENOUS; SUBCUTANEOUS
Qty: 15 ML | Refills: 11
Start: 2023-06-15 | End: 2023-06-15

## 2023-06-15 RX ORDER — INSULIN LISPRO 100 [IU]/ML
INJECTION, SOLUTION INTRAVENOUS; SUBCUTANEOUS
Qty: 15 ML | Refills: 11
Start: 2023-06-15 | End: 2023-06-19 | Stop reason: SDUPTHER

## 2023-06-15 NOTE — PATIENT INSTRUCTIONS
Follow up in 4 months w/Irielle   A1c urine mac prior (1-7 days before appt)    Lab Results   Component Value Date    HGBA1C 8.2 (H) 03/01/2023     Goal less than 7%    Ozempic 2 mg weekly   Metformin 500 mg twice a day   Humalog 10 units before meals   Scale 150-200+1, 201-250+2, etc.  Levemir 57 units twice a day     [email or web address]  Eat fit imelda  Veenomepal imelda  [email or web address]      Goal  no higher than 200

## 2023-06-15 NOTE — PROGRESS NOTES
CHIEF COMPLAINT: Type 2 Diabetes     HPI: Ms. Starla Wharton is a 58 y.o. female who was diagnosed with Type 2 DM > 20 years ago.  On insulin x 15 years ago.   Being seen by me for the first time.   +PN, nephropathy , gastroparesis  F/u with podiatry, ortho    Avoiding red meats  Dealt with incontinence, allergic reaction in April 2023, gi med.  Eating more veggies  Not a carb counter.    Last a1c 12.4% to 8.2%   More discipline  More energy  Dexcom helps a lot    H/o BG 39 mg/dl   Needs cgm  Last seen by Dr. Molian in 3/2023  Gradually has gone up with humalog from 4 units to 10 units in the past 4 months  See media  5/2023   Avg 183 sd 49 TIR 44%  Lab Results   Component Value Date    HGBA1C 8.2 (H) 03/01/2023     Has clarity with Chantal Shrestha, see media.     PREVIOUS DIABETES MEDICATIONS TRIED  Levemir  Humalog  Metformin   Ozempic     CURRENT DIABETIC MEDS: levemir 57 units bid, humalog 10 -10-10 units ac , metformin 500 mg bid, ozempic 2 mg weekly     On MDI (injections 5 x a day)   Makes frequent changes to his/her insulin regimen on the basis of blood glucose data  Testing 4 x a day  Patient is willing and able to use the device  Demonstrated an understanding of the technology and is motivated to use CGM  Patient expected to adhere to a comprehensive diabetes treatment plan and patient has adequate medical supervision  Has multiple impaired awareness of hypoglycemia (hypoglycemia unawareness)    Diabetes Management Status    Statin: Taking  ACE/ARB: Taking    Screening or Prevention Patient's value Goal Complete/Controlled?   HgA1C Testing and Control   Lab Results   Component Value Date    HGBA1C 8.2 (H) 03/01/2023      Annually/Less than 8% No   Lipid profile : 07/22/2022 Annually Yes   LDL control Lab Results   Component Value Date    LDLCALC 71.0 07/22/2022    Annually/Less than 100 mg/dl  Yes   Nephropathy screening Lab Results   Component Value Date    LABMICR 25.0 07/22/2022     Lab Results  "  Component Value Date    PROTEINUA Negative 03/04/2018    Annually Yes   Blood pressure BP Readings from Last 1 Encounters:   06/15/23 128/76    Less than 140/90 Yes   Dilated retinal exam : 06/05/2023 Annually Yes   Foot exam   Most Recent Foot Exam Date: Not Found Annually No     REVIEW OF SYSTEMS  General: no weakness, fatigue, + weight changes 1-4#  Eyes: no double or blurred vision, eye pain, or redness  Cardiovascular: no chest pain, palpitations, edema, or murmurs.   Respiratory: no cough or dyspnea.   GI: no heartburn, nausea, or changes in bowel patterns; good appetite.   Skin: no rashes, dryness, itching, or reactions at insulin injection sites.  Neuro: + numbness, tingling, tremors, or vertigo.   Endocrine: no polyuria, polydipsia, polyphagia, heat or cold intolerance.     Vital Signs  /76 (BP Location: Left arm, Patient Position: Sitting, BP Method: Medium (Manual))   Pulse 86   Ht 5' 6" (1.676 m)   Wt 110.9 kg (244 lb 7.8 oz)   SpO2 98%   BMI 39.46 kg/m²     Hemoglobin A1C   Date Value Ref Range Status   03/01/2023 8.2 (H) 4.0 - 5.6 % Final     Comment:     ADA Screening Guidelines:  5.7-6.4%  Consistent with prediabetes  >or=6.5%  Consistent with diabetes    High levels of fetal hemoglobin interfere with the HbA1C  assay. Heterozygous hemoglobin variants (HbS, HgC, etc)do  not significantly interfere with this assay.   However, presence of multiple variants may affect accuracy.     01/23/2023 9.1 (H) 4.0 - 5.6 % Final     Comment:     ADA Screening Guidelines:  5.7-6.4%  Consistent with prediabetes  >or=6.5%  Consistent with diabetes    High levels of fetal hemoglobin interfere with the HbA1C  assay. Heterozygous hemoglobin variants (HbS, HgC, etc)do  not significantly interfere with this assay.   However, presence of multiple variants may affect accuracy.     10/21/2022 9.8 (H) 4.0 - 5.6 % Final     Comment:     ADA Screening Guidelines:  5.7-6.4%  Consistent with prediabetes  >or=6.5%  " Consistent with diabetes    High levels of fetal hemoglobin interfere with the HbA1C  assay. Heterozygous hemoglobin variants (HbS, HgC, etc)do  not significantly interfere with this assay.   However, presence of multiple variants may affect accuracy.          Chemistry        Component Value Date/Time     04/04/2023 1939    K 3.8 04/04/2023 1939     04/04/2023 1939    CO2 23 04/04/2023 1939    BUN 22 (H) 04/04/2023 1939    CREATININE 1.3 04/04/2023 1939     (H) 04/04/2023 1939        Component Value Date/Time    CALCIUM 9.0 04/04/2023 1939    ALKPHOS 107 01/23/2023 1529    AST 13 01/23/2023 1529    ALT 15 01/23/2023 1529    BILITOT 0.6 01/23/2023 1529    ESTGFRAFRICA 58 (A) 07/22/2022 1123    EGFRNONAA 50 (A) 07/22/2022 1123           Lab Results   Component Value Date    TSH 2.259 01/23/2023      Lab Results   Component Value Date    CHOL 139 07/22/2022    CHOL 181 03/05/2018     Lab Results   Component Value Date    HDL 51 07/22/2022    HDL 72 03/05/2018     Lab Results   Component Value Date    LDLCALC 71.0 07/22/2022    LDLCALC 101.0 03/05/2018     Lab Results   Component Value Date    TRIG 85 07/22/2022    TRIG 40 03/05/2018     Lab Results   Component Value Date    CHOLHDL 36.7 07/22/2022    CHOLHDL 39.8 03/05/2018         PHYSICAL EXAMINATION  Constitutional: Appears well, no distress. Reviewed vitals above.  Eyes: conjunctivae & lids intact; PERRLA, EOMs intact; optic discs   Neck: Supple, trachea midline.   Respiratory: CTA without wheezes, even and unlabored  Cardiovascular: RRR; no edema   Lymph: deferred   Skin: warm and dry; no injection site reactions, no acanthosis nigracans observed.  Neuro:patient alert and cooperative, normal affect; steady gait.  Psychiatric: judgement & insight intact, orientation of time, place & person intact, memory; mood & affect wnl     Diabetes Foot Exam:   Deferred -Dr. Blanca, [facility] [address]  6/14/23  Assessment/Plan    1. Type 2 diabetes  "mellitus with complication, with long-term current use of insulin  Hemoglobin A1C    insulin detemir U-100, Levemir, 100 unit/mL (3 mL) SubQ InPn pen      2. Stage 3a chronic kidney disease        3. Mixed hyperlipidemia        4. Pancreatic cyst        5. Essential hypertension        6. Diabetic polyneuropathy associated with type 2 diabetes mellitus        7. Class 2 severe obesity with serious comorbidity and body mass index (BMI) of 39.0 to 39.9 in adult, unspecified obesity type        8. Type 2 diabetes mellitus with hyperglycemia, with long-term current use of insulin  insulin lispro (HUMALOG KWIKPEN INSULIN) 100 unit/mL pen    pen needle, diabetic (BD ULTRA-FINE MAR PEN NEEDLE) 32 gauge x 5/32" Ndle        1-7. Follow up in 4 months w/ me   F/u with Chantal in July 2023   Dexcom supplies via [employer]  A1c prior (1-7 days before appt)  A1c goal less than 7%  Lab Results   Component Value Date    LDLCALC 71.0 07/22/2022   Near goal   Continue regimen above  Bp controlled  Body mass index is 39.46 kg/m².  May increase insulin resistance  F/u with podiatry     FOLLOW UP  In 4 months      Total time > 69 mins      "

## 2023-06-19 ENCOUNTER — OFFICE VISIT (OUTPATIENT)
Dept: SLEEP MEDICINE | Facility: CLINIC | Age: 58
End: 2023-06-19
Payer: MEDICARE

## 2023-06-19 VITALS
HEART RATE: 89 BPM | SYSTOLIC BLOOD PRESSURE: 126 MMHG | DIASTOLIC BLOOD PRESSURE: 57 MMHG | BODY MASS INDEX: 38.27 KG/M2 | WEIGHT: 238.13 LBS | HEIGHT: 66 IN

## 2023-06-19 DIAGNOSIS — Z79.4 TYPE 2 DIABETES MELLITUS WITH HYPERGLYCEMIA, WITH LONG-TERM CURRENT USE OF INSULIN: ICD-10-CM

## 2023-06-19 DIAGNOSIS — F51.09 OTHER INSOMNIA NOT DUE TO A SUBSTANCE OR KNOWN PHYSIOLOGICAL CONDITION: ICD-10-CM

## 2023-06-19 DIAGNOSIS — R06.83 SNORING: ICD-10-CM

## 2023-06-19 DIAGNOSIS — R53.83 FATIGUE, UNSPECIFIED TYPE: ICD-10-CM

## 2023-06-19 DIAGNOSIS — Z79.4 TYPE 2 DIABETES MELLITUS WITH COMPLICATION, WITH LONG-TERM CURRENT USE OF INSULIN: ICD-10-CM

## 2023-06-19 DIAGNOSIS — I10 HYPERTENSION, UNSPECIFIED TYPE: Primary | ICD-10-CM

## 2023-06-19 DIAGNOSIS — E11.8 TYPE 2 DIABETES MELLITUS WITH COMPLICATION, WITH LONG-TERM CURRENT USE OF INSULIN: ICD-10-CM

## 2023-06-19 DIAGNOSIS — E11.65 TYPE 2 DIABETES MELLITUS WITH HYPERGLYCEMIA, WITH LONG-TERM CURRENT USE OF INSULIN: ICD-10-CM

## 2023-06-19 PROCEDURE — 4010F PR ACE/ARB THEARPY RXD/TAKEN: ICD-10-PCS | Mod: CPTII,S$GLB,, | Performed by: INTERNAL MEDICINE

## 2023-06-19 PROCEDURE — 3078F DIAST BP <80 MM HG: CPT | Mod: CPTII,S$GLB,, | Performed by: INTERNAL MEDICINE

## 2023-06-19 PROCEDURE — 3074F SYST BP LT 130 MM HG: CPT | Mod: CPTII,S$GLB,, | Performed by: INTERNAL MEDICINE

## 2023-06-19 PROCEDURE — 3052F PR MOST RECENT HEMOGLOBIN A1C LEVEL 8.0 - < 9.0%: ICD-10-PCS | Mod: CPTII,S$GLB,, | Performed by: INTERNAL MEDICINE

## 2023-06-19 PROCEDURE — 99999 PR PBB SHADOW E&M-EST. PATIENT-LVL IV: ICD-10-PCS | Mod: PBBFAC,,, | Performed by: INTERNAL MEDICINE

## 2023-06-19 PROCEDURE — 99204 OFFICE O/P NEW MOD 45 MIN: CPT | Mod: S$GLB,,, | Performed by: INTERNAL MEDICINE

## 2023-06-19 PROCEDURE — 99204 PR OFFICE/OUTPT VISIT, NEW, LEVL IV, 45-59 MIN: ICD-10-PCS | Mod: S$GLB,,, | Performed by: INTERNAL MEDICINE

## 2023-06-19 PROCEDURE — 99999 PR PBB SHADOW E&M-EST. PATIENT-LVL IV: CPT | Mod: PBBFAC,,, | Performed by: INTERNAL MEDICINE

## 2023-06-19 PROCEDURE — 3008F PR BODY MASS INDEX (BMI) DOCUMENTED: ICD-10-PCS | Mod: CPTII,S$GLB,, | Performed by: INTERNAL MEDICINE

## 2023-06-19 PROCEDURE — 1159F PR MEDICATION LIST DOCUMENTED IN MEDICAL RECORD: ICD-10-PCS | Mod: CPTII,S$GLB,, | Performed by: INTERNAL MEDICINE

## 2023-06-19 PROCEDURE — 3074F PR MOST RECENT SYSTOLIC BLOOD PRESSURE < 130 MM HG: ICD-10-PCS | Mod: CPTII,S$GLB,, | Performed by: INTERNAL MEDICINE

## 2023-06-19 PROCEDURE — 3078F PR MOST RECENT DIASTOLIC BLOOD PRESSURE < 80 MM HG: ICD-10-PCS | Mod: CPTII,S$GLB,, | Performed by: INTERNAL MEDICINE

## 2023-06-19 PROCEDURE — 1159F MED LIST DOCD IN RCRD: CPT | Mod: CPTII,S$GLB,, | Performed by: INTERNAL MEDICINE

## 2023-06-19 PROCEDURE — 4010F ACE/ARB THERAPY RXD/TAKEN: CPT | Mod: CPTII,S$GLB,, | Performed by: INTERNAL MEDICINE

## 2023-06-19 PROCEDURE — 3052F HG A1C>EQUAL 8.0%<EQUAL 9.0%: CPT | Mod: CPTII,S$GLB,, | Performed by: INTERNAL MEDICINE

## 2023-06-19 PROCEDURE — 3008F BODY MASS INDEX DOCD: CPT | Mod: CPTII,S$GLB,, | Performed by: INTERNAL MEDICINE

## 2023-06-19 RX ORDER — INSULIN LISPRO 100 [IU]/ML
INJECTION, SOLUTION INTRAVENOUS; SUBCUTANEOUS
Qty: 15 ML | Refills: 11 | Status: SHIPPED | OUTPATIENT
Start: 2023-06-19 | End: 2023-10-16

## 2023-06-19 NOTE — TELEPHONE ENCOUNTER
----- Message from Jaz Brandon sent at 6/19/2023 11:14 AM CDT -----  Name of Who is Calling:JIA QUINTERO [[id number]]              What is the request in detail:Requesting a call back regarding medication.               Can the clinic reply by [facility]:              [facility]:[phone]

## 2023-06-19 NOTE — TELEPHONE ENCOUNTER
Care Due:                  Date            Visit Type   Department     Provider  --------------------------------------------------------------------------------                                EP -                              PRIMARY      [facility] INTERNAL  Last Visit: 04-      CARE ([facility])   PATY Perez                              EP -                              PRIMARY      [facility] INTERNAL  Next Visit: 06-      CARE ([facility])   PATY Perez                                                            Last  Test          Frequency    Reason                     Performed    Due Date  --------------------------------------------------------------------------------    HBA1C.......  6 months...  semaglutide..............  03- 08-    [facility] Embedded Care Due Messages. Reference number: [id number].   6/19/2023 1:15:50 PM CDT

## 2023-06-19 NOTE — PROGRESS NOTES
Referred by Meghan Perez MD     NEW PATIENT VISIT    Starla Wharton  is a pleasant 58 y.o. female  with PMH significant for DM, HTN, BMI 38,  who presents for evaluation for LIAM in light of snoring, snoring arousals, HTN, nocturia.    She reports having a sleep study at [facility] a few years ago, but did not receive the results    SLEEP SCHEDULE   Environment    Bed Time 10:30P   Sleep Latency 2-3 hours   Arousals once   Nocturia once   Back to sleep variable   Wake time 8-10A   Naps occasional   Work          Past Medical History:   Diagnosis Date    Diabetes mellitus     Hypertension     Personal history of colonic polyps 06/12/2017     Patient Active Problem List   Diagnosis    Pancreatic cyst    Essential hypertension    Type 2 diabetes mellitus with complication, with long-term current use of insulin    Hyperlipidemia    Cystoid macular degeneration of retina    Diabetic polyneuropathy associated with type 2 diabetes mellitus    Vitamin D deficiency    History of syphilis    Stage 3a chronic kidney disease    Anemia of chronic disease    Class 2 severe obesity with serious comorbidity and body mass index (BMI) of 39.0 to 39.9 in adult    Gastroparesis    Snoring    Plantar fasciitis    Environmental allergies       Current Outpatient Medications:     acetaminophen-codeine 300-30mg (TYLENOL #3) 300-30 mg Tab, acetaminophen 300 mg-codeine 30 mg tablet  TAKE 1 TABLET BY MOUTH EVERY 6 HOURS AS NEEDED FOR PAIN, Disp: , Rfl:     aspirin 81 MG Chew, Take 81 mg by mouth once daily., Disp: , Rfl:     atorvastatin (LIPITOR) 40 MG tablet, Take 1 tablet (40 mg total) by mouth once daily., Disp: 90 tablet, Rfl: 3    blood-glucose sensor (DEXCOM G6 SENSOR) Kenzie, Use daily for continuous glucose monitoring, change sensor every 10 days., Disp: 9 each, Rfl: 3    blood-glucose transmitter (DEXCOM G6 TRANSMITTER) Kenzie, Use with Dexcom sensor, change every 90 days., Disp: 1 each, Rfl: 3    bromfenac (PROLENSA) 0.07 % Drop,  "Apply 1 drop to eye once daily at 6am., Disp: , Rfl:     bromfenac (PROLENSA) 0.07 % Drop, Apply one drop daily to both eyes as directed, Disp: 3 mL, Rfl: 3    ciclopirox (PENLAC) 8 % Soln, ciclopirox 8 % topical solution  APPLY OVER NAIL AND SURROUNDING SKIN. APPLY DAILY OVER PREVIOUS COAT. AFTER SEVEN DAYS MAY REMOVE WITH ALCOHOL AND CONTINUE CYCLE, Disp: , Rfl:     colestipoL (COLESTID) 1 gram Tab, Take 1 tablet (1 g total) by mouth 2 (two) times daily., Disp: 180 tablet, Rfl: 3    diclofenac sodium (VOLTAREN) 1 % Gel, Apply topically as needed., Disp: , Rfl:     gabapentin (NEURONTIN) 300 MG capsule, Take 1 capsule (300 mg total) by mouth 3 (three) times daily., Disp: 270 capsule, Rfl: 1    hydroCHLOROthiazide (HYDRODIURIL) 25 MG tablet, Take 1 tablet (25 mg total) by mouth once daily., Disp: 90 tablet, Rfl: 3    indomethacin (INDOCIN) 50 MG capsule, indomethacin 50 mg capsule, Disp: , Rfl:     insulin detemir U-100, Levemir, 100 unit/mL (3 mL) SubQ InPn pen, Inject 57 Units into the skin 2 (two) times daily. Max daily 114 units, Disp: 105 mL, Rfl: 3    insulin lispro (HUMALOG KWIKPEN INSULIN) 100 unit/mL pen, Inject 10 units before meals plus scale 150-200+1, 201-250+2, 251-300+3, 301-350+4, >350+5. Max daily 45 units, Disp: 15 mL, Rfl: 11    lisinopriL (PRINIVIL,ZESTRIL) 20 MG tablet, Take 1 tablet (20 mg total) by mouth once daily., Disp: 90 tablet, Rfl: 1    metFORMIN (GLUCOPHAGE) 500 MG tablet, Take 1 tablet (500 mg total) by mouth 2 (two) times daily with meals., Disp: 180 tablet, Rfl: 3    pantoprazole (PROTONIX) 20 MG tablet, Take 1 tablet (20 mg total) by mouth once daily., Disp: 90 tablet, Rfl: 3    pen needle, diabetic (BD ULTRA-FINE MAR PEN NEEDLE) 32 gauge x 5/32" Ndle, Use to inject insulin 5 times daily, Disp: 400 each, Rfl: 3    prednisoLONE acetate (PRED FORTE) 1 % DrpS, Place 1 drop into the right eye 2 (two) times a day., Disp: , Rfl:     semaglutide (OZEMPIC) 2 mg/dose (8 mg/3 mL) PnIj, " "Inject 2 mg into the skin every 7 days., Disp: 9 mL, Rfl: 3    blood-glucose meter,continuous (DEXCOM G6 ) Misc, 1 Device by Misc.(Non-Drug; Combo Route) route once. for 1 dose, Disp: 1 each, Rfl: 0       Vitals:    06/19/23 1433   BP: (!) 126/57   BP Location: Left arm   Patient Position: Sitting   BP Method: Medium (Automatic)   Pulse: 89   Weight: 108 kg (238 lb 1.6 oz)   Height: 5' 6" (1.676 m)     Physical Exam:    GEN:   Well-appearing  Psych:  Appropriate affect, demonstrates insight  SKIN:  No rash on the face or bridge of the nose      LABS:   Lab Results   Component Value Date    HGB 11.5 (L) 04/04/2023    CO2 23 04/04/2023       RECORDS REVIEWED PREVIOUSLY:    Prior sleep studies are unavailable.    ASSESSMENT    No flowsheet data found.  PROBLEM DESCRIPTION/ Sx on Presentation  STATUS   possible LIAM   + snoring, rare snoring arousals, no witnessed apneas   Brother has LIAM    New   Daytime Sx   + occasional sleepiness when inactive   ESS 5/24 on intake  New   Insomnia     Trouble falling asleep: takes 2-3 hours  Maintenance:           Prior hypnotics:        Current hypnotics:     New   Nocturia   x once per sleep period  New   Other issues:     PLAN     -recommend sleep testing   -discussed trial therapy if LIAM present and the patient is  open to a trial of CPAP therapy    RTC          The patient was given open opportunity to ask questions and/or express concerns about treatment plan.   All questions/concerns were discussed.     Two patient identifiers used prior to evaluation.           "

## 2023-06-26 ENCOUNTER — LAB VISIT (OUTPATIENT)
Dept: LAB | Facility: OTHER | Age: 58
End: 2023-06-26
Attending: INTERNAL MEDICINE
Payer: MEDICARE

## 2023-06-26 ENCOUNTER — TELEPHONE (OUTPATIENT)
Dept: SLEEP MEDICINE | Facility: OTHER | Age: 58
End: 2023-06-26
Payer: MEDICARE

## 2023-06-26 DIAGNOSIS — E11.8 TYPE 2 DIABETES MELLITUS WITH COMPLICATION, WITH LONG-TERM CURRENT USE OF INSULIN: ICD-10-CM

## 2023-06-26 DIAGNOSIS — E78.2 MIXED HYPERLIPIDEMIA: ICD-10-CM

## 2023-06-26 DIAGNOSIS — I10 PRIMARY HYPERTENSION: ICD-10-CM

## 2023-06-26 DIAGNOSIS — Z00.00 HEALTH MAINTENANCE EXAMINATION: ICD-10-CM

## 2023-06-26 DIAGNOSIS — E11.65 TYPE 2 DIABETES MELLITUS WITH HYPERGLYCEMIA, WITH LONG-TERM CURRENT USE OF INSULIN: ICD-10-CM

## 2023-06-26 DIAGNOSIS — D64.9 ANEMIA, UNSPECIFIED TYPE: ICD-10-CM

## 2023-06-26 DIAGNOSIS — Z79.4 TYPE 2 DIABETES MELLITUS WITH HYPERGLYCEMIA, WITH LONG-TERM CURRENT USE OF INSULIN: ICD-10-CM

## 2023-06-26 DIAGNOSIS — Z79.4 TYPE 2 DIABETES MELLITUS WITH COMPLICATION, WITH LONG-TERM CURRENT USE OF INSULIN: ICD-10-CM

## 2023-06-26 DIAGNOSIS — E55.9 VITAMIN D DEFICIENCY: ICD-10-CM

## 2023-06-26 LAB
25(OH)D3+25(OH)D2 SERPL-MCNC: 35 NG/ML (ref 30–96)
ALBUMIN SERPL BCP-MCNC: 3.6 G/DL (ref 3.5–5.2)
ALBUMIN/CREAT UR: 4 UG/MG (ref 0–30)
ALP SERPL-CCNC: 103 U/L (ref 55–135)
ALT SERPL W/O P-5'-P-CCNC: 14 U/L (ref 10–44)
ANION GAP SERPL CALC-SCNC: 8 MMOL/L (ref 8–16)
ANION GAP SERPL CALC-SCNC: 8 MMOL/L (ref 8–16)
AST SERPL-CCNC: 12 U/L (ref 10–40)
BASOPHILS # BLD AUTO: 0.04 K/UL (ref 0–0.2)
BASOPHILS NFR BLD: 0.6 % (ref 0–1.9)
BILIRUB SERPL-MCNC: 0.4 MG/DL (ref 0.1–1)
BUN SERPL-MCNC: 30 MG/DL (ref 6–20)
BUN SERPL-MCNC: 30 MG/DL (ref 6–20)
CALCIUM SERPL-MCNC: 9.2 MG/DL (ref 8.7–10.5)
CALCIUM SERPL-MCNC: 9.2 MG/DL (ref 8.7–10.5)
CHLORIDE SERPL-SCNC: 106 MMOL/L (ref 95–110)
CHLORIDE SERPL-SCNC: 106 MMOL/L (ref 95–110)
CHOLEST SERPL-MCNC: 132 MG/DL (ref 120–199)
CHOLEST/HDLC SERPL: 3.2 {RATIO} (ref 2–5)
CO2 SERPL-SCNC: 26 MMOL/L (ref 23–29)
CO2 SERPL-SCNC: 26 MMOL/L (ref 23–29)
CREAT SERPL-MCNC: 1.2 MG/DL (ref 0.5–1.4)
CREAT SERPL-MCNC: 1.2 MG/DL (ref 0.5–1.4)
CREAT UR-MCNC: 150.4 MG/DL (ref 15–325)
DIFFERENTIAL METHOD: ABNORMAL
EOSINOPHIL # BLD AUTO: 0.1 K/UL (ref 0–0.5)
EOSINOPHIL NFR BLD: 2 % (ref 0–8)
ERYTHROCYTE [DISTWIDTH] IN BLOOD BY AUTOMATED COUNT: 15.2 % (ref 11.5–14.5)
EST. GFR  (NO RACE VARIABLE): 52 ML/MIN/1.73 M^2
EST. GFR  (NO RACE VARIABLE): 52 ML/MIN/1.73 M^2
ESTIMATED AVG GLUCOSE: 171 MG/DL (ref 68–131)
ESTIMATED AVG GLUCOSE: 171 MG/DL (ref 68–131)
GLUCOSE SERPL-MCNC: 139 MG/DL (ref 70–110)
GLUCOSE SERPL-MCNC: 139 MG/DL (ref 70–110)
HBA1C MFR BLD: 7.6 % (ref 4–5.6)
HBA1C MFR BLD: 7.6 % (ref 4–5.6)
HCT VFR BLD AUTO: 36.8 % (ref 37–48.5)
HDLC SERPL-MCNC: 41 MG/DL (ref 40–75)
HDLC SERPL: 31.1 % (ref 20–50)
HGB BLD-MCNC: 11.1 G/DL (ref 12–16)
IMM GRANULOCYTES # BLD AUTO: 0.01 K/UL (ref 0–0.04)
IMM GRANULOCYTES NFR BLD AUTO: 0.2 % (ref 0–0.5)
IRON SERPL-MCNC: 47 UG/DL (ref 30–160)
LDLC SERPL CALC-MCNC: 79.4 MG/DL (ref 63–159)
LYMPHOCYTES # BLD AUTO: 2.8 K/UL (ref 1–4.8)
LYMPHOCYTES NFR BLD: 43.7 % (ref 18–48)
MCH RBC QN AUTO: 24.7 PG (ref 27–31)
MCHC RBC AUTO-ENTMCNC: 30.2 G/DL (ref 32–36)
MCV RBC AUTO: 82 FL (ref 82–98)
MICROALBUMIN UR DL<=1MG/L-MCNC: 6 UG/ML
MONOCYTES # BLD AUTO: 0.4 K/UL (ref 0.3–1)
MONOCYTES NFR BLD: 5.8 % (ref 4–15)
NEUTROPHILS # BLD AUTO: 3.1 K/UL (ref 1.8–7.7)
NEUTROPHILS NFR BLD: 47.7 % (ref 38–73)
NONHDLC SERPL-MCNC: 91 MG/DL
NRBC BLD-RTO: 0 /100 WBC
PLATELET # BLD AUTO: 327 K/UL (ref 150–450)
PMV BLD AUTO: 9.2 FL (ref 9.2–12.9)
POTASSIUM SERPL-SCNC: 3.8 MMOL/L (ref 3.5–5.1)
POTASSIUM SERPL-SCNC: 3.8 MMOL/L (ref 3.5–5.1)
PROT SERPL-MCNC: 7.2 G/DL (ref 6–8.4)
RBC # BLD AUTO: 4.5 M/UL (ref 4–5.4)
SATURATED IRON: 14 % (ref 20–50)
SODIUM SERPL-SCNC: 140 MMOL/L (ref 136–145)
SODIUM SERPL-SCNC: 140 MMOL/L (ref 136–145)
TOTAL IRON BINDING CAPACITY: 329 UG/DL (ref 250–450)
TRANSFERRIN SERPL-MCNC: 222 MG/DL (ref 200–375)
TRIGL SERPL-MCNC: 58 MG/DL (ref 30–150)
WBC # BLD AUTO: 6.5 K/UL (ref 3.9–12.7)

## 2023-06-26 PROCEDURE — 82570 ASSAY OF URINE CREATININE: CPT | Performed by: STUDENT IN AN ORGANIZED HEALTH CARE EDUCATION/TRAINING PROGRAM

## 2023-06-26 PROCEDURE — 82728 ASSAY OF FERRITIN: CPT | Performed by: STUDENT IN AN ORGANIZED HEALTH CARE EDUCATION/TRAINING PROGRAM

## 2023-06-26 PROCEDURE — 84466 ASSAY OF TRANSFERRIN: CPT | Performed by: STUDENT IN AN ORGANIZED HEALTH CARE EDUCATION/TRAINING PROGRAM

## 2023-06-26 PROCEDURE — 80053 COMPREHEN METABOLIC PANEL: CPT | Performed by: STUDENT IN AN ORGANIZED HEALTH CARE EDUCATION/TRAINING PROGRAM

## 2023-06-26 PROCEDURE — 83036 HEMOGLOBIN GLYCOSYLATED A1C: CPT | Performed by: INTERNAL MEDICINE

## 2023-06-26 PROCEDURE — 82306 VITAMIN D 25 HYDROXY: CPT | Performed by: STUDENT IN AN ORGANIZED HEALTH CARE EDUCATION/TRAINING PROGRAM

## 2023-06-26 PROCEDURE — 85025 COMPLETE CBC W/AUTO DIFF WBC: CPT | Performed by: STUDENT IN AN ORGANIZED HEALTH CARE EDUCATION/TRAINING PROGRAM

## 2023-06-26 PROCEDURE — 80061 LIPID PANEL: CPT | Performed by: STUDENT IN AN ORGANIZED HEALTH CARE EDUCATION/TRAINING PROGRAM

## 2023-06-26 PROCEDURE — 36415 COLL VENOUS BLD VENIPUNCTURE: CPT | Performed by: STUDENT IN AN ORGANIZED HEALTH CARE EDUCATION/TRAINING PROGRAM

## 2023-06-27 LAB — FERRITIN SERPL-MCNC: 115 NG/ML (ref 20–300)

## 2023-06-28 DIAGNOSIS — M20.21 HALLUX RIGIDUS, RIGHT FOOT: Primary | ICD-10-CM

## 2023-06-28 RX ORDER — CEFAZOLIN SODIUM 2 G/50ML
2 SOLUTION INTRAVENOUS
Status: CANCELLED | OUTPATIENT
Start: 2023-06-28

## 2023-06-29 ENCOUNTER — OFFICE VISIT (OUTPATIENT)
Dept: INTERNAL MEDICINE | Facility: CLINIC | Age: 58
End: 2023-06-29
Payer: MEDICARE

## 2023-06-29 VITALS
BODY MASS INDEX: 38.84 KG/M2 | WEIGHT: 240.63 LBS | HEART RATE: 97 BPM | SYSTOLIC BLOOD PRESSURE: 124 MMHG | DIASTOLIC BLOOD PRESSURE: 60 MMHG | OXYGEN SATURATION: 99 %

## 2023-06-29 DIAGNOSIS — D64.9 ANEMIA, UNSPECIFIED TYPE: ICD-10-CM

## 2023-06-29 DIAGNOSIS — E55.9 VITAMIN D DEFICIENCY: ICD-10-CM

## 2023-06-29 DIAGNOSIS — E78.2 MIXED HYPERLIPIDEMIA: ICD-10-CM

## 2023-06-29 DIAGNOSIS — D53.9 NUTRITIONAL ANEMIA: ICD-10-CM

## 2023-06-29 DIAGNOSIS — E11.8 TYPE 2 DIABETES MELLITUS WITH COMPLICATION, WITH LONG-TERM CURRENT USE OF INSULIN: Primary | ICD-10-CM

## 2023-06-29 DIAGNOSIS — R06.83 SNORING: ICD-10-CM

## 2023-06-29 DIAGNOSIS — Z79.4 TYPE 2 DIABETES MELLITUS WITH COMPLICATION, WITH LONG-TERM CURRENT USE OF INSULIN: Primary | ICD-10-CM

## 2023-06-29 DIAGNOSIS — Z23 NEED FOR VACCINATION: ICD-10-CM

## 2023-06-29 DIAGNOSIS — Z12.31 SCREENING MAMMOGRAM FOR BREAST CANCER: ICD-10-CM

## 2023-06-29 DIAGNOSIS — I10 PRIMARY HYPERTENSION: ICD-10-CM

## 2023-06-29 DIAGNOSIS — R21 RASH: ICD-10-CM

## 2023-06-29 PROCEDURE — 99999 PR PBB SHADOW E&M-EST. PATIENT-LVL V: ICD-10-PCS | Mod: PBBFAC,,, | Performed by: STUDENT IN AN ORGANIZED HEALTH CARE EDUCATION/TRAINING PROGRAM

## 2023-06-29 PROCEDURE — 3066F NEPHROPATHY DOC TX: CPT | Mod: CPTII,S$GLB,, | Performed by: STUDENT IN AN ORGANIZED HEALTH CARE EDUCATION/TRAINING PROGRAM

## 2023-06-29 PROCEDURE — 3074F PR MOST RECENT SYSTOLIC BLOOD PRESSURE < 130 MM HG: ICD-10-PCS | Mod: CPTII,S$GLB,, | Performed by: STUDENT IN AN ORGANIZED HEALTH CARE EDUCATION/TRAINING PROGRAM

## 2023-06-29 PROCEDURE — 3061F NEG MICROALBUMINURIA REV: CPT | Mod: CPTII,S$GLB,, | Performed by: STUDENT IN AN ORGANIZED HEALTH CARE EDUCATION/TRAINING PROGRAM

## 2023-06-29 PROCEDURE — 3066F PR DOCUMENTATION OF TREATMENT FOR NEPHROPATHY: ICD-10-PCS | Mod: CPTII,S$GLB,, | Performed by: STUDENT IN AN ORGANIZED HEALTH CARE EDUCATION/TRAINING PROGRAM

## 2023-06-29 PROCEDURE — 3078F DIAST BP <80 MM HG: CPT | Mod: CPTII,S$GLB,, | Performed by: STUDENT IN AN ORGANIZED HEALTH CARE EDUCATION/TRAINING PROGRAM

## 2023-06-29 PROCEDURE — 99214 PR OFFICE/OUTPT VISIT, EST, LEVL IV, 30-39 MIN: ICD-10-PCS | Mod: S$GLB,,, | Performed by: STUDENT IN AN ORGANIZED HEALTH CARE EDUCATION/TRAINING PROGRAM

## 2023-06-29 PROCEDURE — 3061F PR NEG MICROALBUMINURIA RESULT DOCUMENTED/REVIEW: ICD-10-PCS | Mod: CPTII,S$GLB,, | Performed by: STUDENT IN AN ORGANIZED HEALTH CARE EDUCATION/TRAINING PROGRAM

## 2023-06-29 PROCEDURE — 4010F ACE/ARB THERAPY RXD/TAKEN: CPT | Mod: CPTII,S$GLB,, | Performed by: STUDENT IN AN ORGANIZED HEALTH CARE EDUCATION/TRAINING PROGRAM

## 2023-06-29 PROCEDURE — 3008F PR BODY MASS INDEX (BMI) DOCUMENTED: ICD-10-PCS | Mod: CPTII,S$GLB,, | Performed by: STUDENT IN AN ORGANIZED HEALTH CARE EDUCATION/TRAINING PROGRAM

## 2023-06-29 PROCEDURE — 4010F PR ACE/ARB THEARPY RXD/TAKEN: ICD-10-PCS | Mod: CPTII,S$GLB,, | Performed by: STUDENT IN AN ORGANIZED HEALTH CARE EDUCATION/TRAINING PROGRAM

## 2023-06-29 PROCEDURE — 99999 PR PBB SHADOW E&M-EST. PATIENT-LVL V: CPT | Mod: PBBFAC,,, | Performed by: STUDENT IN AN ORGANIZED HEALTH CARE EDUCATION/TRAINING PROGRAM

## 2023-06-29 PROCEDURE — 3008F BODY MASS INDEX DOCD: CPT | Mod: CPTII,S$GLB,, | Performed by: STUDENT IN AN ORGANIZED HEALTH CARE EDUCATION/TRAINING PROGRAM

## 2023-06-29 PROCEDURE — 3051F HG A1C>EQUAL 7.0%<8.0%: CPT | Mod: CPTII,S$GLB,, | Performed by: STUDENT IN AN ORGANIZED HEALTH CARE EDUCATION/TRAINING PROGRAM

## 2023-06-29 PROCEDURE — 99214 OFFICE O/P EST MOD 30 MIN: CPT | Mod: S$GLB,,, | Performed by: STUDENT IN AN ORGANIZED HEALTH CARE EDUCATION/TRAINING PROGRAM

## 2023-06-29 PROCEDURE — 3051F PR MOST RECENT HEMOGLOBIN A1C LEVEL 7.0 - < 8.0%: ICD-10-PCS | Mod: CPTII,S$GLB,, | Performed by: STUDENT IN AN ORGANIZED HEALTH CARE EDUCATION/TRAINING PROGRAM

## 2023-06-29 PROCEDURE — 3074F SYST BP LT 130 MM HG: CPT | Mod: CPTII,S$GLB,, | Performed by: STUDENT IN AN ORGANIZED HEALTH CARE EDUCATION/TRAINING PROGRAM

## 2023-06-29 PROCEDURE — 3078F PR MOST RECENT DIASTOLIC BLOOD PRESSURE < 80 MM HG: ICD-10-PCS | Mod: CPTII,S$GLB,, | Performed by: STUDENT IN AN ORGANIZED HEALTH CARE EDUCATION/TRAINING PROGRAM

## 2023-06-29 NOTE — PATIENT INSTRUCTIONS
I recommend starting a daily vitamin D3 (cholecalciferol) 1,000-2,000 IU supplement. This can be obtained without a prescription at most pharmacies or grocery stores. We can recheck your vitamin D at a later time.

## 2023-06-29 NOTE — PROGRESS NOTES
Subjective:       Patient ID: Starla Wharton is a 58 y.o. female.    Chief Complaint: Type 2 diabetes mellitus with complication, with long-term current use of insulin [E11.8, Z79.4]    Patient is established with me, here today for the following:     T2DM on insulin, peripheral neuropathy, retinopathy, gastroparesis, CKD3, HLD, HTN, GERD, vitamin D deficiency, sickle cell trait       T2DM -   Currently taking:   - Metformin 500 mg BID  - Levemir 57 qAM and 57 units qHS  - Ozempic 2 mg weekly  Not routinely taking Novolog  Following with ROSARIO Ramirez for endocrinology  Following with Chantal for diabetes management  Taking ASA 81 mg daily  Using Dexcom for CGM  UTD on foot exam  UTD on eye exam, last done FEB2023.  Following with Dr. Ramsay routinely for ophthalmology  Lab Results       Component                Value               Date                       HGBA1C                   7.6 (H)             06/26/2023                 HGBA1C                   7.6 (H)             06/26/2023                 HGBA1C                   8.2 (H)             03/01/2023            Lab Results       Component                Value               Date                       MICALBCREAT              4.0                 06/26/2023                   Still with hyperkeratotic skin around abdominal region 2/2 injections  Not improvement with topicals previously  Interested in dermatologist evaluation    HTN -   Currently prescribed HCTZ, lisinopril.  Patient endorses taking medication as directed.  Denies side effects or concerns while taking medication.  Lab Results       Component                Value               Date                       MICALBCREAT              4.0                 06/26/2023            BP Readings from Last 5 Encounters:  06/29/23 : 124/60  06/19/23 : (!) 126/57  06/15/23 : 128/76  05/18/23 : (!) 115/55  04/24/23 : 132/60    Followed up with Dr. Hernández for sleep medicine   Scheduled for sleep testing [id number]      Anemia -  Endorses told previously that she has sickle cell trait  Lab Results       Component                Value               Date                       HGB                      11.1 (L)            06/26/2023                 HCT                      36.8 (L)            06/26/2023                 MCV                      82                  06/26/2023                 RDW                      15.2 (H)            06/26/2023            Lab Results       Component                Value               Date                       IRON                     47                  06/26/2023                 TRANSFERRIN              222                 06/26/2023                 TIBC                     329                 06/26/2023                 FESATURATED              14 (L)              06/26/2023             Lab Results       Component                Value               Date                       FERRITIN                 115                 06/26/2023            Lab Results       Component                Value               Date                       [id number]               521                 10/21/2022            Lab Results       Component                Value               Date                       FOLATE                   13.9                10/21/2022              HLD -   Endorses taking atorvastatin as directed  Denies side effects or concerns while taking medication  Lab Results       Component                Value               Date                       CHOL                     132                 06/26/2023            Lab Results       Component                Value               Date                       LDLCALC                  79.4                06/26/2023            Lab Results       Component                Value               Date                       HDL                      41                  06/26/2023               Vitamin D deficiency -  Not currently taking vitamin D supplementation.  Lab  Results       Component                Value               Date                       [id number]             35                  06/26/2023                 [id number]             30                  07/22/2022               Due for shingles 2nd dose vaccination.  Due for mammogram in AUG2023      Review of Systems   Constitutional:  Negative for activity change, fatigue and fever.   Respiratory:  Negative for cough and shortness of breath.    Cardiovascular:  Negative for chest pain and palpitations.   Gastrointestinal:  Negative for abdominal pain, constipation, diarrhea, nausea and vomiting.   Skin:  Positive for rash.   Neurological:  Negative for syncope and headaches.         Current Outpatient Medications   Medication Instructions    acetaminophen-codeine 300-30mg (TYLENOL #3) 300-30 mg Tab acetaminophen 300 mg-codeine 30 mg tablet   TAKE 1 TABLET BY MOUTH EVERY 6 HOURS AS NEEDED FOR PAIN    aspirin 81 mg, Daily    atorvastatin (LIPITOR) 40 mg, Oral, Daily    blood-glucose meter,continuous (DEXCOM G6 ) Misc 1 Device, Misc.(Non-Drug; Combo Route), Once    blood-glucose sensor (DEXCOM G6 SENSOR) Kenzie Use daily for continuous glucose monitoring, change sensor every 10 days.    blood-glucose transmitter (DEXCOM G6 TRANSMITTER) Kenzie Use with Dexcom sensor, change every 90 days.    bromfenac (PROLENSA) 0.07 % Drop 1 drop, Ophthalmic, Daily    bromfenac (PROLENSA) 0.07 % Drop Apply one drop daily to both eyes as directed    ciclopirox (PENLAC) 8 % Soln ciclopirox 8 % topical solution   APPLY OVER NAIL AND SURROUNDING SKIN. APPLY DAILY OVER PREVIOUS COAT. AFTER SEVEN DAYS MAY REMOVE WITH ALCOHOL AND CONTINUE CYCLE    colestipoL (COLESTID) 1 g, Oral, 2 times daily    diclofenac sodium (VOLTAREN) 1 % Gel Topical (Top), As needed (PRN)    gabapentin (NEURONTIN) 300 mg, Oral, 3 times daily    hydroCHLOROthiazide (HYDRODIURIL) 25 mg, Oral, Daily    indomethacin (INDOCIN) 50 MG capsule indomethacin 50 mg capsule  "   insulin lispro (HUMALOG KWIKPEN INSULIN) 100 unit/mL pen Inject 10 units under the skin before meals plus scale 150-200+1, 201-250+2, 251-300+3, 301-350+4, >350+5. Max daily 45 units    LEVEMIR FLEXPEN 57 Units, Subcutaneous, 2 times daily, Max daily 114 units    lisinopriL (PRINIVIL,ZESTRIL) 20 mg, Oral, Daily    metFORMIN (GLUCOPHAGE) 500 mg, Oral, 2 times daily with meals    OZEMPIC 2 mg, Subcutaneous, Every 7 days    pantoprazole (PROTONIX) 20 mg, Oral, Daily    pen needle, diabetic (BD ULTRA-FINE MAR PEN NEEDLE) 32 gauge x 5/32" Ndle Use to inject insulin 5 times daily    prednisoLONE acetate (PRED FORTE) 1 % DrpS 1 drop, Right Eye, 2 times daily     Objective:      Vitals:    06/29/23 1423   BP: 124/60   Pulse: 97   SpO2: 99%   Weight: 109.1 kg (240 lb 10.1 oz)   PainSc: 0-No pain     Body mass index is 38.84 kg/m².    Physical Exam  Vitals reviewed.   Constitutional:       General: She is not in acute distress.     Appearance: She is not ill-appearing or diaphoretic.   Cardiovascular:      Rate and Rhythm: Normal rate and regular rhythm.      Heart sounds: Normal heart sounds. No murmur heard.     No friction rub. No gallop.   Pulmonary:      Effort: Pulmonary effort is normal. No respiratory distress.      Breath sounds: Normal breath sounds. No stridor. No wheezing, rhonchi or rales.   Skin:     General: Skin is warm and dry.      Comments: Color WNL   Neurological:      Mental Status: She is alert. Mental status is at baseline.   Psychiatric:         Mood and Affect: Mood normal.         Behavior: Behavior normal.         Assessment:       1. Type 2 diabetes mellitus with complication, with long-term current use of insulin    2. Rash    3. Primary hypertension    4. Snoring    5. Anemia, unspecified type    6. Nutritional anemia    7. Mixed hyperlipidemia    8. Vitamin D deficiency    9. Need for vaccination    10. Screening mammogram for breast cancer        Plan:       Type 2 diabetes mellitus with " complication, with long-term current use of insulin  Continue current medications.  RTC in 6 months for follow up.  -     Basic Metabolic Panel; Future    Rash  -     Ambulatory referral/consult to Dermatology; Future    Primary hypertension  Continue current medications.  RTC in 6 months for follow up.    Snoring  Continue evaluation and management per sleep medicine.    Anemia, unspecified type  -     Pathologist Interpretation Differential; Future  -     CBC Auto Differential; Future  -     Folate; Future  -     Vitamin B12; Future  -     Reticulocytes; Future  -     Hemoglobin Electrophoresis,Hgb A2 Dane.; Future    Nutritional anemia  -     Pathologist Interpretation Differential; Future  -     CBC Auto Differential; Future  -     Folate; Future  -     Vitamin B12; Future  -     Reticulocytes; Future  -     Hemoglobin Electrophoresis,Hgb A2 Dane.; Future    Mixed hyperlipidemia  Continue current medications.  RTC in 6 months for follow up.    Vitamin D deficiency  Continue supplementation.  RTC in 6 months for follow up.    Need for vaccination  Provided written Rx for shingles vaccination, advised to obtain at [facility] pharmacy on 2nd floor or preferred pharmacy.    Screening mammogram for breast cancer  -     Mammo Digital Screening [facility] w/ [facility]; Future      Meghan Perez MD  6/29/2023

## 2023-07-10 DIAGNOSIS — E11.42 DIABETIC POLYNEUROPATHY ASSOCIATED WITH TYPE 2 DIABETES MELLITUS: ICD-10-CM

## 2023-07-10 RX ORDER — GABAPENTIN 300 MG/1
300 CAPSULE ORAL 3 TIMES DAILY
Qty: 270 CAPSULE | Refills: 1 | Status: SHIPPED | OUTPATIENT
Start: 2023-07-10 | End: 2024-01-02 | Stop reason: SDUPTHER

## 2023-07-10 NOTE — TELEPHONE ENCOUNTER
No care due was identified.  [facility] Embedded Care Due Messages. Reference number: [id number].   7/10/2023 5:08:53 AM CDT

## 2023-07-11 ENCOUNTER — OFFICE VISIT (OUTPATIENT)
Dept: ORTHOPEDICS | Facility: CLINIC | Age: 58
End: 2023-07-11
Payer: MEDICARE

## 2023-07-11 VITALS
SYSTOLIC BLOOD PRESSURE: 120 MMHG | BODY MASS INDEX: 38.04 KG/M2 | DIASTOLIC BLOOD PRESSURE: 84 MMHG | HEIGHT: 66 IN | HEART RATE: 87 BPM | WEIGHT: 236.69 LBS

## 2023-07-11 DIAGNOSIS — M20.21 HALLUX RIGIDUS, RIGHT FOOT: Primary | ICD-10-CM

## 2023-07-11 PROCEDURE — 99499 UNLISTED E&M SERVICE: CPT | Mod: S$GLB,,, | Performed by: PHYSICIAN ASSISTANT

## 2023-07-11 PROCEDURE — 99499 NO LOS: ICD-10-PCS | Mod: S$GLB,,, | Performed by: PHYSICIAN ASSISTANT

## 2023-07-11 PROCEDURE — 99999 PR PBB SHADOW E&M-EST. PATIENT-LVL IV: CPT | Mod: PBBFAC,,, | Performed by: PHYSICIAN ASSISTANT

## 2023-07-11 PROCEDURE — 99999 PR PBB SHADOW E&M-EST. PATIENT-LVL IV: ICD-10-PCS | Mod: PBBFAC,,, | Performed by: PHYSICIAN ASSISTANT

## 2023-07-11 RX ORDER — ACETAMINOPHEN 500 MG
1000 TABLET ORAL 3 TIMES DAILY
Qty: 42 TABLET | Refills: 0 | Status: SHIPPED | OUTPATIENT
Start: 2023-07-11 | End: 2023-08-02

## 2023-07-11 RX ORDER — OXYCODONE HYDROCHLORIDE 5 MG/1
5 TABLET ORAL EVERY 6 HOURS PRN
Qty: 20 TABLET | Refills: 0 | Status: SHIPPED | OUTPATIENT
Start: 2023-07-11 | End: 2023-10-16

## 2023-07-11 RX ORDER — ONDANSETRON 4 MG/1
4 TABLET, FILM COATED ORAL EVERY 8 HOURS PRN
Qty: 10 TABLET | Refills: 0 | Status: SHIPPED | OUTPATIENT
Start: 2023-07-11 | End: 2023-10-16

## 2023-07-11 NOTE — H&P (VIEW-ONLY)
Subjective:      Patient ID: Starla Wharton is a 58 y.o. female.    Chief Complaint: Pre-op Exam of the Right Foot  Starla SHANKAR is a 58 y.o. female here today for a pre-operative visit in preparation for a right big toe cheiletomy to be performed by Dr. Clarke on 7/26/23.  She was last seen and treated in the clinic on 5/23/23.  She has since seen their primary care for optimization of the chronic health concerns.  There has been no significant change in their past medical or orthopedic status since the previous visit.  No fever, chills, malaise or unexplained weight change.     Past Medical History:   Diagnosis Date    Diabetes mellitus     Hypertension     Personal history of colonic polyps 06/12/2017     Past Surgical History:   Procedure Laterality Date    COLONOSCOPY N/A 5/18/2023    Procedure: COLONOSCOPY;  Surgeon: Marian Schneider MD;  Location: [address]);  Service: Endoscopy;  Laterality: N/A;  gastroporesis-2 days full liquid-1 day clears-suprep-inst mail-tb-pt states does not use portal-tb    pre call done 5/12/23 -egh    HYSTERECTOMY      fibroids    LAPAROSCOPIC CHOLECYSTECTOMY      MYOMECTOMY      PANCREAS BIOPSY      WISDOM TOOTH EXTRACTION       Social History     Occupational History    Not on file   Tobacco Use    Smoking status: Never    Smokeless tobacco: Never   Substance and Sexual Activity    Alcohol use: Yes     Comment: socially    Drug use: No    Sexual activity: Not on file      Review of Systems   Constitutional: Negative for chills and fever.   HENT:  Negative for congestion.    Eyes:  Negative for redness.   Respiratory:  Negative for shortness of breath.    Skin:  Negative for rash.   Gastrointestinal:  Negative for abdominal pain, nausea and vomiting.   Neurological:  Negative for dizziness and seizures.   Psychiatric/Behavioral:  Negative for altered mental status.    Current Outpatient Medications on File Prior to Visit   Medication Sig Dispense Refill    aspirin 81 MG  Chew Take 81 mg by mouth once daily.      atorvastatin (LIPITOR) 40 MG tablet Take 1 tablet (40 mg total) by mouth once daily. 90 tablet 3    blood-glucose sensor (DEXCOM G6 SENSOR) Kenzie Use daily for continuous glucose monitoring, change sensor every 10 days. 9 each 3    blood-glucose transmitter (DEXCOM G6 TRANSMITTER) Kenzie Use with Dexcom sensor, change every 90 days. 1 each 3    bromfenac (PROLENSA) 0.07 % Drop Apply 1 drop to eye once daily at 6am.      bromfenac (PROLENSA) 0.07 % Drop Apply one drop daily to both eyes as directed 3 mL 3    ciclopirox (PENLAC) 8 % Soln ciclopirox 8 % topical solution   APPLY OVER NAIL AND SURROUNDING SKIN. APPLY DAILY OVER PREVIOUS COAT. AFTER SEVEN DAYS MAY REMOVE WITH ALCOHOL AND CONTINUE CYCLE      colestipoL (COLESTID) 1 gram Tab Take 1 tablet (1 g total) by mouth 2 (two) times daily. 180 tablet 3    diclofenac sodium (VOLTAREN) 1 % Gel Apply topically as needed.      gabapentin (NEURONTIN) 300 MG capsule Take 1 capsule (300 mg total) by mouth 3 (three) times daily. 270 capsule 1    hydroCHLOROthiazide (HYDRODIURIL) 25 MG tablet Take 1 tablet (25 mg total) by mouth once daily. 90 tablet 3    indomethacin (INDOCIN) 50 MG capsule indomethacin 50 mg capsule      insulin detemir U-100, Levemir, 100 unit/mL (3 mL) SubQ InPn pen Inject 57 Units into the skin 2 (two) times daily. Max daily 114 units 105 mL 3    insulin lispro (HUMALOG KWIKPEN INSULIN) 100 unit/mL pen Inject 10 units under the skin before meals plus scale 150-200+1, 201-250+2, 251-300+3, 301-350+4, >350+5. Max daily 45 units 15 mL 11    lisinopriL (PRINIVIL,ZESTRIL) 20 MG tablet Take 1 tablet (20 mg total) by mouth once daily. 90 tablet 1    metFORMIN (GLUCOPHAGE) 500 MG tablet Take 1 tablet (500 mg total) by mouth 2 (two) times daily with meals. 180 tablet 3    pantoprazole (PROTONIX) 20 MG tablet Take 1 tablet (20 mg total) by mouth once daily. 90 tablet 3    pen needle, diabetic (BD ULTRA-FINE MAR PEN NEEDLE)  "32 gauge x 5/32" Ndle Use to inject insulin 5 times daily 400 each 3    prednisoLONE acetate (PRED FORTE) 1 % DrpS Place 1 drop into the right eye 2 (two) times a day.      semaglutide (OZEMPIC) 2 mg/dose (8 mg/3 mL) PnIj Inject 2 mg into the skin every 7 days. 9 mL 3    [DISCONTINUED] acetaminophen-codeine 300-30mg (TYLENOL #3) 300-30 mg Tab acetaminophen 300 mg-codeine 30 mg tablet   TAKE 1 TABLET BY MOUTH EVERY 6 HOURS AS NEEDED FOR PAIN      blood-glucose meter,continuous (DEXCOM G6 ) Misc 1 Device by Misc.(Non-Drug; Combo Route) route once. for 1 dose 1 each 0     No current facility-administered medications on file prior to visit.     Review of patient's allergies indicates:   Allergen Reactions    Naprosyn [naproxen]          Objective:      Vitals:    07/11/23 0949   BP: 120/84   BP Location: Right arm   Patient Position: Sitting   BP Method: Large (Automatic)   Pulse: 87   Weight: 107.3 kg (236 lb 10.6 oz)   Height: 5' 5.98" (1.676 m)     Ortho Exam     Gen: WD, WN in NAD   HEENT: NC/AT   Heart: RR   Resp: unlabored breathing   Skin: intact, no lesions pertinent to the surgical site   Assessment:       1. Hallux rigidus, right foot          Plan:       Surgery per Dr. Clarke      "

## 2023-07-11 NOTE — H&P
Subjective:      Patient ID: Starla Wharton is a 58 y.o. female.    Chief Complaint: Pre-op Exam of the Right Foot  Starla SHANKAR is a 58 y.o. female here today for a pre-operative visit in preparation for a right big toe cheiletomy to be performed by Dr. Clarke on 7/26/23.  She was last seen and treated in the clinic on 5/23/23.  She has since seen their primary care for optimization of the chronic health concerns.  There has been no significant change in their past medical or orthopedic status since the previous visit.  No fever, chills, malaise or unexplained weight change.     Past Medical History:   Diagnosis Date    Diabetes mellitus     Hypertension     Personal history of colonic polyps 06/12/2017     Past Surgical History:   Procedure Laterality Date    COLONOSCOPY N/A 5/18/2023    Procedure: COLONOSCOPY;  Surgeon: Marian Schneider MD;  Location: [address]);  Service: Endoscopy;  Laterality: N/A;  gastroporesis-2 days full liquid-1 day clears-suprep-inst mail-tb-pt states does not use portal-tb    pre call done 5/12/23 -egh    HYSTERECTOMY      fibroids    LAPAROSCOPIC CHOLECYSTECTOMY      MYOMECTOMY      PANCREAS BIOPSY      WISDOM TOOTH EXTRACTION       Social History     Occupational History    Not on file   Tobacco Use    Smoking status: Never    Smokeless tobacco: Never   Substance and Sexual Activity    Alcohol use: Yes     Comment: socially    Drug use: No    Sexual activity: Not on file      Review of Systems   Constitutional: Negative for chills and fever.   HENT:  Negative for congestion.    Eyes:  Negative for redness.   Respiratory:  Negative for shortness of breath.    Skin:  Negative for rash.   Gastrointestinal:  Negative for abdominal pain, nausea and vomiting.   Neurological:  Negative for dizziness and seizures.   Psychiatric/Behavioral:  Negative for altered mental status.    Current Outpatient Medications on File Prior to Visit   Medication Sig Dispense Refill    aspirin 81 MG  Chew Take 81 mg by mouth once daily.      atorvastatin (LIPITOR) 40 MG tablet Take 1 tablet (40 mg total) by mouth once daily. 90 tablet 3    blood-glucose sensor (DEXCOM G6 SENSOR) Kenzie Use daily for continuous glucose monitoring, change sensor every 10 days. 9 each 3    blood-glucose transmitter (DEXCOM G6 TRANSMITTER) Kenzie Use with Dexcom sensor, change every 90 days. 1 each 3    bromfenac (PROLENSA) 0.07 % Drop Apply 1 drop to eye once daily at 6am.      bromfenac (PROLENSA) 0.07 % Drop Apply one drop daily to both eyes as directed 3 mL 3    ciclopirox (PENLAC) 8 % Soln ciclopirox 8 % topical solution   APPLY OVER NAIL AND SURROUNDING SKIN. APPLY DAILY OVER PREVIOUS COAT. AFTER SEVEN DAYS MAY REMOVE WITH ALCOHOL AND CONTINUE CYCLE      colestipoL (COLESTID) 1 gram Tab Take 1 tablet (1 g total) by mouth 2 (two) times daily. 180 tablet 3    diclofenac sodium (VOLTAREN) 1 % Gel Apply topically as needed.      gabapentin (NEURONTIN) 300 MG capsule Take 1 capsule (300 mg total) by mouth 3 (three) times daily. 270 capsule 1    hydroCHLOROthiazide (HYDRODIURIL) 25 MG tablet Take 1 tablet (25 mg total) by mouth once daily. 90 tablet 3    indomethacin (INDOCIN) 50 MG capsule indomethacin 50 mg capsule      insulin detemir U-100, Levemir, 100 unit/mL (3 mL) SubQ InPn pen Inject 57 Units into the skin 2 (two) times daily. Max daily 114 units 105 mL 3    insulin lispro (HUMALOG KWIKPEN INSULIN) 100 unit/mL pen Inject 10 units under the skin before meals plus scale 150-200+1, 201-250+2, 251-300+3, 301-350+4, >350+5. Max daily 45 units 15 mL 11    lisinopriL (PRINIVIL,ZESTRIL) 20 MG tablet Take 1 tablet (20 mg total) by mouth once daily. 90 tablet 1    metFORMIN (GLUCOPHAGE) 500 MG tablet Take 1 tablet (500 mg total) by mouth 2 (two) times daily with meals. 180 tablet 3    pantoprazole (PROTONIX) 20 MG tablet Take 1 tablet (20 mg total) by mouth once daily. 90 tablet 3    pen needle, diabetic (BD ULTRA-FINE MAR PEN NEEDLE)  "32 gauge x 5/32" Ndle Use to inject insulin 5 times daily 400 each 3    prednisoLONE acetate (PRED FORTE) 1 % DrpS Place 1 drop into the right eye 2 (two) times a day.      semaglutide (OZEMPIC) 2 mg/dose (8 mg/3 mL) PnIj Inject 2 mg into the skin every 7 days. 9 mL 3    [DISCONTINUED] acetaminophen-codeine 300-30mg (TYLENOL #3) 300-30 mg Tab acetaminophen 300 mg-codeine 30 mg tablet   TAKE 1 TABLET BY MOUTH EVERY 6 HOURS AS NEEDED FOR PAIN      blood-glucose meter,continuous (DEXCOM G6 ) Misc 1 Device by Misc.(Non-Drug; Combo Route) route once. for 1 dose 1 each 0     No current facility-administered medications on file prior to visit.     Review of patient's allergies indicates:   Allergen Reactions    Naprosyn [naproxen]          Objective:      Vitals:    07/11/23 0949   BP: 120/84   BP Location: Right arm   Patient Position: Sitting   BP Method: Large (Automatic)   Pulse: 87   Weight: 107.3 kg (236 lb 10.6 oz)   Height: 5' 5.98" (1.676 m)     Ortho Exam     Gen: WD, WN in NAD   HEENT: NC/AT   Heart: RR   Resp: unlabored breathing   Skin: intact, no lesions pertinent to the surgical site   Assessment:       1. Hallux rigidus, right foot          Plan:       Surgery per Dr. Clarke      "

## 2023-07-11 NOTE — PATIENT INSTRUCTIONS
Post operative pain control     You have been prescribed multiple medications to help with pain control and minimize use of narcotic pain medications. This is called multimodal pain control.   Take these medications scheduled for the first week (take on the below schedule whether or not you are having pain)    Tylenol 1000 mg three times daily  Gabapentin 300 mg nightly- continue dosage as prescribed previously    Take oxycodone 5 mg every 6 hours as needed for breakthrough pain (only if pain is not well controlled with the above medications)    If you use a CPAP for sleep apnea it is extremely important to use this as instructed while taking the pain medication. Untreated sleep apnea and narcotic use can cause respiratory arrest and death

## 2023-07-11 NOTE — PROGRESS NOTES
Starla SHANKAR is a 58 y.o. female here today for a pre-operative visit in preparation for a right big toe cheiletomy to be performed by Dr. Clarke on 7/26/23.  She was last seen and treated in the clinic on 5/23/23.  She has since seen their primary care for optimization of the chronic health concerns.  There has been no significant change in their past medical or orthopedic status since the previous visit.  No fever, chills, malaise or unexplained weight change.     Allergies, medications, past medical history and past surgical history were reviewed with the patient.     Physical examination was performed.     Consents were signed.   Patient has quad cane and will bring with them the day of surgery. They have been counseled on proper use of the assistive device.   Post operative pain medications sent to [facility]  - Avoid NSAIDS due to CKD and allergy to naproxen    -Discussed COVID19 with the patient, they are aware of our current policies and procedures, were given the option of delaying surgery, and they elect to proceed.        Pre, cordelia, and post operative procedure and expectations were discussed.  Questions were answered. The patient has been educated and is ready to proceed with surgery.  Approximately 30 minutes was spent discussing surgical outcomes, plans, procedures, pre, cordelia, and post operative expectations and care. The risks, benefits and alternatives to surgery were discussed with the patient at great length.  These include bleeding, infection, vessel/nerve damage, pain, numbness, tingling, complex regional pain syndrome, hardware/surgical failure, need for further surgery, malunion, nonunion, DVT, PE, arthritis and death. He also understands that the risks of surgery may be greater for some patients due to health or lifestyle issues, such as a current condition or a history of heart disease, obesity, clotting disorders, recurrent infections, smoking, sedentary lifestyle, or noncompliance with  medications, therapy, or followup. The degree of the increased risk is hard to estimate with any degree of precision.  Patient states an understanding and wishes to proceed with surgery.   All questions were answered.  No guarantees were implied or stated.  Informed consent was obtained  The patient will contact us if the have any questions, concerns, and changes in their medical condition prior to surgery.

## 2023-07-17 NOTE — ANESTHESIA PAT ROS NOTE
07/17/2023  Starla Wharton is a 58 y.o., female.      Pre-op Assessment    I have reviewed the Patient Summary Reports.       I have reviewed the Medications.     Review of Systems  Anesthesia Hx:  No problems with previous Anesthesia   History of prior surgery of interest to airway management or planning:  Previous anesthesia: General, MAC 5/18/2023  Colonoscopy with general anesthesia.  Procedure performed at an [facility].      8/31/2018  Laparoscopic Cholecystectomy with MAC.  Airway issues documented on chart review include mask, easy, GETA, easy direct laryngoscopy , view on direct laryngoscopy Grade I    Denies Family Hx of Anesthesia complications.    Denies Personal Hx of Anesthesia complications.                    Social:  Social Alcohol Use, Non-Smoker       Hematology/Oncology:    Oncology Normal    -- Anemia:               Hematology Comments: Anemia of chronic disease,   Vitamin D deficiency                    EENT/Dental:  chronic allergic rhinitis H/O [place] Tooth Extraction,  Cystoid macular degeneration of retina, Environmental allergies          Cardiovascular:  Exercise tolerance: good   Hypertension, well controlled    Denies CAD.       Denies Angina.     hyperlipidemia  Denies ARIAS.                            Pulmonary:     Denies Asthma.   Denies Shortness of breath.   H/O snoring               Renal/:  Chronic Renal Disease, CKD   Stage 3a chronic kidney disease, GFR = 52,   Creat = 1.2, BUN = 30 on 6/26/2023             Hepatic/GI:     GERD, well controlled Denies Liver Disease.  Colonic polyps, Laparoscopic   Cholecystectomy, Pancreatic cyst, S/P pancreas Biopsy, Gastroparesis          Musculoskeletal:  Arthritis   Hallux rigidus, right foot,  Arthritis left foot,  H/O Gout, Plantar fasciitis              OB/GYN/PEDS:  Uterine fibroids, S/P Myomectomy, Hysterectomy, Right  Oophorectomy           Neurological:       Denies Headaches. Denies Seizures.    Diabetic polyneuropathy associated with type 2 diabetes mellitus      Peripheral Neuropathy                          Endocrine:  Diabetes, well controlled, type 2, using insulin Denies Hypothyroidism.  HA1C = 7.6 on 6/26/2023      Obesity / BMI > 30  Psych:  Psychiatric Normal                   Past Medical History:   Diagnosis Date    Diabetes mellitus     Hypertension     Personal history of colonic polyps 06/12/2017     Past Surgical History:   Procedure Laterality Date    COLONOSCOPY N/A 5/18/2023    Procedure: COLONOSCOPY;  Surgeon: Marian Schneider MD;  Location: [address]);  Service: Endoscopy;  Laterality: N/A;  gastroporesis-2 days full liquid-1 day clears-suprep-inst mail-tb-pt states does not use portal-tb    pre call done 5/12/23 -eg    HYSTERECTOMY      fibroids    LAPAROSCOPIC CHOLECYSTECTOMY      MYOMECTOMY      PANCREAS BIOPSY      WISDOM TOOTH EXTRACTION         Anesthesia Assessment: Preoperative EQUATION    Planned Procedure: Procedure(s) (LRB):  CHEILECTOMY big toe (Right)  Requested Anesthesia Type:Choice  Surgeon: Dmitry Clarke MD  Service: Orthopedics  Known or anticipated Date of Surgery:7/26/2023    Surgeon notes: reviewed    Electronic QUestionnaire Assessment completed via nurse interview with patient.        Triage considerations:     The patient has no apparent active cardiac condition (No unstable coronary Syndrome such as severe unstable angina or recent [<1 month] myocardial infarction, decompensated CHF, severe valvular   disease or significant arrhythmia)    Previous anesthesia records:GETA, MAC, Easy airway, Easy intubation, and No problems    Last PCP note: within 1 month , within [facility]       Other important co-morbidities: DM2, GERD, HLD, HTN, and Obesity                Instructions given. (See in Nurse's note)      Ht: 5'6  Wt: 236 lb  BMI: 38.22  Vaccinated

## 2023-07-18 ENCOUNTER — TELEPHONE (OUTPATIENT)
Dept: ORTHOPEDICS | Facility: CLINIC | Age: 58
End: 2023-07-18
Payer: MEDICARE

## 2023-07-18 NOTE — TELEPHONE ENCOUNTER
INFORMED PT ARRIVAL TIME IS 5AM, PT VERBALIZED UNDERSTANDING----- Message from Bibiana Villalpanod sent at 7/18/2023 10:33 AM CDT -----  Regarding: procedure information 7/26/23  Type: General Call Back     Name of Caller:JIA QUINTERO [[id number]]  Symptoms:procedure information  Would the patient rather a call back or a response via [employer] [facility]? Call back   Best Call Back Number:[phone]  Additional Information: Patient indicates she would like to   get some information on her procedure. Patient indicates she is scheduled for 7/26/23 and was notified she needs to be there for 5AM. Patient indicates she needs to know as soon as possible the exact time of her procedure arrival time. Patient indicates she relies on others for transportation and needs to have time to set this up properly. Please call back with further information.

## 2023-07-21 ENCOUNTER — ANESTHESIA EVENT (OUTPATIENT)
Dept: SURGERY | Facility: HOSPITAL | Age: 58
End: 2023-07-21
Payer: MEDICARE

## 2023-07-25 ENCOUNTER — TELEPHONE (OUTPATIENT)
Dept: ORTHOPEDICS | Facility: CLINIC | Age: 58
End: 2023-07-25
Payer: MEDICARE

## 2023-07-25 ENCOUNTER — CLINICAL SUPPORT (OUTPATIENT)
Dept: DIABETES | Facility: CLINIC | Age: 58
End: 2023-07-25
Payer: MEDICARE

## 2023-07-25 DIAGNOSIS — Z79.4 TYPE 2 DIABETES MELLITUS WITH COMPLICATION, WITH LONG-TERM CURRENT USE OF INSULIN: Primary | ICD-10-CM

## 2023-07-25 DIAGNOSIS — E11.8 TYPE 2 DIABETES MELLITUS WITH COMPLICATION, WITH LONG-TERM CURRENT USE OF INSULIN: Primary | ICD-10-CM

## 2023-07-25 PROCEDURE — G0108 PR DIAB MANAGE TRN  PER INDIV: ICD-10-PCS | Mod: S$GLB,,, | Performed by: FAMILY MEDICINE

## 2023-07-25 PROCEDURE — G0108 DIAB MANAGE TRN  PER INDIV: HCPCS | Mod: S$GLB,,, | Performed by: FAMILY MEDICINE

## 2023-07-25 PROCEDURE — 99999 PR PBB SHADOW E&M-EST. PATIENT-LVL I: ICD-10-PCS | Mod: PBBFAC,,,

## 2023-07-25 PROCEDURE — 99999 PR PBB SHADOW E&M-EST. PATIENT-LVL I: CPT | Mod: PBBFAC,,,

## 2023-07-25 NOTE — PROGRESS NOTES
Diabetes Care Specialist Progress Note  Author: Chantal Shrestha RD, CDE  Date: 7/25/2023    Program Intake  Reason for Diabetes Program Visit:: Post Program Follow-Up  Type of Intervention:: Individual  Individual: Education  Education: Self-Management Skill Review, Pattern Management  Type of Follow-Up: 3 month  Current diabetes risk level:: moderate    Lab Results   Component Value Date    HGBA1C 7.6 (H) 06/26/2023    HGBA1C 7.6 (H) 06/26/2023         Diabetes Self-Management Skills Assessment    Medications  Patient is able to describe current diabetes management routine.: yes  Diabetes management routine:: insulin, injectable medications, oral medications, diet (currently holding ozempic 2mg for upcoming surgery later this week. Will resume after surgery. Humalog 10 +scale (150-200 +1), Levemir 57 units BID, metformin 500mg BID)  Patient is able to identify current diabetes medications, dosages, and appropriate timing of medications.: yes  Patient understands the purpose of the medications taken for diabetes.: yes  Patient reports problems or concerns with current medication regimen.: yes  Medication regimen problems/concerns:: other (see comments) (sometimes having lows r/t taking additional humalog after meals)  Medication Skills Assessment Completed:: Yes  Assessment indicates:: Instruction Needed  Area of need?: Yes    Home Blood Glucose Monitoring  Patient states that blood sugar is checked at home daily.: yes  Monitoring Method:: personal continuous glucose monitor  Personal CGM type:: Dexcom G6  Patient is able to use personal CGM appropriately.: yes  CGM Report reviewed?: yes  Home Blood Glucose Monitoring Skills Assessment Completed: : Yes  Assessment indicates:: Adequate understanding  Area of need?: No    Assessment Summary and Plan    Based on today's diabetes care assessment, the following areas of need were identified:      Social 11/9/2022   Access to Mass Media/Tech No   Cognitive/Behavioral  Health No   Culture/Hinduism No   Communication No   Health Literacy No        Clinical 11/9/2022   Medication Adherence Yes   Lab Compliance No        Diabetes Self-Management Skills 7/25/2023   Diabetes Disease Process/Treatment Options -   Nutrition/Healthy Eating -   Physical Activity/Exercise -   Medication Yes - see care planning   Home Blood Glucose Monitoring No   Acute Complications -   Chronic Complications -   Psychosocial/Coping -          Today's interventions were provided through individual discussion, instruction, and written materials were provided.      Patient verbalized understanding of instruction and written materials.  Pt was able to return back demonstration of instructions today. Patient understood key points, needs reinforcement and further instruction.     Diabetes Self-Management Care Plan:    Today's Diabetes Self-Management Care Plan was developed with Starla SHANKAR's input. Starla VONNIE has agreed to work toward the following goal(s) to improve his/her overall diabetes control.      Care Plan: Diabetes Management   Updates made since 6/25/2023 12:00 AM        Problem: Medications         Goal: Pt will titrate long-acting insulin as instructed by MD.    Start Date: 5/24/2023   Expected End Date: 6/29/2023   This Visit's Progress: Met   Priority: High   Barriers: No Barriers Identified   Note:    5/24/23 - Dexcom report shows avg  and 44% time in range. No hypoglycemia. Not having a lot of prandial spikes. Pt has continued with same insulin dosages. Reviewed titration instructions per Dr. Molina - increase by 2 units if FBG remaining >140 over 3-5 days. Pt needs endocrine follow-up appt since Dr. Molina will be leaving in near future - contacted and pt getting scheduled with Prudence Ramirez NP.     7/25/23 - Has titrated Levemir and currently taking 57 units BID consistently. Reviewed dexcom report with pt - time in range has improved to 61%, avg BG is 170, and GMI 7.4% (last A1C 7.6%).  Praised excellent improvement! Much less variability on overall trend. Occasionally having highs and 1 significant low on report. Highs and lows are r/t pt self-adjusting humalog dosing and sometimes taking additional shot after eating. Discussed low (last Sunday) - glucose was high followed by crash. Pt reports took Humalog before meal as prescribed but then took additional humalog after meal. Explained active insulin time of humalog is about 4 hours and when she takes additional humalog shortly after meal, this is stacking insulin doses causing low. Encouraged do not take any additional Humalog unless has been 4 hours since last dose, and then if not eating a meal, only use correction scale. Also, yesterday glucose stayed elevated for a long period of time - this was r/t only took 1 unit of Humalog before meal of Pashto bread sandwiches and fritos (and drinking 32oz sweet tea - doesn't usually drink sweet tea but was at [facility] study). Suspect she is only using scale before meals, instead of adding scale to base dose. Reviewed prescribed humalog base dose + correction scale before meals.     Also, pt has upcoming surgery on feet. Reviewed may see more elevated BG after surgery d/t healing and stress response. Reviewed monitoring BG closely, drinking plenty of water, small/balanced meals, and continue taking medication consistently. Encouraged to communicate with Prudence or Dr. Perez if seeing high BG trend.     Problem: Healthy Eating         Goal: Pt will identify foods that are carbohydrates and use labels for checking carbs.    Start Date: 12/30/2022   Expected End Date: 1/26/2023   This Visit's Progress: On track   Recent Progress: No change   Priority: Medium   Barriers: No Barriers Identified   Note:    12/30/22 - Pt is interested in working toward insulin pump therapy. Discussed importance of learning to count carbohydrates in order to prepare for pump. Pt is willing to try. Provided basic carb counting  education:    Ed on what carbohydrates are, what foods contain carbs and importance of reducing added sugar  Began ed on appropriate portion sizes of carbs for ~15g CHO  Label reading exercise demonstrating using serving size and total CHO grams in carb count  Began ed on the plate method: importance of increasing non-starchy veggies (cooked until soft d/t gastroparesis), choosing lean protein, healthy fats and portioned servings of complex cho's     Provided food log and encouraged practicing carb counting. Pt will write down foods, approx serving size and grams carb and bring log to next appt.     1/26/23 - Pt hasn't been practicing carb counting since last visit - attention has been focused on assisting Vianca's  since her passing. Has been getting more comfortable with identifying what foods are carbs and using plate method (pt shared photo of her plate with baked chicken, broccoli and ~1 cup potatoes - perfect!). Appropriately estimated carbs as 30g. Reviewed basic carb counting, portion sizes for ~15g CHO, and encouraged downloading/using Vesti[employer] imelda for looking up carb grams.     3/8/23 - Reports she has not been carb counting but has been doing well with controlling portions of carb.     4/12/23 - Still has not been practicing carb counting. Reviewed today foods that count as carbs, serving sizes for ~15g, label reading, using imelda for looking up foods she is unsure of and adding up carbs at meals. Also, pt continues to have some problems with gastroparesis s/s - reviewed gastroparesis nutrition therapy guidelines.     5/24/23 - Still not counting carbs but doing much better with portion control. She is balancing meals with plate method.     7/25/23 - She shows better understanding of what foods are carbs and sometimes looking at labels for total carbohydrate. Not practicing carb counting. However, has been using plate method and controlling portion sizes at most meals. Has a splurge about once a  week.           Follow Up Plan     Follow up in about 5 months (around 12/20/2023) for diabetes care check in.    Today's care plan and follow up schedule was discussed with patient.  Starla SHANKAR verbalized understanding of the care plan, goals, and agrees to follow up plan.        The patient was encouraged to communicate with his/her health care provider/physician and care team regarding his/her condition(s) and treatment.  I provided the patient with my contact information today and encouraged to contact me via phone or [facility] Patient Portal as needed.     Length of Visit   Total Time: 60 Minutes

## 2023-07-25 NOTE — TELEPHONE ENCOUNTER
Spoke to patient and informed her of her arrival time (5am), location of surgery ([facility]), instructions for pre-wash, and reviewed no eating or drinking after midnight, confirmed call in regards to medication instructions, informed pt no uber or lyft after sx must have ride, if applicable remove any polish, and bring any paperwork given at pre op to sx. Pt verbalized understanding.

## 2023-07-26 ENCOUNTER — HOSPITAL ENCOUNTER (OUTPATIENT)
Facility: HOSPITAL | Age: 58
Discharge: HOME | End: 2023-07-26
Attending: ORTHOPAEDIC SURGERY | Admitting: ORTHOPAEDIC SURGERY
Payer: MEDICARE

## 2023-07-26 ENCOUNTER — ANESTHESIA (OUTPATIENT)
Dept: SURGERY | Facility: HOSPITAL | Age: 58
End: 2023-07-26
Payer: MEDICARE

## 2023-07-26 VITALS
DIASTOLIC BLOOD PRESSURE: 62 MMHG | HEIGHT: 66 IN | TEMPERATURE: 98 F | WEIGHT: 233 LBS | RESPIRATION RATE: 19 BRPM | BODY MASS INDEX: 37.45 KG/M2 | OXYGEN SATURATION: 100 % | SYSTOLIC BLOOD PRESSURE: 126 MMHG | HEART RATE: 81 BPM

## 2023-07-26 DIAGNOSIS — M20.21 HALLUX RIGIDUS, RIGHT FOOT: Primary | ICD-10-CM

## 2023-07-26 LAB
POCT GLUCOSE: 106 MG/DL (ref 70–110)
POCT GLUCOSE: 147 MG/DL (ref 70–110)

## 2023-07-26 PROCEDURE — 99900035 HC TECH TIME PER 15 MIN (STAT)

## 2023-07-26 PROCEDURE — 94761 N-INVAS EAR/PLS OXIMETRY MLT: CPT

## 2023-07-26 PROCEDURE — 27201423 OPTIME MED/SURG SUP & DEVICES STERILE SUPPLY: Performed by: ORTHOPAEDIC SURGERY

## 2023-07-26 PROCEDURE — 63600175 PHARM REV CODE 636 W HCPCS: Performed by: STUDENT IN AN ORGANIZED HEALTH CARE EDUCATION/TRAINING PROGRAM

## 2023-07-26 PROCEDURE — 28289 CORRJ HALUX RIGDUS W/O IMPLT: CPT | Mod: RT,,, | Performed by: ORTHOPAEDIC SURGERY

## 2023-07-26 PROCEDURE — 25000003 PHARM REV CODE 250: Performed by: NURSE ANESTHETIST, CERTIFIED REGISTERED

## 2023-07-26 PROCEDURE — 82962 GLUCOSE BLOOD TEST: CPT | Performed by: ORTHOPAEDIC SURGERY

## 2023-07-26 PROCEDURE — 37000009 HC ANESTHESIA EA ADD 15 MINS: Performed by: ORTHOPAEDIC SURGERY

## 2023-07-26 PROCEDURE — 71000015 HC POSTOP RECOV 1ST HR: Performed by: ORTHOPAEDIC SURGERY

## 2023-07-26 PROCEDURE — D9220A PRA ANESTHESIA: Mod: ANES,,, | Performed by: ANESTHESIOLOGY

## 2023-07-26 PROCEDURE — 25000003 PHARM REV CODE 250: Performed by: ORTHOPAEDIC SURGERY

## 2023-07-26 PROCEDURE — 63600175 PHARM REV CODE 636 W HCPCS: Performed by: ORTHOPAEDIC SURGERY

## 2023-07-26 PROCEDURE — 25000003 PHARM REV CODE 250: Performed by: STUDENT IN AN ORGANIZED HEALTH CARE EDUCATION/TRAINING PROGRAM

## 2023-07-26 PROCEDURE — 63600175 PHARM REV CODE 636 W HCPCS: Performed by: ANESTHESIOLOGY

## 2023-07-26 PROCEDURE — D9220A PRA ANESTHESIA: Mod: CRNA,,, | Performed by: NURSE ANESTHETIST, CERTIFIED REGISTERED

## 2023-07-26 PROCEDURE — 64450 NJX AA&/STRD OTHER PN/BRANCH: CPT | Mod: 59,RT,, | Performed by: ANESTHESIOLOGY

## 2023-07-26 PROCEDURE — D9220A PRA ANESTHESIA: ICD-10-PCS | Mod: CRNA,,, | Performed by: NURSE ANESTHETIST, CERTIFIED REGISTERED

## 2023-07-26 PROCEDURE — 82962 GLUCOSE BLOOD TEST: CPT | Mod: 91 | Performed by: ORTHOPAEDIC SURGERY

## 2023-07-26 PROCEDURE — D9220A PRA ANESTHESIA: ICD-10-PCS | Mod: ANES,,, | Performed by: ANESTHESIOLOGY

## 2023-07-26 PROCEDURE — 36000707: Performed by: ORTHOPAEDIC SURGERY

## 2023-07-26 PROCEDURE — 64447 NJX AA&/STRD FEMORAL NRV IMG: CPT | Performed by: STUDENT IN AN ORGANIZED HEALTH CARE EDUCATION/TRAINING PROGRAM

## 2023-07-26 PROCEDURE — 28289 PR REPAIR HALLUX RIGIDUS; W/O IMPLANT: ICD-10-PCS | Mod: RT,,, | Performed by: ORTHOPAEDIC SURGERY

## 2023-07-26 PROCEDURE — 71000033 HC RECOVERY, INTIAL HOUR: Performed by: ORTHOPAEDIC SURGERY

## 2023-07-26 PROCEDURE — 37000008 HC ANESTHESIA 1ST 15 MINUTES: Performed by: ORTHOPAEDIC SURGERY

## 2023-07-26 PROCEDURE — 36000706: Performed by: ORTHOPAEDIC SURGERY

## 2023-07-26 PROCEDURE — 64445 NJX AA&/STRD SCIATIC NRV IMG: CPT | Performed by: STUDENT IN AN ORGANIZED HEALTH CARE EDUCATION/TRAINING PROGRAM

## 2023-07-26 PROCEDURE — 64447 RIGHT ADDUCTOR SS: ICD-10-PCS | Mod: 59,RT,, | Performed by: ANESTHESIOLOGY

## 2023-07-26 PROCEDURE — 63600175 PHARM REV CODE 636 W HCPCS: Performed by: NURSE ANESTHETIST, CERTIFIED REGISTERED

## 2023-07-26 PROCEDURE — 64450 RIGHT POPLITEAL SS: ICD-10-PCS | Mod: 59,RT,, | Performed by: ANESTHESIOLOGY

## 2023-07-26 PROCEDURE — 64447 NJX AA&/STRD FEMORAL NRV IMG: CPT | Mod: 59,RT,, | Performed by: ANESTHESIOLOGY

## 2023-07-26 RX ORDER — BUPIVACAINE HYDROCHLORIDE 5 MG/ML
INJECTION, SOLUTION EPIDURAL; INTRACAUDAL
Status: COMPLETED | OUTPATIENT
Start: 2023-07-26 | End: 2023-07-26

## 2023-07-26 RX ORDER — SODIUM CHLORIDE 9 MG/ML
INJECTION, SOLUTION INTRAVENOUS CONTINUOUS PRN
Status: DISCONTINUED | OUTPATIENT
Start: 2023-07-26 | End: 2023-07-26

## 2023-07-26 RX ORDER — LIDOCAINE HYDROCHLORIDE 20 MG/ML
INJECTION INTRAVENOUS
Status: DISCONTINUED | OUTPATIENT
Start: 2023-07-26 | End: 2023-07-26

## 2023-07-26 RX ORDER — HYDROCODONE BITARTRATE AND ACETAMINOPHEN 5; 325 MG/1; MG/1
1 TABLET ORAL EVERY 4 HOURS PRN
Status: DISCONTINUED | OUTPATIENT
Start: 2023-07-26 | End: 2023-07-26 | Stop reason: HOSPADM

## 2023-07-26 RX ORDER — ACETAMINOPHEN 500 MG
1000 TABLET ORAL
Status: COMPLETED | OUTPATIENT
Start: 2023-07-26 | End: 2023-07-26

## 2023-07-26 RX ORDER — SODIUM CHLORIDE 0.9 % (FLUSH) 0.9 %
10 SYRINGE (ML) INJECTION
Status: DISCONTINUED | OUTPATIENT
Start: 2023-07-26 | End: 2023-07-26 | Stop reason: HOSPADM

## 2023-07-26 RX ORDER — HYDROCODONE BITARTRATE AND ACETAMINOPHEN 10; 325 MG/1; MG/1
1 TABLET ORAL EVERY 4 HOURS PRN
Status: DISCONTINUED | OUTPATIENT
Start: 2023-07-26 | End: 2023-07-26 | Stop reason: HOSPADM

## 2023-07-26 RX ORDER — PROPOFOL 10 MG/ML
INJECTION, EMULSION INTRAVENOUS CONTINUOUS PRN
Status: DISCONTINUED | OUTPATIENT
Start: 2023-07-26 | End: 2023-07-26

## 2023-07-26 RX ORDER — LIDOCAINE HYDROCHLORIDE 20 MG/ML
INJECTION, SOLUTION EPIDURAL; INFILTRATION; INTRACAUDAL; PERINEURAL
Status: DISCONTINUED
Start: 2023-07-26 | End: 2023-07-26 | Stop reason: HOSPADM

## 2023-07-26 RX ORDER — ONDANSETRON 2 MG/ML
INJECTION INTRAMUSCULAR; INTRAVENOUS
Status: DISCONTINUED | OUTPATIENT
Start: 2023-07-26 | End: 2023-07-26

## 2023-07-26 RX ORDER — FENTANYL CITRATE 50 UG/ML
25-200 INJECTION, SOLUTION INTRAMUSCULAR; INTRAVENOUS
Status: DISCONTINUED | OUTPATIENT
Start: 2023-07-26 | End: 2023-07-26 | Stop reason: HOSPADM

## 2023-07-26 RX ORDER — METOCLOPRAMIDE HYDROCHLORIDE 5 MG/ML
5 INJECTION INTRAMUSCULAR; INTRAVENOUS EVERY 6 HOURS PRN
Status: DISCONTINUED | OUTPATIENT
Start: 2023-07-26 | End: 2023-07-26 | Stop reason: HOSPADM

## 2023-07-26 RX ORDER — PROPOFOL 10 MG/ML
INJECTION, EMULSION INTRAVENOUS
Status: DISCONTINUED | OUTPATIENT
Start: 2023-07-26 | End: 2023-07-26

## 2023-07-26 RX ORDER — METOCLOPRAMIDE HYDROCHLORIDE 5 MG/ML
10 INJECTION INTRAMUSCULAR; INTRAVENOUS
Status: DISCONTINUED | OUTPATIENT
Start: 2023-07-26 | End: 2023-07-26 | Stop reason: HOSPADM

## 2023-07-26 RX ORDER — ROPIVACAINE HYDROCHLORIDE 5 MG/ML
INJECTION, SOLUTION EPIDURAL; INFILTRATION; PERINEURAL
Status: DISCONTINUED
Start: 2023-07-26 | End: 2023-07-26 | Stop reason: HOSPADM

## 2023-07-26 RX ORDER — EPINEPHRINE 1 MG/ML
INJECTION, SOLUTION, CONCENTRATE INTRAVENOUS
Status: DISCONTINUED
Start: 2023-07-26 | End: 2023-07-26 | Stop reason: HOSPADM

## 2023-07-26 RX ORDER — BUPIVACAINE HYDROCHLORIDE 5 MG/ML
INJECTION, SOLUTION EPIDURAL; INTRACAUDAL
Status: COMPLETED
Start: 2023-07-26 | End: 2023-07-26

## 2023-07-26 RX ORDER — MIDAZOLAM HYDROCHLORIDE 1 MG/ML
.5-4 INJECTION INTRAMUSCULAR; INTRAVENOUS
Status: DISCONTINUED | OUTPATIENT
Start: 2023-07-26 | End: 2023-07-26 | Stop reason: HOSPADM

## 2023-07-26 RX ORDER — METOCLOPRAMIDE HYDROCHLORIDE 5 MG/ML
INJECTION INTRAMUSCULAR; INTRAVENOUS
Status: COMPLETED
Start: 2023-07-26 | End: 2023-07-26

## 2023-07-26 RX ORDER — ONDANSETRON 4 MG/1
8 TABLET, ORALLY DISINTEGRATING ORAL EVERY 8 HOURS PRN
Status: DISCONTINUED | OUTPATIENT
Start: 2023-07-26 | End: 2023-07-26 | Stop reason: HOSPADM

## 2023-07-26 RX ORDER — METOCLOPRAMIDE HYDROCHLORIDE 5 MG/ML
INJECTION INTRAMUSCULAR; INTRAVENOUS
Status: DISCONTINUED | OUTPATIENT
Start: 2023-07-26 | End: 2023-07-26

## 2023-07-26 RX ORDER — FAMOTIDINE 10 MG/ML
INJECTION INTRAVENOUS
Status: DISCONTINUED | OUTPATIENT
Start: 2023-07-26 | End: 2023-07-26

## 2023-07-26 RX ORDER — DEXAMETHASONE SODIUM PHOSPHATE 4 MG/ML
INJECTION, SOLUTION INTRA-ARTICULAR; INTRALESIONAL; INTRAMUSCULAR; INTRAVENOUS; SOFT TISSUE
Status: DISCONTINUED | OUTPATIENT
Start: 2023-07-26 | End: 2023-07-26

## 2023-07-26 RX ORDER — ACETAMINOPHEN 325 MG/1
650 TABLET ORAL EVERY 4 HOURS PRN
Status: DISCONTINUED | OUTPATIENT
Start: 2023-07-26 | End: 2023-07-26 | Stop reason: HOSPADM

## 2023-07-26 RX ADMIN — PROPOFOL 40 MG: 10 INJECTION, EMULSION INTRAVENOUS at 07:07

## 2023-07-26 RX ADMIN — FENTANYL CITRATE 100 MCG: 50 INJECTION INTRAMUSCULAR; INTRAVENOUS at 06:07

## 2023-07-26 RX ADMIN — MIDAZOLAM HYDROCHLORIDE 2 MG: 1 INJECTION, SOLUTION INTRAMUSCULAR; INTRAVENOUS at 06:07

## 2023-07-26 RX ADMIN — PROPOFOL 75 MCG/KG/MIN: 10 INJECTION, EMULSION INTRAVENOUS at 07:07

## 2023-07-26 RX ADMIN — ACETAMINOPHEN 1000 MG: 500 TABLET ORAL at 06:07

## 2023-07-26 RX ADMIN — ONDANSETRON 4 MG: 2 INJECTION INTRAMUSCULAR; INTRAVENOUS at 07:07

## 2023-07-26 RX ADMIN — BUPIVACAINE HYDROCHLORIDE 30 ML: 5 INJECTION, SOLUTION EPIDURAL; INTRACAUDAL; PERINEURAL at 06:07

## 2023-07-26 RX ADMIN — BUPIVACAINE HYDROCHLORIDE 20 ML: 5 INJECTION, SOLUTION EPIDURAL; INTRACAUDAL at 06:07

## 2023-07-26 RX ADMIN — DEXAMETHASONE SODIUM PHOSPHATE 4 MG: 4 INJECTION, SOLUTION INTRAMUSCULAR; INTRAVENOUS at 07:07

## 2023-07-26 RX ADMIN — LIDOCAINE HYDROCHLORIDE 60 MG: 20 INJECTION INTRAVENOUS at 07:07

## 2023-07-26 RX ADMIN — SODIUM CHLORIDE: 0.9 INJECTION, SOLUTION INTRAVENOUS at 06:07

## 2023-07-26 RX ADMIN — FAMOTIDINE 20 MG: 10 INJECTION, SOLUTION INTRAVENOUS at 07:07

## 2023-07-26 RX ADMIN — CEFAZOLIN 2 G: 2 INJECTION, POWDER, FOR SOLUTION INTRAMUSCULAR; INTRAVENOUS at 07:07

## 2023-07-26 RX ADMIN — METOCLOPRAMIDE 10 MG: 5 INJECTION, SOLUTION INTRAMUSCULAR; INTRAVENOUS at 07:07

## 2023-07-26 NOTE — TRANSFER OF CARE
"Anesthesia Transfer of Care Note    Patient: Starla Wharton    Procedure(s) Performed: Procedure(s) (LRB):  CHEILECTOMY big toe (Right)    Patient location: PACU    Anesthesia Type: general    Transport from OR: Transported from OR on 6-10 L/min O2 by face mask with adequate spontaneous ventilation    Post pain: adequate analgesia    Post assessment: no apparent anesthetic complications    Post vital signs: stable    Level of consciousness: awake    Nausea/Vomiting: no nausea/vomiting    Complications: none    Transfer of care protocol was followed      Last vitals:   Visit Vitals  /63 (BP Location: Left arm, Patient Position: Lying)   Pulse 83   Temp 36.8 °C (98.2 °F) (Tympanic)   Resp 14   Ht 5' 6" (1.676 m)   Wt 105.7 kg (233 lb)   SpO2 100%   Breastfeeding No   BMI 37.61 kg/m²     "

## 2023-07-26 NOTE — BRIEF OP NOTE
[place] - Surgery ([facility])  Brief Operative Note    Surgery Date: 7/26/2023     Surgeon(s) and Role:     * Dmitry Clarke MD - Primary    Assisting Surgeon: None    Pre-op Diagnosis:  Hallux rigidus, right foot [M20.21]    Post-op Diagnosis:  Post-Op Diagnosis Codes:     * Hallux rigidus, right foot [M20.21]    Procedure(s) (LRB):  CHEILECTOMY big toe (Right)    Anesthesia: Choice    Operative Findings: See Op Note    Estimated Blood Loss: 5cc         Specimens:   Specimen (24h ago, onward)      None              Discharge Note    OUTCOME: Patient tolerated treatment/procedure well without complication and is now ready for discharge.    DISPOSITION: Home or Self Care    FINAL DIAGNOSIS:  Hallux rigidus, right foot    FOLLOWUP: In clinic    DISCHARGE INSTRUCTIONS:    Discharge Procedure Orders   X-Ray Foot Complete 3 view Right   Standing Status: Future Standing Exp. Date: 07/26/24     Order Specific Question Answer Comments   May the Radiologist modify the order per protocol to meet the clinical needs of the patient? Yes    Release to patient Immediate      Diet general     Sponge bath only until clinic visit     Keep surgical extremity elevated     Ice to affected area     No driving, operating heavy equipment or signing legal documents while taking pain medication     Leave dressing on - Keep it clean, dry, and intact until clinic visit     Call MD for:  temperature >100.4     Call MD for:  persistent nausea and vomiting     Call MD for:  severe uncontrolled pain     Call MD for:  difficulty breathing, headache or visual disturbances     Call MD for:  redness, tenderness, or signs of infection (pain, swelling, redness, odor or green/yellow discharge around incision site)     Call MD for:  hives     Call MD for:  persistent dizziness or light-headedness     Call MD for:  extreme fatigue     Non weight bearing   Order Comments: WBAT RLE

## 2023-07-26 NOTE — ANESTHESIA PROCEDURE NOTES
Right Popliteal SS    Patient location during procedure: pre-op   Block not for primary anesthetic.  Reason for block: at surgeon's request and post-op pain management   Post-op Pain Location: Right Lower Extremity Pain   Start time: 7/26/2023 6:27 AM  Timeout: 7/26/2023 6:26 AM   End time: 7/26/2023 6:45 AM    Staffing  Authorizing Provider: Alexander Snyder MD  Performing Provider: Mert Gama DO    Preanesthetic Checklist  Completed: patient identified, IV checked, site marked, risks and benefits discussed, surgical consent, monitors and equipment checked, pre-op evaluation and timeout performed  Peripheral Block  Patient position: supine  Prep: ChloraPrep  Patient monitoring: heart rate, cardiac monitor, continuous pulse ox, continuous capnometry and frequent blood pressure checks  Block type: popliteal  Laterality: right  Injection technique: single shot  Needle  Needle type: Stimuplex   Needle gauge: 21 G  Needle length: 4 in  Needle localization: anatomical landmarks and ultrasound guidance   -ultrasound image captured on disc.  Assessment  Injection assessment: negative aspiration, negative parasthesia and local visualized surrounding nerve  Paresthesia pain: none  Heart rate change: no  Slow fractionated injection: yes  Pain Tolerance: comfortable throughout block  Medications:    Medications: bupivacaine (pf) (MARCAINE) injection 0.5% - Perineural   30 mL - 7/26/2023 6:30:00 AM    Additional Notes  VSS.  [facility] RN monitoring vitals throughout procedure.  Patient tolerated procedure well.

## 2023-07-26 NOTE — ANESTHESIA PROCEDURE NOTES
Right Adductor SS    Patient location during procedure: pre-op   Block not for primary anesthetic.  Reason for block: at surgeon's request and post-op pain management   Post-op Pain Location: Right Lower Extremity   Start time: 7/26/2023 6:27 AM  Timeout: 7/26/2023 6:26 AM   End time: 7/26/2023 6:45 AM    Staffing  Authorizing Provider: Alexander Snyder MD  Performing Provider: Mert Gama DO    Preanesthetic Checklist  Completed: patient identified, IV checked, site marked, risks and benefits discussed, surgical consent, monitors and equipment checked, pre-op evaluation and timeout performed  Peripheral Block  Patient position: supine  Prep: ChloraPrep  Patient monitoring: heart rate, cardiac monitor, continuous pulse ox, continuous capnometry and frequent blood pressure checks  Block type: adductor canal  Laterality: right  Injection technique: single shot  Needle  Needle type: Stimuplex   Needle gauge: 21 G  Needle length: 4 in  Needle localization: anatomical landmarks and ultrasound guidance   -ultrasound image captured on disc.  Assessment  Injection assessment: negative aspiration, negative parasthesia and local visualized surrounding nerve  Paresthesia pain: none  Heart rate change: no  Slow fractionated injection: yes    Medications:    Medications: bupivacaine (pf) (MARCAINE) injection 0.5% - Perineural   20 mL - 7/26/2023 6:32:00 AM    Additional Notes  VSS.  [facility] RN monitoring vitals throughout procedure.  Patient tolerated procedure well.

## 2023-07-26 NOTE — OP NOTE
Operative report  Date of surgery 07/26/2023    Preop diagnosis:  Right big toe hallux rigidus    Postop diagnosis:  Same     Procedure:  Right big toe cheilectomy     Anesthesia:  Regional     Surgeon:  Dr. Clarke     Complications:  None     Estimated blood loss:  None     Specimens:  None    Procedure in detail:  After regional anesthesia was administered in the preop holding area, the patient was brought to the operating room placed on the operating table in a supine position.  The patient's right leg was prepped and draped in a sterile fashion.  A sterile tourniquet was placed around the calf.  The right leg was exsanguinated with an Esmarch bandage and the tourniquet was elevated to 250 mmHg.  A dorsal incision was made centered over the right big toe MTP joint and the significant bony prominences.  Incision was carried through skin and proximally I encountered three bony loose bodies which were excised.  The incision was carried sharply down to and through the joint capsule exposing the 1st MTP joint.  There was severe osteophyte formation with a large loose fragment dorsally that was excised.  The joint space was devoid of any cartilage.  A microsagittal saw was used to remove the osteophyte formation on the dorsal medial and lateral aspect of the 1st metatarsal head as well as along the base of the proximal phalanx.  A reciprocating rasp was used to smooth down the rough edges.  The joint was well irrigated.  The joint capsule was closed with 3-0 Vicryl suture.  The skin incision was closed with 3-0 nylon suture.  A sterile compressive dressing was placed and the patient was returned to the postop holding area in stable condition.

## 2023-07-26 NOTE — PLAN OF CARE
Nursing pre-op complete. Pt calm, will continue to monitor. Pt dropped off at facility by boyfrienmagalis Rodriguez. Call friend Rose for ride home. Belongings placed in locker 4, cane placed in pre-post room 1. Terell warmer in use for comfort.

## 2023-07-26 NOTE — DISCHARGE SUMMARY
[place] - Surgery (Hospital)  Discharge Note  Short Stay    Procedure(s) (LRB):  CHEILECTOMY big toe (Right)      OUTCOME: Patient tolerated treatment/procedure well without complication and is now ready for discharge.    DISPOSITION: Home or Self Care    FINAL DIAGNOSIS:  Hallux rigidus, right foot    FOLLOWUP: In clinic    DISCHARGE INSTRUCTIONS:    Discharge Procedure Orders   X-Ray Foot Complete 3 view Right   Standing Status: Future Standing Exp. Date: 07/26/24     Order Specific Question Answer Comments   May the Radiologist modify the order per protocol to meet the clinical needs of the patient? Yes    Release to patient Immediate      Diet general     Sponge bath only until clinic visit     Keep surgical extremity elevated     Ice to affected area     No driving, operating heavy equipment or signing legal documents while taking pain medication     Leave dressing on - Keep it clean, dry, and intact until clinic visit     Call MD for:  temperature >100.4     Call MD for:  persistent nausea and vomiting     Call MD for:  severe uncontrolled pain     Call MD for:  difficulty breathing, headache or visual disturbances     Call MD for:  redness, tenderness, or signs of infection (pain, swelling, redness, odor or green/yellow discharge around incision site)     Call MD for:  hives     Call MD for:  persistent dizziness or light-headedness     Call MD for:  extreme fatigue     Non weight bearing   Order Comments: WBAT RLE        TIME SPENT ON DISCHARGE: 5 minutes

## 2023-07-26 NOTE — CARE UPDATE
Spoke with patient's friend Rose and confirmed she will be available to  patient from surgery today.

## 2023-07-26 NOTE — ANESTHESIA PREPROCEDURE EVALUATION
07/26/2023  Starla Wharton is a 58 y.o., female.      Pre-op Assessment    I have reviewed the Patient Summary Reports.     I have reviewed the Nursing Notes. I have reviewed the NPO Status.   I have reviewed the Medications.     Review of Systems  Anesthesia Hx:  No problems with previous Anesthesia  History of prior surgery of interest to airway management or planning: Previous anesthesia: General Denies Family Hx of Anesthesia complications.   Denies Personal Hx of Anesthesia complications.   Social:  Non-Smoker    Hematology/Oncology:  Hematology Normal   Oncology Normal     EENT/Dental:EENT/Dental Normal   Cardiovascular:   Exercise tolerance: good Hypertension hyperlipidemia    Pulmonary:  Pulmonary Normal    Renal/:   Chronic Renal Disease, CKD    Hepatic/GI:   GERD, well controlled Gastroparesis   Musculoskeletal:  Musculoskeletal Normal    Neurological:   Neuromuscular Disease,   Peripheral Neuropathy    Endocrine:   Diabetes, type 2  Obesity / BMI > 30  Dermatological:  Skin Normal    Psych:  Psychiatric Normal           Physical Exam  General: Well nourished, Cooperative, Alert and Oriented    Airway:  Mallampati: III / II  Mouth Opening: Normal  TM Distance: Normal  Tongue: Normal  Neck ROM: Normal ROM    Dental:  Intact    Chest/Lungs:  Clear to auscultation, Normal Respiratory Rate    Heart:  Rate: Normal  Rhythm: Regular Rhythm  Sounds: Normal    Abdomen:  Normal, Soft, Nontender        Anesthesia Plan  Type of Anesthesia, risks & benefits discussed:    Anesthesia Type: Gen Supraglottic Airway, Gen ETT, Gen Natural Airway  Intra-op Monitoring Plan: Standard ASA Monitors  Post Op Pain Control Plan: multimodal analgesia and peripheral nerve block  Induction:  IV  Airway Plan: Direct, Post-Induction  Informed Consent: Informed consent signed with the Patient and all parties understand the risks  and agree with anesthesia plan.  All questions answered.   ASA Score: 3    Ready For Surgery From Anesthesia Perspective.     .

## 2023-07-26 NOTE — ANESTHESIA POSTPROCEDURE EVALUATION
Anesthesia Post Evaluation    Patient: Starla Wharton    Procedure(s) Performed: Procedure(s) (LRB):  CHEILECTOMY big toe (Right)    Final Anesthesia Type: general      Patient location during evaluation: PACU  Patient participation: Yes- Able to Participate  Level of consciousness: awake and alert and oriented  Post-procedure vital signs: reviewed and stable  Pain management: adequate  Airway patency: patent    PONV status at discharge: No PONV  Anesthetic complications: no      Cardiovascular status: hemodynamically stable  Respiratory status: unassisted, spontaneous ventilation and room air  Hydration status: euvolemic  Follow-up not needed.          Vitals Value Taken Time   /59 07/26/23 0902   Temp 36.7 °C (98 °F) 07/26/23 0845   Pulse 77 07/26/23 0907   Resp 14 07/26/23 0906   SpO2 97 % 07/26/23 0907   Vitals shown include unvalidated device data.      Event Time   Out of Recovery 09:00:00         Pain/Cole Score: Pain Rating Prior to Med Admin: 5 (7/26/2023  6:26 AM)  Pain Rating Post Med Admin: 0 (7/26/2023  6:40 AM)  Cole Score: 10 (7/26/2023  8:30 AM)

## 2023-08-02 NOTE — H&P
"COLONOSCOPY HISTORY AND PHYSICAL EXAM    Procedure : Colonoscopy      INDICATIONS: personal history of colon polyps    Last Colonoscopy:  2017       Past Medical History:   Diagnosis Date    Diabetes mellitus     Hypertension     Personal history of colonic polyps 06/12/2017     Sedation Problems: NO  Family History   Problem Relation Age of Onset    Stomach cancer Father      Fam Hx of Sedation Problems: NO  Social History     Socioeconomic History    Marital status: Single   Tobacco Use    Smoking status: Never    Smokeless tobacco: Never   Substance and Sexual Activity    Alcohol use: Yes     Comment: socially    Drug use: No       Review of Systems - Negative except   Respiratory ROS: no dyspnea  Cardiovascular ROS: no exertional chest pain  Gastrointestinal ROS: NO abdominal discomfort,  NO rectal bleeding  Musculoskeletal ROS: no muscular pain  Neurological ROS: no recent stroke    Physical Exam:  /60 (BP Location: Left arm, Patient Position: Lying)   Pulse 92   Temp 97.9 °F (36.6 °C) (Temporal)   Resp 16   Ht 5' 6" (1.676 m)   Wt 108.9 kg (240 lb)   SpO2 100%   Breastfeeding No   BMI 38.74 kg/m²   General: no distress  Head: normocephalic  Mallampati Score   Neck: supple, symmetrical, trachea midline  Lungs:  clear to auscultation bilaterally and normal respiratory effort  Heart: regular rate and rhythm and no murmur  Abdomen: soft, non-tender non-distented; bowel sounds normal; no masses,  no organomegaly  Extremities: no cyanosis or edema, or clubbing    ASA:  II    PLAN  COLONOSCOPY.    SedationPlan :MAC    The details of the procedure, the possible need for biopsy or polypectomy and the potential risks including bleeding, perforation, missed polyps were discussed in detail.     " Colchicine Counseling:  Patient counseled regarding adverse effects including but not limited to stomach upset (nausea, vomiting, stomach pain, or diarrhea).  Patient instructed to limit alcohol consumption while taking this medication.  Colchicine may reduce blood counts especially with prolonged use.  The patient understands that monitoring of kidney function and blood counts may be required, especially at baseline. The patient verbalized understanding of the proper use and possible adverse effects of colchicine.  All of the patient's questions and concerns were addressed.

## 2023-08-08 ENCOUNTER — OFFICE VISIT (OUTPATIENT)
Dept: ORTHOPEDICS | Facility: CLINIC | Age: 58
End: 2023-08-08
Payer: MEDICARE

## 2023-08-08 VITALS — HEIGHT: 66 IN | BODY MASS INDEX: 37.45 KG/M2 | WEIGHT: 233 LBS

## 2023-08-08 DIAGNOSIS — M20.21 HALLUX RIGIDUS, RIGHT FOOT: Primary | ICD-10-CM

## 2023-08-08 PROCEDURE — 4010F PR ACE/ARB THEARPY RXD/TAKEN: ICD-10-PCS | Mod: CPTII,S$GLB,, | Performed by: PHYSICIAN ASSISTANT

## 2023-08-08 PROCEDURE — 4010F ACE/ARB THERAPY RXD/TAKEN: CPT | Mod: CPTII,S$GLB,, | Performed by: PHYSICIAN ASSISTANT

## 2023-08-08 PROCEDURE — 3066F PR DOCUMENTATION OF TREATMENT FOR NEPHROPATHY: ICD-10-PCS | Mod: CPTII,S$GLB,, | Performed by: PHYSICIAN ASSISTANT

## 2023-08-08 PROCEDURE — 1159F MED LIST DOCD IN RCRD: CPT | Mod: CPTII,S$GLB,, | Performed by: PHYSICIAN ASSISTANT

## 2023-08-08 PROCEDURE — 3051F HG A1C>EQUAL 7.0%<8.0%: CPT | Mod: CPTII,S$GLB,, | Performed by: PHYSICIAN ASSISTANT

## 2023-08-08 PROCEDURE — 99024 POSTOP FOLLOW-UP VISIT: CPT | Mod: S$GLB,,, | Performed by: PHYSICIAN ASSISTANT

## 2023-08-08 PROCEDURE — 3051F PR MOST RECENT HEMOGLOBIN A1C LEVEL 7.0 - < 8.0%: ICD-10-PCS | Mod: CPTII,S$GLB,, | Performed by: PHYSICIAN ASSISTANT

## 2023-08-08 PROCEDURE — 3008F PR BODY MASS INDEX (BMI) DOCUMENTED: ICD-10-PCS | Mod: CPTII,S$GLB,, | Performed by: PHYSICIAN ASSISTANT

## 2023-08-08 PROCEDURE — 1159F PR MEDICATION LIST DOCUMENTED IN MEDICAL RECORD: ICD-10-PCS | Mod: CPTII,S$GLB,, | Performed by: PHYSICIAN ASSISTANT

## 2023-08-08 PROCEDURE — 99999 PR PBB SHADOW E&M-EST. PATIENT-LVL III: ICD-10-PCS | Mod: PBBFAC,,, | Performed by: PHYSICIAN ASSISTANT

## 2023-08-08 PROCEDURE — 99024 PR POST-OP FOLLOW-UP VISIT: ICD-10-PCS | Mod: S$GLB,,, | Performed by: PHYSICIAN ASSISTANT

## 2023-08-08 PROCEDURE — 3061F NEG MICROALBUMINURIA REV: CPT | Mod: CPTII,S$GLB,, | Performed by: PHYSICIAN ASSISTANT

## 2023-08-08 PROCEDURE — 3008F BODY MASS INDEX DOCD: CPT | Mod: CPTII,S$GLB,, | Performed by: PHYSICIAN ASSISTANT

## 2023-08-08 PROCEDURE — 99999 PR PBB SHADOW E&M-EST. PATIENT-LVL III: CPT | Mod: PBBFAC,,, | Performed by: PHYSICIAN ASSISTANT

## 2023-08-08 PROCEDURE — 1160F PR REVIEW ALL MEDS BY PRESCRIBER/CLIN PHARMACIST DOCUMENTED: ICD-10-PCS | Mod: CPTII,S$GLB,, | Performed by: PHYSICIAN ASSISTANT

## 2023-08-08 PROCEDURE — 3066F NEPHROPATHY DOC TX: CPT | Mod: CPTII,S$GLB,, | Performed by: PHYSICIAN ASSISTANT

## 2023-08-08 PROCEDURE — 1160F RVW MEDS BY RX/DR IN RCRD: CPT | Mod: CPTII,S$GLB,, | Performed by: PHYSICIAN ASSISTANT

## 2023-08-08 PROCEDURE — 3061F PR NEG MICROALBUMINURIA RESULT DOCUMENTED/REVIEW: ICD-10-PCS | Mod: CPTII,S$GLB,, | Performed by: PHYSICIAN ASSISTANT

## 2023-08-08 NOTE — PROGRESS NOTES
Postoperative Visit  DOS 7/26/23    History of Present Illness:   Starla Wharton is s/p right big toe cheilectomy  who comes to the office for 2 weeks post op evaluation. Her pain is well controlled and the patient is not taking narcotic pain medications. She has been compliant with weight bearing restrictions and elevation instructions.  She denies fevers or chills. She reports some continued swelling.  Pain is improving.      Physical Examination:  She is alert, awake and oriented to time, place and person. She does not appear to be in any distress, and denies any constitutional symptoms. Examination of the right big toe demonstrates healing  incision with no erythema or drainage.  There is mild appropriate post op swelling.    Assessment/Plan:  2 weeks s/p right big toe cheilectomy  Sutures were removed today and steri-strips applied  Advance WBAT  Shoe wear as pain and swelling allow  No refills needed  Follow up in 4 weeks with Dr. Clarke    All questions were answered in detail. The patient is in full agreement with the treatment plan and will proceed accordingly.

## 2023-08-24 ENCOUNTER — HOSPITAL ENCOUNTER (OUTPATIENT)
Dept: RADIOLOGY | Facility: OTHER | Age: 58
Discharge: HOME | End: 2023-08-24
Attending: STUDENT IN AN ORGANIZED HEALTH CARE EDUCATION/TRAINING PROGRAM
Payer: MEDICARE

## 2023-08-24 DIAGNOSIS — Z12.31 SCREENING MAMMOGRAM FOR BREAST CANCER: ICD-10-CM

## 2023-08-24 PROCEDURE — 77067 SCR MAMMO BI INCL CAD: CPT | Mod: TC

## 2023-08-24 PROCEDURE — 77067 SCR MAMMO BI INCL CAD: CPT | Mod: 26,,, | Performed by: RADIOLOGY

## 2023-08-24 PROCEDURE — 77063 MAMMO DIGITAL SCREENING BILAT WITH TOMO: ICD-10-PCS | Mod: 26,,, | Performed by: RADIOLOGY

## 2023-08-24 PROCEDURE — 77067 MAMMO DIGITAL SCREENING BILAT WITH TOMO: ICD-10-PCS | Mod: 26,,, | Performed by: RADIOLOGY

## 2023-08-24 PROCEDURE — 77063 BREAST TOMOSYNTHESIS BI: CPT | Mod: 26,,, | Performed by: RADIOLOGY

## 2023-08-31 PROBLEM — E78.2 MIXED HYPERLIPIDEMIA: Status: ACTIVE | Noted: 2018-03-04

## 2023-09-11 ENCOUNTER — OFFICE VISIT (OUTPATIENT)
Dept: ORTHOPEDICS | Facility: CLINIC | Age: 58
End: 2023-09-11
Payer: MEDICARE

## 2023-09-11 DIAGNOSIS — M20.21 HALLUX RIGIDUS, RIGHT FOOT: ICD-10-CM

## 2023-09-11 DIAGNOSIS — Z09 FOLLOW-UP EXAMINATION AFTER ORTHOPEDIC SURGERY: Primary | ICD-10-CM

## 2023-09-11 PROCEDURE — 3066F PR DOCUMENTATION OF TREATMENT FOR NEPHROPATHY: ICD-10-PCS | Mod: CPTII,S$GLB,, | Performed by: ORTHOPAEDIC SURGERY

## 2023-09-11 PROCEDURE — 3051F PR MOST RECENT HEMOGLOBIN A1C LEVEL 7.0 - < 8.0%: ICD-10-PCS | Mod: CPTII,S$GLB,, | Performed by: ORTHOPAEDIC SURGERY

## 2023-09-11 PROCEDURE — 4010F ACE/ARB THERAPY RXD/TAKEN: CPT | Mod: CPTII,S$GLB,, | Performed by: ORTHOPAEDIC SURGERY

## 2023-09-11 PROCEDURE — 99024 PR POST-OP FOLLOW-UP VISIT: ICD-10-PCS | Mod: S$GLB,,, | Performed by: ORTHOPAEDIC SURGERY

## 2023-09-11 PROCEDURE — 1159F MED LIST DOCD IN RCRD: CPT | Mod: CPTII,S$GLB,, | Performed by: ORTHOPAEDIC SURGERY

## 2023-09-11 PROCEDURE — 3066F NEPHROPATHY DOC TX: CPT | Mod: CPTII,S$GLB,, | Performed by: ORTHOPAEDIC SURGERY

## 2023-09-11 PROCEDURE — 4010F PR ACE/ARB THEARPY RXD/TAKEN: ICD-10-PCS | Mod: CPTII,S$GLB,, | Performed by: ORTHOPAEDIC SURGERY

## 2023-09-11 PROCEDURE — 99024 POSTOP FOLLOW-UP VISIT: CPT | Mod: S$GLB,,, | Performed by: ORTHOPAEDIC SURGERY

## 2023-09-11 PROCEDURE — 99999 PR PBB SHADOW E&M-EST. PATIENT-LVL II: CPT | Mod: PBBFAC,,, | Performed by: ORTHOPAEDIC SURGERY

## 2023-09-11 PROCEDURE — 3061F NEG MICROALBUMINURIA REV: CPT | Mod: CPTII,S$GLB,, | Performed by: ORTHOPAEDIC SURGERY

## 2023-09-11 PROCEDURE — 3051F HG A1C>EQUAL 7.0%<8.0%: CPT | Mod: CPTII,S$GLB,, | Performed by: ORTHOPAEDIC SURGERY

## 2023-09-11 PROCEDURE — 1159F PR MEDICATION LIST DOCUMENTED IN MEDICAL RECORD: ICD-10-PCS | Mod: CPTII,S$GLB,, | Performed by: ORTHOPAEDIC SURGERY

## 2023-09-11 PROCEDURE — 99999 PR PBB SHADOW E&M-EST. PATIENT-LVL II: ICD-10-PCS | Mod: PBBFAC,,, | Performed by: ORTHOPAEDIC SURGERY

## 2023-09-11 PROCEDURE — 3061F PR NEG MICROALBUMINURIA RESULT DOCUMENTED/REVIEW: ICD-10-PCS | Mod: CPTII,S$GLB,, | Performed by: ORTHOPAEDIC SURGERY

## 2023-09-11 NOTE — PROGRESS NOTES
Starla Wharton  Returns today for follow-up.  She is now about six weeks postop from her right big toe cheilectomy for hallux rigidus.  She returns today and reports that the big toe is significantly improved but she is still having some neurogenic symptoms which I believe are related to the nerve block.  She reports numbness and tingling in her forefoot    Examination:.  The right foot reveals some moderate swelling of the right big toe and forefoot this morning.  She has minimal tenderness over the MTP joint.  She appears to have improved motion of the big toe compared to her preoperative motion.    Impression:  1. Follow-up examination after orthopedic surgery        2. Hallux rigidus, right foot              Recommendation:  I believe she is progressing as expected.  The symptoms of numbness and tingling may be related to the nerve block but she also has peripheral neuropathy which may be exacerbated from the surgery.  She can progress her shoe wear and activity as swelling and pain allow.  I reminded her this procedure takes about six months to reach the final outcome.    Follow-up in six weeks if necessary

## 2023-09-13 DIAGNOSIS — E78.5 HYPERLIPIDEMIA, UNSPECIFIED HYPERLIPIDEMIA TYPE: ICD-10-CM

## 2023-09-13 RX ORDER — ATORVASTATIN CALCIUM 40 MG/1
40 TABLET, FILM COATED ORAL DAILY
Qty: 90 TABLET | Refills: 3 | Status: SHIPPED | OUTPATIENT
Start: 2023-09-13

## 2023-09-13 NOTE — TELEPHONE ENCOUNTER
No care due was identified.  [facility] Embedded Care Due Messages. Reference number: [id number].   9/13/2023 5:08:33 AM CDT

## 2023-09-13 NOTE — TELEPHONE ENCOUNTER
Refill Decision Note      Refill Decision Note   Starlahugo Wharton  is requesting a refill authorization.  Brief Assessment and Rationale for Refill:  Approve     Medication Therapy Plan:         Comments:     Note composed:5:41 AM 09/13/2023             Appointments     Last Visit   6/29/2023 Meghan Perez MD   Next Visit   12/20/2023 Meghan Perez MD

## 2023-10-01 DIAGNOSIS — I10 ESSENTIAL HYPERTENSION: ICD-10-CM

## 2023-10-01 RX ORDER — HYDROCHLOROTHIAZIDE 25 MG/1
25 TABLET ORAL DAILY
Qty: 90 TABLET | Refills: 2 | Status: SHIPPED | OUTPATIENT
Start: 2023-10-01 | End: 2024-07-02

## 2023-10-01 NOTE — TELEPHONE ENCOUNTER
Refill Decision Note   Starla Wharton  is requesting a refill authorization.  Brief Assessment and Rationale for Refill:  Approve     Medication Therapy Plan:  FLOS      Comments:     Note composed:6:10 PM 10/01/2023

## 2023-10-01 NOTE — TELEPHONE ENCOUNTER
Care Due:                  Date            Visit Type   Department     Provider  --------------------------------------------------------------------------------                                EP -                              PRIMARY      [facility] INTERNAL  Last Visit: 06-      CARE ([facility])   PATY Perez                              [facility]                              PRIMARY      [facility] INTERNAL  Next Visit: 12-      CARE ([facility])   PATY Perez                                                            Last  Test          Frequency    Reason                     Performed    Due Date  --------------------------------------------------------------------------------    HBA1C.......  6 months...  insulin, semaglutide.....  06- 12-    [facility] Embedded Care Due Messages. Reference number: [id number].   10/01/2023 5:08:48 AM CDT

## 2023-10-11 ENCOUNTER — LAB VISIT (OUTPATIENT)
Dept: LAB | Facility: OTHER | Age: 58
End: 2023-10-11
Attending: NURSE PRACTITIONER
Payer: MEDICARE

## 2023-10-11 DIAGNOSIS — E11.8 TYPE 2 DIABETES MELLITUS WITH COMPLICATION, WITH LONG-TERM CURRENT USE OF INSULIN: ICD-10-CM

## 2023-10-11 DIAGNOSIS — D64.9 ANEMIA, UNSPECIFIED TYPE: ICD-10-CM

## 2023-10-11 DIAGNOSIS — D53.9 NUTRITIONAL ANEMIA: ICD-10-CM

## 2023-10-11 DIAGNOSIS — Z79.4 TYPE 2 DIABETES MELLITUS WITH COMPLICATION, WITH LONG-TERM CURRENT USE OF INSULIN: ICD-10-CM

## 2023-10-11 LAB
ANION GAP SERPL CALC-SCNC: 10 MMOL/L (ref 8–16)
BASOPHILS # BLD AUTO: 0.04 K/UL (ref 0–0.2)
BASOPHILS NFR BLD: 0.5 % (ref 0–1.9)
BUN SERPL-MCNC: 31 MG/DL (ref 6–20)
CALCIUM SERPL-MCNC: 9.5 MG/DL (ref 8.7–10.5)
CHLORIDE SERPL-SCNC: 105 MMOL/L (ref 95–110)
CO2 SERPL-SCNC: 26 MMOL/L (ref 23–29)
CREAT SERPL-MCNC: 1.4 MG/DL (ref 0.5–1.4)
DIFFERENTIAL METHOD: ABNORMAL
EOSINOPHIL # BLD AUTO: 0.2 K/UL (ref 0–0.5)
EOSINOPHIL NFR BLD: 2.3 % (ref 0–8)
ERYTHROCYTE [DISTWIDTH] IN BLOOD BY AUTOMATED COUNT: 15.9 % (ref 11.5–14.5)
EST. GFR  (NO RACE VARIABLE): 43 ML/MIN/1.73 M^2
GLUCOSE SERPL-MCNC: 221 MG/DL (ref 70–110)
HCT VFR BLD AUTO: 35.9 % (ref 37–48.5)
HGB BLD-MCNC: 11 G/DL (ref 12–16)
IMM GRANULOCYTES # BLD AUTO: 0.04 K/UL (ref 0–0.04)
IMM GRANULOCYTES NFR BLD AUTO: 0.5 % (ref 0–0.5)
LYMPHOCYTES # BLD AUTO: 3.2 K/UL (ref 1–4.8)
LYMPHOCYTES NFR BLD: 40.5 % (ref 18–48)
MCH RBC QN AUTO: 25.3 PG (ref 27–31)
MCHC RBC AUTO-ENTMCNC: 30.6 G/DL (ref 32–36)
MCV RBC AUTO: 83 FL (ref 82–98)
MONOCYTES # BLD AUTO: 0.4 K/UL (ref 0.3–1)
MONOCYTES NFR BLD: 4.5 % (ref 4–15)
NEUTROPHILS # BLD AUTO: 4.1 K/UL (ref 1.8–7.7)
NEUTROPHILS NFR BLD: 51.7 % (ref 38–73)
NRBC BLD-RTO: 0 /100 WBC
PATH REV BLD -IMP: NORMAL
PLATELET # BLD AUTO: 313 K/UL (ref 150–450)
PMV BLD AUTO: 8.9 FL (ref 9.2–12.9)
POTASSIUM SERPL-SCNC: 4 MMOL/L (ref 3.5–5.1)
RBC # BLD AUTO: 4.34 M/UL (ref 4–5.4)
RETICS/RBC NFR AUTO: 1.5 % (ref 0.5–2.5)
SODIUM SERPL-SCNC: 141 MMOL/L (ref 136–145)
WBC # BLD AUTO: 7.92 K/UL (ref 3.9–12.7)

## 2023-10-11 PROCEDURE — 80048 BASIC METABOLIC PNL TOTAL CA: CPT | Performed by: STUDENT IN AN ORGANIZED HEALTH CARE EDUCATION/TRAINING PROGRAM

## 2023-10-11 PROCEDURE — 85045 AUTOMATED RETICULOCYTE COUNT: CPT | Performed by: STUDENT IN AN ORGANIZED HEALTH CARE EDUCATION/TRAINING PROGRAM

## 2023-10-11 PROCEDURE — 82746 ASSAY OF FOLIC ACID SERUM: CPT | Performed by: STUDENT IN AN ORGANIZED HEALTH CARE EDUCATION/TRAINING PROGRAM

## 2023-10-11 PROCEDURE — 85025 COMPLETE CBC W/AUTO DIFF WBC: CPT | Performed by: STUDENT IN AN ORGANIZED HEALTH CARE EDUCATION/TRAINING PROGRAM

## 2023-10-11 PROCEDURE — 36415 COLL VENOUS BLD VENIPUNCTURE: CPT | Performed by: STUDENT IN AN ORGANIZED HEALTH CARE EDUCATION/TRAINING PROGRAM

## 2023-10-11 PROCEDURE — 82607 VITAMIN B-12: CPT | Performed by: STUDENT IN AN ORGANIZED HEALTH CARE EDUCATION/TRAINING PROGRAM

## 2023-10-11 PROCEDURE — 85060 PATHOLOGIST REVIEW: ICD-10-PCS | Mod: ,,, | Performed by: STUDENT IN AN ORGANIZED HEALTH CARE EDUCATION/TRAINING PROGRAM

## 2023-10-11 PROCEDURE — 85060 BLOOD SMEAR INTERPRETATION: CPT | Mod: ,,, | Performed by: STUDENT IN AN ORGANIZED HEALTH CARE EDUCATION/TRAINING PROGRAM

## 2023-10-11 PROCEDURE — 83020 HEMOGLOBIN ELECTROPHORESIS: CPT | Performed by: STUDENT IN AN ORGANIZED HEALTH CARE EDUCATION/TRAINING PROGRAM

## 2023-10-11 PROCEDURE — 83036 HEMOGLOBIN GLYCOSYLATED A1C: CPT | Performed by: NURSE PRACTITIONER

## 2023-10-12 ENCOUNTER — PATIENT MESSAGE (OUTPATIENT)
Dept: INTERNAL MEDICINE | Facility: CLINIC | Age: 58
End: 2023-10-12
Payer: MEDICARE

## 2023-10-12 LAB
ESTIMATED AVG GLUCOSE: 180 MG/DL (ref 68–131)
FOLATE SERPL-MCNC: 9.5 NG/ML (ref 4–24)
HBA1C MFR BLD: 7.9 % (ref 4–5.6)
VIT B12 SERPL-MCNC: 664 PG/ML (ref 210–950)

## 2023-10-13 LAB — PATH REV BLD -IMP: NORMAL

## 2023-10-14 LAB
HGB A2 MFR BLD HPLC: 2.4 % (ref 2.2–3.2)
HGB FRACT BLD ELPH-IMP: NORMAL
HGB FRACT BLD ELPH-IMP: NORMAL

## 2023-10-16 ENCOUNTER — OFFICE VISIT (OUTPATIENT)
Dept: INTERNAL MEDICINE | Facility: CLINIC | Age: 58
End: 2023-10-16
Payer: MEDICARE

## 2023-10-16 VITALS
HEIGHT: 65 IN | SYSTOLIC BLOOD PRESSURE: 144 MMHG | HEART RATE: 89 BPM | BODY MASS INDEX: 40.84 KG/M2 | DIASTOLIC BLOOD PRESSURE: 88 MMHG | OXYGEN SATURATION: 100 % | WEIGHT: 245.13 LBS

## 2023-10-16 DIAGNOSIS — E11.65 TYPE 2 DIABETES MELLITUS WITH HYPERGLYCEMIA, WITH LONG-TERM CURRENT USE OF INSULIN: ICD-10-CM

## 2023-10-16 DIAGNOSIS — E11.42 DIABETIC POLYNEUROPATHY ASSOCIATED WITH TYPE 2 DIABETES MELLITUS: ICD-10-CM

## 2023-10-16 DIAGNOSIS — E66.01 CLASS 2 SEVERE OBESITY WITH SERIOUS COMORBIDITY AND BODY MASS INDEX (BMI) OF 39.0 TO 39.9 IN ADULT, UNSPECIFIED OBESITY TYPE: ICD-10-CM

## 2023-10-16 DIAGNOSIS — I10 PRIMARY HYPERTENSION: ICD-10-CM

## 2023-10-16 DIAGNOSIS — E55.9 VITAMIN D DEFICIENCY: ICD-10-CM

## 2023-10-16 DIAGNOSIS — K86.2 PANCREATIC CYST: ICD-10-CM

## 2023-10-16 DIAGNOSIS — Z79.4 TYPE 2 DIABETES MELLITUS WITH COMPLICATION, WITH LONG-TERM CURRENT USE OF INSULIN: Primary | ICD-10-CM

## 2023-10-16 DIAGNOSIS — Z79.4 TYPE 2 DIABETES MELLITUS WITH HYPERGLYCEMIA, WITH LONG-TERM CURRENT USE OF INSULIN: ICD-10-CM

## 2023-10-16 DIAGNOSIS — E78.2 MIXED HYPERLIPIDEMIA: ICD-10-CM

## 2023-10-16 DIAGNOSIS — K31.84 GASTROPARESIS: ICD-10-CM

## 2023-10-16 DIAGNOSIS — E11.8 TYPE 2 DIABETES MELLITUS WITH COMPLICATION, WITH LONG-TERM CURRENT USE OF INSULIN: Primary | ICD-10-CM

## 2023-10-16 DIAGNOSIS — N18.31 STAGE 3A CHRONIC KIDNEY DISEASE: ICD-10-CM

## 2023-10-16 PROCEDURE — 3008F PR BODY MASS INDEX (BMI) DOCUMENTED: ICD-10-PCS | Mod: CPTII,S$GLB,, | Performed by: NURSE PRACTITIONER

## 2023-10-16 PROCEDURE — 90686 IIV4 VACC NO PRSV 0.5 ML IM: CPT | Mod: S$GLB,,, | Performed by: NURSE PRACTITIONER

## 2023-10-16 PROCEDURE — 4010F ACE/ARB THERAPY RXD/TAKEN: CPT | Mod: CPTII,S$GLB,, | Performed by: NURSE PRACTITIONER

## 2023-10-16 PROCEDURE — 95251 CONT GLUC MNTR ANALYSIS I&R: CPT | Mod: S$GLB,,, | Performed by: NURSE PRACTITIONER

## 2023-10-16 PROCEDURE — 3051F HG A1C>EQUAL 7.0%<8.0%: CPT | Mod: CPTII,S$GLB,, | Performed by: NURSE PRACTITIONER

## 2023-10-16 PROCEDURE — 99999 PR PBB SHADOW E&M-EST. PATIENT-LVL IV: ICD-10-PCS | Mod: PBBFAC,,, | Performed by: NURSE PRACTITIONER

## 2023-10-16 PROCEDURE — 3008F BODY MASS INDEX DOCD: CPT | Mod: CPTII,S$GLB,, | Performed by: NURSE PRACTITIONER

## 2023-10-16 PROCEDURE — 90686 FLU VACCINE (QUAD) GREATER THAN OR EQUAL TO 3YO PRESERVATIVE FREE IM: ICD-10-PCS | Mod: S$GLB,,, | Performed by: NURSE PRACTITIONER

## 2023-10-16 PROCEDURE — 1160F RVW MEDS BY RX/DR IN RCRD: CPT | Mod: CPTII,S$GLB,, | Performed by: NURSE PRACTITIONER

## 2023-10-16 PROCEDURE — 3061F NEG MICROALBUMINURIA REV: CPT | Mod: CPTII,S$GLB,, | Performed by: NURSE PRACTITIONER

## 2023-10-16 PROCEDURE — 1159F PR MEDICATION LIST DOCUMENTED IN MEDICAL RECORD: ICD-10-PCS | Mod: CPTII,S$GLB,, | Performed by: NURSE PRACTITIONER

## 2023-10-16 PROCEDURE — 99214 PR OFFICE/OUTPT VISIT, EST, LEVL IV, 30-39 MIN: ICD-10-PCS | Mod: 25,S$GLB,, | Performed by: NURSE PRACTITIONER

## 2023-10-16 PROCEDURE — 99999 PR PBB SHADOW E&M-EST. PATIENT-LVL IV: CPT | Mod: PBBFAC,,, | Performed by: NURSE PRACTITIONER

## 2023-10-16 PROCEDURE — 4010F PR ACE/ARB THEARPY RXD/TAKEN: ICD-10-PCS | Mod: CPTII,S$GLB,, | Performed by: NURSE PRACTITIONER

## 2023-10-16 PROCEDURE — G0008 ADMIN INFLUENZA VIRUS VAC: HCPCS | Mod: S$GLB,,, | Performed by: NURSE PRACTITIONER

## 2023-10-16 PROCEDURE — 3066F NEPHROPATHY DOC TX: CPT | Mod: CPTII,S$GLB,, | Performed by: NURSE PRACTITIONER

## 2023-10-16 PROCEDURE — 1160F PR REVIEW ALL MEDS BY PRESCRIBER/CLIN PHARMACIST DOCUMENTED: ICD-10-PCS | Mod: CPTII,S$GLB,, | Performed by: NURSE PRACTITIONER

## 2023-10-16 PROCEDURE — 3077F SYST BP >= 140 MM HG: CPT | Mod: CPTII,S$GLB,, | Performed by: NURSE PRACTITIONER

## 2023-10-16 PROCEDURE — 1159F MED LIST DOCD IN RCRD: CPT | Mod: CPTII,S$GLB,, | Performed by: NURSE PRACTITIONER

## 2023-10-16 PROCEDURE — 3079F DIAST BP 80-89 MM HG: CPT | Mod: CPTII,S$GLB,, | Performed by: NURSE PRACTITIONER

## 2023-10-16 PROCEDURE — 3066F PR DOCUMENTATION OF TREATMENT FOR NEPHROPATHY: ICD-10-PCS | Mod: CPTII,S$GLB,, | Performed by: NURSE PRACTITIONER

## 2023-10-16 PROCEDURE — 3077F PR MOST RECENT SYSTOLIC BLOOD PRESSURE >= 140 MM HG: ICD-10-PCS | Mod: CPTII,S$GLB,, | Performed by: NURSE PRACTITIONER

## 2023-10-16 PROCEDURE — 3079F PR MOST RECENT DIASTOLIC BLOOD PRESSURE 80-89 MM HG: ICD-10-PCS | Mod: CPTII,S$GLB,, | Performed by: NURSE PRACTITIONER

## 2023-10-16 PROCEDURE — 99214 OFFICE O/P EST MOD 30 MIN: CPT | Mod: 25,S$GLB,, | Performed by: NURSE PRACTITIONER

## 2023-10-16 PROCEDURE — 3051F PR MOST RECENT HEMOGLOBIN A1C LEVEL 7.0 - < 8.0%: ICD-10-PCS | Mod: CPTII,S$GLB,, | Performed by: NURSE PRACTITIONER

## 2023-10-16 PROCEDURE — 95251 PR GLUCOSE MONITOR, 72 HOUR, PHYS INTERP: ICD-10-PCS | Mod: S$GLB,,, | Performed by: NURSE PRACTITIONER

## 2023-10-16 PROCEDURE — 3061F PR NEG MICROALBUMINURIA RESULT DOCUMENTED/REVIEW: ICD-10-PCS | Mod: CPTII,S$GLB,, | Performed by: NURSE PRACTITIONER

## 2023-10-16 PROCEDURE — G0008 FLU VACCINE (QUAD) GREATER THAN OR EQUAL TO 3YO PRESERVATIVE FREE IM: ICD-10-PCS | Mod: S$GLB,,, | Performed by: NURSE PRACTITIONER

## 2023-10-16 RX ORDER — INSULIN LISPRO 100 [IU]/ML
INJECTION, SOLUTION INTRAVENOUS; SUBCUTANEOUS
Qty: 30 ML | Refills: 6 | Status: SHIPPED | OUTPATIENT
Start: 2023-10-16 | End: 2024-02-22 | Stop reason: SDUPTHER

## 2023-10-16 RX ORDER — TIRZEPATIDE 10 MG/.5ML
10 INJECTION, SOLUTION SUBCUTANEOUS
Qty: 4 PEN | Refills: 5 | Status: SHIPPED | OUTPATIENT
Start: 2023-10-16 | End: 2023-12-20

## 2023-10-16 NOTE — PROGRESS NOTES
CHIEF COMPLAINT: Type 2 Diabetes     HPI: Ms. Starla Wharton is a 58 y.o. female who was diagnosed with Type 2 DM > 20 years ago.  On insulin x 15 years ago.   Being seen by me for the first time.   +PN, nephropathy , gastroparesis  F/u with podiatry, ortho    Avoiding red meats-worsen gastroparesis.  Seen in the past by Dr. Molina 3/2023.   H/o BG 39 mg/dl   Dealing with incontinence, stopped metformin.  A1c trends 12.4% to 8.2% to 7.6% to 7.9%   Dealing with some constipation per ozempic   Seen by GI in recent year.   Feels better w/ stopping metformin.   Dexcom helps a lot    Avg 200 mg/dl   Sd 46 mg/dl   TIR 37 %  H/o BG 39 mg/dl   Needs cgm    See media    Lab Results   Component Value Date    HGBA1C 7.9 (H) 10/11/2023     Has clarity with Chantal Shrestha, see media.     PREVIOUS DIABETES MEDICATIONS TRIED  Levemir  Humalog  Metformin   Ozempic     CURRENT DIABETIC MEDS: levemir 57 units bid, humalog 10 -10-10 units ac , stop metformin ,  ozempic 2 mg weekly     On MDI (injections 5 x a day)   Makes frequent changes to his/her insulin regimen on the basis of blood glucose data  Testing 4 x a day  Patient is willing and able to use the device  Demonstrated an understanding of the technology and is motivated to use CGM  Patient expected to adhere to a comprehensive diabetes treatment plan and patient has adequate medical supervision  Has multiple impaired awareness of hypoglycemia (hypoglycemia unawareness)    Diabetes Management Status    Statin: Taking  ACE/ARB: Taking    Screening or Prevention Patient's value Goal Complete/Controlled?   HgA1C Testing and Control   Lab Results   Component Value Date    HGBA1C 7.9 (H) 10/11/2023      Annually/Less than 8% No   Lipid profile : 06/26/2023 Annually Yes   LDL control Lab Results   Component Value Date    LDLCALC 79.4 06/26/2023    Annually/Less than 100 mg/dl  Yes   Nephropathy screening Lab Results   Component Value Date    LABMICR 6.0 06/26/2023     Lab Results  "  Component Value Date    PROTEINUA Negative 03/04/2018    Annually Yes   Blood pressure BP Readings from Last 1 Encounters:   07/26/23 126/62    Less than 140/90 Yes   Dilated retinal exam : 09/19/2023 Annually Yes   Foot exam   : 06/15/2023 Annually No     REVIEW OF SYSTEMS  General: no weakness, fatigue, + weight changes 5# gain 6/2023  Eyes: no double or blurred vision, eye pain, or redness  Cardiovascular: no chest pain, palpitations, edema, or murmurs.   Respiratory: no cough or dyspnea.   GI: no heartburn, nausea, or changes in bowel patterns; good appetite.   Skin: no rashes, dryness, itching, or reactions at insulin injection sites.  Neuro: + numbness, tingling, tremors, or vertigo.   Endocrine: no polyuria, polydipsia, polyphagia, heat or cold intolerance.     Vital Signs  BP (!) 144/88 (BP Location: Left arm, Patient Position: Sitting, BP Method: Medium (Manual))   Pulse 89   Ht 5' 5" (1.651 m)   Wt 111.2 kg (245 lb 2.4 oz)   SpO2 100%   BMI 40.80 kg/m²     Hemoglobin A1C   Date Value Ref Range Status   10/11/2023 7.9 (H) 4.0 - 5.6 % Final     Comment:     ADA Screening Guidelines:  5.7-6.4%  Consistent with prediabetes  >or=6.5%  Consistent with diabetes    High levels of fetal hemoglobin interfere with the HbA1C  assay. Heterozygous hemoglobin variants (HbS, HgC, etc)do  not significantly interfere with this assay.   However, presence of multiple variants may affect accuracy.     06/26/2023 7.6 (H) 4.0 - 5.6 % Final     Comment:     ADA Screening Guidelines:  5.7-6.4%  Consistent with prediabetes  >or=6.5%  Consistent with diabetes    High levels of fetal hemoglobin interfere with the HbA1C  assay. Heterozygous hemoglobin variants (HbS, HgC, etc)do  not significantly interfere with this assay.   However, presence of multiple variants may affect accuracy.     06/26/2023 7.6 (H) 4.0 - 5.6 % Final     Comment:     ADA Screening Guidelines:  5.7-6.4%  Consistent with prediabetes  >or=6.5%  Consistent " with diabetes    High levels of fetal hemoglobin interfere with the HbA1C  assay. Heterozygous hemoglobin variants (HbS, HgC, etc)do  not significantly interfere with this assay.   However, presence of multiple variants may affect accuracy.          Chemistry        Component Value Date/Time     10/11/2023 0818    K 4.0 10/11/2023 0818     10/11/2023 0818    CO2 26 10/11/2023 0818    BUN 31 (H) 10/11/2023 0818    CREATININE 1.4 10/11/2023 0818     (H) 10/11/2023 0818        Component Value Date/Time    CALCIUM 9.5 10/11/2023 0818    ALKPHOS 103 06/26/2023 1030    AST 12 06/26/2023 1030    ALT 14 06/26/2023 1030    BILITOT 0.4 06/26/2023 1030    ESTGFRAFRICA 58 (A) 07/22/2022 1123    EGFRNONAA 50 (A) 07/22/2022 1123           Lab Results   Component Value Date    TSH 2.259 01/23/2023      Lab Results   Component Value Date    CHOL 132 06/26/2023    CHOL 139 07/22/2022    CHOL 181 03/05/2018     Lab Results   Component Value Date    HDL 41 06/26/2023    HDL 51 07/22/2022    HDL 72 03/05/2018     Lab Results   Component Value Date    LDLCALC 79.4 06/26/2023    LDLCALC 71.0 07/22/2022    LDLCALC 101.0 03/05/2018     Lab Results   Component Value Date    TRIG 58 06/26/2023    TRIG 85 07/22/2022    TRIG 40 03/05/2018     Lab Results   Component Value Date    CHOLHDL 31.1 06/26/2023    CHOLHDL 36.7 07/22/2022    CHOLHDL 39.8 03/05/2018         PHYSICAL EXAMINATION  Constitutional: Appears well, no distress. Reviewed vitals above.  Eyes: conjunctivae & lids intact; PERRLA, EOMs intact; optic discs   Neck: Supple, trachea midline.   Respiratory: CTA without wheezes, even and unlabored  Cardiovascular: RRR; no edema   Lymph: deferred   Skin: warm and dry; no injection site reactions, no acanthosis nigracans observed.  Neuro:patient alert and cooperative, normal affect; steady gait.  Psychiatric: judgement & insight intact, orientation of time, place & person intact, memory; mood & affect wnl     Diabetes  Foot Exam:   Deferred -Dr. Blanca, [facility] [address]  6/14/23    Assessment/Plan    1. Type 2 diabetes mellitus with complication, with long-term current use of insulin  Hemoglobin A1C      2. Stage 3a chronic kidney disease        3. Primary hypertension        4. Mixed hyperlipidemia        5. Pancreatic cyst  AMYLASE    LIPASE    AMYLASE    LIPASE      6. Vitamin D deficiency        7. Diabetic polyneuropathy associated with type 2 diabetes mellitus        8. Gastroparesis        9. Class 2 severe obesity with serious comorbidity and body mass index (BMI) of 39.0 to 39.9 in adult, unspecified obesity type        10. Type 2 diabetes mellitus with hyperglycemia, with long-term current use of insulin  insulin lispro (HUMALOG KWIKPEN INSULIN) 100 unit/mL pen        1-10. Follow up in 4 months w/ me   A1c prior -4 months   A1c goal less than 7%  Humalog 10-10 -10 units before scale 150-200+2, etc. (Change scale)  Continue levemir 57 units twice a day   Stop ozempic  Start mounjaro 10 mg weekly   Stop metformin   Discussed gastroparesis   Watch glp1a and glp1/gip group  Amylase and lipase in 4 months, 6 weeks  Body mass index is 40.8 kg/m².  May increase insulin resistance  Reviewed MRI/abdomen -stable p. Cyst  Duramed- dexcom dupplies     FOLLOW UP  In 4 months      Total time > 69 mins

## 2023-10-16 NOTE — PATIENT INSTRUCTIONS
Follow up in 4 months w/ Irielle   A1c amylase, lipase in 4 months   Lipase, amylase in 6 weeks   Influenza today     Lab Results   Component Value Date    HGBA1C 7.9 (H) 10/11/2023     Goal less than 7%     [email or web address]  Eat fit imelda  Myfitnesspal imelda  [email or web address]    mySugr imelda    Stop ozempic  Start mounjaro 10 mg weekly   Humalog 10 units before meals scale use 150-200+2 ,etc.  Continue levemir 57 units twice a day   Stop metformin

## 2023-10-23 ENCOUNTER — OFFICE VISIT (OUTPATIENT)
Dept: ORTHOPEDICS | Facility: CLINIC | Age: 58
End: 2023-10-23
Payer: MEDICARE

## 2023-10-23 VITALS — WEIGHT: 245.13 LBS | HEIGHT: 65 IN | BODY MASS INDEX: 40.84 KG/M2

## 2023-10-23 DIAGNOSIS — Z09 FOLLOW-UP EXAMINATION AFTER ORTHOPEDIC SURGERY: Primary | ICD-10-CM

## 2023-10-23 DIAGNOSIS — M20.21 HALLUX RIGIDUS, RIGHT FOOT: ICD-10-CM

## 2023-10-23 PROCEDURE — 3066F PR DOCUMENTATION OF TREATMENT FOR NEPHROPATHY: ICD-10-PCS | Mod: CPTII,S$GLB,, | Performed by: ORTHOPAEDIC SURGERY

## 2023-10-23 PROCEDURE — 99024 POSTOP FOLLOW-UP VISIT: CPT | Mod: S$GLB,,, | Performed by: ORTHOPAEDIC SURGERY

## 2023-10-23 PROCEDURE — 4010F ACE/ARB THERAPY RXD/TAKEN: CPT | Mod: CPTII,S$GLB,, | Performed by: ORTHOPAEDIC SURGERY

## 2023-10-23 PROCEDURE — 1159F PR MEDICATION LIST DOCUMENTED IN MEDICAL RECORD: ICD-10-PCS | Mod: CPTII,S$GLB,, | Performed by: ORTHOPAEDIC SURGERY

## 2023-10-23 PROCEDURE — 3066F NEPHROPATHY DOC TX: CPT | Mod: CPTII,S$GLB,, | Performed by: ORTHOPAEDIC SURGERY

## 2023-10-23 PROCEDURE — 99024 PR POST-OP FOLLOW-UP VISIT: ICD-10-PCS | Mod: S$GLB,,, | Performed by: ORTHOPAEDIC SURGERY

## 2023-10-23 PROCEDURE — 3051F PR MOST RECENT HEMOGLOBIN A1C LEVEL 7.0 - < 8.0%: ICD-10-PCS | Mod: CPTII,S$GLB,, | Performed by: ORTHOPAEDIC SURGERY

## 2023-10-23 PROCEDURE — 99999 PR PBB SHADOW E&M-EST. PATIENT-LVL III: ICD-10-PCS | Mod: PBBFAC,,, | Performed by: ORTHOPAEDIC SURGERY

## 2023-10-23 PROCEDURE — 3051F HG A1C>EQUAL 7.0%<8.0%: CPT | Mod: CPTII,S$GLB,, | Performed by: ORTHOPAEDIC SURGERY

## 2023-10-23 PROCEDURE — 99999 PR PBB SHADOW E&M-EST. PATIENT-LVL III: CPT | Mod: PBBFAC,,, | Performed by: ORTHOPAEDIC SURGERY

## 2023-10-23 PROCEDURE — 1159F MED LIST DOCD IN RCRD: CPT | Mod: CPTII,S$GLB,, | Performed by: ORTHOPAEDIC SURGERY

## 2023-10-23 PROCEDURE — 3061F PR NEG MICROALBUMINURIA RESULT DOCUMENTED/REVIEW: ICD-10-PCS | Mod: CPTII,S$GLB,, | Performed by: ORTHOPAEDIC SURGERY

## 2023-10-23 PROCEDURE — 4010F PR ACE/ARB THEARPY RXD/TAKEN: ICD-10-PCS | Mod: CPTII,S$GLB,, | Performed by: ORTHOPAEDIC SURGERY

## 2023-10-23 PROCEDURE — 3061F NEG MICROALBUMINURIA REV: CPT | Mod: CPTII,S$GLB,, | Performed by: ORTHOPAEDIC SURGERY

## 2023-10-23 NOTE — PROGRESS NOTES
Starla Wharton  Returns today for follow-up.  She is now about three months postop from her right big toe cheilectomy for hallux rigidus.  Her last visit was on 09/11/2023 and she reported that her toe pain was improved but she was still having some neurogenic symptoms which I felt maybe due to her nerve block.  She was reporting numbness and tingling in her forefoot.  She returns today and reports no significant improvement since her last visit although she understands it is going to take more time.  She still reports swelling and pain as well as some continued neurogenic symptoms.    Examination:  The right foot reveals moderate swelling about the big toe MTP joint.  She has continued decreased motion but no significant pain within the range of her motion.  She has mild diffuse tenderness about the MTP joint.    Impression:  1. Follow-up examination after orthopedic surgery        2. Hallux rigidus, right foot              Recommendation:  At this point I think all we can do is give this further time.  She is going to continue to do range of motion exercises on her own.      Follow-up in three months

## 2023-10-30 DIAGNOSIS — I10 ESSENTIAL HYPERTENSION: ICD-10-CM

## 2023-10-30 RX ORDER — LISINOPRIL 20 MG/1
20 TABLET ORAL DAILY
Qty: 90 TABLET | Refills: 3 | Status: SHIPPED | OUTPATIENT
Start: 2023-10-30 | End: 2024-10-24

## 2023-10-30 NOTE — TELEPHONE ENCOUNTER
No care due was identified.  [facility] Embedded Care Due Messages. Reference number: [id number].   10/30/2023 5:08:34 AM CDT

## 2023-10-30 NOTE — TELEPHONE ENCOUNTER
Refill Routing Note   Medication(s) are not appropriate for processing by [facility] for the following reason(s):      Required vitals abnormal    [facility] action(s):  Defer Care Due:  None identified            Appointments  past 12m or future 3m with PCP    Date Provider   Last Visit   6/29/2023 Meghan Perez MD   Next Visit   12/20/2023 Meghan Perez MD   ED visits in past 90 days: 0        Note composed:6:47 AM 10/30/2023

## 2023-11-06 ENCOUNTER — PATIENT MESSAGE (OUTPATIENT)
Dept: ADMINISTRATIVE | Facility: HOSPITAL | Age: 58
End: 2023-11-06
Payer: MEDICARE

## 2023-11-09 ENCOUNTER — TELEPHONE (OUTPATIENT)
Dept: INTERNAL MEDICINE | Facility: CLINIC | Age: 58
End: 2023-11-09
Payer: MEDICARE

## 2023-11-09 DIAGNOSIS — D64.9 ANEMIA, UNSPECIFIED TYPE: Primary | ICD-10-CM

## 2023-11-09 NOTE — TELEPHONE ENCOUNTER
Please ensure patient is aware of the following message, thank you!    Good afternoon Ms. Wharton ,     Overall your labs look ok, just one concern. Your vitamin B12, folate, metabolic panel, and hemoglobin electrophoresis were all fine. Your blood counts are still a bit low and I would like our hematologists to evaluate this further. I can place a consult to the hematologists to request recommendations or we can assist you with an appointment to discuss with them, let us know which you prefer!     I look forward to seeing you again, hope you have a great weekend!      Sincerely,  Dr. Perez   Written by Meghan Perez MD on 11/3/2023  2:21 PM CDT

## 2023-11-11 ENCOUNTER — PATIENT MESSAGE (OUTPATIENT)
Dept: INTERNAL MEDICINE | Facility: CLINIC | Age: 58
End: 2023-11-11
Payer: MEDICARE

## 2023-11-13 ENCOUNTER — TELEPHONE (OUTPATIENT)
Dept: INTERNAL MEDICINE | Facility: CLINIC | Age: 58
End: 2023-11-13
Payer: MEDICARE

## 2023-11-13 NOTE — TELEPHONE ENCOUNTER
----- Message from Edis Arauz sent at 11/13/2023  1:44 PM CST -----  Contact: Patient  Type:  Patient Call          Who Called: patient         Does the patient know what this is regarding?: Requesting a call back from Dr Perez herself to discuss her lab results ; please advise           Would the patient rather a call back or a response via [facility]? Call           Best Call Back Number: [phone] (home)              Additional Information:         with patient

## 2023-11-15 ENCOUNTER — OFFICE VISIT (OUTPATIENT)
Dept: INTERNAL MEDICINE | Facility: CLINIC | Age: 58
End: 2023-11-15
Payer: MEDICARE

## 2023-11-15 VITALS
DIASTOLIC BLOOD PRESSURE: 88 MMHG | HEART RATE: 89 BPM | SYSTOLIC BLOOD PRESSURE: 144 MMHG | WEIGHT: 245.13 LBS | BODY MASS INDEX: 40.8 KG/M2

## 2023-11-15 DIAGNOSIS — E11.319 TYPE 2 DIABETES MELLITUS WITH RETINOPATHY, WITH LONG-TERM CURRENT USE OF INSULIN, MACULAR EDEMA PRESENCE UNSPECIFIED, UNSPECIFIED LATERALITY, UNSPECIFIED RETINOPATHY SEVERITY: Primary | ICD-10-CM

## 2023-11-15 DIAGNOSIS — Z79.4 TYPE 2 DIABETES MELLITUS WITH RETINOPATHY, WITH LONG-TERM CURRENT USE OF INSULIN, MACULAR EDEMA PRESENCE UNSPECIFIED, UNSPECIFIED LATERALITY, UNSPECIFIED RETINOPATHY SEVERITY: Primary | ICD-10-CM

## 2023-11-15 DIAGNOSIS — I10 PRIMARY HYPERTENSION: ICD-10-CM

## 2023-11-15 DIAGNOSIS — D64.9 ANEMIA, UNSPECIFIED TYPE: ICD-10-CM

## 2023-11-15 DIAGNOSIS — E78.2 MIXED HYPERLIPIDEMIA: ICD-10-CM

## 2023-11-15 PROCEDURE — 99443 PR PHYSICIAN TELEPHONE EVALUATION 21-30 MIN: ICD-10-PCS | Mod: 95,,, | Performed by: STUDENT IN AN ORGANIZED HEALTH CARE EDUCATION/TRAINING PROGRAM

## 2023-11-15 PROCEDURE — 3051F HG A1C>EQUAL 7.0%<8.0%: CPT | Mod: CPTII,95,, | Performed by: STUDENT IN AN ORGANIZED HEALTH CARE EDUCATION/TRAINING PROGRAM

## 2023-11-15 PROCEDURE — 3079F DIAST BP 80-89 MM HG: CPT | Mod: CPTII,95,, | Performed by: STUDENT IN AN ORGANIZED HEALTH CARE EDUCATION/TRAINING PROGRAM

## 2023-11-15 PROCEDURE — 3061F NEG MICROALBUMINURIA REV: CPT | Mod: CPTII,95,, | Performed by: STUDENT IN AN ORGANIZED HEALTH CARE EDUCATION/TRAINING PROGRAM

## 2023-11-15 PROCEDURE — 3061F PR NEG MICROALBUMINURIA RESULT DOCUMENTED/REVIEW: ICD-10-PCS | Mod: CPTII,95,, | Performed by: STUDENT IN AN ORGANIZED HEALTH CARE EDUCATION/TRAINING PROGRAM

## 2023-11-15 PROCEDURE — 3077F SYST BP >= 140 MM HG: CPT | Mod: CPTII,95,, | Performed by: STUDENT IN AN ORGANIZED HEALTH CARE EDUCATION/TRAINING PROGRAM

## 2023-11-15 PROCEDURE — 3066F NEPHROPATHY DOC TX: CPT | Mod: CPTII,95,, | Performed by: STUDENT IN AN ORGANIZED HEALTH CARE EDUCATION/TRAINING PROGRAM

## 2023-11-15 PROCEDURE — 1159F PR MEDICATION LIST DOCUMENTED IN MEDICAL RECORD: ICD-10-PCS | Mod: CPTII,95,, | Performed by: STUDENT IN AN ORGANIZED HEALTH CARE EDUCATION/TRAINING PROGRAM

## 2023-11-15 PROCEDURE — 3079F PR MOST RECENT DIASTOLIC BLOOD PRESSURE 80-89 MM HG: ICD-10-PCS | Mod: CPTII,95,, | Performed by: STUDENT IN AN ORGANIZED HEALTH CARE EDUCATION/TRAINING PROGRAM

## 2023-11-15 PROCEDURE — 3077F PR MOST RECENT SYSTOLIC BLOOD PRESSURE >= 140 MM HG: ICD-10-PCS | Mod: CPTII,95,, | Performed by: STUDENT IN AN ORGANIZED HEALTH CARE EDUCATION/TRAINING PROGRAM

## 2023-11-15 PROCEDURE — 4010F ACE/ARB THERAPY RXD/TAKEN: CPT | Mod: CPTII,95,, | Performed by: STUDENT IN AN ORGANIZED HEALTH CARE EDUCATION/TRAINING PROGRAM

## 2023-11-15 PROCEDURE — 3066F PR DOCUMENTATION OF TREATMENT FOR NEPHROPATHY: ICD-10-PCS | Mod: CPTII,95,, | Performed by: STUDENT IN AN ORGANIZED HEALTH CARE EDUCATION/TRAINING PROGRAM

## 2023-11-15 PROCEDURE — 4010F PR ACE/ARB THEARPY RXD/TAKEN: ICD-10-PCS | Mod: CPTII,95,, | Performed by: STUDENT IN AN ORGANIZED HEALTH CARE EDUCATION/TRAINING PROGRAM

## 2023-11-15 PROCEDURE — 1159F MED LIST DOCD IN RCRD: CPT | Mod: CPTII,95,, | Performed by: STUDENT IN AN ORGANIZED HEALTH CARE EDUCATION/TRAINING PROGRAM

## 2023-11-15 PROCEDURE — 3051F PR MOST RECENT HEMOGLOBIN A1C LEVEL 7.0 - < 8.0%: ICD-10-PCS | Mod: CPTII,95,, | Performed by: STUDENT IN AN ORGANIZED HEALTH CARE EDUCATION/TRAINING PROGRAM

## 2023-11-15 PROCEDURE — 99443 PR PHYSICIAN TELEPHONE EVALUATION 21-30 MIN: CPT | Mod: 95,,, | Performed by: STUDENT IN AN ORGANIZED HEALTH CARE EDUCATION/TRAINING PROGRAM

## 2023-11-15 NOTE — PROGRESS NOTES
The patient's location is:  [place]  The chief complaint leading to consultation is:  Type 2 diabetes mellitus with retinopathy, with long-term current use of insulin, macular edema presence unspecified, unspecified laterality, unspecified retinopathy severity [E11.319, Z79.4]    Visit type: audio only    Time spent in discussion with the patient: 17 minutes  24 minutes of total time spent on the encounter. This includes time spent in discussion with the patient and time preparing for the patient appointment: review of tests, obtaining and/or reviewing separately obtained history, documenting clinical information in the electronic or other health record, independently interpreting results (not separately reported), and communicating results to the patient/family/caregiver or care coordination (not separately reported).     Each patient to whom he or she provides medical services by telemedicine is: (1) informed of the relationship between the physician and patient and the respective role of any other health care provider with respect to management of the patient; and (2) notified that he or she may decline to receive medical services by telemedicine and may withdraw from such care at any time.      Subjective:   Starla Wharton is a 58 y.o. female who presents for Type 2 diabetes mellitus with retinopathy, with long-term current use of insulin, macular edema presence unspecified, unspecified laterality, unspecified retinopathy severity [E11.319, Z79.4].       Patient is established with me, here today for the following:     T2DM on insulin, peripheral neuropathy, retinopathy, gastroparesis, CKD3, HLD, HTN, GERD, vitamin D deficiency, sickle cell trait       T2DM -   Currently taking:   - Metformin 500 mg BID  - Levemir 57 qAM and 57 units qHS  - Novolog 10 units qAC, using sliding scale as well  - Mounjaro 10 mg weekly  Following with ROSARIO Ramirez for endocrinology  Following with Chantal for diabetes  management  Taking ASA 81 mg daily  Using Dexcom for CGM  UTD on foot exam  UTD on eye exam, last done FEB2023.  Following with Dr. Ramsay routinely for ophthalmology  Lab Results       Component                Value               Date                       HGBA1C                   7.9 (H)             10/11/2023                 HGBA1C                   7.6 (H)             06/26/2023                 HGBA1C                   7.6 (H)             06/26/2023            Lab Results       Component                Value               Date                       MICALBCREAT              4.0                 06/26/2023               Anemia -  Endorses told previously that she has sickle cell trait  Quantitative Hgb A2 2.4%  No longer taking the iron supplements  Lab Results       Component                Value               Date                       HGB                      11.0 (L)            10/11/2023                 HCT                      35.9 (L)            10/11/2023                 MCV                      83                  10/11/2023                 RDW                      15.9 (H)            10/11/2023            Lab Results       Component                Value               Date                       IRON                     47                  06/26/2023                 TRANSFERRIN              222                 06/26/2023                 TIBC                     329                 06/26/2023                 FESATURATED              14 (L)              06/26/2023             Lab Results       Component                Value               Date                       FERRITIN                 115                 06/26/2023            Lab Results       Component                Value               Date                       IUOTHEAL27               664                 10/11/2023            Lab Results       Component                Value               Date                       FOLATE                   9.5                  10/11/2023                       HLD -   Endorses taking atorvastatin as directed  Denies side effects or concerns while taking medication  Lab Results       Component                Value               Date                       CHOL                     132                 06/26/2023            Lab Results       Component                Value               Date                       TRIG                     58                  06/26/2023            Lab Results       Component                Value               Date                       LDLCALC                  79.4                06/26/2023            Lab Results       Component                Value               Date                       HDL                      41                  06/26/2023              HTN -   Currently prescribed HCTZ, lisinopril.  Patient endorses taking medication as directed.  Denies side effects or concerns while taking medication.  Lab Results       Component                Value               Date                       MICALBCREAT              4.0                 06/26/2023            BP Readings from Last 5 Encounters:  11/15/23 : (!) 144/88  10/16/23 : (!) 144/88  07/26/23 : 126/62  07/11/23 : 120/84  06/29/23 : 124/60          Review of Systems   Constitutional:  Negative for activity change and unexpected weight change.   HENT:  Negative for hearing loss, rhinorrhea and trouble swallowing.    Eyes:  Negative for discharge and visual disturbance.   Respiratory:  Negative for chest tightness and wheezing.    Cardiovascular:  Positive for chest pain. Negative for palpitations.   Gastrointestinal:  Negative for blood in stool, constipation, diarrhea and vomiting.   Endocrine: Negative for polydipsia and polyuria.   Genitourinary:  Negative for difficulty urinating, hematuria and menstrual problem.   Musculoskeletal:  Positive for arthralgias. Negative for joint swelling and neck pain.   Neurological:  Negative for weakness and  "headaches.   Psychiatric/Behavioral:  Negative for confusion and dysphoric mood.          Current Outpatient Medications   Medication Instructions    aspirin 81 mg, Daily    atorvastatin (LIPITOR) 40 mg, Oral, Daily    blood-glucose meter,continuous (DEXCOM G6 ) Misc 1 Device, Misc.(Non-Drug; Combo Route), Once    blood-glucose sensor (DEXCOM G6 SENSOR) Kenzie Use daily for continuous glucose monitoring, change sensor every 10 days.    blood-glucose transmitter (DEXCOM G6 TRANSMITTER) Kenzie Use with Dexcom sensor, change every 90 days.    bromfenac (PROLENSA) 0.07 % Drop 1 drop, Ophthalmic, Daily    bromfenac (PROLENSA) 0.07 % Drop Apply one drop daily to both eyes as directed    ciclopirox (PENLAC) 8 % Soln ciclopirox 8 % topical solution   APPLY OVER NAIL AND SURROUNDING SKIN. APPLY DAILY OVER PREVIOUS COAT. AFTER SEVEN DAYS MAY REMOVE WITH ALCOHOL AND CONTINUE CYCLE    diclofenac sodium (VOLTAREN) 1 % Gel Topical (Top), As needed (PRN)    gabapentin (NEURONTIN) 300 mg, Oral, 3 times daily    hydroCHLOROthiazide (HYDRODIURIL) 25 mg, Oral, Daily    indomethacin (INDOCIN) 50 MG capsule indomethacin 50 mg capsule    insulin lispro (HUMALOG KWIKPEN INSULIN) 100 unit/mL pen Inject 10 units under the skin before meals plus scale 150-200+2, 201-250+4, 251-300+6, 301-350+8, >350+10. Max daily 65 units    LEVEMIR FLEXPEN 57 Units, Subcutaneous, 2 times daily, Max daily 114 units    lisinopriL (PRINIVIL,ZESTRIL) 20 mg, Oral, Daily    MOUNJARO 10 mg, Subcutaneous, Every 7 days    pantoprazole (PROTONIX) 20 mg, Oral, Daily    pen needle, diabetic (BD ULTRA-FINE MAR PEN NEEDLE) 32 gauge x 5/32" Ndle Use to inject insulin 5 times daily    prednisoLONE acetate (PRED FORTE) 1 % DrpS 1 drop, Right Eye, 2 times daily       Objective:     Vitals:    11/15/23 1053   BP: (!) 144/88   Pulse: 89        Physical Exam  Psychiatric:         Attention and Perception: Attention normal.         Mood and Affect: Mood normal.         " Speech: Speech normal.           Assessment/Plan:       Type 2 diabetes mellitus with retinopathy, with long-term current use of insulin, macular edema presence unspecified, unspecified laterality, unspecified retinopathy severity  Continue current medications.  RTC in 3 months for follow up.    Anemia, unspecified type  -     E-Consult to Hemon[facility]    Mixed hyperlipidemia  Continue current medications.  RTC in 3 months for follow up.    Primary hypertension  Continue current medications.  RTC in 3 months for follow up.      Meghan Perez MD  11/15/2023

## 2023-11-18 ENCOUNTER — E-CONSULT (OUTPATIENT)
Dept: HEMATOLOGY/ONCOLOGY | Facility: CLINIC | Age: 58
End: 2023-11-18
Payer: MEDICARE

## 2023-11-18 DIAGNOSIS — D64.9 ANEMIA, UNSPECIFIED TYPE: Primary | ICD-10-CM

## 2023-11-18 PROBLEM — E11.319 TYPE 2 DIABETES MELLITUS WITH RETINOPATHY, WITH LONG-TERM CURRENT USE OF INSULIN: Status: ACTIVE | Noted: 2018-03-04

## 2023-11-18 PROCEDURE — 99451 NTRPROF PH1/NTRNET/EHR 5/>: CPT | Mod: S$GLB,,, | Performed by: INTERNAL MEDICINE

## 2023-11-18 PROCEDURE — 99451 PR INTERPROF, PHONE/INTERNET/EHR, CONSULT, >= 5MINS: ICD-10-PCS | Mod: S$GLB,,, | Performed by: INTERNAL MEDICINE

## 2023-11-19 NOTE — CONSULTS
[facility]- Hematology Oncology  Response for E-Consult     Patient Name: Starla Wharton  MRN: [id number]  Primary Care Provider: Meghan Perez MD   Requesting Provider: Meghan Perez MD  E-Consult to Hemon[facility]  Consult performed by: Dottie Camarillo MD  Consult ordered by: Meghan Perez MD  Reason for consult: Anemia  Assessment/Recommendations: Se Consult           Recommendation: Mild normocytic anemia is noted since 2018. Hb is stable in the 11 g//dl range. Work up is normal except mildly low iron saturation in June. Check SPEP with KERRY, serum free lyte chains and quantitative immune globulins as she does have mild renal insufficiency.   Recommend trial or oral iron supplementation three times a week with stool softener as needed. Ensure that she is up to date with GI work up.     Contingency: Refer to Hematology clinic in case of Hb < 10 g/dl in which case she may need a bone marrow biopsy.     Total time of Consultation: 5 minute    I did not speak to the requesting provider verbally about this.     *This eConsult is based on the clinical data available to me and is furnished without benefit of a physical examination. The eConsult will need to be interpreted in light of any clinical issues or changes in patient status not available to me at the time of filing this eConsults. Significant changes in patient condition or level of acuity should result in immediate formal consultation and reevaluation. Please alert me if you have further questions.    Thank you for this eConsult referral.     MD Edwin Lujan [facility]- Hematology Oncology

## 2023-11-20 ENCOUNTER — TELEPHONE (OUTPATIENT)
Dept: INTERNAL MEDICINE | Facility: CLINIC | Age: 58
End: 2023-11-20
Payer: MEDICARE

## 2023-11-20 DIAGNOSIS — D64.9 ANEMIA, UNSPECIFIED TYPE: Primary | ICD-10-CM

## 2023-11-20 RX ORDER — FERROUS SULFATE 325(65) MG
325 TABLET ORAL
Qty: 36 TABLET | Refills: 1 | Status: SHIPPED | OUTPATIENT
Start: 2023-11-20

## 2023-11-20 NOTE — TELEPHONE ENCOUNTER
Following up on recommendations provided by hematology e-consult.  Per recommendations ordered further lab testing  Sent iron supplement to pharmacy for patient to start taking.

## 2023-11-29 ENCOUNTER — TELEPHONE (OUTPATIENT)
Dept: ORTHOPEDICS | Facility: CLINIC | Age: 58
End: 2023-11-29
Payer: MEDICARE

## 2023-11-29 NOTE — TELEPHONE ENCOUNTER
I spoke with pt,rescheduled appoinment. Pt,verbalized understanding.        ----- Message from Becky Machado sent at 11/29/2023  1:18 PM CST -----  Regarding: Consult/Advisory  Contact: [phone]  CONSULT/ADVISORY    Name of Caller:  JIA QUINTERO [[id number]]    Contact Preference:   [phone]    Nature of Call:   Pt has Post Op Appt on 12/07/2023 @ 145pm, asking for time to be changed to 330-4pm.  Please call to confirm.

## 2023-11-30 ENCOUNTER — LAB VISIT (OUTPATIENT)
Dept: LAB | Facility: OTHER | Age: 58
End: 2023-11-30
Attending: NURSE PRACTITIONER
Payer: MEDICARE

## 2023-11-30 ENCOUNTER — PATIENT MESSAGE (OUTPATIENT)
Dept: ADMINISTRATIVE | Facility: HOSPITAL | Age: 58
End: 2023-11-30
Payer: MEDICARE

## 2023-11-30 ENCOUNTER — PATIENT OUTREACH (OUTPATIENT)
Dept: ADMINISTRATIVE | Facility: HOSPITAL | Age: 58
End: 2023-11-30
Payer: MEDICARE

## 2023-11-30 DIAGNOSIS — K86.2 PANCREATIC CYST: ICD-10-CM

## 2023-11-30 DIAGNOSIS — D64.9 ANEMIA, UNSPECIFIED TYPE: ICD-10-CM

## 2023-11-30 LAB
AMYLASE SERPL-CCNC: 58 U/L (ref 20–110)
BASOPHILS # BLD AUTO: 0.04 K/UL (ref 0–0.2)
BASOPHILS NFR BLD: 0.6 % (ref 0–1.9)
DIFFERENTIAL METHOD: ABNORMAL
EOSINOPHIL # BLD AUTO: 0.2 K/UL (ref 0–0.5)
EOSINOPHIL NFR BLD: 2.5 % (ref 0–8)
ERYTHROCYTE [DISTWIDTH] IN BLOOD BY AUTOMATED COUNT: 14.6 % (ref 11.5–14.5)
HCT VFR BLD AUTO: 37.2 % (ref 37–48.5)
HGB BLD-MCNC: 11.4 G/DL (ref 12–16)
IGA SERPL-MCNC: 313 MG/DL (ref 40–350)
IGG SERPL-MCNC: 1595 MG/DL (ref 650–1600)
IGM SERPL-MCNC: 117 MG/DL (ref 50–300)
IMM GRANULOCYTES # BLD AUTO: 0.03 K/UL (ref 0–0.04)
IMM GRANULOCYTES NFR BLD AUTO: 0.5 % (ref 0–0.5)
LIPASE SERPL-CCNC: 32 U/L (ref 4–60)
LYMPHOCYTES # BLD AUTO: 2.7 K/UL (ref 1–4.8)
LYMPHOCYTES NFR BLD: 41.1 % (ref 18–48)
MCH RBC QN AUTO: 25.2 PG (ref 27–31)
MCHC RBC AUTO-ENTMCNC: 30.6 G/DL (ref 32–36)
MCV RBC AUTO: 82 FL (ref 82–98)
MONOCYTES # BLD AUTO: 0.4 K/UL (ref 0.3–1)
MONOCYTES NFR BLD: 6.8 % (ref 4–15)
NEUTROPHILS # BLD AUTO: 3.1 K/UL (ref 1.8–7.7)
NEUTROPHILS NFR BLD: 48.5 % (ref 38–73)
NRBC BLD-RTO: 0 /100 WBC
PLATELET # BLD AUTO: 295 K/UL (ref 150–450)
PMV BLD AUTO: 9.1 FL (ref 9.2–12.9)
RBC # BLD AUTO: 4.53 M/UL (ref 4–5.4)
WBC # BLD AUTO: 6.47 K/UL (ref 3.9–12.7)

## 2023-11-30 PROCEDURE — 36415 COLL VENOUS BLD VENIPUNCTURE: CPT | Performed by: STUDENT IN AN ORGANIZED HEALTH CARE EDUCATION/TRAINING PROGRAM

## 2023-11-30 PROCEDURE — 84165 PATHOLOGIST INTERPRETATION SPE: ICD-10-PCS | Mod: 26,,, | Performed by: PATHOLOGY

## 2023-11-30 PROCEDURE — 82784 ASSAY IGA/IGD/IGG/IGM EACH: CPT | Performed by: STUDENT IN AN ORGANIZED HEALTH CARE EDUCATION/TRAINING PROGRAM

## 2023-11-30 PROCEDURE — 83690 ASSAY OF LIPASE: CPT | Performed by: NURSE PRACTITIONER

## 2023-11-30 PROCEDURE — 85025 COMPLETE CBC W/AUTO DIFF WBC: CPT | Performed by: STUDENT IN AN ORGANIZED HEALTH CARE EDUCATION/TRAINING PROGRAM

## 2023-11-30 PROCEDURE — 84165 PROTEIN E-PHORESIS SERUM: CPT | Mod: 26,,, | Performed by: PATHOLOGY

## 2023-11-30 PROCEDURE — 86334 IMMUNOFIX E-PHORESIS SERUM: CPT | Performed by: STUDENT IN AN ORGANIZED HEALTH CARE EDUCATION/TRAINING PROGRAM

## 2023-11-30 PROCEDURE — 86334 PATHOLOGIST INTERPRETATION IFE: ICD-10-PCS | Mod: 26,,, | Performed by: PATHOLOGY

## 2023-11-30 PROCEDURE — 82150 ASSAY OF AMYLASE: CPT | Performed by: NURSE PRACTITIONER

## 2023-11-30 PROCEDURE — 86334 IMMUNOFIX E-PHORESIS SERUM: CPT | Mod: 26,,, | Performed by: PATHOLOGY

## 2023-11-30 PROCEDURE — 83521 IG LIGHT CHAINS FREE EACH: CPT | Performed by: STUDENT IN AN ORGANIZED HEALTH CARE EDUCATION/TRAINING PROGRAM

## 2023-11-30 PROCEDURE — 84165 PROTEIN E-PHORESIS SERUM: CPT | Performed by: STUDENT IN AN ORGANIZED HEALTH CARE EDUCATION/TRAINING PROGRAM

## 2023-12-01 LAB
ALBUMIN SERPL ELPH-MCNC: 3.55 G/DL (ref 3.35–5.55)
ALPHA1 GLOB SERPL ELPH-MCNC: 0.31 G/DL (ref 0.17–0.41)
ALPHA2 GLOB SERPL ELPH-MCNC: 0.94 G/DL (ref 0.43–0.99)
B-GLOBULIN SERPL ELPH-MCNC: 0.91 G/DL (ref 0.5–1.1)
GAMMA GLOB SERPL ELPH-MCNC: 1.49 G/DL (ref 0.67–1.58)
INTERPRETATION SERPL IFE-IMP: NORMAL
KAPPA LC SER QL IA: 5.64 MG/DL (ref 0.33–1.94)
KAPPA LC/LAMBDA SER IA: 1.32 (ref 0.26–1.65)
LAMBDA LC SER QL IA: 4.27 MG/DL (ref 0.57–2.63)
PROT SERPL-MCNC: 7.2 G/DL (ref 6–8.4)

## 2023-12-04 ENCOUNTER — PATIENT MESSAGE (OUTPATIENT)
Dept: INTERNAL MEDICINE | Facility: CLINIC | Age: 58
End: 2023-12-04
Payer: MEDICARE

## 2023-12-04 LAB
PATHOLOGIST INTERPRETATION IFE: NORMAL
PATHOLOGIST INTERPRETATION SPE: NORMAL

## 2023-12-19 ENCOUNTER — TELEPHONE (OUTPATIENT)
Dept: ADMINISTRATIVE | Facility: HOSPITAL | Age: 58
End: 2023-12-19
Payer: MEDICARE

## 2023-12-19 NOTE — TELEPHONE ENCOUNTER
Population Health Chart Review & Patient Outreach Details          Updates Requested / Reviewed:     [x]  Care Everywhere    []     []  External Sources ([facility], [facility], [facility], etc.)    [] [facility]   [] [facility]   [] Other:    [x]  Care Team Updated   [x]  Removed  or Duplicate Orders   [x]  Immunization Reconciliation Completed / Queried    [x] [place]   [] [place]   [] [place]   [] [place]      Health Maintenance Topics Addressed and Outreach Outcomes / Actions Taken:         Blood Pressure Control []  Primary Care Follow Up Visit Scheduled     []  Remote Blood Pressure Reading Captured    []  Patient Declined Remote Reading or Scheduling Appt - Escalated to PCP    []  Patient Will Call Back or Send Portal Message with Reading     Additional Notes:     Ms Wharton is currently on PHN Blood Pressure Gap Report. Update appointment notes for 2023 to have a 2nd reading completed if 1st reading is elevated.

## 2023-12-20 ENCOUNTER — CLINICAL SUPPORT (OUTPATIENT)
Dept: DIABETES | Facility: CLINIC | Age: 58
End: 2023-12-20
Payer: MEDICARE

## 2023-12-20 ENCOUNTER — OFFICE VISIT (OUTPATIENT)
Dept: INTERNAL MEDICINE | Facility: CLINIC | Age: 58
End: 2023-12-20
Payer: MEDICARE

## 2023-12-20 VITALS
BODY MASS INDEX: 39.84 KG/M2 | SYSTOLIC BLOOD PRESSURE: 134 MMHG | DIASTOLIC BLOOD PRESSURE: 70 MMHG | HEART RATE: 74 BPM | OXYGEN SATURATION: 98 % | WEIGHT: 239.44 LBS

## 2023-12-20 DIAGNOSIS — N18.31 STAGE 3A CHRONIC KIDNEY DISEASE: ICD-10-CM

## 2023-12-20 DIAGNOSIS — Z79.4 TYPE 2 DIABETES MELLITUS WITH RETINOPATHY, WITH LONG-TERM CURRENT USE OF INSULIN, MACULAR EDEMA PRESENCE UNSPECIFIED, UNSPECIFIED LATERALITY, UNSPECIFIED RETINOPATHY SEVERITY: ICD-10-CM

## 2023-12-20 DIAGNOSIS — Z79.4 TYPE 2 DIABETES MELLITUS WITH COMPLICATION, WITH LONG-TERM CURRENT USE OF INSULIN: Primary | ICD-10-CM

## 2023-12-20 DIAGNOSIS — J32.9 RHINOSINUSITIS: Primary | ICD-10-CM

## 2023-12-20 DIAGNOSIS — D64.9 ANEMIA, UNSPECIFIED TYPE: ICD-10-CM

## 2023-12-20 DIAGNOSIS — E11.8 TYPE 2 DIABETES MELLITUS WITH COMPLICATION, WITH LONG-TERM CURRENT USE OF INSULIN: Primary | ICD-10-CM

## 2023-12-20 DIAGNOSIS — E11.319 TYPE 2 DIABETES MELLITUS WITH RETINOPATHY, WITH LONG-TERM CURRENT USE OF INSULIN, MACULAR EDEMA PRESENCE UNSPECIFIED, UNSPECIFIED LATERALITY, UNSPECIFIED RETINOPATHY SEVERITY: ICD-10-CM

## 2023-12-20 DIAGNOSIS — K21.9 GASTROESOPHAGEAL REFLUX DISEASE, UNSPECIFIED WHETHER ESOPHAGITIS PRESENT: ICD-10-CM

## 2023-12-20 DIAGNOSIS — E78.2 MIXED HYPERLIPIDEMIA: ICD-10-CM

## 2023-12-20 DIAGNOSIS — I10 PRIMARY HYPERTENSION: ICD-10-CM

## 2023-12-20 PROCEDURE — 3066F NEPHROPATHY DOC TX: CPT | Mod: CPTII,S$GLB,, | Performed by: STUDENT IN AN ORGANIZED HEALTH CARE EDUCATION/TRAINING PROGRAM

## 2023-12-20 PROCEDURE — 3066F PR DOCUMENTATION OF TREATMENT FOR NEPHROPATHY: ICD-10-PCS | Mod: CPTII,S$GLB,, | Performed by: STUDENT IN AN ORGANIZED HEALTH CARE EDUCATION/TRAINING PROGRAM

## 2023-12-20 PROCEDURE — 99215 PR OFFICE/OUTPT VISIT, EST, LEVL V, 40-54 MIN: ICD-10-PCS | Mod: S$GLB,,, | Performed by: STUDENT IN AN ORGANIZED HEALTH CARE EDUCATION/TRAINING PROGRAM

## 2023-12-20 PROCEDURE — 3061F NEG MICROALBUMINURIA REV: CPT | Mod: CPTII,S$GLB,, | Performed by: STUDENT IN AN ORGANIZED HEALTH CARE EDUCATION/TRAINING PROGRAM

## 2023-12-20 PROCEDURE — G0108 DIAB MANAGE TRN  PER INDIV: HCPCS | Mod: S$GLB,,, | Performed by: DIETITIAN, REGISTERED

## 2023-12-20 PROCEDURE — 4010F ACE/ARB THERAPY RXD/TAKEN: CPT | Mod: CPTII,S$GLB,, | Performed by: STUDENT IN AN ORGANIZED HEALTH CARE EDUCATION/TRAINING PROGRAM

## 2023-12-20 PROCEDURE — 99999 PR PBB SHADOW E&M-EST. PATIENT-LVL IV: ICD-10-PCS | Mod: PBBFAC,,, | Performed by: STUDENT IN AN ORGANIZED HEALTH CARE EDUCATION/TRAINING PROGRAM

## 2023-12-20 PROCEDURE — 99215 OFFICE O/P EST HI 40 MIN: CPT | Mod: S$GLB,,, | Performed by: STUDENT IN AN ORGANIZED HEALTH CARE EDUCATION/TRAINING PROGRAM

## 2023-12-20 PROCEDURE — 99999 PR PBB SHADOW E&M-EST. PATIENT-LVL IV: CPT | Mod: PBBFAC,,, | Performed by: STUDENT IN AN ORGANIZED HEALTH CARE EDUCATION/TRAINING PROGRAM

## 2023-12-20 PROCEDURE — 4010F PR ACE/ARB THEARPY RXD/TAKEN: ICD-10-PCS | Mod: CPTII,S$GLB,, | Performed by: STUDENT IN AN ORGANIZED HEALTH CARE EDUCATION/TRAINING PROGRAM

## 2023-12-20 PROCEDURE — 3008F PR BODY MASS INDEX (BMI) DOCUMENTED: ICD-10-PCS | Mod: CPTII,S$GLB,, | Performed by: STUDENT IN AN ORGANIZED HEALTH CARE EDUCATION/TRAINING PROGRAM

## 2023-12-20 PROCEDURE — 3078F DIAST BP <80 MM HG: CPT | Mod: CPTII,S$GLB,, | Performed by: STUDENT IN AN ORGANIZED HEALTH CARE EDUCATION/TRAINING PROGRAM

## 2023-12-20 PROCEDURE — 3061F PR NEG MICROALBUMINURIA RESULT DOCUMENTED/REVIEW: ICD-10-PCS | Mod: CPTII,S$GLB,, | Performed by: STUDENT IN AN ORGANIZED HEALTH CARE EDUCATION/TRAINING PROGRAM

## 2023-12-20 PROCEDURE — 3078F PR MOST RECENT DIASTOLIC BLOOD PRESSURE < 80 MM HG: ICD-10-PCS | Mod: CPTII,S$GLB,, | Performed by: STUDENT IN AN ORGANIZED HEALTH CARE EDUCATION/TRAINING PROGRAM

## 2023-12-20 PROCEDURE — G0108 PR DIAB MANAGE TRN  PER INDIV: ICD-10-PCS | Mod: S$GLB,,, | Performed by: DIETITIAN, REGISTERED

## 2023-12-20 PROCEDURE — 3075F SYST BP GE 130 - 139MM HG: CPT | Mod: CPTII,S$GLB,, | Performed by: STUDENT IN AN ORGANIZED HEALTH CARE EDUCATION/TRAINING PROGRAM

## 2023-12-20 PROCEDURE — 3008F BODY MASS INDEX DOCD: CPT | Mod: CPTII,S$GLB,, | Performed by: STUDENT IN AN ORGANIZED HEALTH CARE EDUCATION/TRAINING PROGRAM

## 2023-12-20 PROCEDURE — 3051F HG A1C>EQUAL 7.0%<8.0%: CPT | Mod: CPTII,S$GLB,, | Performed by: STUDENT IN AN ORGANIZED HEALTH CARE EDUCATION/TRAINING PROGRAM

## 2023-12-20 PROCEDURE — 3075F PR MOST RECENT SYSTOLIC BLOOD PRESS GE 130-139MM HG: ICD-10-PCS | Mod: CPTII,S$GLB,, | Performed by: STUDENT IN AN ORGANIZED HEALTH CARE EDUCATION/TRAINING PROGRAM

## 2023-12-20 PROCEDURE — 3051F PR MOST RECENT HEMOGLOBIN A1C LEVEL 7.0 - < 8.0%: ICD-10-PCS | Mod: CPTII,S$GLB,, | Performed by: STUDENT IN AN ORGANIZED HEALTH CARE EDUCATION/TRAINING PROGRAM

## 2023-12-20 RX ORDER — TIRZEPATIDE 12.5 MG/.5ML
12.5 INJECTION, SOLUTION SUBCUTANEOUS
Qty: 12 PEN | Refills: 1 | Status: SHIPPED | OUTPATIENT
Start: 2023-12-20 | End: 2024-02-22

## 2023-12-20 RX ORDER — PROMETHAZINE HYDROCHLORIDE AND DEXTROMETHORPHAN HYDROBROMIDE 6.25; 15 MG/5ML; MG/5ML
5 SYRUP ORAL EVERY 6 HOURS PRN
Qty: 120 ML | Refills: 0 | Status: SHIPPED | OUTPATIENT
Start: 2023-12-20 | End: 2023-12-30

## 2023-12-20 RX ORDER — AZELASTINE 1 MG/ML
2 SPRAY, METERED NASAL 2 TIMES DAILY
Qty: 30 ML | Refills: 2 | Status: SHIPPED | OUTPATIENT
Start: 2023-12-20 | End: 2024-02-07

## 2023-12-20 RX ORDER — OMEPRAZOLE 40 MG/1
40 CAPSULE, DELAYED RELEASE ORAL DAILY
Qty: 90 CAPSULE | Refills: 1 | Status: SHIPPED | OUTPATIENT
Start: 2023-12-20 | End: 2024-02-22

## 2023-12-20 RX ORDER — AMOXICILLIN AND CLAVULANATE POTASSIUM 875; 125 MG/1; MG/1
1 TABLET, FILM COATED ORAL EVERY 12 HOURS
Qty: 10 TABLET | Refills: 0 | Status: SHIPPED | OUTPATIENT
Start: 2023-12-20 | End: 2023-12-25

## 2023-12-20 NOTE — PATIENT INSTRUCTIONS
For body aches, headaches, and/or fevers:   - Tylenol 1,000 mg every 8 hours or 500 mg every 4 hours.    For chest congestion try Mucinex 1,200 mg twice daily (must be well hydrated for the medication to work).   Sore throat can also be treated with warm tea with honey and salt water gargling (8 oz warm water with 1/4 tsp salt).   Try nasal saline rinses using only sterile or distilled water 1-2 times daily.   Sent promethazine-DM for cough to pharmacy.   Sent azelastine for sinus congestion to pharmacy.    Start Augmentin for 5 days if symptoms unimproved by Saturday.

## 2023-12-20 NOTE — PROGRESS NOTES
"Subjective:       Patient ID: Starla Wharton is a 58 y.o. female.    Chief Complaint: Rhinosinusitis [J32.9]    Patient is established with me, here today for the following:    T2DM on insulin, peripheral neuropathy, retinopathy, gastroparesis, CKD3, HLD, HTN, GERD, vitamin D deficiency, sickle cell trait       Endorses sinus congestion, sinus pressure, non-productive cough, sore throat starting a week ago.  Denies fever, chills, SOB, CP.   Has tried Diabetic Tussin without significant improvement.  Has tried Advil Cold and Sinus with some improvement.  Denies recent sick contacts.  Tested negative for COVID at home on Friday.    T2DM -   Currently taking:   - Metformin 500 mg BID  - Levemir 57 qAM and 57 units qHS  - Novolog 10 units qAC, using sliding scale as well  - Mounjaro 10 mg weekly  Following with ROSARIO Ramirez for endocrinology  Following with Chantal for diabetes management  Taking ASA 81 mg daily  Using Dexcom CGM for glucose monitoring  Denies blood sugars < 70 mg/dL  UTD on foot exam  UTD on eye exam, last done FEB2023.  Following with Dr. Ramsay routinely for ophthalmology  Lab Results       Component                Value               Date                       HGBA1C                   7.9 (H)             10/11/2023                 HGBA1C                   7.6 (H)             06/26/2023                 HGBA1C                   7.6 (H)             06/26/2023            Lab Results       Component                Value               Date                       MICALBCREAT              4.0                 06/26/2023              Wt Readings from Last 5 Encounters:  12/20/23 : 108.6 kg (239 lb 6.7 oz)  11/15/23 : 111.2 kg (245 lb 2.4 oz)  10/23/23 : 111.2 kg (245 lb 2.4 oz)  10/16/23 : 111.2 kg (245 lb 2.4 oz)  08/08/23 : 105.7 kg (233 lb 0.4 oz)     Anemia -  E-consulted hematology  Additional work up completed  Noted with elevated FLC NOV2023  "Contingency: Refer to Hematology clinic in case of Hb < 10 " "g/dl in which case she may need a bone marrow biopsy. "  Quantitative Hgb A2 2.4%  No longer taking the iron supplements  Lab Results       Component                Value               Date                       HGB                      11.4 (L)            11/30/2023                 HCT                      37.2                11/30/2023                 MCV                      82                  11/30/2023                 RDW                      14.6 (H)            11/30/2023            Lab Results       Component                Value               Date                       IRON                     47                  06/26/2023                 TRANSFERRIN              222                 06/26/2023                 TIBC                     329                 06/26/2023                 FESATURATED              14 (L)              06/26/2023             Lab Results       Component                Value               Date                       FERRITIN                 115                 06/26/2023            Lab Results       Component                Value               Date                       [id number]               664                 10/11/2023            Lab Results       Component                Value               Date                       FOLATE                   9.5                 10/11/2023                            HLD -   Endorses taking atorvastatin as directed  Denies side effects or concerns while taking medication  Lab Results       Component                Value               Date                       CHOL                     132                 06/26/2023            Lab Results       Component                Value               Date                       TRIG                     58                  06/26/2023            Lab Results       Component                Value               Date                       LDLCALC                  79.4                06/26/2023            Lab Results     "   Component                Value               Date                       HDL                      41                  06/26/2023               HTN -   Currently prescribed HCTZ, lisinopril.  Patient endorses taking medication as directed.  Denies side effects or concerns while taking medication.  Lab Results       Component                Value               Date                       MICALBCREAT              4.0                 06/26/2023            BP Readings from Last 5 Encounters:  11/15/23 : (!) 144/88  10/16/23 : (!) 144/88  07/26/23 : 126/62  07/11/23 : 120/84  06/29/23 : 124/60    CKD 3a -  Not currently taking indomethacin or NSAIDs  Endorses has been on Protonix chronically for reflux   Lab Results       Component                Value               Date                       CREATININE               1.4                 10/11/2023                 CREATININE               1.2                 06/26/2023                 CREATININE               1.2                 06/26/2023            Lab Results       Component                Value               Date                       NA                       141                 10/11/2023                 K                        4.0                 10/11/2023                 CO2                      26                  10/11/2023                  Review of Systems   Constitutional:  Negative for activity change, fatigue and fever.   Respiratory:  Negative for cough and shortness of breath.    Cardiovascular:  Negative for chest pain and palpitations.   Gastrointestinal:  Negative for abdominal pain, constipation, diarrhea, nausea and vomiting.   Neurological:  Negative for syncope and headaches.         Current Outpatient Medications   Medication Instructions    aspirin 81 mg, Daily    atorvastatin (LIPITOR) 40 mg, Oral, Daily    blood-glucose meter,continuous (DEXCOM G6 ) Misc 1 Device, Misc.(Non-Drug; Combo Route), Once    blood-glucose sensor (DEXCOM G6  "SENSOR) Kenzie Use daily for continuous glucose monitoring, change sensor every 10 days.    blood-glucose transmitter (DEXCOM G6 TRANSMITTER) Kenzie Use with Dexcom sensor, change every 90 days.    bromfenac (PROLENSA) 0.07 % Drop 1 drop, Ophthalmic, Daily    bromfenac (PROLENSA) 0.07 % Drop Apply one drop daily to both eyes as directed    ciclopirox (PENLAC) 8 % Soln ciclopirox 8 % topical solution   APPLY OVER NAIL AND SURROUNDING SKIN. APPLY DAILY OVER PREVIOUS COAT. AFTER SEVEN DAYS MAY REMOVE WITH ALCOHOL AND CONTINUE CYCLE    diclofenac sodium (VOLTAREN) 1 % Gel Topical (Top), As needed (PRN)    ferrous sulfate (FEOSOL) 325 mg, Oral, Every Mon, Wed, Fri    gabapentin (NEURONTIN) 300 mg, Oral, 3 times daily    hydroCHLOROthiazide (HYDRODIURIL) 25 mg, Oral, Daily    indomethacin (INDOCIN) 50 MG capsule indomethacin 50 mg capsule    insulin lispro (HUMALOG KWIKPEN INSULIN) 100 unit/mL pen Inject 10 units under the skin before meals plus scale 150-200+2, 201-250+4, 251-300+6, 301-350+8, >350+10. Max daily 65 units    LEVEMIR FLEXPEN 57 Units, Subcutaneous, 2 times daily, Max daily 114 units    lisinopriL (PRINIVIL,ZESTRIL) 20 mg, Oral, Daily    MOUNJARO 10 mg, Subcutaneous, Every 7 days    pantoprazole (PROTONIX) 20 mg, Oral, Daily    pen needle, diabetic (BD ULTRA-FINE MAR PEN NEEDLE) 32 gauge x 5/32" Ndle Use to inject insulin 5 times daily    prednisoLONE acetate (PRED FORTE) 1 % DrpS 1 drop, Right Eye, 2 times daily     Objective:      Vitals:    12/20/23 0902   BP: 134/70   Pulse: 74   SpO2: 98%   Weight: 108.6 kg (239 lb 6.7 oz)     Body mass index is 39.84 kg/m².    Physical Exam  Vitals reviewed.   Constitutional:       General: She is not in acute distress.     Appearance: She is not ill-appearing or diaphoretic.   Cardiovascular:      Rate and Rhythm: Normal rate and regular rhythm.      Heart sounds: Normal heart sounds. No murmur heard.     No friction rub. No gallop.   Pulmonary:      Effort: Pulmonary " effort is normal. No respiratory distress.      Breath sounds: Normal breath sounds. No stridor. No wheezing, rhonchi or rales.   Skin:     General: Skin is warm and dry.      Comments: Color WNL   Neurological:      Mental Status: She is alert. Mental status is at baseline.   Psychiatric:         Mood and Affect: Mood normal.         Behavior: Behavior normal.         Assessment:       1. Rhinosinusitis    2. Type 2 diabetes mellitus with retinopathy, with long-term current use of insulin, macular edema presence unspecified, unspecified laterality, unspecified retinopathy severity    3. Anemia, unspecified type    4. Mixed hyperlipidemia    5. Primary hypertension    6. Stage 3a chronic kidney disease    7. Gastroesophageal reflux disease, unspecified whether esophagitis present        Plan:       Rhinosinusitis  For body aches, headaches, and/or fevers:   - Tylenol 1,000 mg every 8 hours or 500 mg every 4 hours.    For chest congestion try Mucinex 1,200 mg twice daily (must be well hydrated for the medication to work).   Sore throat can also be treated with warm tea with honey and salt water gargling (8 oz warm water with 1/4 tsp salt).   Try nasal saline rinses using only sterile or distilled water 1-2 times daily.   Sent promethazine-DM for cough to pharmacy.   Sent azelastine for sinus congestion to pharmacy.    Start Augmentin for 5 days if symptoms unimproved by Saturday.  -     azelastine (ASTELIN) 137 mcg (0.1 %) nasal spray; SPRAY 2 sprays (274 mcg total) by Nasal route 2 (two) times daily.  -     promethazine-dextromethorphan (PROMETHAZINE-DM) 6.25-15 mg/5 mL Syrp; Take 5 mLs by mouth every 6 (six) hours as needed (cough).  -     amoxicillin-clavulanate 875-125mg (AUGMENTIN) 875-125 mg per tablet; Take 1 tablet by mouth every 12 (twelve) hours. for 5 days    Type 2 diabetes mellitus with retinopathy, with long-term current use of insulin, macular edema presence unspecified, unspecified laterality,  unspecified retinopathy severity  Increase Mounjaro   Continue other medications  RTC in 3 months for follow up.  -     tirzepatide (MOUNJARO) 12.5 mg/0.5 mL PnIj; Inject 12.5 mg into the skin every 7 days.  -     Comprehensive Metabolic Panel; Future  -     Hemoglobin A1C; Future  -     CBC Auto Differential; Future    Anemia, unspecified type  Continue monitoring   -     Iron and TIBC; Future  -     Ferritin; Future  -     CBC Auto Differential; Future    Mixed hyperlipidemia  Continue current medications.  RTC in 3 months for follow up.    Primary hypertension  Continue current medications.  RTC in 3 months for follow up.    Stage 3a chronic kidney disease  Continue monitoring  RTC in 3 months for follow up.    Gastroesophageal reflux disease, unspecified whether esophagitis present  Discontinue Protonix  Start omeprazole  -     omeprazole (PRILOSEC) 40 MG capsule; Take 1 capsule (40 mg total) by mouth once daily.      42 minutes were spent in chart review, documentation and review of results, and evaluation, treatment, and counseling of patient on the same day of service.    Meghan Perez MD  12/20/2023

## 2023-12-20 NOTE — PROGRESS NOTES
Diabetes Care Specialist Progress Note  Author: Chantal Shrestha RD, CDE  Date: 12/20/2023    Program Intake  Reason for Diabetes Program Visit:: Initial Diabetes Assessment  Current diabetes risk level:: moderate  In the last 12 months, have you:: been admitted to a hospital  Was the ER or hospital admission related to diabetes?: No  Permission to speak with others about care:: no    Lab Results   Component Value Date    HGBA1C 7.9 (H) 10/11/2023       Clinical     Patient Health Rating  Compared to other people your age, how would you rate your health?: Good    Problem Review  Reviewed Problem List with Patient: yes  Active comorbidities affecting diabetes self-care.: yes  Comorbidities: Neuropathy, Chronic Kidney Disease, Gastrointestinal Disorder  Reviewed health maintenance: yes    Clinical Assessment  Current Diabetes Treatment: Injectable, Insulin  Have you ever experienced hypoglycemia (low blood sugar)?: yes  In the last month, how often have you experienced low blood sugar?: other (see comments) (rarely - had severe hypo in the past after taking extra humalog after meal (<4 hours since previous injection))  Are you able to tell when your blood sugar is low?: Yes  What symptoms do you experience?: Anxious/nervous, Sweaty, Dizzy/Light-headed  How do you treat hypoglycemia (low blood sugar)?: 1/2 can soda/fruit juice, 5-6 pieces of hard candy  Have you ever experienced hyperglycemia (high blood sugar)?: yes  In the last month, how often have you experienced high blood sugar?: once a day  Are you able to tell when your blood sugar is high?: No (comment)    Medication Information  How do you obtain your medications?: Patient drives  How many days a week do you miss your medications?: 2  Do you sometimes have difficulty refilling your medications?: No  Medication adherence impacting ability to self-manage diabetes?: Yes    Labs  Do you have regular lab work to monitor your medications?: Yes  Type of Regular Lab  Work: A1c, Cholesterol, Microalbumin  Where do you get your labs drawn?: [facility]  Lab Compliance Barriers: No    Nutritional Status  Meal Plan 24 Hour Recall: Breakfast, Dinner, Snack  Meal Plan 24 Hour Recall - Breakfast: Boost Glucose Control  Meal Plan 24 Hour Recall - Lunch: skips  Meal Plan 24 Hour Recall - Dinner: last night broccoli cheddar soup, water  Meal Plan 24 Hour Recall - Snack: hot tamale candy, chips, diet coke  Change in appetite?: Yes (decreased appetite with mounjaro)    Additional Social History    Support  Does anyone support you with your diabetes care?: yes  Who supports you?: self, friend, family member  Who takes you to your medical appointments?: self  Does the current support meet the patient's needs?: Yes  Is Support an area impacting ability to self-manage diabetes?: No    Access to Mass Media & Technology  Does the patient have access to any of the following devices or technologies?: Smart phone, Internet Access  Media or technology needs impacting ability to self-manage diabetes?: No    Cognitive/Behavioral Health  Alert and Oriented: Yes  Difficulty Thinking: No  Requires Prompting: No  Requires assistance for routine expression?: No  Cognitive or behavioral barriers impacting ability to self-manage diabetes?: No    Culture/Gnosticism  Culture or Hindu beliefs that may impact ability to access healthcare: No    Communication  Language preference: English  Hearing Problems: No  Vision Problems: Yes  Vision problem type:: Decreased Vision (seeing opthamology)  Communication needs impacting ability to self-manage diabetes?: No    Health Literacy  Preferred Learning Method: Face to Face, Demonstration  How often do you need to have someone help you read instructions, pamphlets, or written material from your doctor or pharmacy?: Never  Health literacy needs impacting ability to self-manage diabetes?: No      Diabetes Self-Management Skills Assessment    Diabetes Disease  Process/Treatment Options  Patient/caregiver able to state what happens when someone has diabetes.: yes  Patient/caregiver knows what type of diabetes they have.: yes  Diabetes Type : Type II  Patient/caregiver able to identify at least three signs and symptoms of diabetes.: yes  Identified signs and symptoms:: fatigue, increased thirst  Patient able to identify at least three risk factors for diabetes.: yes  Identified risk factors:: age over 40, being overweight, family history  Diabetes Disease Process/Treatment Options: Skills Assessment Completed: Yes  Assessment indicates:: Adequate understanding  Area of need?: No    Nutrition/Healthy Eating  Challenges to healthy eating:: snacking between meals and at night  Method of carbohydrate measurement:: plate method  Patient can identify foods that impact blood sugar.: yes  Patient-identified foods:: soda, starches (bread, pasta, rice, cereal), sweets, fruit/fruit juice, starchy vegetables (corn, peas, beans)  Nutrition/Healthy Eating Skills Assessment Completed:: Yes  Assessment indicates:: Instruction Needed  Area of need?: Yes    Physical Activity/Exercise  Patient's daily activity level:: lightly active  Patient formally exercises outside of work.: no  Patient can identify forms of physical activity.: yes  Stated forms of physical activity:: any movement performed by muscles that uses energy, housework, manual activity at work  Patient can identify reasons why exercise/physical activity is important in diabetes management.: yes  Identified reasons:: lowers blood glucose, blood pressure, and cholesterol, helps insulin work better  Physical Activity/Exercise Skills Assessment Completed: : Yes  Assessment indicates:: Adequate understanding  Area of need?: No    Medications  Patient is able to describe current diabetes management routine.: yes  Diabetes management routine:: insulin, injectable medications, diet  Patient is able to identify current diabetes  medications, dosages, and appropriate timing of medications.: yes  Patient understands the purpose of the medications taken for diabetes.: yes  Patient reports problems or concerns with current medication regimen.: yes  Medication regimen problems/concerns:: other (see comments) (sometimes missing humalog)  Medication Skills Assessment Completed:: Yes  Assessment indicates:: Adequate understanding  Area of need?: Yes    Current DM meds:  Mounjaro 12.5mg weekly  Levemir 57 units BID  Humalog 10 units + scale  150-200 +2  201-250 +4  251-300 +6  301-350 +8  >350 +10    Monitoring:  Dexcom G6        Home Blood Glucose Monitoring  Patient states that blood sugar is checked at home daily.: yes  Monitoring Method:: personal continuous glucose monitor  Personal CGM type:: Dexcom G6  Patient is able to use personal CGM appropriately.: yes  CGM Report reviewed?: yes  Home Blood Glucose Monitoring Skills Assessment Completed: : Yes  Assessment indicates:: Adequate understanding  Area of need?: No    Acute Complications  Patient is able to identify types of acute complications: Yes  Patient Identified:: Hypoglycemia, Hyperglycemia  Patient is able to state the basic meaning of hypoglycemia?: Yes  Able to state the blood sugar range for hypoglycemia?: yes  Patient can identify general symptoms of hypoglycemia: yes  Patient identified:: shakiness, anxiety, dizziness, fatigue  Able to state proper treatment of hypoglycemia?: yes  Patient identified:: 1/2 can soda/fruit juice, 5-6 pieces of hard candy, 1 tablespoon sugar/honey  Patient is able to state the basic meaning of hyperglycemia?: Yes  Able to state the blood sugar range for hyperglycemia?: yes  Patient able to state proper treatment of hyperglycemia?: yes  Patient identified:: increase water intake, monitor blood sugar, take medication as recommended  Acute Complications Skills Assessment Completed: : Yes  Assessment indicates:: Adequate understanding  Area of need?:  No    Chronic Complications  Patient can identify major chronic complications of diabetes.: yes  Stated chronic complications:: kidney disease, neuropathy/nerve damage, retinopathy, heart disease/heart attack  Patient can identify ways to prevent or delay diabetes complications.: yes  Stated ways to prevent complications:: controlling blood sugar, controlling cholesterol and triglycerides, having regular diabetic eye exams, checking feet daily and having routine comprehensive foot exams  Chronic Complications Skills Assessment Completed: : Yes  Assessment indicates:: Adequate understanding  Area of need?: No    Psychosocial/Coping  Psychosocial/Coping Skills Assessment Completed: : No  Deffered due to:: Time      Assessment Summary and Plan    Based on today's diabetes care assessment, the following areas of need were identified:          12/20/2023    12:02 AM   Social   Support No   Access to Mass Media/Tech No   Cognitive/Behavioral Health No   Culture/Advent No   Communication No   Health Literacy No            12/20/2023    12:02 AM   Clinical   Medication Adherence Yes   Lab Compliance No            12/20/2023    12:02 AM   Diabetes Self-Management Skills   Diabetes Disease Process/Treatment Options No   Nutrition/Healthy Eating Yes - see care planning   Physical Activity/Exercise No   Medication Yes - see care planning   Home Blood Glucose Monitoring No   Acute Complications No   Chronic Complications No          Today's interventions were provided through individual discussion, instruction, and written materials were provided.      Patient verbalized understanding of instruction and written materials.  Pt was able to return back demonstration of instructions today. Patient understood key points, needs reinforcement and further instruction.     Diabetes Self-Management Care Plan:    Today's Diabetes Self-Management Care Plan was developed with Starla SHANKAR's input. Starla SHANKAR has agreed to work toward the  following goal(s) to improve his/her overall diabetes control.      Care Plan: Diabetes Management   Updates made since 11/20/2023 12:00 AM        Problem: Medications         Goal: Will keep Humalog at bedside (where she typically eats dinner).    Start Date: 12/20/2023   Expected End Date: 1/19/2024   Priority: High   Barriers: No Barriers Identified   Note:    12/20/23 - She is sometimes missing Humalog with dinner. Eats at home around 7-8pm but does not feel like getting up out of bed to go get Humalog. Plans to change where she stores Humalog and keep at bedside. Reinforced importance of taking Humalog consistently with meals.        Task: Reviewed with patient all current diabetes medications and provided basic review of the purpose, dosage, frequency, side effects, and storage of both oral and injectable diabetes medications. Completed 12/20/2023        Task: Discussed guidelines for preventing, detecting and treating hypoglycemia and hyperglycemia and reviewed the importance of meal and medication timing with diabetes mediations for prevention of hypoglycemia and maximum drug benefit. Completed 12/20/2023        Problem: Healthy Eating         Goal: Will choose more 0-5g CHO snacks rather than candy or chips.    Start Date: 12/20/2023   Expected End Date: 1/19/2024   Priority: Medium   Barriers: No Barriers Identified   Note:    12/20/23 - Has been gradually working on cutting out hot tamale candy - in the past would buy 5 boxes per month and snack on a few whenever she passed by them. Has kept from buying them the past couple of months and is down to 1 1/2 boxes. Went on a [employer] chip kick a little while back but has been avoiding those lately too. Encouraged continued efforts. Looking for healthier snack options. Discussed some 0-5g CHO snacks - she likes lightly salted cashews or peanuts and will try making that swap.        Task: Reviewed the sources and role of Carbohydrate, Protein, and Fat and how each  nutrient impacts blood sugar.         Task: Provided visual examples using dry measuring cups, food models, and other familiar objects such as computer mouse, deck or cards, tennis ball etc. to help with visualization of portions.         Task: Explained how to count carbohydrates using the food label and the use of dry measuring cups for accurate carb counting.         Task: Discussed strategies for choosing healthier menu options when dining out.         Task: Recommended replacing beverages containing high sugar content with noncaloric/sugar free options and/or water.         Task: Review the importance of balancing carbohydrates with each meal using portion control techniques to count servings of carbohydrate and label reading to identify serving size and amount of total carbs per serving.         Task: Provided Sample plate method and reviewed the use of the plate to estimate amounts of carbohydrate per meal.           Follow Up Plan     Follow up in about 3 months (around 3/20/2024) for check in .    Today's care plan and follow up schedule was discussed with patient.  Starla SHANKAR verbalized understanding of the care plan, goals, and agrees to follow up plan.        The patient was encouraged to communicate with his/her health care provider/physician and care team regarding his/her condition(s) and treatment.  I provided the patient with my contact information today and encouraged to contact me via phone or [facility]'s Patient Portal as needed.     Length of Visit   Total Time: 45 Minutes

## 2023-12-20 NOTE — PROGRESS NOTES
Diabetes Care Specialist Progress Note  Author: Chantal Shrestha RD, CDE  Date: 12/20/2023    Program Intake  Reason for Diabetes Program Visit:: Initial Diabetes Assessment  Current diabetes risk level:: moderate  In the last 12 months, have you:: been admitted to a hospital  Was the ER or hospital admission related to diabetes?: No  Permission to speak with others about care:: no    Lab Results   Component Value Date    HGBA1C 7.9 (H) 10/11/2023       Clinical      Patient Health Rating  Compared to other people your age, how would you rate your health?: Good    Problem Review  Reviewed Problem List with Patient: yes  Active comorbidities affecting diabetes self-care.: yes  Comorbidities: Neuropathy, Chronic Kidney Disease, Gastrointestinal Disorder  Reviewed health maintenance: yes    Clinical Assessment  Current Diabetes Treatment: Injectable, Insulin  Have you ever experienced hypoglycemia (low blood sugar)?: yes  In the last month, how often have you experienced low blood sugar?: other (see comments) (rarely - had severe hypo in the past after taking extra humalog after meal (<4 hours since previous injection))  Are you able to tell when your blood sugar is low?: Yes  What symptoms do you experience?: Anxious/nervous, Sweaty, Dizzy/Light-headed  How do you treat hypoglycemia (low blood sugar)?: 1/2 can soda/fruit juice, 5-6 pieces of hard candy  Have you ever experienced hyperglycemia (high blood sugar)?: yes  In the last month, how often have you experienced high blood sugar?: once a day  Are you able to tell when your blood sugar is high?: No (comment)    Medication Information  How do you obtain your medications?: Patient drives  How many days a week do you miss your medications?: 2  Do you sometimes have difficulty refilling your medications?: No  Medication adherence impacting ability to self-manage diabetes?: Yes    Current DM meds:  Mounjaro 12.5mg weekly  Levemir 57 units BID  Humalog 10 units +  scale  150-200 +2  201-250 +4  251-300 +6  301-350 +8  >350 +10    Monitoring:  Dexcom G6        Labs  Do you have regular lab work to monitor your medications?: Yes  Type of Regular Lab Work: A1c, Cholesterol, Microalbumin  Where do you get your labs drawn?: [facility]  Lab Compliance Barriers: No    Nutritional Status  Meal Plan 24 Hour Recall: Breakfast, Dinner, Snack  Meal Plan 24 Hour Recall - Breakfast: Boost Glucose Control  Meal Plan 24 Hour Recall - Lunch: skips  Meal Plan 24 Hour Recall - Dinner: last night broccoli cheddar soup, water  Meal Plan 24 Hour Recall - Snack: hot tamale candy, chips, diet coke  Change in appetite?: Yes (decreased appetite with mounjaro)    Additional Social History    Support  Does anyone support you with your diabetes care?: yes  Who supports you?: self, friend, family member  Who takes you to your medical appointments?: self  Does the current support meet the patient's needs?: Yes  Is Support an area impacting ability to self-manage diabetes?: No    Access to Mass Media & Technology  Does the patient have access to any of the following devices or technologies?: Smart phone, Internet Access  Media or technology needs impacting ability to self-manage diabetes?: No    Cognitive/Behavioral Health  Alert and Oriented: Yes  Difficulty Thinking: No  Requires Prompting: No  Requires assistance for routine expression?: No  Cognitive or behavioral barriers impacting ability to self-manage diabetes?: No    Culture/Oriental orthodox  Culture or Jew beliefs that may impact ability to access healthcare: No    Communication  Language preference: English  Hearing Problems: No  Vision Problems: Yes  Vision problem type:: Decreased Vision (seeing opthamology)  Communication needs impacting ability to self-manage diabetes?: No    Health Literacy  Preferred Learning Method: Face to Face, Demonstration  How often do you need to have someone help you read instructions, pamphlets, or written material from  your doctor or pharmacy?: Never  Health literacy needs impacting ability to self-manage diabetes?: No      Diabetes Self-Management Skills Assessment    Diabetes Disease Process/Treatment Options  Patient/caregiver able to state what happens when someone has diabetes.: yes  Patient/caregiver knows what type of diabetes they have.: yes  Diabetes Type : Type II  Patient/caregiver able to identify at least three signs and symptoms of diabetes.: yes  Identified signs and symptoms:: fatigue, increased thirst  Patient able to identify at least three risk factors for diabetes.: yes  Identified risk factors:: age over 40, being overweight, family history  Diabetes Disease Process/Treatment Options: Skills Assessment Completed: Yes  Assessment indicates:: Adequate understanding  Area of need?: No    Nutrition/Healthy Eating  Challenges to healthy eating:: snacking between meals and at night  Method of carbohydrate measurement:: plate method  Patient can identify foods that impact blood sugar.: yes  Patient-identified foods:: soda, starches (bread, pasta, rice, cereal), sweets, fruit/fruit juice, starchy vegetables (corn, peas, beans)  Nutrition/Healthy Eating Skills Assessment Completed:: Yes  Assessment indicates:: Instruction Needed  Area of need?: Yes    Physical Activity/Exercise  Patient's daily activity level:: lightly active  Patient formally exercises outside of work.: no  Patient can identify forms of physical activity.: yes  Stated forms of physical activity:: any movement performed by muscles that uses energy, housework, manual activity at work  Patient can identify reasons why exercise/physical activity is important in diabetes management.: yes  Identified reasons:: lowers blood glucose, blood pressure, and cholesterol, helps insulin work better  Physical Activity/Exercise Skills Assessment Completed: : Yes  Assessment indicates:: Adequate understanding  Area of need?: No         Home Blood Glucose  Monitoring  Patient states that blood sugar is checked at home daily.: yes  Monitoring Method:: personal continuous glucose monitor  Personal CGM type:: Dexcom G6  Patient is able to use personal CGM appropriately.: yes  CGM Report reviewed?: yes  Home Blood Glucose Monitoring Skills Assessment Completed: : Yes  Assessment indicates:: Adequate understanding  Area of need?: No    Acute Complications  Patient is able to identify types of acute complications: Yes  Patient Identified:: Hypoglycemia, Hyperglycemia  Patient is able to state the basic meaning of hypoglycemia?: Yes  Able to state the blood sugar range for hypoglycemia?: yes  Patient can identify general symptoms of hypoglycemia: yes  Patient identified:: shakiness, anxiety, dizziness, fatigue  Able to state proper treatment of hypoglycemia?: yes  Patient identified:: 1/2 can soda/fruit juice, 5-6 pieces of hard candy, 1 tablespoon sugar/honey  Patient is able to state the basic meaning of hyperglycemia?: Yes  Able to state the blood sugar range for hyperglycemia?: yes  Patient able to state proper treatment of hyperglycemia?: yes  Patient identified:: increase water intake, monitor blood sugar, take medication as recommended  Acute Complications Skills Assessment Completed: : Yes  Assessment indicates:: Adequate understanding  Area of need?: No    Chronic Complications  Patient can identify major chronic complications of diabetes.: yes  Stated chronic complications:: kidney disease, neuropathy/nerve damage, retinopathy, heart disease/heart attack  Patient can identify ways to prevent or delay diabetes complications.: yes  Stated ways to prevent complications:: controlling blood sugar, controlling cholesterol and triglycerides, having regular diabetic eye exams, checking feet daily and having routine comprehensive foot exams  Chronic Complications Skills Assessment Completed: : Yes  Assessment indicates:: Adequate understanding  Area of need?:  No    Psychosocial/Coping  Psychosocial/Coping Skills Assessment Completed: : No  Deffered due to:: Time      Assessment Summary and Plan    Based on today's diabetes care assessment, the following areas of need were identified:          12/20/2023    12:02 AM   Social   Support No   Access to Mass Media/Tech No   Cognitive/Behavioral Health No   Culture/Protestant No   Communication No   Health Literacy No            12/20/2023    12:02 AM   Clinical   Medication Adherence Yes   Lab Compliance No            12/20/2023    12:02 AM   Diabetes Self-Management Skills   Diabetes Disease Process/Treatment Options No   Nutrition/Healthy Eating Yes   Physical Activity/Exercise No   Home Blood Glucose Monitoring No   Acute Complications No   Chronic Complications No          Today's interventions were provided through individual discussion, instruction, and written materials were provided.      Patient verbalized understanding of instruction and written materials.  Pt was able to return back demonstration of instructions today. Patient understood key points, needs reinforcement and further instruction.     Diabetes Self-Management Care Plan:    Today's Diabetes Self-Management Care Plan was developed with Starla SHANKAR's input. Starla SHANKAR has agreed to work toward the following goal(s) to improve his/her overall diabetes control.      Care Plan: Diabetes Management   Updates made since 11/20/2023 12:00 AM        Problem: Medications         Goal: Will keep Humalog at bedside (where she typically eats dinner).    Start Date: 12/20/2023   Expected End Date: 1/19/2024   Priority: High   Barriers: No Barriers Identified   Note:    12/20/23 - She is sometimes missing Humalog with dinner. Eats at home around 7-8pm but does not feel like getting up out of bed to go get Humalog. Plans to change where she stores Humalog and keep at bedside. Reinforced importance of taking Humalog consistently with meals.        Task: Reviewed with patient  all current diabetes medications and provided basic review of the purpose, dosage, frequency, side effects, and storage of both oral and injectable diabetes medications. Completed 12/20/2023        Task: Discussed guidelines for preventing, detecting and treating hypoglycemia and hyperglycemia and reviewed the importance of meal and medication timing with diabetes mediations for prevention of hypoglycemia and maximum drug benefit. Completed 12/20/2023        Problem: Healthy Eating         Goal: Will choose more 0-5g CHO snacks rather than candy or chips.    Start Date: 12/20/2023   Expected End Date: 1/19/2024   Priority: Medium   Barriers: No Barriers Identified   Note:    12/20/23 - Has been gradually working on cutting out hot tamale candy - in the past would buy 5 boxes per month and snack on a few whenever she passed by them. Has kept from buying them the past couple of months and is down to 1 1/2 boxes. Went on a [employer] chip kick a little while back but has been avoiding those lately too. Encouraged continued efforts. Looking for healthier snack options. Discussed some 0-5g CHO snacks - she likes lightly salted cashews or peanuts and will try making that swap.        Task: Reviewed the sources and role of Carbohydrate, Protein, and Fat and how each nutrient impacts blood sugar.         Task: Provided visual examples using dry measuring cups, food models, and other familiar objects such as computer mouse, deck or cards, tennis ball etc. to help with visualization of portions.         Task: Explained how to count carbohydrates using the food label and the use of dry measuring cups for accurate carb counting.         Task: Discussed strategies for choosing healthier menu options when dining out.         Task: Recommended replacing beverages containing high sugar content with noncaloric/sugar free options and/or water.         Task: Review the importance of balancing carbohydrates with each meal using portion  control techniques to count servings of carbohydrate and label reading to identify serving size and amount of total carbs per serving.         Task: Provided Sample plate method and reviewed the use of the plate to estimate amounts of carbohydrate per meal.           Follow Up Plan     Follow up in about 3 months (around 3/20/2024) for check in .    Today's care plan and follow up schedule was discussed with patient.  Starla SHANKAR verbalized understanding of the care plan, goals, and agrees to follow up plan.        The patient was encouraged to communicate with his/her health care provider/physician and care team regarding his/her condition(s) and treatment.  I provided the patient with my contact information today and encouraged to contact me via phone or [facility] Patient Portal as needed.     Length of Visit   Total Time: 45 Minutes

## 2024-01-02 DIAGNOSIS — E11.42 DIABETIC POLYNEUROPATHY ASSOCIATED WITH TYPE 2 DIABETES MELLITUS: ICD-10-CM

## 2024-01-02 NOTE — TELEPHONE ENCOUNTER
No care due was identified.  [facility] Embedded Care Due Messages. Reference number: [id number].   1/02/2024 10:27:39 AM CST

## 2024-01-02 NOTE — TELEPHONE ENCOUNTER
----- Message from Danielle Landon sent at 1/2/2024 10:30 AM CST -----  Regarding: med refill  Can the clinic reply in [facility]:       Please refill the medication(s) listed below.       Please call the patient when the prescription(s) is ready for  at this phone number: [phone]           Medication #1 gabapentin (NEURONTIN) 300 MG capsule     Medication #2     Preferred Pharmacy:   [facility] [address] [place] [address]  Phone: [phone] Fax: [phone]

## 2024-01-03 RX ORDER — GABAPENTIN 300 MG/1
300 CAPSULE ORAL 3 TIMES DAILY
Qty: 270 CAPSULE | Refills: 1 | Status: SHIPPED | OUTPATIENT
Start: 2024-01-03 | End: 2024-01-04

## 2024-01-04 ENCOUNTER — OFFICE VISIT (OUTPATIENT)
Dept: ORTHOPEDICS | Facility: CLINIC | Age: 59
End: 2024-01-04
Payer: MEDICARE

## 2024-01-04 VITALS — HEIGHT: 65 IN | BODY MASS INDEX: 39.89 KG/M2 | WEIGHT: 239.44 LBS

## 2024-01-04 DIAGNOSIS — E11.42 DIABETIC POLYNEUROPATHY ASSOCIATED WITH TYPE 2 DIABETES MELLITUS: ICD-10-CM

## 2024-01-04 DIAGNOSIS — M20.21 HALLUX RIGIDUS, RIGHT FOOT: Primary | ICD-10-CM

## 2024-01-04 PROCEDURE — 99999 PR PBB SHADOW E&M-EST. PATIENT-LVL III: CPT | Mod: PBBFAC,,, | Performed by: ORTHOPAEDIC SURGERY

## 2024-01-04 PROCEDURE — 99213 OFFICE O/P EST LOW 20 MIN: CPT | Mod: S$GLB,,, | Performed by: ORTHOPAEDIC SURGERY

## 2024-01-04 RX ORDER — GABAPENTIN 300 MG/1
600 CAPSULE ORAL 3 TIMES DAILY
Qty: 540 CAPSULE | Refills: 1 | Status: SHIPPED | OUTPATIENT
Start: 2024-01-04 | End: 2024-07-02

## 2024-01-04 NOTE — PROGRESS NOTES
Starla Wharton  Returns today for follow-up.  She is now six months postop from a right big toe cheilectomy for hallux rigidus and arthritis.  She reports that her big toe is doing well.  Her main complaints today have to do with neurogenic pain related to her peripheral neuropathy.  She has been taking 1200 mg of gabapentin for her neuropathy and requests an increase in dosage.    Examination:  The right big toe reveals minimal swelling.  She is decreased but functional motion of the big toe without any pain.  There is no tenderness about the big toe MTP joint.  .      Impression:  1. Hallux rigidus, right foot        2. Diabetic polyneuropathy associated with type 2 diabetes mellitus  gabapentin (NEURONTIN) 300 MG capsule        Recommendation:  With regards to her big toe, no further intervention is recommended at this time.  I went ahead and increased her gabapentin to 1800 mg a day for her peripheral neuropathy.  Any further adjustments of her gabapentin or treatment of her peripheral neuropathy should be managed by her pcp or endocrinologist.    Follow-up as needed

## 2024-01-11 NOTE — NURSING
Patient educated on cause of blood sugar elevation and treatment, and purpose of sending medication home or with security. Patient verbalized understanding. Patient pleasant and cooperative. Pt given benadryl x1 for itching with mild relief. Atarax given x 1. Will continue to monitor.   see chief complaint quote

## 2024-02-02 ENCOUNTER — LAB VISIT (OUTPATIENT)
Dept: LAB | Facility: OTHER | Age: 59
End: 2024-02-02
Attending: STUDENT IN AN ORGANIZED HEALTH CARE EDUCATION/TRAINING PROGRAM
Payer: MEDICARE

## 2024-02-02 DIAGNOSIS — E11.319 TYPE 2 DIABETES MELLITUS WITH RETINOPATHY, WITH LONG-TERM CURRENT USE OF INSULIN, MACULAR EDEMA PRESENCE UNSPECIFIED, UNSPECIFIED LATERALITY, UNSPECIFIED RETINOPATHY SEVERITY: ICD-10-CM

## 2024-02-02 DIAGNOSIS — Z79.4 TYPE 2 DIABETES MELLITUS WITH RETINOPATHY, WITH LONG-TERM CURRENT USE OF INSULIN, MACULAR EDEMA PRESENCE UNSPECIFIED, UNSPECIFIED LATERALITY, UNSPECIFIED RETINOPATHY SEVERITY: ICD-10-CM

## 2024-02-02 DIAGNOSIS — D64.9 ANEMIA, UNSPECIFIED TYPE: ICD-10-CM

## 2024-02-02 LAB
ALBUMIN SERPL BCP-MCNC: 3.3 G/DL (ref 3.5–5.2)
ALP SERPL-CCNC: 109 U/L (ref 55–135)
ALT SERPL W/O P-5'-P-CCNC: 13 U/L (ref 10–44)
ANION GAP SERPL CALC-SCNC: 9 MMOL/L (ref 8–16)
AST SERPL-CCNC: 12 U/L (ref 10–40)
BASOPHILS # BLD AUTO: 0.04 K/UL (ref 0–0.2)
BASOPHILS # BLD AUTO: 0.04 K/UL (ref 0–0.2)
BASOPHILS NFR BLD: 0.6 % (ref 0–1.9)
BASOPHILS NFR BLD: 0.6 % (ref 0–1.9)
BILIRUB SERPL-MCNC: 0.3 MG/DL (ref 0.1–1)
BUN SERPL-MCNC: 20 MG/DL (ref 6–20)
CALCIUM SERPL-MCNC: 8.6 MG/DL (ref 8.7–10.5)
CHLORIDE SERPL-SCNC: 104 MMOL/L (ref 95–110)
CO2 SERPL-SCNC: 25 MMOL/L (ref 23–29)
CREAT SERPL-MCNC: 1.3 MG/DL (ref 0.5–1.4)
DIFFERENTIAL METHOD BLD: ABNORMAL
DIFFERENTIAL METHOD BLD: ABNORMAL
EOSINOPHIL # BLD AUTO: 0.2 K/UL (ref 0–0.5)
EOSINOPHIL # BLD AUTO: 0.2 K/UL (ref 0–0.5)
EOSINOPHIL NFR BLD: 2.3 % (ref 0–8)
EOSINOPHIL NFR BLD: 2.3 % (ref 0–8)
ERYTHROCYTE [DISTWIDTH] IN BLOOD BY AUTOMATED COUNT: 14.3 % (ref 11.5–14.5)
ERYTHROCYTE [DISTWIDTH] IN BLOOD BY AUTOMATED COUNT: 14.3 % (ref 11.5–14.5)
EST. GFR  (NO RACE VARIABLE): 47 ML/MIN/1.73 M^2
ESTIMATED AVG GLUCOSE: 180 MG/DL (ref 68–131)
FERRITIN SERPL-MCNC: 132 NG/ML (ref 20–300)
GLUCOSE SERPL-MCNC: 179 MG/DL (ref 70–110)
HBA1C MFR BLD: 7.9 % (ref 4–5.6)
HCT VFR BLD AUTO: 36.3 % (ref 37–48.5)
HCT VFR BLD AUTO: 36.3 % (ref 37–48.5)
HGB BLD-MCNC: 10.9 G/DL (ref 12–16)
HGB BLD-MCNC: 10.9 G/DL (ref 12–16)
IMM GRANULOCYTES # BLD AUTO: 0.07 K/UL (ref 0–0.04)
IMM GRANULOCYTES # BLD AUTO: 0.07 K/UL (ref 0–0.04)
IMM GRANULOCYTES NFR BLD AUTO: 1.1 % (ref 0–0.5)
IMM GRANULOCYTES NFR BLD AUTO: 1.1 % (ref 0–0.5)
IRON SERPL-MCNC: 46 UG/DL (ref 30–160)
LYMPHOCYTES # BLD AUTO: 2.4 K/UL (ref 1–4.8)
LYMPHOCYTES # BLD AUTO: 2.4 K/UL (ref 1–4.8)
LYMPHOCYTES NFR BLD: 36 % (ref 18–48)
LYMPHOCYTES NFR BLD: 36 % (ref 18–48)
MCH RBC QN AUTO: 25.1 PG (ref 27–31)
MCH RBC QN AUTO: 25.1 PG (ref 27–31)
MCHC RBC AUTO-ENTMCNC: 30 G/DL (ref 32–36)
MCHC RBC AUTO-ENTMCNC: 30 G/DL (ref 32–36)
MCV RBC AUTO: 83 FL (ref 82–98)
MCV RBC AUTO: 83 FL (ref 82–98)
MONOCYTES # BLD AUTO: 0.4 K/UL (ref 0.3–1)
MONOCYTES # BLD AUTO: 0.4 K/UL (ref 0.3–1)
MONOCYTES NFR BLD: 5.5 % (ref 4–15)
MONOCYTES NFR BLD: 5.5 % (ref 4–15)
NEUTROPHILS # BLD AUTO: 3.6 K/UL (ref 1.8–7.7)
NEUTROPHILS # BLD AUTO: 3.6 K/UL (ref 1.8–7.7)
NEUTROPHILS NFR BLD: 54.5 % (ref 38–73)
NEUTROPHILS NFR BLD: 54.5 % (ref 38–73)
NRBC BLD-RTO: 0 /100 WBC
NRBC BLD-RTO: 0 /100 WBC
PLATELET # BLD AUTO: 274 K/UL (ref 150–450)
PLATELET # BLD AUTO: 274 K/UL (ref 150–450)
PMV BLD AUTO: 9.2 FL (ref 9.2–12.9)
PMV BLD AUTO: 9.2 FL (ref 9.2–12.9)
POTASSIUM SERPL-SCNC: 4.1 MMOL/L (ref 3.5–5.1)
PROT SERPL-MCNC: 7.5 G/DL (ref 6–8.4)
RBC # BLD AUTO: 4.35 M/UL (ref 4–5.4)
RBC # BLD AUTO: 4.35 M/UL (ref 4–5.4)
SATURATED IRON: 15 % (ref 20–50)
SODIUM SERPL-SCNC: 138 MMOL/L (ref 136–145)
TOTAL IRON BINDING CAPACITY: 309 UG/DL (ref 250–450)
TRANSFERRIN SERPL-MCNC: 209 MG/DL (ref 200–375)
WBC # BLD AUTO: 6.58 K/UL (ref 3.9–12.7)
WBC # BLD AUTO: 6.58 K/UL (ref 3.9–12.7)

## 2024-02-02 PROCEDURE — 36415 COLL VENOUS BLD VENIPUNCTURE: CPT | Performed by: STUDENT IN AN ORGANIZED HEALTH CARE EDUCATION/TRAINING PROGRAM

## 2024-02-02 PROCEDURE — 80053 COMPREHEN METABOLIC PANEL: CPT | Performed by: STUDENT IN AN ORGANIZED HEALTH CARE EDUCATION/TRAINING PROGRAM

## 2024-02-02 PROCEDURE — 82728 ASSAY OF FERRITIN: CPT | Performed by: STUDENT IN AN ORGANIZED HEALTH CARE EDUCATION/TRAINING PROGRAM

## 2024-02-02 PROCEDURE — 83540 ASSAY OF IRON: CPT | Performed by: STUDENT IN AN ORGANIZED HEALTH CARE EDUCATION/TRAINING PROGRAM

## 2024-02-02 PROCEDURE — 85025 COMPLETE CBC W/AUTO DIFF WBC: CPT | Performed by: STUDENT IN AN ORGANIZED HEALTH CARE EDUCATION/TRAINING PROGRAM

## 2024-02-02 PROCEDURE — 83036 HEMOGLOBIN GLYCOSYLATED A1C: CPT | Performed by: STUDENT IN AN ORGANIZED HEALTH CARE EDUCATION/TRAINING PROGRAM

## 2024-02-06 ENCOUNTER — TELEPHONE (OUTPATIENT)
Dept: INTERNAL MEDICINE | Facility: CLINIC | Age: 59
End: 2024-02-06
Payer: MEDICARE

## 2024-02-06 NOTE — TELEPHONE ENCOUNTER
Patient said she's having sciatic pain in her back , its been years since she had this pain so she don't remember what she's taken for the pain in the past. Refused appt. Asking if provider would call something in for her. She's allergic to Naprosyn

## 2024-02-06 NOTE — TELEPHONE ENCOUNTER
----- Message from Marian David sent at 2/6/2024  3:07 PM CST -----  Contact: JIA QUINTERO [[id number]]  Type: Call Back      Who called: JIA QUINTERO [[id number]]      What is the request in detail: Patient is requesting a call back. Pt states that she would like to speak with the nurse in regard to her back pain. She declined an appointment. She states that she just needs to speak with someone.   Please advise.     Can the clinic reply by [facility]? No      Would the patient rather a call back or a response via [facility]? Call back       Best call back number: [phone] (home)       Additional Information:

## 2024-02-06 NOTE — TELEPHONE ENCOUNTER
Recommend coming in for appt to discuss. If no possible way she can come in person, ok to do virtual appt instead. Please let patient know, thank you!

## 2024-02-07 ENCOUNTER — HOSPITAL ENCOUNTER (OUTPATIENT)
Dept: RADIOLOGY | Facility: OTHER | Age: 59
Discharge: HOME | End: 2024-02-07
Payer: MEDICARE

## 2024-02-07 ENCOUNTER — OFFICE VISIT (OUTPATIENT)
Dept: INTERNAL MEDICINE | Facility: CLINIC | Age: 59
End: 2024-02-07
Payer: MEDICARE

## 2024-02-07 VITALS
HEART RATE: 83 BPM | WEIGHT: 241.19 LBS | SYSTOLIC BLOOD PRESSURE: 136 MMHG | HEIGHT: 65 IN | DIASTOLIC BLOOD PRESSURE: 78 MMHG | OXYGEN SATURATION: 99 % | BODY MASS INDEX: 40.18 KG/M2

## 2024-02-07 DIAGNOSIS — M54.42 ACUTE BILATERAL LOW BACK PAIN WITH LEFT-SIDED SCIATICA: ICD-10-CM

## 2024-02-07 DIAGNOSIS — M54.42 ACUTE BILATERAL LOW BACK PAIN WITH LEFT-SIDED SCIATICA: Primary | ICD-10-CM

## 2024-02-07 PROCEDURE — 99999 PR PBB SHADOW E&M-EST. PATIENT-LVL III: CPT | Mod: PBBFAC,,,

## 2024-02-07 PROCEDURE — 72100 X-RAY EXAM L-S SPINE 2/3 VWS: CPT | Mod: TC,FY

## 2024-02-07 PROCEDURE — 72100 X-RAY EXAM L-S SPINE 2/3 VWS: CPT | Mod: 26,,, | Performed by: RADIOLOGY

## 2024-02-07 PROCEDURE — 99213 OFFICE O/P EST LOW 20 MIN: CPT | Mod: S$GLB,,,

## 2024-02-07 RX ORDER — TRAMADOL HYDROCHLORIDE 50 MG/1
50 TABLET ORAL EVERY 6 HOURS
Qty: 7 TABLET | Refills: 0 | Status: SHIPPED | OUTPATIENT
Start: 2024-02-07 | End: 2024-02-16

## 2024-02-07 RX ORDER — METHOCARBAMOL 500 MG/1
500 TABLET, FILM COATED ORAL 3 TIMES DAILY
Qty: 15 TABLET | Refills: 0 | Status: SHIPPED | OUTPATIENT
Start: 2024-02-07 | End: 2024-02-12

## 2024-02-07 NOTE — PROGRESS NOTES
INTERNAL MEDICINE URGENT VISIT NOTE    CHIEF COMPLAINT     Chief Complaint   Patient presents with    Back Pain       HPI     Starla Wharton is a 58 y.o. female who presents for an urgent visit today.    PCP is Meghan Perez MD, patient is new to me. Pt reports sciatic nerve pain that started 2 days ago. Happened years ago. Was treated with NSAID and muscle relaxer. Reports waking up with the pain. Pain radiates into back of L thigh down to knee. Pain 9/10. Hurts to lay flat. Has used heating pad and tylenol with mild relief.     Past Medical History:  Past Medical History:   Diagnosis Date    Diabetes mellitus     Hypertension     Personal history of colonic polyps 06/12/2017       Home Medications:  Prior to Admission medications    Medication Sig Start Date End Date Taking? Authorizing Provider   aspirin 81 MG Chew Take 81 mg by mouth once daily.    Provider, Historical   atorvastatin (LIPITOR) 40 MG tablet Take 1 tablet (40 mg total) by mouth once daily. 9/13/23   Meghan Perez MD   azelastine (ASTELIN) 137 mcg (0.1 %) nasal spray SPRAY 2 sprays (274 mcg total) by Nasal route 2 (two) times daily. 12/20/23 12/19/24  Meghan Perez MD   blood-glucose meter,continuous (DEXCOM G6 ) Misc 1 Device by Misc.(Non-Drug; Combo Route) route once. for 1 dose 11/10/22 10/16/23  Meghan Perez MD   blood-glucose sensor (DEXCOM G6 SENSOR) Kenzie Use daily for continuous glucose monitoring, change sensor every 10 days. 10/21/22   Meghan Perez MD   blood-glucose transmitter (DEXCOM G6 TRANSMITTER) Kenzie Use with Dexcom sensor, change every 90 days. 10/21/22   Meghan Perez MD   bromfenac (PROLENSA) 0.07 % Drop Apply 1 drop to eye once daily at 6am.    Provider, Historical   bromfenac (PROLENSA) 0.07 % Drop Apply one drop daily to both eyes as directed 2/5/23      bromfenac (PROLENSA) 0.07 % Drop Place 1 drop into both eyes daily 1/9/24      ciclopirox (PENLAC) 8 % Soln ciclopirox 8 % topical  "solution   APPLY OVER NAIL AND SURROUNDING SKIN. APPLY DAILY OVER PREVIOUS COAT. AFTER SEVEN DAYS MAY REMOVE WITH ALCOHOL AND CONTINUE CYCLE 1/5/22   Provider, Historical   ferrous sulfate (FEOSOL) 325 mg (65 mg iron) Tab tablet Take 1 tablet (325 mg total) by mouth every Mon, Wed, Fri. 11/20/23   Meghan Perez MD   gabapentin (NEURONTIN) 300 MG capsule Take 2 capsules (600 mg total) by mouth 3 (three) times daily. 1/4/24 7/2/24  Dmitry Clarke MD   hydroCHLOROthiazide (HYDRODIURIL) 25 MG tablet Take 1 tablet (25 mg total) by mouth once daily. 10/1/23 6/27/24  Meghan Perez MD   insulin detemir U-100, Levemir, 100 unit/mL (3 mL) SubQ InPn pen Inject 57 Units into the skin 2 (two) times daily. Max daily 114 units 6/19/23   Meghan Perez MD   insulin lispro (HUMALOG KWIKPEN INSULIN) 100 unit/mL pen Inject 10 units under the skin before meals plus scale 150-200+2, 201-250+4, 251-300+6, 301-350+8, >350+10. Max daily 65 units 10/16/23   Prudence Ramirez APRN, ANDRESP   lisinopriL (PRINIVIL,ZESTRIL) 20 MG tablet Take 1 tablet (20 mg total) by mouth once daily. 10/30/23 10/24/24  Meghan Perez MD   omeprazole (PRILOSEC) 40 MG capsule Take 1 capsule (40 mg total) by mouth once daily. 12/20/23   Meghan Perez MD   pen needle, diabetic (BD ULTRA-FINE MAR PEN NEEDLE) 32 gauge x 5/32" Ndle Use to inject insulin 5 times daily 6/15/23   Prudence Ramirez APRN, SO   prednisoLONE acetate (PRED FORTE) 1 % DrpS Place 1 drop into the right eye 2 (two) times a day.    Provider, Historical   tirzepatide (MOUNJARO) 12.5 mg/0.5 mL PnIj Inject 12.5 mg into the skin every 7 days. 12/20/23   Meghan Perez MD       Review of Systems:  Review of Systems   Constitutional: Negative.    Musculoskeletal:  Positive for back pain.       Health Maintainence:   Immunizations:  Health Maintenance         Date Due Completion Date    COVID-19 Vaccine (4 - 2023-24 season) 09/01/2023 2/6/2022    Foot Exam 06/15/2024 6/15/2023 " "   Diabetes Urine Screening 06/26/2024 6/26/2023    Lipid Panel 06/26/2024 6/26/2023    Hemoglobin A1c 08/02/2024 2/2/2024    Mammogram 08/24/2024 8/24/2023    Eye Exam 09/19/2024 9/19/2023    Low Dose Statin 01/04/2025 1/4/2024    TETANUS VACCINE 11/14/2028 11/14/2018    Colorectal Cancer Screening 05/18/2033 5/18/2023             PHYSICAL EXAM     /78 (BP Location: Right arm, Patient Position: Sitting)   Pulse 83   Ht 5' 5" (1.651 m)   Wt 109.4 kg (241 lb 2.9 oz)   SpO2 99%   BMI 40.13 kg/m²     Physical Exam  Vitals reviewed.   Constitutional:       Appearance: She is well-developed.   HENT:      Head: Normocephalic and atraumatic.   Eyes:      Conjunctiva/sclera: Conjunctivae normal.   Cardiovascular:      Rate and Rhythm: Normal rate.   Pulmonary:      Effort: Pulmonary effort is normal. No respiratory distress.   Skin:     General: Skin is warm and dry.      Findings: No rash.   Neurological:      Mental Status: She is alert and oriented to person, place, and time.      Coordination: Coordination normal.   Psychiatric:         Behavior: Behavior normal.         LABS     Lab Results   Component Value Date    HGBA1C 7.9 (H) 02/02/2024     CMP  Sodium   Date Value Ref Range Status   02/02/2024 138 136 - 145 mmol/L Final     Potassium   Date Value Ref Range Status   02/02/2024 4.1 3.5 - 5.1 mmol/L Final     Chloride   Date Value Ref Range Status   02/02/2024 104 95 - 110 mmol/L Final     CO2   Date Value Ref Range Status   02/02/2024 25 23 - 29 mmol/L Final     Glucose   Date Value Ref Range Status   02/02/2024 179 (H) 70 - 110 mg/dL Final     BUN   Date Value Ref Range Status   02/02/2024 20 6 - 20 mg/dL Final     Creatinine   Date Value Ref Range Status   02/02/2024 1.3 0.5 - 1.4 mg/dL Final     Calcium   Date Value Ref Range Status   02/02/2024 8.6 (L) 8.7 - 10.5 mg/dL Final     Total Protein   Date Value Ref Range Status   02/02/2024 7.5 6.0 - 8.4 g/dL Final     Albumin   Date Value Ref Range Status "   02/02/2024 3.3 (L) 3.5 - 5.2 g/dL Final     Total Bilirubin   Date Value Ref Range Status   02/02/2024 0.3 0.1 - 1.0 mg/dL Final     Comment:     For infants and newborns, interpretation of results should be based  on gestational age, weight and in agreement with clinical  observations.    Premature Infant recommended reference ranges:  Up to 24 hours.............<8.0 mg/dL  Up to 48 hours............<12.0 mg/dL  3-5 days..................<15.0 mg/dL  6-29 days.................<15.0 mg/dL       Alkaline Phosphatase   Date Value Ref Range Status   02/02/2024 109 55 - 135 U/L Final     AST   Date Value Ref Range Status   02/02/2024 12 10 - 40 U/L Final     ALT   Date Value Ref Range Status   02/02/2024 13 10 - 44 U/L Final     Anion Gap   Date Value Ref Range Status   02/02/2024 9 8 - 16 mmol/L Final     eGFR if    Date Value Ref Range Status   07/22/2022 58 (A) >60 mL/min/1.73 m^2 Final     eGFR if non    Date Value Ref Range Status   07/22/2022 50 (A) >60 mL/min/1.73 m^2 Final     Comment:     Calculation used to obtain the estimated glomerular filtration  rate (eGFR) is the CKD-EPI equation.        Lab Results   Component Value Date    WBC 6.58 02/02/2024    WBC 6.58 02/02/2024    HGB 10.9 (L) 02/02/2024    HGB 10.9 (L) 02/02/2024    HCT 36.3 (L) 02/02/2024    HCT 36.3 (L) 02/02/2024    MCV 83 02/02/2024    MCV 83 02/02/2024     02/02/2024     02/02/2024     Lab Results   Component Value Date    CHOL 132 06/26/2023    CHOL 139 07/22/2022    CHOL 181 03/05/2018     Lab Results   Component Value Date    HDL 41 06/26/2023    HDL 51 07/22/2022    HDL 72 03/05/2018     Lab Results   Component Value Date    LDLCALC 79.4 06/26/2023    LDLCALC 71.0 07/22/2022    LDLCALC 101.0 03/05/2018     Lab Results   Component Value Date    TRIG 58 06/26/2023    TRIG 85 07/22/2022    TRIG 40 03/05/2018     Lab Results   Component Value Date    CHOLHDL 31.1 06/26/2023    CHOLHDL 36.7  07/22/2022    CHOLHDL 39.8 03/05/2018     Lab Results   Component Value Date    TSH 2.259 01/23/2023       ASSESSMENT/PLAN   1. Acute bilateral low back pain with left-sided sciatica  -     X-Ray Lumbar Spine 2 Or 3 Views; Future; Expected date: 02/07/2024  -     methocarbamoL (ROBAXIN) 500 MG Tab; Take 1 tablet (500 mg total) by mouth 3 (three) times daily. for 5 days  Dispense: 15 tablet; Refill: 0  -     traMADoL (ULTRAM) 50 mg tablet; Take 1 tablet (50 mg total) by mouth every 6 (six) hours.  Dispense: 7 tablet; Refill: 0         Follow up with PCP as needed. Pt instructed to use the methocarbamol during the day and take the tramadol at bedtime so she can rest.

## 2024-02-14 ENCOUNTER — TELEPHONE (OUTPATIENT)
Dept: INTERNAL MEDICINE | Facility: CLINIC | Age: 59
End: 2024-02-14
Payer: MEDICARE

## 2024-02-14 NOTE — TELEPHONE ENCOUNTER
----- Message from Hazel Rodriguez sent at 2/14/2024 10:14 AM CST -----  Name of Who is calling :JIA QUINTERO [[id number]]        What is the request in detail:Pt wants a call back in regards to pain in her back. She said the pain hasn't gone away even with her medication.Please assist        Can the clinic reply by [facility]:no            [facility]: [phone]

## 2024-02-14 NOTE — TELEPHONE ENCOUNTER
If pain not improving or resolving then needs to be reassessed   - please arrange f/u with provider with availability today   - she may benefit from another treatment like steroid pack but safest option to be reassessed

## 2024-02-15 ENCOUNTER — TELEPHONE (OUTPATIENT)
Dept: INTERNAL MEDICINE | Facility: CLINIC | Age: 59
End: 2024-02-15
Payer: MEDICARE

## 2024-02-15 NOTE — TELEPHONE ENCOUNTER
----- Message from Edis Arauz sent at 2/14/2024  4:14 PM CST -----  Contact: Patient  Type:  Patient Call          Who Called: Patient         Does the patient know what this is regarding?: Requesting a call back ;pt said that she called earlier and noone has called her back ;pt is complaining of sever back pain ; please advise           Would the patient rather a call back or a response via [facility]?call           Best Call Back Number: [phone]             Additional Information: Pt said that her matter is urgent

## 2024-02-16 ENCOUNTER — OFFICE VISIT (OUTPATIENT)
Dept: INTERNAL MEDICINE | Facility: CLINIC | Age: 59
End: 2024-02-16
Attending: INTERNAL MEDICINE
Payer: MEDICARE

## 2024-02-16 ENCOUNTER — LAB VISIT (OUTPATIENT)
Dept: LAB | Facility: OTHER | Age: 59
End: 2024-02-16
Attending: NURSE PRACTITIONER
Payer: MEDICARE

## 2024-02-16 VITALS
HEIGHT: 65 IN | BODY MASS INDEX: 39.59 KG/M2 | WEIGHT: 237.63 LBS | SYSTOLIC BLOOD PRESSURE: 136 MMHG | DIASTOLIC BLOOD PRESSURE: 68 MMHG | HEART RATE: 88 BPM | OXYGEN SATURATION: 99 %

## 2024-02-16 DIAGNOSIS — E11.8 TYPE 2 DIABETES MELLITUS WITH COMPLICATION, WITH LONG-TERM CURRENT USE OF INSULIN: ICD-10-CM

## 2024-02-16 DIAGNOSIS — N18.31 STAGE 3A CHRONIC KIDNEY DISEASE: ICD-10-CM

## 2024-02-16 DIAGNOSIS — Z79.4 TYPE 2 DIABETES MELLITUS WITH COMPLICATION, WITH LONG-TERM CURRENT USE OF INSULIN: ICD-10-CM

## 2024-02-16 DIAGNOSIS — M54.50 ACUTE LEFT-SIDED LOW BACK PAIN, UNSPECIFIED WHETHER SCIATICA PRESENT: Primary | ICD-10-CM

## 2024-02-16 DIAGNOSIS — K86.2 PANCREATIC CYST: ICD-10-CM

## 2024-02-16 DIAGNOSIS — E11.42 DIABETIC POLYNEUROPATHY ASSOCIATED WITH TYPE 2 DIABETES MELLITUS: ICD-10-CM

## 2024-02-16 LAB
AMYLASE SERPL-CCNC: 58 U/L (ref 20–110)
ESTIMATED AVG GLUCOSE: 177 MG/DL (ref 68–131)
HBA1C MFR BLD: 7.8 % (ref 4–5.6)
LIPASE SERPL-CCNC: 49 U/L (ref 4–60)

## 2024-02-16 PROCEDURE — 99999 PR PBB SHADOW E&M-EST. PATIENT-LVL IV: CPT | Mod: PBBFAC,,, | Performed by: INTERNAL MEDICINE

## 2024-02-16 PROCEDURE — 83036 HEMOGLOBIN GLYCOSYLATED A1C: CPT | Performed by: NURSE PRACTITIONER

## 2024-02-16 PROCEDURE — 36415 COLL VENOUS BLD VENIPUNCTURE: CPT | Performed by: NURSE PRACTITIONER

## 2024-02-16 PROCEDURE — 99214 OFFICE O/P EST MOD 30 MIN: CPT | Mod: S$GLB,,, | Performed by: INTERNAL MEDICINE

## 2024-02-16 PROCEDURE — 82150 ASSAY OF AMYLASE: CPT | Performed by: NURSE PRACTITIONER

## 2024-02-16 PROCEDURE — 83690 ASSAY OF LIPASE: CPT | Performed by: NURSE PRACTITIONER

## 2024-02-16 RX ORDER — METHYLPREDNISOLONE 4 MG/1
TABLET ORAL
Qty: 21 EACH | Refills: 0 | Status: SHIPPED | OUTPATIENT
Start: 2024-02-16 | End: 2024-02-22

## 2024-02-16 RX ORDER — METHOCARBAMOL 500 MG/1
500 TABLET, FILM COATED ORAL 4 TIMES DAILY
COMMUNITY

## 2024-02-16 RX ORDER — TRAMADOL HYDROCHLORIDE 50 MG/1
50 TABLET ORAL EVERY 6 HOURS PRN
Qty: 24 TABLET | Refills: 0 | Status: SHIPPED | OUTPATIENT
Start: 2024-02-16 | End: 2024-02-22

## 2024-02-16 RX ORDER — CYCLOBENZAPRINE HCL 5 MG
5 TABLET ORAL 3 TIMES DAILY PRN
Qty: 30 TABLET | Refills: 0 | Status: SHIPPED | OUTPATIENT
Start: 2024-02-16 | End: 2024-02-22

## 2024-02-16 NOTE — PROGRESS NOTES
"Subjective:       Patient ID: Starla Wharton is a 58 y.o. female.    Chief Complaint: Back Pain    Pt normally cared for by my colleague Dr. Perez and patient is new to me. I have reviewed patient's past medical, surgical, and social history in addition to MAR and allergies.     2 week hx of left low back pain that occasionally radiates to left lateral/posterior thigh and stops mid thigh.          Review of Systems   Constitutional:  Negative for chills, fatigue, fever and unexpected weight change.   HENT:  Negative for ear pain, hearing loss, postnasal drip, tinnitus, trouble swallowing and voice change.    Respiratory:  Negative for cough, chest tightness, shortness of breath and wheezing.    Cardiovascular:  Negative for chest pain, palpitations and leg swelling.   Gastrointestinal:  Negative for abdominal pain, blood in stool, diarrhea, nausea and vomiting.   Endocrine: Negative for polydipsia, polyphagia and polyuria.   Genitourinary:  Negative for difficulty urinating, dysuria, hematuria and vaginal bleeding.   Skin:  Negative for rash.   Allergic/Immunologic: Negative for food allergies.   Neurological:  Negative for dizziness, numbness and headaches.   Hematological:  Does not bruise/bleed easily.   Psychiatric/Behavioral:  The patient is not nervous/anxious.        Objective:      Vitals:    02/16/24 1308   BP: 136/68   BP Location: Right arm   Patient Position: Sitting   BP Method: Large (Manual)   Pulse: 88   SpO2: 99%   Weight: 107.8 kg (237 lb 10.5 oz)   Height: 5' 5" (1.651 m)      Physical Exam  Vitals and nursing note reviewed.   Constitutional:       General: She is not in acute distress.     Appearance: Normal appearance. She is well-developed. She is not diaphoretic.   HENT:      Head: Normocephalic and atraumatic.      Mouth/Throat:      Pharynx: No oropharyngeal exudate.   Eyes:      General: No scleral icterus.        Right eye: No discharge.         Left eye: No discharge.      " Conjunctiva/sclera: Conjunctivae normal.      Pupils: Pupils are equal, round, and reactive to light.   Neck:      Thyroid: No thyromegaly.   Cardiovascular:      Rate and Rhythm: Normal rate and regular rhythm.      Heart sounds: Normal heart sounds. No murmur heard.  Pulmonary:      Effort: Pulmonary effort is normal. No respiratory distress.      Breath sounds: Normal breath sounds. No wheezing or rales.   Abdominal:      General: There is no distension.   Musculoskeletal:         General: No tenderness.      Comments: Heel and toe walk intact. Sensation to light touch intact and symmetric. Distal and proximal strength intact and symmetric.     Lymphadenopathy:      Cervical: No cervical adenopathy.   Skin:     General: Skin is warm and dry.   Neurological:      Mental Status: She is alert and oriented to person, place, and time.   Psychiatric:         Speech: Speech normal.         Behavior: Behavior normal.         Assessment:       1. Acute left-sided low back pain, unspecified whether sciatica present    2. Diabetic polyneuropathy associated with type 2 diabetes mellitus    3. Stage 3a chronic kidney disease        Plan:       Starla SHANKAR was seen today for back pain.    Diagnoses and all orders for this visit:    Acute left-sided low back pain, unspecified whether sciatica present  -     Ambulatory referral/consult [facility]; Future  -     methylPREDNISolone (MEDROL DOSEPACK) 4 mg tablet; use as directed  -     cyclobenzaprine (FLEXERIL) 5 MG tablet; Take 1 tablet (5 mg total) by mouth 3 (three) times daily as needed.  -     traMADoL (ULTRAM) 50 mg tablet; Take 1 tablet (50 mg total) by mouth every 6 (six) hours as needed for Pain (pain 6-10)    Diabetic polyneuropathy associated with type 2 diabetes mellitus   She has SSI as home for concomitant steroid use.    Stage 3a chronic kidney disease           Simón Nguyen MD  Internal Medicine-[facility]        Side effects of medication(s)  were discussed in detail and patient voiced understanding.  Patient will call back for any issues or complications.

## 2024-02-21 ENCOUNTER — PATIENT MESSAGE (OUTPATIENT)
Dept: INTERNAL MEDICINE | Facility: CLINIC | Age: 59
End: 2024-02-21
Payer: MEDICARE

## 2024-02-21 NOTE — PROGRESS NOTES
Subjective:     Patient ID: Starla Wharton is a 58 y.o. female.    Chief Complaint: Rectal Pain (Waist line)    Ms Wharton is a 57 yo female sent in consultation by Dr. Hunter for evaluation of acute low back pain that started 2/5/2024.  She had previous back pain episode and was told it was sciatica and muscle relaxer.  This is the longest episode and the worst.  The pain started across the lower back and would run down the right thigh.  Now the pain is left lower back.  She was given robaxin and tramadol.  She was given steroids but she did not want to take them because of the DM.  She is stiff in the morning.  Pain is 4/10 now, worst 7/10 getting up in the morning.  She needs 5 min too loosen up.  She feels like it is improving.  She has been taking robaxin twice a day.  She feels like it makes her drowsy.  She takes gabapentin 3 times a day for her feet.  The thigh pain is better.  She has done PT since before tiana.  She feels like it makes her more sore.  She has not been to chiropractor.  The pain is with lying down and getting out of bed.  She does not feel like bending increases the pain.      X-ray lumbar 2/7/2024  No acute fractures, preserved vertebral body heights and pedicles.  No spondylolysis or spondylolisthesis.  No areas of obvious disc narrowing.  Preserved facet articulations.  Intact right and left SI joints and visualized hip joint spaces.  Stable focus of increased sclerosis involving the right iliac bone to that seen on the above CT and therefore felt related to innocuous process.     Impression:     As above    Past Medical History:  No date: Diabetes mellitus  No date: Hypertension  06/12/2017: Personal history of colonic polyps    Past Surgical History:  7/26/2023: CHEILECTOMY; Right      Comment:  Procedure: CHEILECTOMY big toe;  Surgeon: Dmitry Clarke MD;  Location: [facility];  Service: Orthopedics;                Laterality: Right;  5/18/2023: COLONOSCOPY; N/A       Comment:  Procedure: COLONOSCOPY;  Surgeon: Marian Schneider MD;  Location: [facility] ([address] [place] [address]);  Service: Endoscopy;                Laterality: N/A;  gastroporesis-2 days full liquid-1 day                clears-suprep-inst mail-tb-pt states does not use                portal-tbpre call done 5/12/23 -[facility]  No date: HYSTERECTOMY      Comment:  fibroids  No date: LAPAROSCOPIC CHOLECYSTECTOMY  No date: MYOMECTOMY  No date: PANCREAS BIOPSY  No date: WISDOM TOOTH EXTRACTION    Review of patient's family history indicates:  Problem: Hypertension      Relation: Mother          Age of Onset: (Not Specified)  Problem: Diabetes      Relation: Mother          Age of Onset: (Not Specified)  Problem: Cancer      Relation: Father          Age of Onset: (Not Specified)  Problem: Stomach cancer      Relation: Father          Age of Onset: (Not Specified)      Social History    Socioeconomic History      Marital status: Single    Tobacco Use      Smoking status: Never      Smokeless tobacco: Never    Substance and Sexual Activity      Alcohol use: Yes        Comment: socially      Drug use: No    Social Determinants of Health  Financial Resource Strain: Low Risk  (11/14/2023)      Overall Financial Resource Strain (CARDIA)          Difficulty of Paying Living Expenses: Not very hard  Food Insecurity: No Food Insecurity (11/14/2023)      Hunger Vital Sign          Worried About Running Out of Food in the Last Year: Never true          Ran Out of Food in the Last Year: Never true  Transportation Needs: No Transportation Needs (11/14/2023)      PRAPARE - Transportation          Lack of Transportation (Medical): No          Lack of Transportation (Non-Medical): No  Physical Activity: Unknown (11/14/2023)      Exercise Vital Sign          Days of Exercise per Week: Patient declined          Minutes of Exercise per Session: 0 min  Stress: No Stress Concern Present (11/14/2023)      [employer]  - Occupational Stress Questionnaire          Feeling of Stress : Only a little  Social Connections: Unknown (11/14/2023)      Social Connection and Isolation Panel [NHANES]          Frequency of Communication with Friends and Family: More than three times a week          Frequency of Social Gatherings with Friends and Family: Three times a week          Active Member of Clubs or Organizations: Yes          Attends Club or Organization Meetings: 1 to 4 times per year          Marital Status:   Housing Stability: Low Risk  (11/14/2023)      Housing Stability Vital Sign          Unable to Pay for Housing in the Last Year: No          Number of Places Lived in the Last Year: 1          Unstable Housing in the Last Year: No    Current Outpatient Medications:  aspirin 81 MG Chew, Take 81 mg by mouth once daily., Disp: , Rfl:   atorvastatin (LIPITOR) 40 MG tablet, Take 1 tablet (40 mg total) by mouth once daily., Disp: 90 tablet, Rfl: 3  blood-glucose meter,continuous (DEXCOM G6 ) Misc, 1 Device by Misc.(Non-Drug; Combo Route) route once. for 1 dose, Disp: 1 each, Rfl: 0  blood-glucose sensor (DEXCOM G6 SENSOR) Kenzie, Use daily for continuous glucose monitoring, change sensor every 10 days., Disp: 9 each, Rfl: 3  blood-glucose transmitter (DEXCOM G6 TRANSMITTER) Kenzie, Use with Dexcom sensor, change every 90 days., Disp: 1 each, Rfl: 3  bromfenac (PROLENSA) 0.07 % Drop, Apply one drop daily to both eyes as directed, Disp: 3 mL, Rfl: 3  bromfenac (PROLENSA) 0.07 % Drop, Place 1 drop into both eyes daily, Disp: 3 mL, Rfl: 3  cyclobenzaprine (FLEXERIL) 5 MG tablet, Take 1 tablet (5 mg total) by mouth 3 (three) times daily as needed., Disp: 30 tablet, Rfl: 0  ferrous sulfate (FEOSOL) 325 mg (65 mg iron) Tab tablet, Take 1 tablet (325 mg total) by mouth every Mon, Wed, Fri. (Patient not taking: Reported on 2/16/2024), Disp: 36 tablet, Rfl: 1  gabapentin (NEURONTIN) 300 MG capsule, Take 2 capsules (600 mg total) by  "mouth 3 (three) times daily., Disp: 540 capsule, Rfl: 1  hydroCHLOROthiazide (HYDRODIURIL) 25 MG tablet, Take 1 tablet (25 mg total) by mouth once daily., Disp: 90 tablet, Rfl: 2  insulin detemir U-100, Levemir, 100 unit/mL (3 mL) SubQ InPn pen, Inject 57 Units into the skin 2 (two) times daily. Max daily 114 units, Disp: 105 mL, Rfl: 3  insulin lispro (HUMALOG KWIKPEN INSULIN) 100 unit/mL pen, Inject 10 units under the skin before meals plus scale 150-200+2, 201-250+4, 251-300+6, 301-350+8, >350+10. Max daily 65 units, Disp: 30 mL, Rfl: 6  lisinopriL (PRINIVIL,ZESTRIL) 20 MG tablet, Take 1 tablet (20 mg total) by mouth once daily., Disp: 90 tablet, Rfl: 3  methocarbamoL (ROBAXIN) 500 MG Tab, Take 500 mg by mouth 4 (four) times daily., Disp: , Rfl:   methylPREDNISolone (MEDROL DOSEPACK) 4 mg tablet, use as directed, Disp: 21 each, Rfl: 0  omeprazole (PRILOSEC) 40 MG capsule, Take 1 capsule (40 mg total) by mouth once daily., Disp: 90 capsule, Rfl: 1  pen needle, diabetic (BD ULTRA-FINE MAR PEN NEEDLE) 32 gauge x 5/32" Ndle, Use to inject insulin 5 times daily, Disp: 400 each, Rfl: 3  tirzepatide (MOUNJARO) 12.5 mg/0.5 mL PnIj, Inject 12.5 mg into the skin every 7 days., Disp: 12 pen , Rfl: 1  traMADoL (ULTRAM) 50 mg tablet, Take 1 tablet (50 mg total) by mouth every 6 (six) hours as needed for Pain (pain 6-10)., Disp: 24 tablet, Rfl: 0    No current facility-administered medications for this visit.      Review of patient's allergies indicates:   -- Naprosyn [naproxen]        Review of Systems   Constitutional: Negative for weight gain and weight loss.   Cardiovascular:  Negative for chest pain.   Respiratory:  Negative for shortness of breath.    Musculoskeletal:  Positive for back pain. Negative for joint pain and joint swelling.   Gastrointestinal:  Negative for abdominal pain, bowel incontinence, nausea and vomiting.   Genitourinary:  Negative for bladder incontinence.   Neurological:  Positive for paresthesias " (neuropathy in feet). Negative for numbness.        Objective:     General: Starla SHANKAR is well-developed, well-nourished, appears stated age, in no acute distress, alert and oriented to time, place and person.     General    Vitals reviewed.  Constitutional: She is oriented to person, place, and time. She appears well-developed and well-nourished.   HENT:   Head: Normocephalic and atraumatic.   Pulmonary/Chest: Effort normal.   Neurological: She is alert and oriented to person, place, and time.   Psychiatric: She has a normal mood and affect. Her behavior is normal. Judgment and thought content normal.     General Musculoskeletal Exam   Gait: normal     Right Ankle/Foot Exam     Tests   Heel Walk: able to perform  Tiptoe Walk: able to perform    Left Ankle/Foot Exam     Tests   Heel Walk: able to perform  Tiptoe Walk: able to perform  Back (L-Spine & T-Spine) / Neck (C-Spine) Exam     Tenderness Left paramedian tenderness of the Sacrum.     Back (L-Spine & T-Spine) Range of Motion   Extension:  30 (with pain)   Flexion:  90   Lateral bend right:  20   Lateral bend left:  20   Rotation right:  40   Rotation left:  40     Spinal Sensation   Right Side Sensation  C-Spine Level: normal   L-Spine Level: normal  S-Spine Level: normal  Left Side Sensation  C-Spine Level: normal  L-Spine Level: normal  S-Spine Level: normal    Back (L-Spine & T-Spine) Tests   Right Side Tests  Straight leg raise:        Sitting SLR: > 70 degrees    Left Side Tests  Straight leg raise:       Sitting SLR: > 70 degrees      Other   She has no scoliosis .  Spinal Kyphosis:  Absent    Comments:  Neg ISAI with left back pain and left left ISAI      Muscle Strength   Right Upper Extremity   Biceps: 5/5   Deltoid:  5/5  Triceps:  5/5  Wrist extension: 5/5   Finger Flexors:  5/5  Left Upper Extremity  Biceps: 5/5   Deltoid:  5/5  Triceps:  5/5  Wrist extension: 5/5   Finger Flexors:  5/5  Right Lower Extremity   Hip Flexion: 5/5   Quadriceps:  5/5    Anterior tibial:  5/5   EHL:  5/5  Left Lower Extremity   Hip Flexion: 5/5   Quadriceps:  5/5   Anterior tibial:  5/5   EHL:  5/5    Reflexes     Left Side  Biceps:  2+  Triceps:  2+  Brachioradialis:  2+  Achilles:  2+  Left Khan's Sign:  Absent  Babinski Sign:  absent  Quadriceps:  2+    Right Side   Biceps:  2+  Triceps:  2+  Brachioradialis:  2+  Achilles:  2+  Right Khan's Sign:  absent  Babinski Sign:  absent  Quadriceps:  2+    Vascular Exam     Right Pulses        Carotid:                  2+    Left Pulses        Carotid:                  2+          Assessment:     1. Acute left-sided low back pain without sciatica         Plan:     Orders Placed This Encounter    Ambulatory referral/consult to Physical/Occupational Therapy     We discussed back pain and the nature of back pain.  We discussed that it will likely improve and has already started to improve and that it is not one thing that causes the pain but an accumulation of multiple things that we do.    X-ray of the lumbar spine was reviewed and shows good alignment  We discussed SI joints as source of pain.  She originally felt pain on both sides and now mainly on the left  We discussed posture sitting and the importance of trying to sit better.  We discussed working on posture and trying to sit with a curve in the lower back  We discussed the benefits of therapy and exercise and continuing to move.  We discussed PT is first treatment to help give her some exercises to help the pain  Robaxin 500mg we discussed a half during the day.  Currently taking 2 pills a day  We discussed steroid can help decrease inflammation.  She is not interested and improving  PT for back and core strengthening si joint mobilization and HEP at [facility]  Treat your own back book  RTC 3 months    More than 50% of the total time  of 45 minutes was spent face to face in counseling on diagnosis and treatment options. I also counseled patient  on common and most usual side  effect of prescribed medications.  I reviewed Primary care , and other specialty's notes to better coordinate patient's care. All questions were answered, and patient voiced understanding.         Follow-up: No follow-ups on file. If there are any questions prior to this, the patient was instructed to contact the office.        No

## 2024-02-22 ENCOUNTER — OFFICE VISIT (OUTPATIENT)
Dept: INTERNAL MEDICINE | Facility: CLINIC | Age: 59
End: 2024-02-22
Payer: MEDICARE

## 2024-02-22 VITALS
SYSTOLIC BLOOD PRESSURE: 132 MMHG | DIASTOLIC BLOOD PRESSURE: 76 MMHG | OXYGEN SATURATION: 98 % | HEIGHT: 65 IN | HEART RATE: 86 BPM | WEIGHT: 236.31 LBS | BODY MASS INDEX: 39.37 KG/M2

## 2024-02-22 DIAGNOSIS — I10 PRIMARY HYPERTENSION: ICD-10-CM

## 2024-02-22 DIAGNOSIS — E11.8 TYPE 2 DIABETES MELLITUS WITH COMPLICATION, WITH LONG-TERM CURRENT USE OF INSULIN: ICD-10-CM

## 2024-02-22 DIAGNOSIS — N18.31 STAGE 3A CHRONIC KIDNEY DISEASE: ICD-10-CM

## 2024-02-22 DIAGNOSIS — Z79.4 TYPE 2 DIABETES MELLITUS WITH HYPERGLYCEMIA, WITH LONG-TERM CURRENT USE OF INSULIN: ICD-10-CM

## 2024-02-22 DIAGNOSIS — E11.42 DIABETIC POLYNEUROPATHY ASSOCIATED WITH TYPE 2 DIABETES MELLITUS: ICD-10-CM

## 2024-02-22 DIAGNOSIS — E11.65 TYPE 2 DIABETES MELLITUS WITH HYPERGLYCEMIA, WITH LONG-TERM CURRENT USE OF INSULIN: ICD-10-CM

## 2024-02-22 DIAGNOSIS — M54.50 ACUTE MIDLINE LOW BACK PAIN WITHOUT SCIATICA: ICD-10-CM

## 2024-02-22 DIAGNOSIS — Z79.4 TYPE 2 DIABETES MELLITUS WITH BOTH EYES AFFECTED BY RETINOPATHY WITHOUT MACULAR EDEMA, WITH LONG-TERM CURRENT USE OF INSULIN, UNSPECIFIED RETINOPATHY SEVERITY: Primary | ICD-10-CM

## 2024-02-22 DIAGNOSIS — E11.319 TYPE 2 DIABETES MELLITUS WITH BOTH EYES AFFECTED BY RETINOPATHY WITHOUT MACULAR EDEMA, WITH LONG-TERM CURRENT USE OF INSULIN, UNSPECIFIED RETINOPATHY SEVERITY: Primary | ICD-10-CM

## 2024-02-22 DIAGNOSIS — E78.2 MIXED HYPERLIPIDEMIA: ICD-10-CM

## 2024-02-22 DIAGNOSIS — Z79.4 TYPE 2 DIABETES MELLITUS WITH COMPLICATION, WITH LONG-TERM CURRENT USE OF INSULIN: ICD-10-CM

## 2024-02-22 DIAGNOSIS — E66.01 CLASS 2 SEVERE OBESITY WITH SERIOUS COMORBIDITY AND BODY MASS INDEX (BMI) OF 39.0 TO 39.9 IN ADULT, UNSPECIFIED OBESITY TYPE: ICD-10-CM

## 2024-02-22 DIAGNOSIS — E55.9 VITAMIN D DEFICIENCY: ICD-10-CM

## 2024-02-22 DIAGNOSIS — K86.2 PANCREATIC CYST: ICD-10-CM

## 2024-02-22 PROCEDURE — 95251 CONT GLUC MNTR ANALYSIS I&R: CPT | Mod: S$GLB,,, | Performed by: NURSE PRACTITIONER

## 2024-02-22 PROCEDURE — 99214 OFFICE O/P EST MOD 30 MIN: CPT | Mod: S$GLB,,, | Performed by: NURSE PRACTITIONER

## 2024-02-22 PROCEDURE — 99999 PR PBB SHADOW E&M-EST. PATIENT-LVL IV: CPT | Mod: PBBFAC,,, | Performed by: NURSE PRACTITIONER

## 2024-02-22 RX ORDER — INSULIN GLARGINE 300 U/ML
INJECTION, SOLUTION SUBCUTANEOUS
Qty: 6 PEN | Refills: 6 | Status: SHIPPED | OUTPATIENT
Start: 2024-02-22

## 2024-02-22 RX ORDER — TIRZEPATIDE 15 MG/.5ML
15 INJECTION, SOLUTION SUBCUTANEOUS
Qty: 4 PEN | Refills: 5 | Status: SHIPPED | OUTPATIENT
Start: 2024-02-22 | End: 2024-02-22 | Stop reason: SDUPTHER

## 2024-02-22 RX ORDER — INSULIN LISPRO 100 [IU]/ML
INJECTION, SOLUTION INTRAVENOUS; SUBCUTANEOUS
Qty: 30 ML | Refills: 6 | Status: SHIPPED | OUTPATIENT
Start: 2024-02-22

## 2024-02-22 RX ORDER — TIRZEPATIDE 10 MG/.5ML
10 INJECTION, SOLUTION SUBCUTANEOUS
Qty: 4 PEN | Refills: 5 | Status: SHIPPED | OUTPATIENT
Start: 2024-02-22 | End: 2024-06-06

## 2024-02-22 RX ORDER — TIRZEPATIDE 15 MG/.5ML
15 INJECTION, SOLUTION SUBCUTANEOUS
Qty: 4 PEN | Refills: 5 | Status: SHIPPED | OUTPATIENT
Start: 2024-02-22 | End: 2024-02-23 | Stop reason: SDUPTHER

## 2024-02-22 RX ORDER — BLOOD-GLUCOSE TRANSMITTER
EACH MISCELLANEOUS
Qty: 1 EACH | Refills: 3 | Status: SHIPPED | OUTPATIENT
Start: 2024-02-22

## 2024-02-22 RX ORDER — BLOOD-GLUCOSE SENSOR
EACH MISCELLANEOUS
Qty: 9 EACH | Refills: 3 | Status: SHIPPED | OUTPATIENT
Start: 2024-02-22

## 2024-02-22 NOTE — PATIENT INSTRUCTIONS
Follow up in 4 months w/Irielle   A1c lipid panel prior -4 months -June 2024    Lab Results   Component Value Date    HGBA1C 7.8 (H) 02/16/2024     Goal less than 7.5%     [email or web address]  Eat fit imelda  MyOoolalanesspal imelda  [email or web address]    mySugr imelda     Dexcom  Levemir------ toujeo 57 units twice a day  humalog 10 units before meals plus scale 180-230+2, 231-280+4, 281-330+6, 331-380+8, >380+10.   Mounjaro 15 mg weekly -primary care

## 2024-02-22 NOTE — PROGRESS NOTES
CHIEF COMPLAINT: Type 2 Diabetes     HPI: Ms. Starla Wharton is a 58 y.o. female who was diagnosed with Type 2 DM > 20 years ago.  On insulin x 15 years ago.   Being seen by me again today.  Last seen by me fall 2023.   +PN, nephropathy , gastroparesis  F/u with podiatry, ortho    Avoiding red meats-worsen gastroparesis.  Seen in the past by Dr. Molina 3/2023.   H/o BG 39 mg/dl   Dealing with incontinence, stopped metformin.  A1c trends: 12.4% to 8.2% to 7.6% to 7.9% , 7.8%  Having acute midline- radiating to left at times 4-5/10, xray done, pending f/u with back and spine specialist.   Did not take prednisone, not using flexeril.  Dealing with some constipation per ozempic   Changed to mounjaro in the past 6 months, tolerating well.   Stopped iron tablets- r/t constipation.    Had a fall in the past week, went down on knees.     Seen by GI in recent year.   Feels better w/ stopping metformin in the past year.     Dexcom g7   Avg 172 mg/dl  Sd 46 mg/dl   Gmi 7.4%   TIR 62%   See media    Dietary habits:  Banana, boost control (glyc)   Eggs grits occ   Soups- broccoli /cheddar cheese or corn chowder  Varies     Lab Results   Component Value Date    HGBA1C 7.8 (H) 02/16/2024     Has clarity with Chantal Shrestha, see media.   Works consistently w/ DE.     PREVIOUS DIABETES MEDICATIONS TRIED  Levemir  Humalog  Metformin   Ozempic     CURRENT DIABETIC MEDS: levemir 57 units bid, humalog 10 -10-10 units ac , stop metformin ,  mounjaro 12.5 mg weekly    On MDI (injections 5 x a day)   Makes frequent changes to his/her insulin regimen on the basis of blood glucose data  Testing 4 x a day  Patient is willing and able to use the device  Demonstrated an understanding of the technology and is motivated to use CGM  Patient expected to adhere to a comprehensive diabetes treatment plan and patient has adequate medical supervision  Has multiple impaired awareness of hypoglycemia (hypoglycemia unawareness)    Diabetes Management  "Status    Statin: Taking  ACE/ARB: Taking    Screening or Prevention Patient's value Goal Complete/Controlled?   HgA1C Testing and Control   Lab Results   Component Value Date    HGBA1C 7.8 (H) 02/16/2024      Annually/Less than 8% No   Lipid profile : 06/26/2023 Annually Yes   LDL control Lab Results   Component Value Date    LDLCALC 79.4 06/26/2023    Annually/Less than 100 mg/dl  Yes   Nephropathy screening Lab Results   Component Value Date    LABMICR 6.0 06/26/2023     Lab Results   Component Value Date    PROTEINUA Negative 03/04/2018    Annually Yes   Blood pressure BP Readings from Last 1 Encounters:   02/16/24 136/68    Less than 140/90 Yes   Dilated retinal exam : 09/19/2023 Annually Yes   Foot exam   : 06/15/2023 Annually No     REVIEW OF SYSTEMS  General: no weakness, fatigue, + weight changes 5# gain 6/2023  Eyes: no double or blurred vision, eye pain, or redness  Cardiovascular: no chest pain, palpitations, edema, or murmurs.   Respiratory: no cough or dyspnea.   GI: no heartburn, nausea, or changes in bowel patterns; good appetite.   Skin: no rashes, dryness, itching, or reactions at insulin injection sites.  Neuro: + numbness, tingling, tremors, or vertigo.   Endocrine: no polyuria, polydipsia, polyphagia, heat or cold intolerance.     Vital Signs  /76 (BP Location: Left arm, Patient Position: Sitting, BP Method: Medium (Manual))   Pulse 86   Ht 5' 5" (1.651 m)   Wt 107.2 kg (236 lb 5.3 oz)   SpO2 98%   BMI 39.33 kg/m²     Hemoglobin A1C   Date Value Ref Range Status   02/16/2024 7.8 (H) 4.0 - 5.6 % Final     Comment:     ADA Screening Guidelines:  5.7-6.4%  Consistent with prediabetes  >or=6.5%  Consistent with diabetes    High levels of fetal hemoglobin interfere with the HbA1C  assay. Heterozygous hemoglobin variants (HbS, HgC, etc)do  not significantly interfere with this assay.   However, presence of multiple variants may affect accuracy.     02/02/2024 7.9 (H) 4.0 - 5.6 % Final     " Comment:     ADA Screening Guidelines:  5.7-6.4%  Consistent with prediabetes  >or=6.5%  Consistent with diabetes    High levels of fetal hemoglobin interfere with the HbA1C  assay. Heterozygous hemoglobin variants (HbS, HgC, etc)do  not significantly interfere with this assay.   However, presence of multiple variants may affect accuracy.     10/11/2023 7.9 (H) 4.0 - 5.6 % Final     Comment:     ADA Screening Guidelines:  5.7-6.4%  Consistent with prediabetes  >or=6.5%  Consistent with diabetes    High levels of fetal hemoglobin interfere with the HbA1C  assay. Heterozygous hemoglobin variants (HbS, HgC, etc)do  not significantly interfere with this assay.   However, presence of multiple variants may affect accuracy.          Chemistry        Component Value Date/Time     02/02/2024 1052    K 4.1 02/02/2024 1052     02/02/2024 1052    CO2 25 02/02/2024 1052    BUN 20 02/02/2024 1052    CREATININE 1.3 02/02/2024 1052     (H) 02/02/2024 1052        Component Value Date/Time    CALCIUM 8.6 (L) 02/02/2024 1052    ALKPHOS 109 02/02/2024 1052    AST 12 02/02/2024 1052    ALT 13 02/02/2024 1052    BILITOT 0.3 02/02/2024 1052    ESTGFRAFRICA 58 (A) 07/22/2022 1123    EGFRNONAA 50 (A) 07/22/2022 1123           Lab Results   Component Value Date    TSH 2.259 01/23/2023      Lab Results   Component Value Date    CHOL 132 06/26/2023    CHOL 139 07/22/2022    CHOL 181 03/05/2018     Lab Results   Component Value Date    HDL 41 06/26/2023    HDL 51 07/22/2022    HDL 72 03/05/2018     Lab Results   Component Value Date    LDLCALC 79.4 06/26/2023    LDLCALC 71.0 07/22/2022    LDLCALC 101.0 03/05/2018     Lab Results   Component Value Date    TRIG 58 06/26/2023    TRIG 85 07/22/2022    TRIG 40 03/05/2018     Lab Results   Component Value Date    CHOLHDL 31.1 06/26/2023    CHOLHDL 36.7 07/22/2022    CHOLHDL 39.8 03/05/2018         PHYSICAL EXAMINATION  Constitutional: Appears well, no distress. Reviewed vitals  above.  Eyes: conjunctivae & lids intact; PERRLA, EOMs intact; optic discs   Neck: Supple, trachea midline.   Respiratory: CTA without wheezes, even and unlabored  Cardiovascular: RRR; no edema   Lymph: deferred   Skin: warm and dry; no injection site reactions, no acanthosis nigracans observed.  Neuro:patient alert and cooperative, normal affect; steady gait.  Psychiatric: judgement & insight intact, orientation of time, place & person intact, memory; mood & affect wnl     Diabetes Foot Exam:   Deferred -Dr. Blanca, [facility] [address]  6/14/23    Assessment/Plan    1. Type 2 diabetes mellitus with both eyes affected by retinopathy without macular edema, with long-term current use of insulin, unspecified retinopathy severity  Hemoglobin A1C      2. Diabetic polyneuropathy associated with type 2 diabetes mellitus        3. Class 2 severe obesity with serious comorbidity and body mass index (BMI) of 39.0 to 39.9 in adult, unspecified obesity type        4. Mixed hyperlipidemia        5. Pancreatic cyst        6. Primary hypertension        7. Stage 3a chronic kidney disease        8. Vitamin D deficiency        9. Acute midline low back pain without sciatica        10. Type 2 diabetes mellitus with hyperglycemia, with long-term current use of insulin  insulin lispro (HUMALOG KWIKPEN INSULIN) 100 unit/mL pen      11. Type 2 diabetes mellitus with complication, with long-term current use of insulin  blood-glucose transmitter (DEXCOM G6 TRANSMITTER) Kenzie    blood-glucose sensor (DEXCOM G6 SENSOR) Kenzie        1-11.   Follow up in 4 months w/ me   A1c prior -4 months   A1c goal less than 7.5%   Body mass index is 39.33 kg/m².  May increase insulin resistance  Lab Results   Component Value Date    LDLCALC 79.4 06/26/2023     Lipid next time   Bp controlled  Watch kidney functions  Send rx for mounjaro 15 mg weekly    FOLLOW UP  In 4 months

## 2024-02-23 ENCOUNTER — TELEPHONE (OUTPATIENT)
Dept: INTERNAL MEDICINE | Facility: CLINIC | Age: 59
End: 2024-02-23
Payer: MEDICARE

## 2024-02-23 RX ORDER — TIRZEPATIDE 15 MG/.5ML
15 INJECTION, SOLUTION SUBCUTANEOUS
Qty: 4 PEN | Refills: 5 | Status: SHIPPED | OUTPATIENT
Start: 2024-02-23 | End: 2024-03-15 | Stop reason: SDUPTHER

## 2024-02-23 NOTE — TELEPHONE ENCOUNTER
----- Message from Francoise Wagonere Shadi sent at 2/23/2024 10:19 AM CST -----  Contact: [phone] pt  Requesting an RX refill or new RX.  Is this a refill or new RX: fefill  RX name and strength (copy/paste from chart):  tirzepatide (MOUNJARO) 15 mg/0.5 mL PnIj 4 pen  Is this a 30 day or 90 day RX:   Pharmacy name and phone # (copy/paste from chart):    [facility]  [address] [place] [id number]  Phone: [phone] Fax: [phone]  Call back----pt states no one has this nor the 10mg please advice.

## 2024-02-23 NOTE — TELEPHONE ENCOUNTER
----- Message from LETICIA Mahan, ANDRESP sent at 2/23/2024  8:37 AM CST -----  Regarding: mounjaro rx  Hi!  Please call [facility]- see if they have dose-10 mg weekly -mounjaro  Thanks  Prudence

## 2024-02-27 ENCOUNTER — OFFICE VISIT (OUTPATIENT)
Dept: SPINE | Facility: CLINIC | Age: 59
End: 2024-02-27
Attending: INTERNAL MEDICINE
Payer: MEDICARE

## 2024-02-27 VITALS
RESPIRATION RATE: 18 BRPM | HEART RATE: 98 BPM | BODY MASS INDEX: 39.37 KG/M2 | SYSTOLIC BLOOD PRESSURE: 115 MMHG | WEIGHT: 236.31 LBS | DIASTOLIC BLOOD PRESSURE: 56 MMHG | OXYGEN SATURATION: 100 % | HEIGHT: 65 IN

## 2024-02-27 DIAGNOSIS — M54.50 ACUTE LEFT-SIDED LOW BACK PAIN WITHOUT SCIATICA: Primary | ICD-10-CM

## 2024-02-27 PROCEDURE — 99999 PR PBB SHADOW E&M-EST. PATIENT-LVL V: CPT | Mod: PBBFAC,,, | Performed by: PHYSICAL MEDICINE & REHABILITATION

## 2024-02-27 PROCEDURE — 99204 OFFICE O/P NEW MOD 45 MIN: CPT | Mod: S$GLB,,, | Performed by: PHYSICAL MEDICINE & REHABILITATION

## 2024-02-27 NOTE — PATIENT INSTRUCTIONS
Patients Guide to Back Pain    Low back pain effects 2/3 of adults at some point in their lives.  It is one of the top 10 reasons to go to the doctor.  Back pain is scary, sudden, and painful but does usually improve.  It is usually benign and is not caused by a serious underlying disease.  95% of back pain is mechanical, meaning something makes it worse (most commonly bending and sitting).    If bending makes it worse, extension stretches can be helpful.    If standing makes it worse flexion, stretches and z-lie can be helpful.   You can find details on these stretches and more in Treat Your Own Back by Francisco Stephenson.    Moving is the best medicine, even if it hurts. Bed rest is not recommended for back pain.  Early return to daily activities leads to less chronic disability.  Early involvement in Physical Therapy can decrease the likelihood of acute low back pain becoming chronic.1  Back pain is a complex issue to diagnose. A definite diagnosis for back pain cannot be made for nearly 85% of patients at the initial visit.   Medicine such as muscle relaxers and anti-inflammatories can be helpful. Narcotic pain medicine is not recommended for back pain.    Imaging Guidance:  Imaging such as MRIs and X-rays are not necessary in the beginning and do not influence treatment or influence the course of acute back pain. Degenerative changes such as disc bulges and arthritis are common in people as young as 18 and with no pain.  In people under 50 with no signs and symptoms of systemic disease no imaging is needed.2  Conservative care for 6 weeks is recommended for patients with or without shooting nerve pain in arms/legs prior to MRI.2  Imaging of patients with low back pain without indications of serious underlying conditions does not improve outcomes.3  RESOURCES  [facility] website:  [email or web address]  [facility]  website:  [email or web address]  Books:  Treat Your Own Back by Francisco Stephenson  Treat Your Own Neck by Francisco Stephenson  KEY TAKEAWAYS:  Moving is recommended. Bed rest is not recommended.  Seek Physical Therapy as early as possible.  Acute flares will improve.    1WGio mosley et al. Nonpharmacologic options for treating acute and chronic pain. PMR journal 7 2015) [id number]  2JJoão cross and Ryley Mario. Diagnostic evaluation of low back pain with emphasis on imaging. Annals of internal medicine 2002; 137:586-597  3CJavon javed Rongwei Fu, Ryley Yung. Imaging studies for low back pain: systemic review and meta-analysis. Lancet 2009;373 463-03

## 2024-03-06 ENCOUNTER — CLINICAL SUPPORT (OUTPATIENT)
Dept: REHABILITATION | Facility: OTHER | Age: 59
End: 2024-03-06
Attending: PHYSICAL MEDICINE & REHABILITATION
Payer: MEDICARE

## 2024-03-06 DIAGNOSIS — R29.898 WEAKNESS OF BOTH HIPS: ICD-10-CM

## 2024-03-06 DIAGNOSIS — M54.50 ACUTE LEFT-SIDED LOW BACK PAIN WITHOUT SCIATICA: ICD-10-CM

## 2024-03-06 DIAGNOSIS — R29.3 POSTURE ABNORMALITY: ICD-10-CM

## 2024-03-06 DIAGNOSIS — M54.50 ACUTE BILATERAL LOW BACK PAIN WITHOUT SCIATICA: Primary | ICD-10-CM

## 2024-03-06 PROCEDURE — 97161 PT EVAL LOW COMPLEX 20 MIN: CPT | Mod: PN

## 2024-03-06 NOTE — PROGRESS NOTES
Therapy Diagnosis:   Encounter Diagnoses   Name Primary?    Acute left-sided low back pain without sciatica     Acute bilateral low back pain without sciatica Yes    Weakness of both hips     Posture abnormality      Physician: Mila Ryan, *

## 2024-03-06 NOTE — PLAN OF CARE
[facility]  Physical Therapy Initial Evaluation    Name: Starla Wharton  Clinic Number: [id number]      Therapy Diagnosis:   Encounter Diagnoses   Name Primary?    Acute left-sided low back pain without sciatica     Acute bilateral low back pain without sciatica Yes    Weakness of both hips     Posture abnormality      Physician: Mila Ryan, *    Physician Orders: PT Evaluate and Treat - Back and core strengthening, Si joint mobilization, and extension/flexion exercises and HEP   Medical Diagnosis from Referral: Acute left-sided low back pain without sciatica (M54.50)  Evaluation Date: 3/6/2024  Authorization Period Expiration: 12/31/2024  Plan of Care Expiration: 5/31/2024  Visit # / Visits authorized: 1/ 1  Re-assessment: due 4/6/2024  Focus On therapeutic Outcomes (FOTO): 3/6/2024 (1/5; 1)     Time In: 1:08 pm  Time Out: 2:00 pm  Total Billable Time: 00 minutes    Precautions: Standard, Diabetes, and hypertension     Subjective   Date of onset: a month ago  History of current condition - Strala reports: she woke up one day a few weeks ago and was in tears.  She reports having a sciatic type pain.  She saw the MD and was provided with medication that helped but the pain came back when she stopped.  Since all of this started, she reports falling to her knees once due to the pain.  She went back to the doctor and suggested an MRI but was told no and steroids were suggested but she declined.  She was instructed to continue with the medications.  Pain is reported on left side going down the thigh.  Sometimes she feels like she is getting better but continues to feel stiff in the low back/lumbosacral region.  Feels like she is bending forward when walking. She has had similar pain a few years ago but that resolved.  Insidious onset. Pain mainly is on the right side but can occur on the left. She endorses some hip crease pain bilaterally as well as pain that starts in the right  hip/buttock region and goes intermediate down the back of the thigh.  Pain has not traveled past the knee on the right and was localized to low back when on the left. Sleep is not really affected by the pain but it can wake her up at times. She feels like things have gotten better since onset.      Burning, tingling, numbness: a little bit, particularly when sitting on the toilet but not any other time.   Falls in the last 12 months: 1 fall a few weeks ago due to the pain  Popping, clicking, locking, feeling of instability at the hip: no    Patient denies dizziness, blurry vision, nausea, vomiting, impaired sensations in groin and saddle region and changes in bowel/bladder.     Endorses minor headaches where she previously did not have them.        Medical History:   Past Medical History:   Diagnosis Date    Diabetes mellitus     Hypertension     Personal history of colonic polyps 06/12/2017   Neuropathy     Surgical History:   Starla Wharton  has a past surgical history that includes Hysterectomy; Laparoscopic cholecystectomy; [place] tooth extraction; Pancreas Biopsy; Myomectomy; Colonoscopy (N/A, 5/18/2023); and Cheilectomy (Right, 7/26/2023).    Medications:   Starla SHANKAR has a current medication list which includes the following prescription(s): aspirin, atorvastatin, dexcom g6 , dexcom g6 sensor, dexcom g6 transmitter, prolensa, prolensa, ferrous sulfate, gabapentin, hydrochlorothiazide, toujeo solostar u-300 insulin, insulin lispro, lisinopril, methocarbamol, pen needle, diabetic, mounjaro, and mounjaro.    Allergies:   Review of patient's allergies indicates:   Allergen Reactions    Naprosyn [naproxen]         Imaging, x-ray: COMPARISON:  Present exam interpreted after review of the appearance of the lumbar spine and bony pelvis and proximal femurs on abdomen and pelvis CT March 14, 2018     FINDINGS:  No acute fractures, preserved vertebral body heights and pedicles.  No spondylolysis or spondylolisthesis.   No areas of obvious disc narrowing.  Preserved facet articulations.  Intact right and left SI joints and visualized hip joint spaces.  Stable focus of increased sclerosis involving the right iliac bone to that seen on the above CT and therefore felt related to innocuous process.     Impression:     As above    Prior Therapy: yes but not for current complaints  Social History: single story house with 4 steps to enter lives alone  Occupation: disabled  Prior Level of Function: independent with Activities of daily living, household activities   Current Level of Function: difficulty with household chores such as mopping, sweeping, cooking/stirring the pot, standing     Pain:  Current 5/10, worst 5/10, best 0/10   Location: bilateral low back   Description: Throbbing and Electric  Aggravating Factors: Standing, Laying on the sides, and Morning/getting out of the bed  Easing Factors: hot bath and stretching muscle relaxor    Patients goals: reduce pain, improve her ability to move and take care of her body.     Objective     Observation:   Stands and walks with slight anterior trunk lean     Palpation:  Tender with palpation but no pain reproduction with palpation of the gluteals and piriformis, posterior superior iliac spine and lateral aspect of sacrum bilaterally  Increased tone in lumbar paraspinals bilaterally but no tenderness or pain reproduction    Flexibility:   Moderate tightness in bilateral piriformis with left > right and pain reproduction  Hamstring: mild limitation bilateral   Hip flexors: moderate tightness bilateral     Range of Motion:     Thoracolumbar Active range of motion Pain/Dysfunction with Movement   Flexion 25% limited  pain   Extension 50% limited  pain   Right side bending To knee  Pain    Left side bending To knee  Pain and stretching in anterior thigh on right   Right Rotation 25% limited   Pain at end range and cramping   Left Rotation 50% limited   Pain at end range       Manual Muscle  test/Strength:    Right Left Pain/Dysfunction with Movement   Hip Flexion 4+/5 4/5    Hip Extension 4/5 4-/5    Hip internal rotation 3+/5 3+/5    Hip external rotation 4/5 4/5    Hip Abduction 3+/5 3+/5          Joint Mobility:   - Lumbar: hypomobile rotation with Pain on     Special Tests:  Double Knee to Chest: reduced symptoms  Prone on Elbows: no change  Bridge: (-) although minimal hip clearance noted and unable to hold SL bridge position  Straight leg raise test: right: (-); left: (-)   Slump test: right: (-); left: (-)       REPEATED TEST MOVEMENTS:  Repeated Flexion in lying better   Repeated Extension in lying  no effect       STATIC TESTS   Sitting slouched  worse   Sitting erect better   Standing slouched better   Standing erect  worse   Lying prone in extension  no effect     Other:   5x sit<>stand: 18 seconds    Normal:   60-69: 11.4 seconds  70-79: 12.6 seconds  80-89: 12.7 seconds    30 seconds sit<>stand: 9 reps    Normal:   Age Male Female   60-64 17 15   65-69 16 15   70-74 15 14   75-79 14 13   80-84 13 12   85-89 11 11   90-94 9 9      Modified Oswestry: 36.0 (higher score = greater disability)    CMS Impairment/Limitation/Restriction for Focus On therapeutic Outcomes (FOTO) Lumbar Spine Survey    Therapist reviewed Focus On therapeutic Outcomes (FOTO) scores for Starla Wharton on 3/6/2024.   Focus On therapeutic Outcomes (FOTO) documents entered into [employer] - see Media section.    Intake Score: 48%  Goal: 60%           TREATMENT     Home Exercises and Patient Education Provided    Education provided:   Patient was educated on initial evaluation findings and expectations as well as future PT services, procedures, and expectations for optimal compliance with therapy.     Written Home Exercises Provided:  home exercise program will be provided at first treatment visit.     Assessment   Starla SHANKAR is a 58 y.o. female referred to outpatient Physical Therapy with a medical diagnosis of Acute left-sided  low back pain without sciatica. Patient presents with decreased range of motion, strength, flexibility, tender to palpation with moderate to heavy palpation and increased tissue tension as noted above but no pain reproduction, poor posture and increased pain limiting her participation with recreational and household duties.      Patient prognosis is Good.   Patient will benefit from skilled outpatient Physical Therapy to address the deficits stated above and in the chart below, provide patient/family education, and to maximize patient's level of independence to return to prior level of function.     Plan of care discussed with patient: Yes  Patient's spiritual, cultural and educational needs considered and patient is agreeable to the plan of care and goals as stated below:     Anticipated Barriers for therapy: none    Medical Necessity is demonstrated by the following  History  Co-morbidities and personal factors that may impact the plan of care [] LOW: no personal factors / co-morbidities  [x] MODERATE: 1-2 personal factors / co-morbidities  [] HIGH: 3+ personal factors / co-morbidities    Moderate / High Support Documentation:   Co-morbidities affecting plan of care: diabetes, hypertension, high BMI     Personal Factors:   lifestyle     Examination  Body Structures and Functions, activity limitations and participation restrictions that may impact the plan of care [] LOW: addressing 1-2 elements  [x] MODERATE: 3+ elements  [] HIGH: 4+ elements (please support below)    Moderate / High Support Documentation: range of motion, strength, flexibility, posture; limited walking, standing, sitting, lying on sides, housework including cooking and cleaning     Clinical Presentation [x] LOW: stable  [] MODERATE: Evolving  [] HIGH: Unstable     Decision Making/ Complexity Score: low       Goals:    Short term goals: In 6 weeks,  Goal Status   Patient will be independent with home exercise program to promote improved therapy  outcomes.     2. Patient will perform palloff press with good control to demonstrate improved core strength    3. Patient will improve bilateral hip Manual Muscle test to 4/5 to demonstrate improved strength for functional tasks including dressing, bathing, grooming, walking, stairs and transitional movements.     4. Patient will improve lumbar range of motion by 25% to improve functional mobility including dressing, bathing, grooming, walking, stairs and transitional movements.     5. Patient will improve 5x sit<>stand to 12 seconds to improve functional strength and promote mobility.         Long term goals: In 12 weeks, Goal Status   6. Patient will be independent with progressed home exercise program to self manage symptoms.     7. Patient will improve bilateral hip Manual Muscle test to 4+/5 to improve strength for functional tasks including dressing, bathing, walking, stairs and transitional movements.     8. Patient will report standing for 30 minutes with <2/10 pain to cook meals and clean.     9. Patient will improve Modified Oswestry score from 36.0 to 25.0 to decrease perceived limitation with maintaining/changing body position.     10. Patient will improve 30 second sit<>stand to 15 reps to improve functional strength and promote mobility.         Plan   Plan of care Certification: 3/6/2024 to 5/31/2024.    Outpatient Physical Therapy 2 times weekly for 24 visits to include the following interventions: Cervical/Lumbar Traction, Gait Training, Manual Therapy, Moist Heat/ Ice, Neuromuscular Re-ed, Patient Education, Therapeutic Activities, Therapeutic Exercise, and dry needling.   Patient will be seen by a physical therapist minimally every 6th visit or every 30 days.    Navya Bocanegra, PT

## 2024-03-07 DIAGNOSIS — K21.9 GASTROESOPHAGEAL REFLUX DISEASE, UNSPECIFIED WHETHER ESOPHAGITIS PRESENT: ICD-10-CM

## 2024-03-07 RX ORDER — OMEPRAZOLE 40 MG/1
40 CAPSULE, DELAYED RELEASE ORAL DAILY
Qty: 90 CAPSULE | Refills: 1 | OUTPATIENT
Start: 2024-03-07

## 2024-03-07 NOTE — TELEPHONE ENCOUNTER
No care due was identified.  [facility] Embedded Care Due Messages. Reference number: [id number].   3/07/2024 9:08:41 AM CST

## 2024-03-08 ENCOUNTER — TELEPHONE (OUTPATIENT)
Dept: INTERNAL MEDICINE | Facility: CLINIC | Age: 59
End: 2024-03-08
Payer: MEDICARE

## 2024-03-08 NOTE — TELEPHONE ENCOUNTER
----- Message from Rosa Maria Tootie sent at 3/8/2024 11:26 AM CST -----  Regarding: discontinued rx  Name of Who is Calling:pt          What is the request in detail:  Pt is calling bc she was told her rx pantoprazole (PROTONIX) 20 MG tablet/ was discontinued by . She is asking what is she supposed to do for her acid reflux. Will dr prescribe this for her please     [facility]   Phone: [phone]  Fax: [phone]          Can the clinic reply by [facility]:          What Number [facility] Call Back [facility]: [phone]

## 2024-03-11 ENCOUNTER — DOCUMENTATION ONLY (OUTPATIENT)
Dept: REHABILITATION | Facility: OTHER | Age: 59
End: 2024-03-11
Payer: MEDICARE

## 2024-03-11 NOTE — TELEPHONE ENCOUNTER
We discussed discontinuing the pantoprazole and changing her to omeprazole at her appointment in DEC2023. She has omeprazole (PRILOSEC) 40 MG capsule at her pharmacy to take for reflux. Thank you!

## 2024-03-15 ENCOUNTER — TELEPHONE (OUTPATIENT)
Dept: INTERNAL MEDICINE | Facility: CLINIC | Age: 59
End: 2024-03-15
Payer: MEDICARE

## 2024-03-15 DIAGNOSIS — Z79.4 TYPE 2 DIABETES MELLITUS WITH COMPLICATION, WITH LONG-TERM CURRENT USE OF INSULIN: Primary | ICD-10-CM

## 2024-03-15 DIAGNOSIS — E11.8 TYPE 2 DIABETES MELLITUS WITH COMPLICATION, WITH LONG-TERM CURRENT USE OF INSULIN: Primary | ICD-10-CM

## 2024-03-15 RX ORDER — TIRZEPATIDE 15 MG/.5ML
15 INJECTION, SOLUTION SUBCUTANEOUS
Qty: 4 PEN | Refills: 5 | Status: SHIPPED | OUTPATIENT
Start: 2024-03-15 | End: 2024-03-15 | Stop reason: SDUPTHER

## 2024-03-15 RX ORDER — TIRZEPATIDE 15 MG/.5ML
15 INJECTION, SOLUTION SUBCUTANEOUS
Qty: 4 PEN | Refills: 5 | Status: CANCELLED | OUTPATIENT
Start: 2024-03-15

## 2024-03-15 RX ORDER — TIRZEPATIDE 5 MG/.5ML
5 INJECTION, SOLUTION SUBCUTANEOUS
Qty: 4 PEN | Refills: 5 | Status: CANCELLED | OUTPATIENT
Start: 2024-03-15

## 2024-03-15 RX ORDER — TIRZEPATIDE 15 MG/.5ML
15 INJECTION, SOLUTION SUBCUTANEOUS
Qty: 4 PEN | Refills: 5 | Status: SHIPPED | OUTPATIENT
Start: 2024-03-15

## 2024-03-15 NOTE — TELEPHONE ENCOUNTER
No care due was identified.  [facility] Embedded Care Due Messages. Reference number: [id number].   3/15/2024 11:43:13 AM CDT

## 2024-03-15 NOTE — TELEPHONE ENCOUNTER
----- Message from Gael Pereyra sent at 3/15/2024  1:35 PM CDT -----  Regarding: Refill Request    Who Called: Ventura from [facility] Prescription or Refill : Refill    RX Name and Strength:   tirzepatide (MOUNJARO) 15 mg/0.5 mL PnIj, Needs a escript      RX Name and Strength:         RX Name and Strength:      30 day or 90 day RX:     Preferred Pharmacy:[facility] [address] [place] [address] Vickey Easley [facility] [address]    Would the patient rather a call back or a response via [facility]?    Best Call Back Number:   [phone]    Additional Information:

## 2024-03-15 NOTE — TELEPHONE ENCOUNTER
Pt called - reports called around to multiple [facility], [facility] and [facility] pharmacies and unable to get 10mg or 15mg Mounjaro. Pt switched from Ozempic to Mounjaro d/t GI s/e and has been tolerating Mounjaro well. Pt asking for advise on what to do. This educator spoke with [facility] - currently only have 5mg Mounjaro and vendor currently only has 5mg (unknown when they will be able to get 10 or 15mg dosages). Communicating with Dr. Perez.    Addendum: Pt called back stating she spoke with a friend who works at [facility] pharmacy, and they have Mounjaro 15mg - pt requesting paper Rx or Rx be e-prescribed to [facility]. Re-order attached.

## 2024-03-15 NOTE — TELEPHONE ENCOUNTER
----- Message from Gael Roddy sent at 3/15/2024  9:26 AM CDT -----  Regarding: E Script  Name of Who is Calling:  Patient          What is the request in detail:  Patient would like to have a phone call pertaining to her Rx irzepatide (MOUNJARO) 15 mg/0.5 mL PnIj            Can the clinic reply by [facility]: No            What Number to Call Back if not in [facility]: [phone]

## 2024-03-15 NOTE — TELEPHONE ENCOUNTER
----- Message from Gael Roddy sent at 3/15/2024  9:26 AM CDT -----  Regarding: E Script  Name of Who is Calling:  Patient          What is the request in detail:  Patient would like to have a phone call pertaining to her Rx irzepatide (MOUNJARO) 15 mg/0.5 mL PnIj            Can the clinic reply by [facility]: No            What Number to Call Back if not in [facility]: [phone]

## 2024-03-15 NOTE — TELEPHONE ENCOUNTER
Please CALL pt and inquire if she needs 10mg or 15mg of mounjaro - see requests for both ?? And another request for 5mg dose     Which is she actually taking at this time? If taking 10mg then we can try for 12.5mg dose - please let me know

## 2024-03-18 NOTE — PROGRESS NOTES
[facility]   Physical Therapy Treatment Note     Name: Starla Wharton  [facility] Number: [id number]    Therapy Diagnosis:   Encounter Diagnoses   Name Primary?    Acute bilateral low back pain without sciatica Yes    Weakness of both hips     Posture abnormality      Physician: Mila Ryan, *    Visit Date: 3/19/2024    Physician Orders: PT Evaluate and Treat - Back and core strengthening, Si joint mobilization, and extension/flexion exercises and HEP   Medical Diagnosis from Referral: Acute left-sided low back pain without sciatica (M54.50)  Evaluation Date: 3/6/2024  Authorization Period Expiration: 12/31/2024  Plan of Care Expiration: 5/31/2024  Progress Note Due: 4/6/2024    Visit # / Visits authorized: 2 (1/ 20)   FOTO: 2/ 5; 1 (Last issued on 3/6/2024)    PTA Visit #: 1/ 5    Precautions: Standard    Time In: 2:36 pm  Time Out: 3:24 pm  Total Billable Time: 43 minutes    SUBJECTIVE   Patient reports: Hasn't had any pain since the evaluation    She was not issued a home exercise program.  Response to previous treatment: No adverse effects  Functional change: No pain since eval    Pain: 0/10  Location: Left low back    OBJECTIVE     All Objective info from 3/6/2024:     Observation:   Stands and walks with slight anterior trunk lean      Palpation:  Tender with palpation but no pain reproduction with palpation of the gluteals and piriformis, posterior superior iliac spine and lateral aspect of sacrum bilaterally  Increased tone in lumbar paraspinals bilaterally but no tenderness or pain reproduction     Flexibility:   Moderate tightness in bilateral piriformis with left > right and pain reproduction  Hamstring: mild limitation bilateral   Hip flexors: moderate tightness bilateral      Range of Motion:      Thoracolumbar Active range of motion Pain/Dysfunction with Movement   Flexion 25% limited  pain   Extension 50% limited  pain   Right side bending To knee  Pain    Left side  "bending To knee  Pain and stretching in anterior thigh on right   Right Rotation 25% limited   Pain at end range and cramping   Left Rotation 50% limited   Pain at end range         Manual Muscle test/Strength:    Right Left Pain/Dysfunction with Movement   Hip Flexion 4+/5 4/5     Hip Extension 4/5 4-/5     Hip internal rotation 3+/5 3+/5     Hip external rotation 4/5 4/5     Hip Abduction 3+/5 3+/5              Joint Mobility:   - Lumbar: hypomobile rotation with Pain on      Special Tests:  Double Knee to Chest: reduced symptoms  Prone on Elbows: no change  Bridge: (-) although minimal hip clearance noted and unable to hold SL bridge position  Straight leg raise test: right: (-); left: (-)   Slump test: right: (-); left: (-)         REPEATED TEST MOVEMENTS:  Repeated Flexion in lying better   Repeated Extension in lying  no effect         STATIC TESTS   Sitting slouched  worse   Sitting erect better   Standing slouched better   Standing erect  worse   Lying prone in extension  no effect      Other:   5x sit<>stand: 18 seconds     Normal:   60-69: 11.4 seconds  70-79: 12.6 seconds  80-89: 12.7 seconds     30 seconds sit<>stand: 9 reps     Normal:   Age Male Female   60-64 17 15   65-69 16 15   70-74 15 14   75-79 14 13   80-84 13 12   85-89 11 11   90-94 9 9      Modified Oswestry: 36.0 (higher score = greater disability)     CMS Impairment/Limitation/Restriction for Focus On therapeutic Outcomes (FOTO) Lumbar Spine Survey     Therapist reviewed Focus On therapeutic Outcomes (FOTO) scores for Starla Wharton on 3/6/2024.   Focus On therapeutic Outcomes (FOTO) documents entered into [employer] - see Media section.     Intake Score: 48%  Goal: 60%         TREATMENT       Patient received therapeutic exercises for 10 minutes for improved strength, endurance, ROM, and flexibility including:  [x] +Lower trunk rotation on PB x 2 min  [x] +Double knee to chest on PB x 2 min  [x] +Supine hip flexor stretch 2 x 30" each  [x] " "+Piriformis stretch 2 x 30" each  [x] +Hamstring stretch 2 x 30" each    Patient received manual therapeutic technique for 00 minutes for improved soft tissue and joint mobility including:        Patient received neuromuscular reeducation for 30 minutes for improved balance, coordination, kinesthetic, sense, proprioception, and posture including:  [x] +Transverse abdominus activation with PB 20 x 3"  [x] +Posterior pelvic tilt 20 x 3"  [x] +Bridge 2 x 10  (Postponed due to increased pain)  [x] +Side lying clams 8 x 10" each   [x] +Side lying reverse clams 8 x 10 each     Patient received therapeutic activities for 3 minutes for improved tolerance to functional activities including:  [x] +HEP update and review      PATIENT EDUCATION AND HOME EXERCISES     Home Exercises Provided and Patient Education Provided     Education provided:   - Exercise form and rationale  - Piriformis stretch  - Side lying clams  - Side lying reverse clams    Written Home Exercises Provided: yes. Exercises were reviewed and Starla was able to demonstrate them prior to the end of the session.  Starla demonstrated good  understanding of the education provided. See EMR under Patient Instructions for exercises provided during therapy sessions    ASSESSMENT   Patient tolerated treatment fairly well, but patient experienced some back pain with bridging. Initiated hip and core exercises today to address areas of weakness. Will monitor patient's symptoms and progress as tolerated.    Starla Is progressing well towards her goals.   Pt prognosis is Good.     Pt will continue to benefit from skilled outpatient physical therapy to address the deficits listed in the problem list box on initial evaluation, provide pt/family education and to maximize pt's level of independence in the home and community environment.     Pt's spiritual, cultural and educational needs considered and pt agreeable to plan of care and goals.     Anticipated barriers to " physical therapy: None    Goals:   Short term goals: In 6 weeks,  Goal Status   Patient will be independent with home exercise program to promote improved therapy outcomes.      In Progress   2. Patient will perform palloff press with good control to demonstrate improved core strength     In Progress   3. Patient will improve bilateral hip Manual Muscle test to 4/5 to demonstrate improved strength for functional tasks including dressing, bathing, grooming, walking, stairs and transitional movements.      In Progress   4. Patient will improve lumbar range of motion by 25% to improve functional mobility including dressing, bathing, grooming, walking, stairs and transitional movements.      In Progress   5. Patient will improve 5x sit<>stand to 12 seconds to improve functional strength and promote mobility.      In Progress         Long term goals: In 12 weeks, Goal Status   6. Patient will be independent with progressed home exercise program to self manage symptoms.      In Progress   7. Patient will improve bilateral hip Manual Muscle test to 4+/5 to improve strength for functional tasks including dressing, bathing, walking, stairs and transitional movements.      In Progress   8. Patient will report standing for 30 minutes with <2/10 pain to cook meals and clean.      In Progress   9. Patient will improve Modified Oswestry score from 36.0 to 25.0 to decrease perceived limitation with maintaining/changing body position.      In Progress   10. Patient will improve 30 second sit<>stand to 15 reps to improve functional strength and promote mobility.     In Progress     PLAN   Plan of care Certification: 3/6/2024 to 5/31/2024.     Improve core and hip strength    Outpatient Physical Therapy 2 times weekly for 24 visits to include the following interventions: Cervical/Lumbar Traction, Gait Training, Manual Therapy, Moist Heat/ Ice, Neuromuscular Re-ed, Patient Education, Therapeutic Activities, Therapeutic Exercise, and  dry needling.       Essie Haque III, PTA

## 2024-03-19 ENCOUNTER — CLINICAL SUPPORT (OUTPATIENT)
Dept: REHABILITATION | Facility: OTHER | Age: 59
End: 2024-03-19
Payer: MEDICARE

## 2024-03-19 DIAGNOSIS — R29.3 POSTURE ABNORMALITY: ICD-10-CM

## 2024-03-19 DIAGNOSIS — M54.50 ACUTE BILATERAL LOW BACK PAIN WITHOUT SCIATICA: Primary | ICD-10-CM

## 2024-03-19 DIAGNOSIS — R29.898 WEAKNESS OF BOTH HIPS: ICD-10-CM

## 2024-03-19 PROCEDURE — 97110 THERAPEUTIC EXERCISES: CPT | Mod: PN,CQ

## 2024-03-19 PROCEDURE — 97112 NEUROMUSCULAR REEDUCATION: CPT | Mod: PN,CQ

## 2024-03-21 ENCOUNTER — CLINICAL SUPPORT (OUTPATIENT)
Dept: DIABETES | Facility: CLINIC | Age: 59
End: 2024-03-21
Payer: MEDICARE

## 2024-03-21 ENCOUNTER — OFFICE VISIT (OUTPATIENT)
Dept: INTERNAL MEDICINE | Facility: CLINIC | Age: 59
End: 2024-03-21
Payer: MEDICARE

## 2024-03-21 VITALS — BODY MASS INDEX: 38.59 KG/M2 | WEIGHT: 231.94 LBS

## 2024-03-21 VITALS — SYSTOLIC BLOOD PRESSURE: 128 MMHG | DIASTOLIC BLOOD PRESSURE: 60 MMHG | HEART RATE: 74 BPM

## 2024-03-21 DIAGNOSIS — Z91.09 ENVIRONMENTAL ALLERGIES: ICD-10-CM

## 2024-03-21 DIAGNOSIS — N18.31 STAGE 3A CHRONIC KIDNEY DISEASE: ICD-10-CM

## 2024-03-21 DIAGNOSIS — Z79.4 TYPE 2 DIABETES MELLITUS WITH COMPLICATION, WITH LONG-TERM CURRENT USE OF INSULIN: Primary | ICD-10-CM

## 2024-03-21 DIAGNOSIS — L20.89 FLEXURAL ATOPIC DERMATITIS: ICD-10-CM

## 2024-03-21 DIAGNOSIS — Z00.00 HEALTH MAINTENANCE EXAMINATION: Primary | ICD-10-CM

## 2024-03-21 DIAGNOSIS — I10 PRIMARY HYPERTENSION: ICD-10-CM

## 2024-03-21 DIAGNOSIS — E65 ABDOMINAL PANNICULUS: ICD-10-CM

## 2024-03-21 DIAGNOSIS — E11.8 TYPE 2 DIABETES MELLITUS WITH COMPLICATION, WITH LONG-TERM CURRENT USE OF INSULIN: Primary | ICD-10-CM

## 2024-03-21 DIAGNOSIS — E11.319 TYPE 2 DIABETES MELLITUS WITH BOTH EYES AFFECTED BY RETINOPATHY WITHOUT MACULAR EDEMA, WITH LONG-TERM CURRENT USE OF INSULIN, UNSPECIFIED RETINOPATHY SEVERITY: ICD-10-CM

## 2024-03-21 DIAGNOSIS — E55.9 VITAMIN D DEFICIENCY: ICD-10-CM

## 2024-03-21 DIAGNOSIS — Z79.4 TYPE 2 DIABETES MELLITUS WITH BOTH EYES AFFECTED BY RETINOPATHY WITHOUT MACULAR EDEMA, WITH LONG-TERM CURRENT USE OF INSULIN, UNSPECIFIED RETINOPATHY SEVERITY: ICD-10-CM

## 2024-03-21 DIAGNOSIS — D63.8 ANEMIA OF CHRONIC DISEASE: ICD-10-CM

## 2024-03-21 DIAGNOSIS — K21.9 GASTROESOPHAGEAL REFLUX DISEASE, UNSPECIFIED WHETHER ESOPHAGITIS PRESENT: ICD-10-CM

## 2024-03-21 DIAGNOSIS — E78.2 MIXED HYPERLIPIDEMIA: ICD-10-CM

## 2024-03-21 PROCEDURE — 99999 PR PBB SHADOW E&M-EST. PATIENT-LVL III: CPT | Mod: PBBFAC,,, | Performed by: STUDENT IN AN ORGANIZED HEALTH CARE EDUCATION/TRAINING PROGRAM

## 2024-03-21 PROCEDURE — G0108 DIAB MANAGE TRN  PER INDIV: HCPCS | Mod: S$GLB,,, | Performed by: DIETITIAN, REGISTERED

## 2024-03-21 PROCEDURE — 99214 OFFICE O/P EST MOD 30 MIN: CPT | Mod: S$GLB,,, | Performed by: STUDENT IN AN ORGANIZED HEALTH CARE EDUCATION/TRAINING PROGRAM

## 2024-03-21 PROCEDURE — 99999 PR PBB SHADOW E&M-EST. PATIENT-LVL I: CPT | Mod: PBBFAC,,, | Performed by: DIETITIAN, REGISTERED

## 2024-03-21 RX ORDER — TRIAMCINOLONE ACETONIDE 1 MG/G
CREAM TOPICAL 2 TIMES DAILY PRN
Qty: 45 G | Refills: 0 | Status: SHIPPED | OUTPATIENT
Start: 2024-03-21

## 2024-03-21 RX ORDER — NYSTATIN 100000 [USP'U]/G
POWDER TOPICAL 2 TIMES DAILY PRN
Qty: 30 G | Refills: 1 | Status: SHIPPED | OUTPATIENT
Start: 2024-03-21

## 2024-03-21 RX ORDER — AZELASTINE 1 MG/ML
2 SPRAY, METERED NASAL 2 TIMES DAILY PRN
Qty: 30 ML | Refills: 2 | Status: SHIPPED | OUTPATIENT
Start: 2024-03-21 | End: 2025-03-21

## 2024-03-21 RX ORDER — LEVOCETIRIZINE DIHYDROCHLORIDE 5 MG/1
5 TABLET, FILM COATED ORAL NIGHTLY PRN
Qty: 90 TABLET | Refills: 1 | Status: SHIPPED | OUTPATIENT
Start: 2024-03-21

## 2024-03-21 RX ORDER — OMEPRAZOLE 40 MG/1
40 CAPSULE, DELAYED RELEASE ORAL DAILY
Qty: 90 CAPSULE | Refills: 1 | Status: SHIPPED | OUTPATIENT
Start: 2024-03-21

## 2024-03-21 NOTE — PROGRESS NOTES
Subjective:       Patient ID: Starla Wharton is a 58 y.o. female.    Chief Complaint: Health maintenance examination [Z00.00]    Patient is established with me, here today for the following:     T2DM on insulin, peripheral neuropathy, retinopathy, gastroparesis, CKD3, HLD, HTN, GERD, vitamin D deficiency, sickle cell trait      Health maintenance -   Cologuard negative SEP2021.   Denies family history of colorectal cancer.  Mammogram BI-RADS 1 in AUG2023.  History of prior abnormal mammogram.  Biopsy showed benign fatty tissue.  Family history of breast cancer.  Denies family history of ovarian cancer.  Hysterectomy due to fibroids.   Denies history of prior abnormal pap smears.  Denies family history of osteoporosis.  Family history of cardiac disease.  UTD on Tdap, COVID primary/booster, shingles, influenza vaccinations.  Due for COVID vaccinations.  Never smoker.  Denies drug use.  Completed HIV and hepatitis C screening.     T2DM -   Currently taking:   - Metformin 500 mg BID  - Toujeo 57 qAM and 57 units qHS  - Novolog 10 units qAC, using sliding scale as well though has not been requiring as much sliding scale  - Mounjaro 15 mg weekly  Following with ROSARIO Ramirez for endocrinology  Following with Chantal for diabetes management  Taking ASA 81 mg daily  Using Dexcom CGM for glucose monitoring  Endorses has had glucose in the 60's.  UTD on foot exam.  UTD on eye exam.  Following with Dr. Ramsay routinely for ophthalmology  Lab Results       Component                Value               Date                       HGBA1C                   7.8 (H)             02/16/2024                 HGBA1C                   7.9 (H)             02/02/2024                 HGBA1C                   7.9 (H)             10/11/2023            Lab Results       Component                Value               Date                       MICALBCREAT              4.0                 06/26/2023                  Wt Readings from Last 10  "Encounters:  02/27/24 : 107.2 kg (236 lb 5.3 oz)  02/22/24 : 107.2 kg (236 lb 5.3 oz)  02/16/24 : 107.8 kg (237 lb 10.5 oz)  02/07/24 : 109.4 kg (241 lb 2.9 oz)  01/04/24 : 108.6 kg (239 lb 6.7 oz)  12/20/23 : 108.6 kg (239 lb 6.7 oz)  11/15/23 : 111.2 kg (245 lb 2.4 oz)  10/23/23 : 111.2 kg (245 lb 2.4 oz)  10/16/23 : 111.2 kg (245 lb 2.4 oz)  08/08/23 : 105.7 kg (233 lb 0.4 oz)    Endorses since losing weight is noting more excess skin around midsection  Occasionally with pruritus between skin folds  Using two towels to thoroughly dry skin after showering     Anemia -  Noted with elevated FLC NOV2023  E-consulted hematology  "Contingency: Refer to Hematology clinic in case of Hb < 10 g/dl in which case she may need a bone marrow biopsy. "  Quantitative Hgb A2 2.4%  Not currently taking iron supplementation.   Lab Results       Component                Value               Date                       HGB                      10.9 (L)            02/02/2024                 HGB                      10.9 (L)            02/02/2024                 HCT                      36.3 (L)            02/02/2024                 HCT                      36.3 (L)            02/02/2024                 MCV                      83                  02/02/2024                 MCV                      83                  02/02/2024                 RDW                      14.3                02/02/2024                 RDW                      14.3                02/02/2024            Lab Results       Component                Value               Date                       IRON                     46                  02/02/2024                 TRANSFERRIN              209                 02/02/2024                 TIBC                     309                 02/02/2024                 FESATURATED              15 (L)              02/02/2024             Lab Results       Component                Value               Date                       " FERRITIN                 132                 02/02/2024            Lab Results       Component                Value               Date                       [id number]               664                 10/11/2023            Lab Results       Component                Value               Date                       FOLATE                   9.5                 10/11/2023                                HLD -   Endorses taking atorvastatin as directed  Denies side effects or concerns while taking medication  Lab Results       Component                Value               Date                       CHOL                     132                 06/26/2023            Lab Results       Component                Value               Date                       TRIG                     58                  06/26/2023            Lab Results       Component                Value               Date                       LDLCALC                  79.4                06/26/2023            Lab Results       Component                Value               Date                       HDL                      41                  06/26/2023            Due for lipid recheck.     HTN -   Currently prescribed HCTZ, lisinopril.  Patient endorses taking medication as directed.  Denies side effects or concerns while taking medication.  Lab Results       Component                Value               Date                       MICALBCREAT              4.0                 06/26/2023            BP Readings from Last 5 Encounters:  02/27/24 : (!) 115/56  02/22/24 : 132/76  02/16/24 : 136/68  02/07/24 : 136/78  12/20/23 : 134/70     CKD 3a -  Avoiding NSAIDs and nephrotoxic medications  Lab Results       Component                Value               Date                       CREATININE               1.3                 02/02/2024                 CREATININE               1.4                 10/11/2023                 CREATININE               1.2                  06/26/2023                 CREATININE               1.2                 06/26/2023            Lab Results       Component                Value               Date                       NA                       138                 02/02/2024                 K                        4.1                 02/02/2024                 CO2                      25                  02/02/2024              Vitamin D deficiency -  Not currently taking vitamin D supplementation.  Lab Results       Component                Value               Date                       [id number]             35                  06/26/2023                 [id number]             30                  07/22/2022              Has not been able to fill omeprazole yet for GERD.   Was previously on pantoprazole     Notes hyperpigmented, hyperkeratotic patches on flexural surfaces of forearms  Endorses brother had eczema and looks similar  Requests referral to dermatologist    Recently with worsening of allergies  Endorses runny nose, watery/itchy eyes  Has been taking OTC allergy/sinus combo pill without significant improvement           Review of Systems   Constitutional:  Negative for activity change, fatigue and fever.   HENT:  Positive for congestion and rhinorrhea.    Eyes:  Positive for itching.   Respiratory:  Negative for cough and shortness of breath.    Cardiovascular:  Negative for chest pain and palpitations.   Gastrointestinal:  Negative for abdominal pain, constipation, diarrhea, nausea and vomiting.   Skin:  Positive for rash.   Neurological:  Negative for syncope and headaches.         Current Outpatient Medications   Medication Instructions    aspirin 81 mg, Daily    atorvastatin (LIPITOR) 40 mg, Oral, Daily    azelastine (ASTELIN) 274 mcg, Nasal, 2 times daily PRN    blood-glucose meter,continuous (DEXCOM G6 ) Misc 1 Device, Misc.(Non-Drug; Combo Route), Once    blood-glucose sensor (DEXCOM G6 SENSOR) Kenzie Use daily for  "continuous glucose monitoring, change sensor every 10 days.    blood-glucose transmitter (DEXCOM G6 TRANSMITTER) Kenzie Use with Dexcom sensor, change every 90 days.    bromfenac (PROLENSA) 0.07 % Drop Apply one drop daily to both eyes as directed    bromfenac (PROLENSA) 0.07 % Drop Place 1 drop into both eyes daily    ferrous sulfate (FEOSOL) 325 mg, Oral, Every Mon, Wed, Fri    gabapentin (NEURONTIN) 600 mg, Oral, 3 times daily    glucagon 3 mg/actuation Spry 1 Dose, Nasal, Once as needed    hydroCHLOROthiazide (HYDRODIURIL) 25 mg, Oral, Daily    insulin glargine, TOUJEO, (TOUJEO SOLOSTAR U-300 INSULIN) 300 unit/mL (1.5 mL) InPn pen Inject 57 units twice a day , max daily 114 units    insulin lispro (HUMALOG KWIKPEN INSULIN) 100 unit/mL pen Inject 10 units under the skin before meals plus scale 150-200+2, 201-250+4, 251-300+6, 301-350+8, >350+10. Max daily 65 units    levocetirizine (XYZAL) 5 mg, Oral, Nightly PRN    lisinopriL (PRINIVIL,ZESTRIL) 20 mg, Oral, Daily    methocarbamoL (ROBAXIN) 500 mg, Oral, 4 times daily    MOUNJARO 10 mg, Subcutaneous, Every 7 days    MOUNJARO 15 mg, Subcutaneous, Every 7 days    nystatin (MYCOSTATIN) powder Topical (Top), 2 times daily PRN    omeprazole (PRILOSEC) 40 mg, Oral, Daily    pen needle, diabetic (BD ULTRA-FINE MAR PEN NEEDLE) 32 gauge x 5/32" Ndle Use to inject insulin 5 times daily    triamcinolone acetonide 0.1% (KENALOG) 0.1 % cream Topical (Top), 2 times daily PRN     Objective:      Vitals:    03/21/24 0851   BP: 128/60   Pulse: 74     There is no height or weight on file to calculate BMI.    Physical Exam  Vitals reviewed.   Constitutional:       General: She is not in acute distress.     Appearance: Normal appearance. She is not ill-appearing or diaphoretic.   HENT:      Head: Normocephalic and atraumatic.      Right Ear: Tympanic membrane, ear canal and external ear normal. There is no impacted cerumen.      Left Ear: Tympanic membrane, ear canal and external ear " normal. There is no impacted cerumen.      Nose: Nose normal. No rhinorrhea.      Mouth/Throat:      Mouth: Mucous membranes are moist.      Pharynx: Oropharynx is clear. No oropharyngeal exudate or posterior oropharyngeal erythema.   Eyes:      General: No scleral icterus.        Right eye: No discharge.         Left eye: No discharge.      Conjunctiva/sclera: Conjunctivae normal.   Neck:      Thyroid: No thyromegaly or thyroid tenderness.      Trachea: Trachea normal.   Cardiovascular:      Rate and Rhythm: Normal rate and regular rhythm.      Heart sounds: Normal heart sounds. No murmur heard.     No friction rub. No gallop.   Pulmonary:      Effort: Pulmonary effort is normal. No respiratory distress.      Breath sounds: Normal breath sounds. No stridor. No wheezing, rhonchi or rales.   Abdominal:      General: There is no distension.      Palpations: Abdomen is soft.      Tenderness: There is no abdominal tenderness. There is no guarding or rebound.   Musculoskeletal:         General: No swelling or deformity.      Cervical back: Neck supple.   Lymphadenopathy:      Head:      Right side of head: No submandibular or posterior auricular adenopathy.      Left side of head: No submandibular or posterior auricular adenopathy.      Cervical: No cervical adenopathy.      Right cervical: No superficial, deep or posterior cervical adenopathy.     Left cervical: No superficial, deep or posterior cervical adenopathy.      Upper Body:      Right upper body: No supraclavicular adenopathy.      Left upper body: No supraclavicular adenopathy.   Skin:     General: Skin is warm and dry.          Neurological:      General: No focal deficit present.      Mental Status: She is alert. Mental status is at baseline.      Gait: Gait normal.   Psychiatric:         Mood and Affect: Mood normal.         Behavior: Behavior normal.         Assessment:       1. Health maintenance examination    2. Type 2 diabetes mellitus with both eyes  affected by retinopathy without macular edema, with long-term current use of insulin, unspecified retinopathy severity    3. Abdominal panniculus    4. Anemia of chronic disease    5. Mixed hyperlipidemia    6. Primary hypertension    7. Stage 3a chronic kidney disease    8. Vitamin D deficiency    9. Gastroesophageal reflux disease, unspecified whether esophagitis present    10. Environmental allergies    11. Flexural atopic dermatitis        Plan:       Type 2 diabetes mellitus with both eyes affected by retinopathy without macular edema, with long-term current use of insulin, unspecified retinopathy severity  Continue current medications.  Labs before next appt  Continue evaluation and management per endocrinologist.  Discussed management of hypoglycemic episodes and use of glucagon  RTC in 3 months for follow up.  -     glucagon 3 mg/actuation Spry; 1 Dose by Nasal route once as needed (severe low blood sugar).  -     Comprehensive Metabolic Panel; Future  -     Hemoglobin A1C; Future  -     Microalbumin/Creatinine Ratio, Urine; Future    Abdominal panniculus  - Keep skin cool and dry. Use a fan or air conditioning as needed. Try to expose affected skin to air twice a day for about 30 minutes each time.  - Do not wear tight shoes or clothing. Wear a bra that has good support.  - Wear clothes made with absorbent fabrics, but do not wear nylon or other manmade fibers.  - After exercise, shower and dry off. Use a hair dryer on cool to dry areas that can trap wetness, such as under your arms or breasts.  - Change undergarments and clothing as soon as possible if they became damp from sweat.   - Try topical powders (such as Gold Bond) to prevent moisture build up in the area.   -     nystatin (MYCOSTATIN) powder; Apply topically 2 (two) times daily as needed (itching between skin folds).    Anemia of chronic disease  -     CBC Auto Differential; Future    Mixed hyperlipidemia  Continue current medications.  RTC in 3  months for follow up.  -     Lipid Panel; Future    Primary hypertension  Continue current medications.  RTC in 3 months for follow up.  -     TSH; Future    Stage 3a chronic kidney disease  Avoid nephrotoxic medications  RTC in 3 months for follow up.    Vitamin D deficiency  -     Vitamin D; Future    Gastroesophageal reflux disease, unspecified whether esophagitis present  -     omeprazole (PRILOSEC) 40 MG capsule; Take 1 capsule (40 mg total) by mouth once daily.    Environmental allergies  -     azelastine (ASTELIN) 137 mcg (0.1 %) nasal spray; 2 sprays (274 mcg total) by Nasal route 2 (two) times daily as needed (allergies, runny nose).  -     levocetirizine (XYZAL) 5 MG tablet; Take 1 tablet (5 mg total) by mouth nightly as needed for Allergies.    Flexural atopic dermatitis  -     Ambulatory referral/consult to Dermatology; Future  -     triamcinolone acetonide 0.1% (KENALOG) 0.1 % cream; Apply topically 2 (two) times daily as needed (eczema).    Health maintenance examination  Reviewed and discussed age appropriate screenings and immunizations.  -     Comprehensive Metabolic Panel; Future  -     TSH; Future  -     Lipid Panel; Future  -     Hemoglobin A1C; Future  -     CBC Auto Differential; Future  -     Microalbumin/Creatinine Ratio, Urine; Future  -     Vitamin D; Future        Meghan Perez MD  3/21/2024

## 2024-03-21 NOTE — PROGRESS NOTES
Diabetes Care Specialist Progress Note  Author: Chantal Shrestha RD, CDE  Date: 3/21/2024    Program Intake  Reason for Diabetes Program Visit:: Intervention  Type of Intervention:: Individual  Individual: Education  Education: Pattern Management, Self-Management Skill Review  Current diabetes risk level:: moderate  In the last 12 months, have you:: been admitted to a hospital  Was the ER or hospital admission related to diabetes?: No  Permission to speak with others about care:: no  Continuous Glucose Monitoring  Patient has CGM: Yes  Personal CGM type:: Dexcom G6    Lab Results   Component Value Date    HGBA1C 7.8 (H) 02/16/2024       Clinical    Weight: 105.2 kg (231 lb 14.8 oz)       Body mass index is 38.59 kg/m².    Current DM meds:  Mounjaro 15mg weekly  Toujeo 57 BID  Novolog 10 AC + scale  150-200 +2  201-250 +4  251-300 +6  301-350 +8  >350 +10    Dexcom G6      Diabetes Self-Management Skills Assessment      Home Blood Glucose Monitoring  Personal CGM type:: Dexcom G6    Psychosocial/Coping  Patient can identify ways of coping with chronic disease.: yes  Patient-stated ways of coping with chronic disease:: support from loved ones, spiritual/Taoism practices  Psychosocial/Coping Skills Assessment Completed: : Yes  Assessment indicates:: Adequate understanding  Area of need?: No      Assessment Summary and Plan    Based on today's diabetes care assessment, the following areas of need were identified:          12/20/2023    12:02 AM   Social   Support No   Access to Mass Media/Tech No   Cognitive/Behavioral Health No   Culture/Episcopal No   Communication No   Health Literacy No            12/20/2023    12:02 AM   Clinical   Medication Adherence Yes   Lab Compliance No            3/21/2024    12:02 AM   Diabetes Self-Management Skills   Psychosocial/Coping No          Today's interventions were provided through individual discussion, instruction, and written materials were provided.      Patient verbalized  understanding of instruction and written materials.  Pt was able to return back demonstration of instructions today. Patient understood key points, needs reinforcement and further instruction.     Diabetes Self-Management Care Plan:    Today's Diabetes Self-Management Care Plan was developed with Starla SHANKAR's input. Starla SHANKAR has agreed to work toward the following goal(s) to improve his/her overall diabetes control.      Care Plan: Diabetes Management   Updates made since 2/20/2024 12:00 AM        Problem: Medications         Goal: Will keep Humalog at bedside (where she typically eats dinner).    Start Date: 12/20/2023   Expected End Date: 1/19/2024   This Visit's Progress: Met   Priority: High   Barriers: No Barriers Identified   Note:    12/20/23 - She is sometimes missing Humalog with dinner. Eats at home around 7-8pm but does not feel like getting up out of bed to go get Humalog. Plans to change where she stores Humalog and keep at bedside. Reinforced importance of taking Humalog consistently with meals.     3/21/24 - Has been more consistent with taking Humalog with dinner. Reviewed Dexcom report with pt and applauded improvement 73% TIR (compared to 52% in December), avg glucose 160 (down from 180), and GMI 7.1%. Also continuing to lose wt with Mounjaro 15mg. Has had challenges with supply chain issues but has a friend at [facility] pharmacy that has helped her with this. Pt expressed feeling pleased with results and has more energy.        Problem: Healthy Eating         Goal: Will choose more 0-5g CHO snacks rather than candy or chips.    Start Date: 12/20/2023   Expected End Date: 1/19/2024   This Visit's Progress: On track   Priority: Medium   Barriers: No Barriers Identified   Note:    12/20/23 - Has been gradually working on cutting out hot tamale candy - in the past would buy 5 boxes per month and snack on a few whenever she passed by them. Has kept from buying them the past couple of months and is down to  "1 1/2 boxes. Went on a BBQ chip kick a little while back but has been avoiding those lately too. Encouraged continued efforts. Looking for healthier snack options. Discussed some 0-5g CHO snacks - she likes lightly salted cashews or peanuts and will try making that swap.     3/21/24 - Mounjaro working well for reducing cravings. She will occasionally get something fried or a snack food but will be done with it after a couple of bites. Eating soup lately - sometimes only having a bowl of broccoli cheddar soup all day. She is noticing "flabby" stomach/loss of muscle. Encouraged getting adequate protein to avoid losing significant muscle with GLP with incorporating lean protein (fish, chicken, eggs, etc). She likes to drink a Boost Glucose control shake most days for breakfast (not a breakfast person) but getting tired of the flavors. Encouraged lower carb shake with 30g protein or more and reviewed some options.         Problem: Physical Activity and Exercise         Goal: Patient agrees to increase physical activity to a goal of 3 times per week for 30 minutes.    Start Date: 3/21/2024   Expected End Date: 4/20/2024   Priority: High   Barriers: No Barriers Identified   Note:    3/21/24 - Pt noticing "flabby" stomach with wt loss. Discussed need for activity to help with maintaining muscle while supporting wt loss efforts and glucose control. Wants to work with a  to get specific, intentional exercises and interested in [place]. Will check into which gyms are covered under her [employer] plan.        Task: Discussed role of physical activity on reducing insulin resistance and improvement in overall glycemic control. Completed 3/21/2024        Task: Discussed role of physical activity as it relates to weight loss Completed 3/21/2024        Task: Offered suggestions on how patient could increase their regular physical activity Completed 3/21/2024          Follow Up Plan     Follow up in about 11 " weeks (around 6/6/2024) for check in .    Today's care plan and follow up schedule was discussed with patient.  Starla SHANKAR verbalized understanding of the care plan, goals, and agrees to follow up plan.        The patient was encouraged to communicate with his/her health care provider/physician and care team regarding his/her condition(s) and treatment.  I provided the patient with my contact information today and encouraged to contact me via phone or [facility] Patient Portal as needed.     Length of Visit   Total Time: 30 Minutes

## 2024-03-31 DIAGNOSIS — L20.89 FLEXURAL ATOPIC DERMATITIS: ICD-10-CM

## 2024-03-31 NOTE — TELEPHONE ENCOUNTER
Care Due:                  Date            Visit Type   Department     Provider  --------------------------------------------------------------------------------                                EP -                              PRIMARY      [facility] INTERNAL  Last Visit: 03-      CARE ([facility])   PATY Perez                              EP -                              PRIMARY      [facility] INTERNAL  Next Visit: 06-      CARE ([facility])   PATY Perez                                                            Last  Test          Frequency    Reason                     Performed    Due Date  --------------------------------------------------------------------------------    Lipid Panel.  12 months..  atorvastatin.............  06- 06-    [facility] Embedded Care Due Messages. Reference number: [id number].   3/31/2024 5:08:48 AM CDT

## 2024-04-01 RX ORDER — TRIAMCINOLONE ACETONIDE 1 MG/G
CREAM TOPICAL 2 TIMES DAILY PRN
Qty: 45 G | Refills: 1 | OUTPATIENT
Start: 2024-04-01

## 2024-04-01 NOTE — TELEPHONE ENCOUNTER
Refill Routing Note   Medication(s) are not appropriate for processing by [facility] for the following reason(s):        New or recently adjusted medication: script restarted 3/21/24    [facility] action(s):  Defer     Requires labs : Yes - lipid panel due 6/21/24            Appointments  past 12m or future 3m with PCP    Date Provider   Last Visit   3/21/2024 Meghan Perez MD   Next Visit   6/6/2024 Meghan Perez MD   ED visits in past 90 days: 0        Note composed:1:20 PM 04/01/2024

## 2024-04-23 ENCOUNTER — PATIENT MESSAGE (OUTPATIENT)
Dept: REHABILITATION | Facility: OTHER | Age: 59
End: 2024-04-23
Payer: MEDICARE

## 2024-05-14 ENCOUNTER — TELEPHONE (OUTPATIENT)
Dept: DIABETES | Facility: CLINIC | Age: 59
End: 2024-05-14
Payer: MEDICARE

## 2024-05-14 NOTE — TELEPHONE ENCOUNTER
Pt called reporting consistently having low BGs. Unable to view current Dexcom data - last data from April 30th. Pt's imelda logged out - instructed on logging in for continued connection to clinic account. Pt verbally reports waking up in 60s-70s and having frequent lows in 60s throughout the night and sometime during the day. Treating with hot tamale candies or regular coke. Recently went up on Mounjaro to 15mg weekly and is taking Toujeo 57 units BID as prescribed. She has not been taking any Humalog lately. Forcing herself to eat at least 2 meals daily and drinks Glucerna shake for her breakfast. Discussed with increase in Mounjaro, likely requiring less insulin. Educated on long-acting insulin titration when Fasting glucose consistently <90.

## 2024-05-24 ENCOUNTER — TELEPHONE (OUTPATIENT)
Dept: INTERNAL MEDICINE | Facility: CLINIC | Age: 59
End: 2024-05-24
Payer: MEDICARE

## 2024-05-24 ENCOUNTER — LAB VISIT (OUTPATIENT)
Dept: LAB | Facility: OTHER | Age: 59
End: 2024-05-24
Attending: STUDENT IN AN ORGANIZED HEALTH CARE EDUCATION/TRAINING PROGRAM
Payer: MEDICARE

## 2024-05-24 DIAGNOSIS — D63.8 ANEMIA OF CHRONIC DISEASE: ICD-10-CM

## 2024-05-24 DIAGNOSIS — E78.2 MIXED HYPERLIPIDEMIA: ICD-10-CM

## 2024-05-24 DIAGNOSIS — I10 PRIMARY HYPERTENSION: ICD-10-CM

## 2024-05-24 DIAGNOSIS — Z79.4 TYPE 2 DIABETES MELLITUS WITH BOTH EYES AFFECTED BY RETINOPATHY WITHOUT MACULAR EDEMA, WITH LONG-TERM CURRENT USE OF INSULIN, UNSPECIFIED RETINOPATHY SEVERITY: ICD-10-CM

## 2024-05-24 DIAGNOSIS — E55.9 VITAMIN D DEFICIENCY: ICD-10-CM

## 2024-05-24 DIAGNOSIS — E11.319 TYPE 2 DIABETES MELLITUS WITH BOTH EYES AFFECTED BY RETINOPATHY WITHOUT MACULAR EDEMA, WITH LONG-TERM CURRENT USE OF INSULIN, UNSPECIFIED RETINOPATHY SEVERITY: ICD-10-CM

## 2024-05-24 DIAGNOSIS — Z00.00 HEALTH MAINTENANCE EXAMINATION: ICD-10-CM

## 2024-05-24 LAB
25(OH)D3+25(OH)D2 SERPL-MCNC: 27 NG/ML (ref 30–96)
ALBUMIN SERPL BCP-MCNC: 3.5 G/DL (ref 3.5–5.2)
ALP SERPL-CCNC: 105 U/L (ref 55–135)
ALT SERPL W/O P-5'-P-CCNC: 20 U/L (ref 10–44)
ANION GAP SERPL CALC-SCNC: 10 MMOL/L (ref 8–16)
AST SERPL-CCNC: 20 U/L (ref 10–40)
BASOPHILS # BLD AUTO: 0.04 K/UL (ref 0–0.2)
BASOPHILS NFR BLD: 0.6 % (ref 0–1.9)
BILIRUB SERPL-MCNC: 0.3 MG/DL (ref 0.1–1)
BUN SERPL-MCNC: 32 MG/DL (ref 6–20)
CALCIUM SERPL-MCNC: 8.8 MG/DL (ref 8.7–10.5)
CHLORIDE SERPL-SCNC: 105 MMOL/L (ref 95–110)
CHOLEST SERPL-MCNC: 123 MG/DL (ref 120–199)
CHOLEST/HDLC SERPL: 2.9 {RATIO} (ref 2–5)
CO2 SERPL-SCNC: 23 MMOL/L (ref 23–29)
CREAT SERPL-MCNC: 1.7 MG/DL (ref 0.5–1.4)
DIFFERENTIAL METHOD BLD: ABNORMAL
EOSINOPHIL # BLD AUTO: 0.1 K/UL (ref 0–0.5)
EOSINOPHIL NFR BLD: 2 % (ref 0–8)
ERYTHROCYTE [DISTWIDTH] IN BLOOD BY AUTOMATED COUNT: 15.3 % (ref 11.5–14.5)
EST. GFR  (NO RACE VARIABLE): 34 ML/MIN/1.73 M^2
ESTIMATED AVG GLUCOSE: 154 MG/DL (ref 68–131)
GLUCOSE SERPL-MCNC: 77 MG/DL (ref 70–110)
HBA1C MFR BLD: 7 % (ref 4–5.6)
HCT VFR BLD AUTO: 34.9 % (ref 37–48.5)
HDLC SERPL-MCNC: 42 MG/DL (ref 40–75)
HDLC SERPL: 34.1 % (ref 20–50)
HGB BLD-MCNC: 10.6 G/DL (ref 12–16)
IMM GRANULOCYTES # BLD AUTO: 0.03 K/UL (ref 0–0.04)
IMM GRANULOCYTES NFR BLD AUTO: 0.4 % (ref 0–0.5)
LDLC SERPL CALC-MCNC: 66.6 MG/DL (ref 63–159)
LYMPHOCYTES # BLD AUTO: 2.7 K/UL (ref 1–4.8)
LYMPHOCYTES NFR BLD: 39 % (ref 18–48)
MCH RBC QN AUTO: 25.2 PG (ref 27–31)
MCHC RBC AUTO-ENTMCNC: 30.4 G/DL (ref 32–36)
MCV RBC AUTO: 83 FL (ref 82–98)
MONOCYTES # BLD AUTO: 0.4 K/UL (ref 0.3–1)
MONOCYTES NFR BLD: 5.5 % (ref 4–15)
NEUTROPHILS # BLD AUTO: 3.7 K/UL (ref 1.8–7.7)
NEUTROPHILS NFR BLD: 52.5 % (ref 38–73)
NONHDLC SERPL-MCNC: 81 MG/DL
NRBC BLD-RTO: 0 /100 WBC
PLATELET # BLD AUTO: 269 K/UL (ref 150–450)
PMV BLD AUTO: 9 FL (ref 9.2–12.9)
POTASSIUM SERPL-SCNC: 4.1 MMOL/L (ref 3.5–5.1)
PROT SERPL-MCNC: 7.7 G/DL (ref 6–8.4)
RBC # BLD AUTO: 4.2 M/UL (ref 4–5.4)
SODIUM SERPL-SCNC: 138 MMOL/L (ref 136–145)
TRIGL SERPL-MCNC: 72 MG/DL (ref 30–150)
TSH SERPL DL<=0.005 MIU/L-ACNC: 2 UIU/ML (ref 0.4–4)
WBC # BLD AUTO: 7.03 K/UL (ref 3.9–12.7)

## 2024-05-24 PROCEDURE — 85025 COMPLETE CBC W/AUTO DIFF WBC: CPT | Performed by: STUDENT IN AN ORGANIZED HEALTH CARE EDUCATION/TRAINING PROGRAM

## 2024-05-24 PROCEDURE — 84443 ASSAY THYROID STIM HORMONE: CPT | Performed by: STUDENT IN AN ORGANIZED HEALTH CARE EDUCATION/TRAINING PROGRAM

## 2024-05-24 PROCEDURE — 83036 HEMOGLOBIN GLYCOSYLATED A1C: CPT | Performed by: STUDENT IN AN ORGANIZED HEALTH CARE EDUCATION/TRAINING PROGRAM

## 2024-05-24 PROCEDURE — 82306 VITAMIN D 25 HYDROXY: CPT | Performed by: STUDENT IN AN ORGANIZED HEALTH CARE EDUCATION/TRAINING PROGRAM

## 2024-05-24 PROCEDURE — 36415 COLL VENOUS BLD VENIPUNCTURE: CPT | Performed by: STUDENT IN AN ORGANIZED HEALTH CARE EDUCATION/TRAINING PROGRAM

## 2024-05-24 PROCEDURE — 80061 LIPID PANEL: CPT | Performed by: STUDENT IN AN ORGANIZED HEALTH CARE EDUCATION/TRAINING PROGRAM

## 2024-05-24 PROCEDURE — 80053 COMPREHEN METABOLIC PANEL: CPT | Performed by: STUDENT IN AN ORGANIZED HEALTH CARE EDUCATION/TRAINING PROGRAM

## 2024-05-24 NOTE — TELEPHONE ENCOUNTER
I was able to speak with the patient and I discussed the recommendations below with her. Feels that her MD can explain it to her better when given levels of BUN, Cr. Would like her MD to call her.       From Dr. Perez    Please call patient and make sure she is not  taking any NSAID medications such as ibuprofen (Motrin) or naproxen (Aleve). If she is, I would like her to stop them and take a medication such as acetaminophen (Tylenol) instead. She should also ensure she is drinking plenty of water, 5-6, 12 oz glasses daily. Thank you!

## 2024-05-24 NOTE — TELEPHONE ENCOUNTER
----- Message from Meghan Perez MD sent at 5/24/2024 11:56 AM CDT -----  Please call patient and make sure she is not  taking any NSAID medications such as ibuprofen (Motrin) or naproxen (Aleve). If she is, I would like her to stop them and take a medication such as acetaminophen (Tylenol) instead. She should also ensure she is drinking plenty of water, 5-6, 12 oz glasses daily. Thank you!

## 2024-05-28 ENCOUNTER — OFFICE VISIT (OUTPATIENT)
Dept: INTERNAL MEDICINE | Facility: CLINIC | Age: 59
End: 2024-05-28
Payer: MEDICARE

## 2024-05-28 DIAGNOSIS — Z79.4 TYPE 2 DIABETES MELLITUS WITH BOTH EYES AFFECTED BY RETINOPATHY WITHOUT MACULAR EDEMA, WITH LONG-TERM CURRENT USE OF INSULIN, UNSPECIFIED RETINOPATHY SEVERITY: ICD-10-CM

## 2024-05-28 DIAGNOSIS — D63.8 ANEMIA OF CHRONIC DISEASE: ICD-10-CM

## 2024-05-28 DIAGNOSIS — N18.30 STAGE 3 CHRONIC KIDNEY DISEASE, UNSPECIFIED WHETHER STAGE 3A OR 3B CKD: Primary | ICD-10-CM

## 2024-05-28 DIAGNOSIS — I10 ESSENTIAL HYPERTENSION: ICD-10-CM

## 2024-05-28 DIAGNOSIS — E55.9 VITAMIN D DEFICIENCY: ICD-10-CM

## 2024-05-28 DIAGNOSIS — E11.319 TYPE 2 DIABETES MELLITUS WITH BOTH EYES AFFECTED BY RETINOPATHY WITHOUT MACULAR EDEMA, WITH LONG-TERM CURRENT USE OF INSULIN, UNSPECIFIED RETINOPATHY SEVERITY: ICD-10-CM

## 2024-05-28 NOTE — PROGRESS NOTES
The patient's location is:  [place]  The chief complaint leading to consultation is:  Stage 3 chronic kidney disease, unspecified whether stage 3a or 3b CKD [N18.30]    Visit type: audiovisual    Time spent in discussion with the patient: 12 minutes  15 minutes of total time spent on the encounter. This includes time spent in discussion with the patient and time preparing for the patient appointment: review of tests, obtaining and/or reviewing separately obtained history, documenting clinical information in the electronic or other health record, independently interpreting results (not separately reported), and communicating results to the patient/family/caregiver or care coordination (not separately reported).     Each patient to whom he or she provides medical services by telemedicine is: (1) informed of the relationship between the physician and patient and the respective role of any other health care provider with respect to management of the patient; and (2) notified that he or she may decline to receive medical services by telemedicine and may withdraw from such care at any time.      Subjective:   Starla Wharton is a 59 y.o. female who presents for Stage 3 chronic kidney disease, unspecified whether stage 3a or 3b CKD [N18.30].       Patient is established with me, here today for the following:    CKD -  Not drinking water routinely, maybe 2-4 bottles  Lab Results       Component                Value               Date                       CREATININE               1.7 (H)             05/24/2024                 CREATININE               1.3                 02/02/2024                 CREATININE               1.4                 10/11/2023            Lab Results       Component                Value               Date                       NA                       138                 05/24/2024                 K                        4.1                 05/24/2024                 CO2                      23     "              05/24/2024              Anemia -  Noted with elevated FLC NOV2023  E-consulted hematology  "Contingency: Refer to Hematology clinic in case of Hb < 10 g/dl in which case she may need a bone marrow biopsy. "  Quantitative Hgb A2 2.4%  Lab Results       Component                Value               Date                       HGB                      10.6 (L)            05/24/2024                 HCT                      34.9 (L)            05/24/2024                 MCV                      83                  05/24/2024                 RDW                      15.3 (H)            05/24/2024            Lab Results       Component                Value               Date                       IRON                     46                  02/02/2024                 TRANSFERRIN              209                 02/02/2024                 TIBC                     309                 02/02/2024                 FESATURATED              15 (L)              02/02/2024             Lab Results       Component                Value               Date                       FERRITIN                 132                 02/02/2024            Lab Results       Component                Value               Date                       [id number]               664                 10/11/2023            Lab Results       Component                Value               Date                       FOLATE                   9.5                 10/11/2023                T2DM -   Currently taking:   - Toujeo 50 qAM and 50 units qHS  - Has not been requiring Novolog for sliding scale   - Mounjaro 15 mg weekly  Following with ROSARIO Ramirez for endocrinology  Following with [facility] for diabetes management  Taking ASA 81 mg daily  Using Dexcom CGM for glucose monitoring  UTD on foot exam.  UTD on eye exam.  Lab Results       Component                Value               Date                       HGBA1C                   7.0 (H)             " 05/24/2024                 HGBA1C                   7.8 (H)             02/16/2024                 HGBA1C                   7.9 (H)             02/02/2024            Lab Results       Component                Value               Date                       MICALBCREAT              12.2                05/24/2024              Vitamin D deficiency -  Lab Results       Component                Value               Date                       [id number]             27 (L)              05/24/2024                 [id number]             35                  06/26/2023                 [id number]             30                  07/22/2022                       Review of Systems   All other systems reviewed and are negative.        Current Outpatient Medications   Medication Instructions    aspirin 81 mg, Daily    atorvastatin (LIPITOR) 40 mg, Oral, Daily    azelastine (ASTELIN) 137 mcg (0.1 %) nasal spray spray 2 sprays (274 mcg total) by Nasal route 2 (two) times daily as needed (allergies, runny nose).    blood-glucose meter,continuous (DEXCOM G6 ) Misc 1 Device, Misc.(Non-Drug; Combo Route), Once    blood-glucose sensor (DEXCOM G6 SENSOR) Kenzie Use daily for continuous glucose monitoring, change sensor every 10 days.    blood-glucose transmitter (DEXCOM G6 TRANSMITTER) Kenzie Use with Dexcom sensor, change every 90 days.    bromfenac (PROLENSA) 0.07 % Drop Apply one drop daily to both eyes as directed    bromfenac (PROLENSA) 0.07 % Drop Place 1 drop into both eyes daily    ferrous sulfate (FEOSOL) 325 mg, Oral, Every Mon, Wed, Fri    gabapentin (NEURONTIN) 600 mg, Oral, 3 times daily    hydroCHLOROthiazide (HYDRODIURIL) 25 mg, Oral, Daily    insulin glargine, TOUJEO, (TOUJEO SOLOSTAR U-300 INSULIN) 300 unit/mL (1.5 mL) InPn pen Inject 57 units twice a day , max daily 114 units    insulin lispro (HUMALOG KWIKPEN INSULIN) 100 unit/mL pen Inject 10 units under the skin before meals plus scale 150-200+2, 201-250+4,  "251-300+6, 301-350+8, >350+10. Max daily 65 units    levocetirizine (XYZAL) 5 mg, Oral, Nightly PRN    lisinopriL (PRINIVIL,ZESTRIL) 20 mg, Oral, Daily    methocarbamoL (ROBAXIN) 500 mg, Oral, 4 times daily    MOUNJARO 10 mg, Subcutaneous, Every 7 days    MOUNJARO 15 mg, Subcutaneous, Every 7 days    nystatin (MYCOSTATIN) powder Topical (Top), 2 times daily PRN    omeprazole (PRILOSEC) 40 mg, Oral, Daily    pen needle, diabetic (BD ULTRA-FINE MAR PEN NEEDLE) 32 gauge x 5/32" Ndle Use to inject insulin 5 times daily    triamcinolone acetonide 0.1% (KENALOG) 0.1 % cream Topical (Top), 2 times daily PRN       Objective:   There were no vitals filed for this visit.     Physical Exam  Constitutional:       General: She is not in acute distress.     Appearance: Normal appearance. She is not ill-appearing or diaphoretic.   Neurological:      Mental Status: She is alert.   Psychiatric:         Attention and Perception: Attention normal.         Mood and Affect: Mood and affect normal.         Speech: Speech normal.           Assessment/Plan:       Stage 3 chronic kidney disease, unspecified whether stage 3a or 3b CKD  Continue hydration  Avoid NSAIDs and nephrotoxic medications  RTC in 3-4 weeks for follow up.    Anemia of chronic disease  Continue current medications.  RTC in 3-4 weeks for follow up.    Type 2 diabetes mellitus with both eyes affected by retinopathy without macular edema, with long-term current use of insulin, unspecified retinopathy severity  Continue current medications.  RTC in 3-4 weeks for follow up.    Vitamin D deficiency  Continue supplementation.  RTC in 3-4 weeks for follow up.        Meghan Perez MD  05/28/2024   "

## 2024-05-28 NOTE — TELEPHONE ENCOUNTER
No care due was identified.  [facility] Embedded Care Due Messages. Reference number: [id number].   5/28/2024 5:04:41 PM CDT

## 2024-05-29 RX ORDER — HYDROCHLOROTHIAZIDE 25 MG/1
25 TABLET ORAL DAILY
Qty: 90 TABLET | Refills: 3 | Status: SHIPPED | OUTPATIENT
Start: 2024-05-29

## 2024-05-29 NOTE — TELEPHONE ENCOUNTER
Refill Routing Note   Medication(s) are not appropriate for processing by [facility] for the following reason(s):     [facility]DI not previously overridden by current provider--after initial override, the [facility] Center will be able to continue overrides      Required labs abnormal: SCr    [facility] action(s):  Defer           Pharmacist review requested: Yes     Appointments  past 12m or future 3m with PCP    Date Provider   Last Visit   5/28/2024 Meghan Perez MD   Next Visit   6/6/2024 Meghan Perez MD   ED visits in past 90 days: 0        Note composed:10:43 PM 05/28/2024

## 2024-05-29 NOTE — TELEPHONE ENCOUNTER
Refill Routing Note   Medication(s) are not appropriate for processing by [facility] for the following reason(s):        Required labs abnormal:     [facility] action(s):  Defer             Pharmacist review requested: Yes     Appointments  past 12m or future 3m with PCP    Date Provider   Last Visit   5/28/2024 Meghan Perez MD   Next Visit   6/6/2024 Meghan Perez MD   ED visits in past 90 days: 0        Note composed:10:46 AM 05/29/2024

## 2024-06-06 ENCOUNTER — LAB VISIT (OUTPATIENT)
Dept: LAB | Facility: OTHER | Age: 59
End: 2024-06-06
Attending: STUDENT IN AN ORGANIZED HEALTH CARE EDUCATION/TRAINING PROGRAM
Payer: MEDICARE

## 2024-06-06 ENCOUNTER — CLINICAL SUPPORT (OUTPATIENT)
Dept: DIABETES | Facility: CLINIC | Age: 59
End: 2024-06-06
Payer: MEDICARE

## 2024-06-06 ENCOUNTER — OFFICE VISIT (OUTPATIENT)
Dept: INTERNAL MEDICINE | Facility: CLINIC | Age: 59
End: 2024-06-06
Payer: MEDICARE

## 2024-06-06 VITALS
BODY MASS INDEX: 40.5 KG/M2 | WEIGHT: 243.38 LBS | DIASTOLIC BLOOD PRESSURE: 70 MMHG | OXYGEN SATURATION: 98 % | SYSTOLIC BLOOD PRESSURE: 136 MMHG | HEART RATE: 86 BPM

## 2024-06-06 DIAGNOSIS — N18.30 STAGE 3 CHRONIC KIDNEY DISEASE, UNSPECIFIED WHETHER STAGE 3A OR 3B CKD: ICD-10-CM

## 2024-06-06 DIAGNOSIS — D64.9 ANEMIA, UNSPECIFIED TYPE: ICD-10-CM

## 2024-06-06 DIAGNOSIS — E11.319 TYPE 2 DIABETES MELLITUS WITH RETINOPATHY, WITH LONG-TERM CURRENT USE OF INSULIN, MACULAR EDEMA PRESENCE UNSPECIFIED, UNSPECIFIED LATERALITY, UNSPECIFIED RETINOPATHY SEVERITY: ICD-10-CM

## 2024-06-06 DIAGNOSIS — Z00.00 HEALTH MAINTENANCE EXAMINATION: Primary | ICD-10-CM

## 2024-06-06 DIAGNOSIS — E11.8 TYPE 2 DIABETES MELLITUS WITH COMPLICATION, WITH LONG-TERM CURRENT USE OF INSULIN: Primary | ICD-10-CM

## 2024-06-06 DIAGNOSIS — Z79.4 TYPE 2 DIABETES MELLITUS WITH RETINOPATHY, WITH LONG-TERM CURRENT USE OF INSULIN, MACULAR EDEMA PRESENCE UNSPECIFIED, UNSPECIFIED LATERALITY, UNSPECIFIED RETINOPATHY SEVERITY: ICD-10-CM

## 2024-06-06 DIAGNOSIS — Z12.31 SCREENING MAMMOGRAM FOR BREAST CANCER: ICD-10-CM

## 2024-06-06 DIAGNOSIS — Z79.4 TYPE 2 DIABETES MELLITUS WITH COMPLICATION, WITH LONG-TERM CURRENT USE OF INSULIN: Primary | ICD-10-CM

## 2024-06-06 LAB
ALBUMIN SERPL BCP-MCNC: 3.5 G/DL (ref 3.5–5.2)
ANION GAP SERPL CALC-SCNC: 12 MMOL/L (ref 8–16)
BILIRUB UR QL STRIP: NEGATIVE
BUN SERPL-MCNC: 41 MG/DL (ref 6–20)
CALCIUM SERPL-MCNC: 9.2 MG/DL (ref 8.7–10.5)
CHLORIDE SERPL-SCNC: 105 MMOL/L (ref 95–110)
CLARITY UR: CLEAR
CO2 SERPL-SCNC: 22 MMOL/L (ref 23–29)
COLOR UR: YELLOW
CREAT SERPL-MCNC: 1.5 MG/DL (ref 0.5–1.4)
CREAT UR-MCNC: 89 MG/DL (ref 15–325)
EST. GFR  (NO RACE VARIABLE): 40 ML/MIN/1.73 M^2
GLUCOSE SERPL-MCNC: 92 MG/DL (ref 70–110)
GLUCOSE UR QL STRIP: NEGATIVE
HGB UR QL STRIP: NEGATIVE
KETONES UR QL STRIP: NEGATIVE
LEUKOCYTE ESTERASE UR QL STRIP: NEGATIVE
NITRITE UR QL STRIP: NEGATIVE
PH UR STRIP: 6 [PH] (ref 5–8)
PHOSPHATE SERPL-MCNC: 3.5 MG/DL (ref 2.7–4.5)
POTASSIUM SERPL-SCNC: 4.6 MMOL/L (ref 3.5–5.1)
PROT UR QL STRIP: NEGATIVE
PROT UR-MCNC: 9 MG/DL (ref 0–15)
PROT/CREAT UR: 0.1 MG/G{CREAT} (ref 0–0.2)
SODIUM SERPL-SCNC: 139 MMOL/L (ref 136–145)
SODIUM UR-SCNC: 125 MMOL/L (ref 20–250)
SP GR UR STRIP: 1.02 (ref 1–1.03)
URN SPEC COLLECT METH UR: NORMAL

## 2024-06-06 PROCEDURE — 4010F ACE/ARB THERAPY RXD/TAKEN: CPT | Mod: CPTII,S$GLB,, | Performed by: STUDENT IN AN ORGANIZED HEALTH CARE EDUCATION/TRAINING PROGRAM

## 2024-06-06 PROCEDURE — 99999 PR PBB SHADOW E&M-EST. PATIENT-LVL IV: CPT | Mod: PBBFAC,,, | Performed by: STUDENT IN AN ORGANIZED HEALTH CARE EDUCATION/TRAINING PROGRAM

## 2024-06-06 PROCEDURE — 36415 COLL VENOUS BLD VENIPUNCTURE: CPT | Performed by: STUDENT IN AN ORGANIZED HEALTH CARE EDUCATION/TRAINING PROGRAM

## 2024-06-06 PROCEDURE — 3075F SYST BP GE 130 - 139MM HG: CPT | Mod: CPTII,S$GLB,, | Performed by: STUDENT IN AN ORGANIZED HEALTH CARE EDUCATION/TRAINING PROGRAM

## 2024-06-06 PROCEDURE — 1159F MED LIST DOCD IN RCRD: CPT | Mod: CPTII,S$GLB,, | Performed by: STUDENT IN AN ORGANIZED HEALTH CARE EDUCATION/TRAINING PROGRAM

## 2024-06-06 PROCEDURE — G0108 DIAB MANAGE TRN  PER INDIV: HCPCS | Mod: S$GLB,,, | Performed by: DIETITIAN, REGISTERED

## 2024-06-06 PROCEDURE — 80069 RENAL FUNCTION PANEL: CPT | Performed by: STUDENT IN AN ORGANIZED HEALTH CARE EDUCATION/TRAINING PROGRAM

## 2024-06-06 PROCEDURE — 3078F DIAST BP <80 MM HG: CPT | Mod: CPTII,S$GLB,, | Performed by: STUDENT IN AN ORGANIZED HEALTH CARE EDUCATION/TRAINING PROGRAM

## 2024-06-06 PROCEDURE — G2211 COMPLEX E/M VISIT ADD ON: HCPCS | Mod: S$GLB,,, | Performed by: STUDENT IN AN ORGANIZED HEALTH CARE EDUCATION/TRAINING PROGRAM

## 2024-06-06 PROCEDURE — 84300 ASSAY OF URINE SODIUM: CPT | Performed by: STUDENT IN AN ORGANIZED HEALTH CARE EDUCATION/TRAINING PROGRAM

## 2024-06-06 PROCEDURE — 3061F NEG MICROALBUMINURIA REV: CPT | Mod: CPTII,S$GLB,, | Performed by: STUDENT IN AN ORGANIZED HEALTH CARE EDUCATION/TRAINING PROGRAM

## 2024-06-06 PROCEDURE — 3066F NEPHROPATHY DOC TX: CPT | Mod: CPTII,S$GLB,, | Performed by: STUDENT IN AN ORGANIZED HEALTH CARE EDUCATION/TRAINING PROGRAM

## 2024-06-06 PROCEDURE — 3008F BODY MASS INDEX DOCD: CPT | Mod: CPTII,S$GLB,, | Performed by: STUDENT IN AN ORGANIZED HEALTH CARE EDUCATION/TRAINING PROGRAM

## 2024-06-06 PROCEDURE — 3051F HG A1C>EQUAL 7.0%<8.0%: CPT | Mod: CPTII,S$GLB,, | Performed by: STUDENT IN AN ORGANIZED HEALTH CARE EDUCATION/TRAINING PROGRAM

## 2024-06-06 PROCEDURE — 81003 URINALYSIS AUTO W/O SCOPE: CPT | Performed by: STUDENT IN AN ORGANIZED HEALTH CARE EDUCATION/TRAINING PROGRAM

## 2024-06-06 PROCEDURE — 84156 ASSAY OF PROTEIN URINE: CPT | Performed by: STUDENT IN AN ORGANIZED HEALTH CARE EDUCATION/TRAINING PROGRAM

## 2024-06-06 PROCEDURE — 99214 OFFICE O/P EST MOD 30 MIN: CPT | Mod: S$GLB,,, | Performed by: STUDENT IN AN ORGANIZED HEALTH CARE EDUCATION/TRAINING PROGRAM

## 2024-06-06 NOTE — PROGRESS NOTES
"Subjective:       Patient ID: Starla Wharton is a 59 y.o. female.    Chief Complaint: Health maintenance examination [Z00.00]    Patient is established with me, here today for the following:     T2DM on insulin, peripheral neuropathy, retinopathy, gastroparesis, CKD3, HLD, HTN, GERD, vitamin D deficiency, sickle cell trait      Health maintenance -   Cologuard negative SEP2021.   Denies family history of colorectal cancer.  Mammogram BI-RADS 1 in AUG2023. Due for repeat.  History of prior abnormal mammogram.  Biopsy showed benign fatty tissue.  Family history of breast cancer.  Denies family history of ovarian cancer.  Hysterectomy due to fibroids.   Denies history of prior abnormal pap smears.  Denies family history of osteoporosis.  Family history of cardiac disease.  UTD on Tdap, COVID primary/booster, shingles, influenza vaccinations.  Due for COVID vaccinations.  Never smoker.  Denies drug use.  Completed HIV and hepatitis C screening.     CKD -  Trying to increase hydration   Lab Results       Component                Value               Date                       CREATININE               1.7 (H)             05/24/2024                 CREATININE               1.3                 02/02/2024                 CREATININE               1.4                 10/11/2023            Lab Results       Component                Value               Date                       NA                       138                 05/24/2024                 K                        4.1                 05/24/2024                 CO2                      23                  05/24/2024               Anemia -  Noted with elevated FLC NOV2023  E-consulted hematology  "Contingency: Refer to Hematology clinic in case of Hb < 10 g/dl in which case she may need a bone marrow biopsy. "  Quantitative Hgb A2 2.4%  Lab Results       Component                Value               Date                       HGB                      10.6 (L)            " 05/24/2024                 HCT                      34.9 (L)            05/24/2024                 MCV                      83                  05/24/2024                 RDW                      15.3 (H)            05/24/2024            Lab Results       Component                Value               Date                       IRON                     46                  02/02/2024                 TRANSFERRIN              209                 02/02/2024                 TIBC                     309                 02/02/2024                 FESATURATED              15 (L)              02/02/2024             Lab Results       Component                Value               Date                       FERRITIN                 132                 02/02/2024            Lab Results       Component                Value               Date                       [id number]               664                 10/11/2023            Lab Results       Component                Value               Date                       FOLATE                   9.5                 10/11/2023               T2DM -   Currently taking:   - Toujeo 50 qAM and 50 units qHS  - Has not been requiring Novolog for sliding scale   - Mounjaro 15 mg weekly  Following with ROSARIO Ramirez for endocrinology  Following with Chantal for diabetes management  Taking ASA 81 mg daily  Using Dexcom CGM for glucose monitoring  UTD on foot exam.  UTD on eye exam.  Following with Dr. Ramsay routinely for ophthalmology  Lab Results       Component                Value               Date                       HGBA1C                   7.0 (H)             05/24/2024                 HGBA1C                   7.8 (H)             02/16/2024                 HGBA1C                   7.9 (H)             02/02/2024            Lab Results       Component                Value               Date                       MICALBCREAT              12.2                05/24/2024                      Review of Systems   Constitutional:  Negative for activity change, fatigue and fever.   Respiratory:  Negative for cough and shortness of breath.    Cardiovascular:  Negative for chest pain and palpitations.   Gastrointestinal:  Negative for abdominal pain, constipation, diarrhea, nausea and vomiting.   Neurological:  Negative for syncope and headaches.         Current Outpatient Medications   Medication Instructions    aspirin 81 mg, Daily    atorvastatin (LIPITOR) 40 mg, Oral, Daily    azelastine (ASTELIN) 137 mcg (0.1 %) nasal spray spray 2 sprays (274 mcg total) by Nasal route 2 (two) times daily as needed (allergies, runny nose).    blood-glucose meter,continuous (DEXCOM G6 ) Misc 1 Device, Misc.(Non-Drug; Combo Route), Once    blood-glucose sensor (DEXCOM G6 SENSOR) Kenzie Use daily for continuous glucose monitoring, change sensor every 10 days.    blood-glucose transmitter (DEXCOM G6 TRANSMITTER) Kenzie Use with Dexcom sensor, change every 90 days.    bromfenac (PROLENSA) 0.07 % Drop Apply one drop daily to both eyes as directed    bromfenac (PROLENSA) 0.07 % Drop Place 1 drop into both eyes daily    ferrous sulfate (FEOSOL) 325 mg, Oral, Every Mon, Wed, Fri    gabapentin (NEURONTIN) 600 mg, Oral, 3 times daily    hydroCHLOROthiazide (HYDRODIURIL) 25 mg, Oral, Daily    insulin glargine, TOUJEO, (TOUJEO SOLOSTAR U-300 INSULIN) 300 unit/mL (1.5 mL) InPn pen Inject 57 units twice a day , max daily 114 units    insulin lispro (HUMALOG KWIKPEN INSULIN) 100 unit/mL pen Inject 10 units under the skin before meals plus scale 150-200+2, 201-250+4, 251-300+6, 301-350+8, >350+10. Max daily 65 units    levocetirizine (XYZAL) 5 mg, Oral, Nightly PRN    lisinopriL (PRINIVIL,ZESTRIL) 20 mg, Oral, Daily    methocarbamoL (ROBAXIN) 500 mg, Oral, 4 times daily    MOUNJARO 10 mg, Subcutaneous, Every 7 days    MOUNJARO 15 mg, Subcutaneous, Every 7 days    nystatin (MYCOSTATIN) powder Topical (Top), 2 times daily PRN  "   omeprazole (PRILOSEC) 40 mg, Oral, Daily    pen needle, diabetic (BD ULTRA-FINE MAR PEN NEEDLE) 32 gauge x 5/32" Ndle Use to inject insulin 5 times daily    triamcinolone acetonide 0.1% (KENALOG) 0.1 % cream Topical (Top), 2 times daily PRN     Objective:      Vitals:    06/06/24 1035   BP: 136/70   Pulse: 86   SpO2: 98%   Weight: 110.4 kg (243 lb 6.2 oz)   PainSc: 0-No pain     Body mass index is 40.5 kg/m².    Physical Exam  Vitals reviewed.   Constitutional:       General: She is not in acute distress.     Appearance: She is not ill-appearing or diaphoretic.   Cardiovascular:      Rate and Rhythm: Normal rate and regular rhythm.      Pulses:           Dorsalis pedis pulses are 2+ on the right side and 2+ on the left side.        Posterior tibial pulses are 2+ on the right side and 2+ on the left side.      Heart sounds: Normal heart sounds. No murmur heard.     No friction rub. No gallop.   Pulmonary:      Effort: Pulmonary effort is normal. No respiratory distress.      Breath sounds: Normal breath sounds. No stridor. No wheezing, rhonchi or rales.   Feet:      Right foot:      Protective Sensation: 10 sites tested.  10 sites sensed.      Skin integrity: Skin integrity normal.      Toenail Condition: Right toenails are normal.      Left foot:      Protective Sensation: 10 sites tested.  10 sites sensed.      Skin integrity: Skin integrity normal.      Toenail Condition: Left toenails are normal.   Skin:     General: Skin is warm and dry.      Comments: Color WNL   Neurological:      Mental Status: She is alert. Mental status is at baseline.   Psychiatric:         Mood and Affect: Mood normal.         Behavior: Behavior normal.         Assessment:       1. Health maintenance examination    2. Stage 3 chronic kidney disease, unspecified whether stage 3a or 3b CKD    3. Anemia, unspecified type    4. Type 2 diabetes mellitus with retinopathy, with long-term current use of insulin, macular edema presence " unspecified, unspecified laterality, unspecified retinopathy severity    5. Screening mammogram for breast cancer        Plan:       Stage 3 chronic kidney disease, unspecified whether stage 3a or 3b CKD  Avoid nephrotoxic medications  Continue hydration   RTC in 6 months for follow up.  -     RENAL FUNCTION PANEL; Future  -     Urinalysis, Reflex to Urine Culture Urine, Clean Catch; Future  -     Sodium, urine, random; Future  -     Protein/Creatinine Ratio, Urine; Future    Anemia, unspecified type  RTC in 6 months for follow up.    Type 2 diabetes mellitus with retinopathy, with long-term current use of insulin, macular edema presence unspecified, unspecified laterality, unspecified retinopathy severity  Continue current medications  Start Jardiance daily  RTC in 6 months for follow up.  -     RENAL FUNCTION PANEL; Future  -     empagliflozin (JARDIANCE) 10 mg tablet; Take 1 tablet (10 mg total) by mouth once daily.    Health maintenance examination  Reviewed and discussed age appropriate screenings and immunizations.    Screening mammogram for breast cancer  -     Mammo Digital Screening Bilat w/ Prasanna; Future      Meghan Perez MD  6/6/2024

## 2024-06-06 NOTE — PATIENT INSTRUCTIONS
If fasting AM blood sugar is <120 for two consecutive mornings, then decrease AM and PM insulin dose by 2 units.     Decrease Toujeo to 44 units in the morning and 44 units at night.   If kidney function looks ok, will send Jardiance 10 mg to start taking.  Continue Mounjaro once weekly

## 2024-06-07 NOTE — PROGRESS NOTES
Diabetes Care Specialist Progress Note  Author: Chantal Shrestha RD, CDE  Date: 6/7/2024    Program Intake  Reason for Diabetes Program Visit:: Intervention  Type of Intervention:: Individual  Individual: Education  Education: Self-Management Skill Review  Current diabetes risk level:: moderate  In the last 12 months, have you:: been admitted to a hospital  Was the ER or hospital admission related to diabetes?: No  Permission to speak with others about care:: no  Continuous Glucose Monitoring  Patient has CGM: Yes  Personal CGM type:: Dexcom G6    Lab Results   Component Value Date    HGBA1C 7.0 (H) 05/24/2024       Clinical    Current DM meds:  Toujeo 40 units BID  Mounjaro 15mg weekly  Humalog 10 units + scale AC  150-200+2, 201-250+4, 251-300+6, 301-350+8, >350+10     Today's interventions were provided through individual discussion, instruction, and written materials were provided.      Patient verbalized understanding of instruction and written materials.  Pt was able to return back demonstration of instructions today. Patient understood key points, needs reinforcement and further instruction.     Diabetes Self-Management Care Plan:    Today's Diabetes Self-Management Care Plan was developed with Starla SHANKAR's input. Starla SHANKAR has agreed to work toward the following goal(s) to improve his/her overall diabetes control.      Care Plan: Diabetes Management   Updates made since 5/8/2024 12:00 AM        Problem: Medications         Goal: Will keep Humalog at bedside (where she typically eats dinner). Completed 6/6/2024   Start Date: 12/20/2023   Expected End Date: 1/19/2024   Recent Progress: Met   Priority: High   Barriers: No Barriers Identified   Note:    12/20/23 - She is sometimes missing Humalog with dinner. Eats at home around 7-8pm but does not feel like getting up out of bed to go get Humalog. Plans to change where she stores Humalog and keep at bedside. Reinforced importance of taking Humalog consistently  "with meals.     3/21/24 - Has been more consistent with taking Humalog with dinner. Reviewed Dexcom report with pt and applauded improvement 73% TIR (compared to 52% in December), avg glucose 160 (down from 180), and GMI 7.1%. Also continuing to lose wt with Mounjaro 15mg. Has had challenges with supply chain issues but has a friend at [facility] that has helped her with this. Pt expressed feeling pleased with results and has more energy.        Problem: Healthy Eating         Goal: Will choose more 0-5g CHO snacks rather than candy or chips.    Start Date: 12/20/2023   Expected End Date: 1/19/2024   This Visit's Progress: On track   Recent Progress: On track   Priority: Medium   Barriers: No Barriers Identified   Note:    12/20/23 - Has been gradually working on cutting out hot tamale candy - in the past would buy 5 boxes per month and snack on a few whenever she passed by them. Has kept from buying them the past couple of months and is down to 1 1/2 boxes. Went on a [employer] chip kick a little while back but has been avoiding those lately too. Encouraged continued efforts. Looking for healthier snack options. Discussed some 0-5g CHO snacks - she likes lightly salted cashews or peanuts and will try making that swap.     3/21/24 - Mounjaro working well for reducing cravings. She will occasionally get something fried or a snack food but will be done with it after a couple of bites. Eating soup lately - sometimes only having a bowl of broccoli cheddar soup all day. She is noticing "flabby" stomach/loss of muscle. Encouraged getting adequate protein to avoid losing significant muscle with GLP with incorporating lean protein (fish, chicken, eggs, etc). She likes to drink a Boost Glucose control shake most days for breakfast (not a breakfast person) but getting tired of the flavors. Encouraged lower carb shake with 30g protein or more and reviewed some options.      6/6/24 - Continues to eat less overall - has no appetite " "with mounjaro. Continues with glucerna or boost in morning, snack mid day and balanced meal for dinner. Still eats hot tamale candy sometimes but has reduced how much and how often. Limits fried/greasy/high fat foods - sister wants her to start going out to eat with her once a week again - used to go to [employer] and get fried foods. Reviewed high fat or fried foods will worsen GI s/s with taking mounjaro. Encouraged choosing baked, grilled, roasted, steamed menu choices instead of fried, eat slowly, stop when feeling full, and incorporate protein and vegetables at meals.       Task: Reviewed the sources and role of Carbohydrate, Protein, and Fat and how each nutrient impacts blood sugar. Completed 6/7/2024        Task: Discussed strategies for choosing healthier menu options when dining out. Completed 6/7/2024        Task: Review the importance of balancing carbohydrates with each meal using portion control techniques to count servings of carbohydrate and label reading to identify serving size and amount of total carbs per serving. Completed 6/7/2024        Problem: Physical Activity and Exercise         Goal: Patient agrees to increase physical activity to a goal of 3 times per week for 30 minutes.    Start Date: 3/21/2024   Expected End Date: 4/20/2024   This Visit's Progress: No change   Priority: High   Barriers: No Barriers Identified   Note:    3/21/24 - Pt noticing "flabby" stomach with wt loss. Discussed need for activity to help with maintaining muscle while supporting wt loss efforts and glucose control. Wants to work with a  to get specific, intentional exercises and interested in [place]. Will check into which gyms are covered under her [employer] plan.     6/6/24 - She is disappointed in regaining some weight compared to last clinic visit. Dr. Perez has reduced long-acting insulin dose today and pt is on 15mg mounjaro dose, both of which can help with wt loss. Encouraged adding in " activity to aid in wt goals as well.          Follow Up Plan     Follow up in about 3 months (around 9/6/2024) for check in/follow-up.    Today's care plan and follow up schedule was discussed with patient.  Starla SHANKAR verbalized understanding of the care plan, goals, and agrees to follow up plan.        The patient was encouraged to communicate with his/her health care provider/physician and care team regarding his/her condition(s) and treatment.  I provided the patient with my contact information today and encouraged to contact me via phone or [facility] Patient Portal as needed.     Length of Visit   Total Time: 45 Minutes

## 2024-06-25 ENCOUNTER — LAB VISIT (OUTPATIENT)
Dept: LAB | Facility: OTHER | Age: 59
End: 2024-06-25
Attending: STUDENT IN AN ORGANIZED HEALTH CARE EDUCATION/TRAINING PROGRAM
Payer: MEDICARE

## 2024-06-25 DIAGNOSIS — E11.8 TYPE 2 DIABETES MELLITUS WITH COMPLICATION, WITH LONG-TERM CURRENT USE OF INSULIN: ICD-10-CM

## 2024-06-25 DIAGNOSIS — E78.2 MIXED HYPERLIPIDEMIA: ICD-10-CM

## 2024-06-25 DIAGNOSIS — Z79.4 TYPE 2 DIABETES MELLITUS WITH COMPLICATION, WITH LONG-TERM CURRENT USE OF INSULIN: ICD-10-CM

## 2024-06-25 LAB
CHOLEST SERPL-MCNC: 127 MG/DL (ref 120–199)
CHOLEST/HDLC SERPL: 3 {RATIO} (ref 2–5)
ESTIMATED AVG GLUCOSE: 140 MG/DL (ref 68–131)
HBA1C MFR BLD: 6.5 % (ref 4–5.6)
HDLC SERPL-MCNC: 43 MG/DL (ref 40–75)
HDLC SERPL: 33.9 % (ref 20–50)
LDLC SERPL CALC-MCNC: 70 MG/DL (ref 63–159)
NONHDLC SERPL-MCNC: 84 MG/DL
TRIGL SERPL-MCNC: 70 MG/DL (ref 30–150)

## 2024-06-25 PROCEDURE — 83036 HEMOGLOBIN GLYCOSYLATED A1C: CPT | Performed by: NURSE PRACTITIONER

## 2024-06-25 PROCEDURE — 80061 LIPID PANEL: CPT | Performed by: NURSE PRACTITIONER

## 2024-06-25 PROCEDURE — 36415 COLL VENOUS BLD VENIPUNCTURE: CPT | Performed by: NURSE PRACTITIONER

## 2024-06-26 ENCOUNTER — PATIENT MESSAGE (OUTPATIENT)
Dept: INTERNAL MEDICINE | Facility: CLINIC | Age: 59
End: 2024-06-26
Payer: MEDICARE

## 2024-06-30 PROBLEM — E55.9 VITAMIN D DEFICIENCY: Status: RESOLVED | Noted: 2022-07-20 | Resolved: 2024-06-30

## 2024-06-30 NOTE — PROGRESS NOTES
CHIEF COMPLAINT: Type 2 Diabetes     HPI: Ms. Starla Wharton is a 59 y.o. female who was diagnosed with Type 2 DM > 20 years ago.  On insulin x 15 years ago.     Last seen by me spring 2024.   Being seen by me again today.   +PN, nephropathy , gastroparesis  F/u with podiatry, ortho    Avoiding red meats-worsen gastroparesis.  Seen in the past by Dr. Molina 3/2023.   H/o BG 39 mg/dl   Dealing with incontinence, stopped metformin.    A1c trends: 12.4% to 8.2% to 7.6% to 7.9% , 7.8% to 7% to 6.5%  Less insulin needs.  Lab Results   Component Value Date    HGBA1C 6.5 (H) 06/25/2024       Avg 144 mg/dl  Sd 37 mg/dl  Gmi 6.8%  TIR 84%     Having acute midline- radiating to left at times 4-5/10, xray done, pending f/u with back and spine specialist.   Did not take prednisone, not using flexeril.  Dealing with some constipation per ozempic   Changed to mounjaro in the past 6 months, tolerating well.   Stopped iron tablets- r/t constipation.    Had a fall in the past week, went down on knees.     Seen by GI in recent year.   Feels better w/ stopping metformin in the past year.     \Dietary habits:  Banana, boost control (glyc)   Eggs grits occ   Soups- broccoli /cheddar cheese or corn chowder  Varies     Has clarity with Chantal Shrestha, see media.   Works consistently w/ DE.     PREVIOUS DIABETES MEDICATIONS TRIED  Levemir  Humalog  Metformin   Ozempic   Mounjaro     CURRENT DIABETIC MEDS: toujeo 32  units bid, humalog correction scale 180-230 +2, etc. ,  mounjaro 15 mg weekly, jardiance 10 mg daily     On MDI (injections 5 x a day)   Makes frequent changes to his/her insulin regimen on the basis of blood glucose data  Testing 4 x a day  Patient is willing and able to use the device  Demonstrated an understanding of the technology and is motivated to use CGM  Patient expected to adhere to a comprehensive diabetes treatment plan and patient has adequate medical supervision  Has multiple impaired awareness of hypoglycemia  "(hypoglycemia unawareness)    Diabetes Management Status    Statin: Taking  ACE/ARB: Taking    Screening or Prevention Patient's value Goal Complete/Controlled?   HgA1C Testing and Control   Lab Results   Component Value Date    HGBA1C 6.5 (H) 06/25/2024      Annually/Less than 8% No   Lipid profile : 06/25/2024 Annually Yes   LDL control Lab Results   Component Value Date    LDLCALC 70.0 06/25/2024    Annually/Less than 100 mg/dl  Yes   Nephropathy screening Lab Results   Component Value Date    LABMICR 36.0 05/24/2024     Lab Results   Component Value Date    PROTEINUA Negative 06/06/2024    Annually Yes   Blood pressure BP Readings from Last 1 Encounters:   06/06/24 136/70    Less than 140/90 Yes   Dilated retinal exam : 04/17/2024 Annually Yes   Foot exam   : 06/06/2024 Annually No     REVIEW OF SYSTEMS  General: no weakness, fatigue, + weight changes 4# gain 6/2023  Eyes: no double or blurred vision, eye pain, or redness  Cardiovascular: no chest pain, palpitations, edema, or murmurs.   Respiratory: no cough or dyspnea.   GI: no heartburn, nausea, or changes in bowel patterns; good appetite.   Skin: no rashes, dryness, itching, or reactions at insulin injection sites.  Neuro: + numbness, tingling, tremors, or vertigo.   Endocrine: no polyuria, polydipsia, polyphagia, heat or cold intolerance.     Vital Signs  /68 (BP Location: Left arm, Patient Position: Sitting, BP Method: Medium (Manual))   Pulse 84   Ht 5' 5" (1.651 m)   Wt 109.2 kg (240 lb 11.9 oz)   SpO2 96%   BMI 40.06 kg/m²     Hemoglobin A1C   Date Value Ref Range Status   06/25/2024 6.5 (H) 4.0 - 5.6 % Final     Comment:     ADA Screening Guidelines:  5.7-6.4%  Consistent with prediabetes  >or=6.5%  Consistent with diabetes    High levels of fetal hemoglobin interfere with the HbA1C  assay. Heterozygous hemoglobin variants (HbS, HgC, etc)do  not significantly interfere with this assay.   However, presence of multiple variants may affect " accuracy.     05/24/2024 7.0 (H) 4.0 - 5.6 % Final     Comment:     ADA Screening Guidelines:  5.7-6.4%  Consistent with prediabetes  >or=6.5%  Consistent with diabetes    High levels of fetal hemoglobin interfere with the HbA1C  assay. Heterozygous hemoglobin variants (HbS, HgC, etc)do  not significantly interfere with this assay.   However, presence of multiple variants may affect accuracy.     02/16/2024 7.8 (H) 4.0 - 5.6 % Final     Comment:     ADA Screening Guidelines:  5.7-6.4%  Consistent with prediabetes  >or=6.5%  Consistent with diabetes    High levels of fetal hemoglobin interfere with the HbA1C  assay. Heterozygous hemoglobin variants (HbS, HgC, etc)do  not significantly interfere with this assay.   However, presence of multiple variants may affect accuracy.          Chemistry        Component Value Date/Time     06/06/2024 1220    K 4.6 06/06/2024 1220     06/06/2024 1220    CO2 22 (L) 06/06/2024 1220    BUN 41 (H) 06/06/2024 1220    CREATININE 1.5 (H) 06/06/2024 1220    GLU 92 06/06/2024 1220        Component Value Date/Time    CALCIUM 9.2 06/06/2024 1220    ALKPHOS 105 05/24/2024 0932    AST 20 05/24/2024 0932    ALT 20 05/24/2024 0932    BILITOT 0.3 05/24/2024 0932    ESTGFRAFRICA 58 (A) 07/22/2022 1123    EGFRNONAA 50 (A) 07/22/2022 1123           Lab Results   Component Value Date    TSH 2.000 05/24/2024      Lab Results   Component Value Date    CHOL 127 06/25/2024    CHOL 123 05/24/2024    CHOL 132 06/26/2023     Lab Results   Component Value Date    HDL 43 06/25/2024    HDL 42 05/24/2024    HDL 41 06/26/2023     Lab Results   Component Value Date    LDLCALC 70.0 06/25/2024    LDLCALC 66.6 05/24/2024    LDLCALC 79.4 06/26/2023     Lab Results   Component Value Date    TRIG 70 06/25/2024    TRIG 72 05/24/2024    TRIG 58 06/26/2023     Lab Results   Component Value Date    CHOLHDL 33.9 06/25/2024    CHOLHDL 34.1 05/24/2024    CHOLHDL 31.1 06/26/2023         PHYSICAL  EXAMINATION  Constitutional: Appears well, no distress. Reviewed vitals above.  Eyes: conjunctivae & lids intact; PERRLA, EOMs intact; optic discs   Neck: Supple, trachea midline.   Respiratory: CTA without wheezes, even and unlabored  Cardiovascular: RRR; no edema   Lymph: deferred   Skin: warm and dry; no injection site reactions, no acanthosis nigracans observed.  Neuro:patient alert and cooperative, normal affect; steady gait.  Psychiatric: judgement & insight intact, orientation of time, place & person intact, memory; mood & affect wnl     Diabetes Foot Exam:   Deferred -Dr. Blanca, [facility] [place]  6/14/23    Assessment/Plan    1. Type 2 diabetes mellitus with both eyes affected by retinopathy without macular edema, with long-term current use of insulin, unspecified retinopathy severity  Hemoglobin A1C      2. Class 2 severe obesity with serious comorbidity and body mass index (BMI) of 39.0 to 39.9 in adult, unspecified obesity type        3. Diabetic polyneuropathy associated with type 2 diabetes mellitus        4. Gastroparesis        5. Stage 3a chronic kidney disease        6. Primary hypertension        7. Mixed hyperlipidemia        8. Encounter for screening mammogram for malignant neoplasm of breast  Mammo Digital Screening Bilat        Follow up in 4 months w/ me , A1c goal less than 7% , at goal, decrease toujeo 28 units bid, continue mounjaro and jardiance, humalog correction 180-230+2 , etc.  Body mass index is 40.06 kg/m².  F/u with podiatry  Doing well with mounjaro   Continue to monitor  Bp controlled, on acei   On statin  Mammogram 2024   FOLLOW UP  In 4 months

## 2024-07-01 ENCOUNTER — OFFICE VISIT (OUTPATIENT)
Dept: INTERNAL MEDICINE | Facility: CLINIC | Age: 59
End: 2024-07-01
Payer: MEDICARE

## 2024-07-01 VITALS
HEART RATE: 84 BPM | SYSTOLIC BLOOD PRESSURE: 132 MMHG | BODY MASS INDEX: 40.11 KG/M2 | DIASTOLIC BLOOD PRESSURE: 68 MMHG | WEIGHT: 240.75 LBS | OXYGEN SATURATION: 96 % | HEIGHT: 65 IN

## 2024-07-01 DIAGNOSIS — N18.31 STAGE 3A CHRONIC KIDNEY DISEASE: ICD-10-CM

## 2024-07-01 DIAGNOSIS — I10 PRIMARY HYPERTENSION: ICD-10-CM

## 2024-07-01 DIAGNOSIS — K31.84 GASTROPARESIS: ICD-10-CM

## 2024-07-01 DIAGNOSIS — E11.65 TYPE 2 DIABETES MELLITUS WITH HYPERGLYCEMIA, WITH LONG-TERM CURRENT USE OF INSULIN: ICD-10-CM

## 2024-07-01 DIAGNOSIS — E11.42 DIABETIC POLYNEUROPATHY ASSOCIATED WITH TYPE 2 DIABETES MELLITUS: ICD-10-CM

## 2024-07-01 DIAGNOSIS — Z79.4 TYPE 2 DIABETES MELLITUS WITH HYPERGLYCEMIA, WITH LONG-TERM CURRENT USE OF INSULIN: ICD-10-CM

## 2024-07-01 DIAGNOSIS — E11.319 TYPE 2 DIABETES MELLITUS WITH BOTH EYES AFFECTED BY RETINOPATHY WITHOUT MACULAR EDEMA, WITH LONG-TERM CURRENT USE OF INSULIN, UNSPECIFIED RETINOPATHY SEVERITY: Primary | ICD-10-CM

## 2024-07-01 DIAGNOSIS — Z79.4 TYPE 2 DIABETES MELLITUS WITH BOTH EYES AFFECTED BY RETINOPATHY WITHOUT MACULAR EDEMA, WITH LONG-TERM CURRENT USE OF INSULIN, UNSPECIFIED RETINOPATHY SEVERITY: Primary | ICD-10-CM

## 2024-07-01 DIAGNOSIS — Z12.31 ENCOUNTER FOR SCREENING MAMMOGRAM FOR MALIGNANT NEOPLASM OF BREAST: ICD-10-CM

## 2024-07-01 DIAGNOSIS — E66.01 CLASS 2 SEVERE OBESITY WITH SERIOUS COMORBIDITY AND BODY MASS INDEX (BMI) OF 39.0 TO 39.9 IN ADULT, UNSPECIFIED OBESITY TYPE: ICD-10-CM

## 2024-07-01 DIAGNOSIS — E78.2 MIXED HYPERLIPIDEMIA: ICD-10-CM

## 2024-07-01 PROCEDURE — 1160F RVW MEDS BY RX/DR IN RCRD: CPT | Mod: CPTII,S$GLB,, | Performed by: NURSE PRACTITIONER

## 2024-07-01 PROCEDURE — 3044F HG A1C LEVEL LT 7.0%: CPT | Mod: CPTII,S$GLB,, | Performed by: NURSE PRACTITIONER

## 2024-07-01 PROCEDURE — 3061F NEG MICROALBUMINURIA REV: CPT | Mod: CPTII,S$GLB,, | Performed by: NURSE PRACTITIONER

## 2024-07-01 PROCEDURE — 99999 PR PBB SHADOW E&M-EST. PATIENT-LVL IV: CPT | Mod: PBBFAC,,, | Performed by: NURSE PRACTITIONER

## 2024-07-01 PROCEDURE — 4010F ACE/ARB THERAPY RXD/TAKEN: CPT | Mod: CPTII,S$GLB,, | Performed by: NURSE PRACTITIONER

## 2024-07-01 PROCEDURE — 3075F SYST BP GE 130 - 139MM HG: CPT | Mod: CPTII,S$GLB,, | Performed by: NURSE PRACTITIONER

## 2024-07-01 PROCEDURE — 3066F NEPHROPATHY DOC TX: CPT | Mod: CPTII,S$GLB,, | Performed by: NURSE PRACTITIONER

## 2024-07-01 PROCEDURE — 1159F MED LIST DOCD IN RCRD: CPT | Mod: CPTII,S$GLB,, | Performed by: NURSE PRACTITIONER

## 2024-07-01 PROCEDURE — 95251 CONT GLUC MNTR ANALYSIS I&R: CPT | Mod: S$GLB,,, | Performed by: NURSE PRACTITIONER

## 2024-07-01 PROCEDURE — 99214 OFFICE O/P EST MOD 30 MIN: CPT | Mod: S$GLB,,, | Performed by: NURSE PRACTITIONER

## 2024-07-01 PROCEDURE — 3008F BODY MASS INDEX DOCD: CPT | Mod: CPTII,S$GLB,, | Performed by: NURSE PRACTITIONER

## 2024-07-01 PROCEDURE — 3078F DIAST BP <80 MM HG: CPT | Mod: CPTII,S$GLB,, | Performed by: NURSE PRACTITIONER

## 2024-07-01 RX ORDER — INSULIN GLARGINE 300 [IU]/ML
INJECTION, SOLUTION SUBCUTANEOUS
Start: 2024-07-01

## 2024-07-01 RX ORDER — INSULIN LISPRO 100 [IU]/ML
INJECTION, SOLUTION INTRAVENOUS; SUBCUTANEOUS
Start: 2024-07-01

## 2024-07-01 NOTE — PATIENT INSTRUCTIONS
Follow up in 4 months w/Irielle   A1c prior -4 months     Lab Results   Component Value Date    HGBA1C 6.5 (H) 06/25/2024     Goal less than 7%    [email or web address]  Eat fit imelda  My[employer]arknesspal imelda  [email or web address]    mySugr imelda     Goal  no higher than 180 mg/dl    Toujeo 28 units twice a day   Humalog correction scale 180-230+2, etc.  Mounjaro 15 mg weekly  Jardiance 10 mg daily

## 2024-07-03 ENCOUNTER — OFFICE VISIT (OUTPATIENT)
Dept: DERMATOLOGY | Facility: CLINIC | Age: 59
End: 2024-07-03
Payer: MEDICARE

## 2024-07-03 DIAGNOSIS — L70.0 ACNE VULGARIS: ICD-10-CM

## 2024-07-03 DIAGNOSIS — L20.9 ATOPIC DERMATITIS, UNSPECIFIED TYPE: Primary | ICD-10-CM

## 2024-07-03 PROCEDURE — 3044F HG A1C LEVEL LT 7.0%: CPT | Mod: CPTII,S$GLB,, | Performed by: STUDENT IN AN ORGANIZED HEALTH CARE EDUCATION/TRAINING PROGRAM

## 2024-07-03 PROCEDURE — 4010F ACE/ARB THERAPY RXD/TAKEN: CPT | Mod: CPTII,S$GLB,, | Performed by: STUDENT IN AN ORGANIZED HEALTH CARE EDUCATION/TRAINING PROGRAM

## 2024-07-03 PROCEDURE — 99999 PR PBB SHADOW E&M-EST. PATIENT-LVL II: CPT | Mod: PBBFAC,,, | Performed by: STUDENT IN AN ORGANIZED HEALTH CARE EDUCATION/TRAINING PROGRAM

## 2024-07-03 PROCEDURE — 99204 OFFICE O/P NEW MOD 45 MIN: CPT | Mod: S$GLB,,, | Performed by: STUDENT IN AN ORGANIZED HEALTH CARE EDUCATION/TRAINING PROGRAM

## 2024-07-03 PROCEDURE — 1160F RVW MEDS BY RX/DR IN RCRD: CPT | Mod: CPTII,S$GLB,, | Performed by: STUDENT IN AN ORGANIZED HEALTH CARE EDUCATION/TRAINING PROGRAM

## 2024-07-03 PROCEDURE — 3061F NEG MICROALBUMINURIA REV: CPT | Mod: CPTII,S$GLB,, | Performed by: STUDENT IN AN ORGANIZED HEALTH CARE EDUCATION/TRAINING PROGRAM

## 2024-07-03 PROCEDURE — 3066F NEPHROPATHY DOC TX: CPT | Mod: CPTII,S$GLB,, | Performed by: STUDENT IN AN ORGANIZED HEALTH CARE EDUCATION/TRAINING PROGRAM

## 2024-07-03 PROCEDURE — 1159F MED LIST DOCD IN RCRD: CPT | Mod: CPTII,S$GLB,, | Performed by: STUDENT IN AN ORGANIZED HEALTH CARE EDUCATION/TRAINING PROGRAM

## 2024-07-03 RX ORDER — TACROLIMUS 1 MG/G
OINTMENT TOPICAL 2 TIMES DAILY
Qty: 60 G | Refills: 3 | Status: SHIPPED | OUTPATIENT
Start: 2024-07-03

## 2024-07-03 RX ORDER — TRETINOIN 0.5 MG/G
CREAM TOPICAL NIGHTLY
Qty: 20 G | Refills: 3 | Status: SHIPPED | OUTPATIENT
Start: 2024-07-03

## 2024-07-03 NOTE — PATIENT INSTRUCTIONS
XEROSIS (DRY SKIN)        Definition    Xerosis is the term for dry skin.  We all have a natural oil coating over our skin produced by the skin oil glands.  If this oil is removed, the skin becomes dry which can lead to cracking, which can lead to inflammation.  Xerosis is usually a long-term problem that recurs often, especially in the winter.    Cause    Long hot baths or showers can remove our natural oil and lead to xerosis.  One should never take more than one bath or shower a day and for no longer than ten minutes.  Use of harsh soaps such as Zest, Dial, and Ivory can worsen and cause xerosis.  Cold winter weather worsens xerosis because the amount of moisture contained in cold air is much less than the amount of moisture in warm air.    Treatment    Treatment is intended to restore the natural oil to your skin.  Keep the skin lubricated.    Do not take more than one bath or shower a day.  Use lukewarm water, not hot.  Hot water dries out the skin.    Use a gentle moisturizing soap such as Cetaphil soap, Oil of Olay, Dove, Basis, Ivory moisture care, Restoraderm cleanser.    When toweling dry, dont rub.  Blot the skin so there is still some water left on the skin.  You should apply a moisturizing cream to all of the skin such as Cerave cream, Cetaphil cream, Lipikar [place] AP+ Intense Repair Moisturizing Cream or Restoraderm or Eucerin Original Formula cream.   Alpha hydroxyacid lotions, i.e., AmLactin, also work very well for preventing dry skin, but may burn when used on inflamed or reddened skin.    If you like to swim during the winter months, you should not use soap when getting out of the pool.  When you have finished swimming, rinse off the chlorine with cool to warm water.  If this will be the only shower of the day, then you may use Cetaphil or another mild soap to cleanse your skin.  After the shower, apply a moisturizing cream to all of the skin as above.        [address] [place] [address]/ [phone] FAX/ [email or web address]       _____________________________________  RETINOIDS           Your doctor has prescribed a topical retinoid for your skin. A retinoid is a vitamin A derived product used to treat a variety of skin conditions including acne, actinic keratoses (pre-skin cancers), uneven pigmentation from sun damage, fine lines and wrinkles, and enlarged pores.    How do they work?         Retinoids increase skin cell turn over from the normal 30 days to five or six days, minimizing clogged pores-the major factor in acne. Retinoids can also repair the DNA in cells damaged by the sun helping to even out skin pigmentation and clear pre-skin cancers. They can shrink oil glands and minimize the appearance of large pores. These effects can not be appreciated unless the medication is used on a consistent basis!    How do I use a retinoid?         After washing with a mild cleanser (Purpose, Aqua glycolic face cleanser, Cetaphil, Neutrogena deep cream cleanser), the skin should be moisturized with a non-retinol containing moisturizer such as Cerave PM. Then a thin layer of medication is applied to the forehead, nose cheeks, and chin (and around eyes if treating fine lines and wrinkles) at night. The amount of medication needed to cover the entire face should be no more than the size of a green pea. Irritation around the eyes can be treated with Vaseline at night.    What if my skin appears dry, red, and is peeling?          Retinoids do not cause dry skin but rather they cause the top layer of the skin the shed, giving an appearance of dry skin. In fact, new healthy skin cells are replacing older, damaged cells on the surface. This usually occurs the first 2-4 weeks as the skin is adjusting to the medication. It is reasonable to use the medication every other night or even every 2 nights until your skin adjusts. You can use a MILD exfoliant to remove the peeling skin (Aveeno daily  clarifying pads, Aqua glycolic face cream) and can apply a moisturizer throughout the day as needed. Retinoids come in a variety of strengths and vehicles and your doctor can find one best for you. If you cannot tolerate prescription strength retinoids, over the counter products with retinol may be beneficial. (Olay ProX wrinkle cream, JULIANA deep wrinkle cream, Green Cream at [employer])    Will my skin be more sensitive in the sun?           You will need to use sunscreen with SPF 30 daily. Retinoids will cause the outermost layer of the skin to be thinner and thus more sensitive to ultraviolet rays. However, remember that over time, retinoids actually make the skin thicker by enhancing collagen deposition which protects the skin from sun damage.    When will I see results?            If you are using a retinoid for acne, you should see improvement in 6-8 weeks. Do not be alarmed if you find that your acne gets worse before it gets better-KEEP USING THE MEDICATION- this is a normal response and your acne will improve if you can stick with it.           If you are using the medication for anti-aging and skin dyspigmentation, you may see results in 3 months, but most effects are not visible until 6 months. Retinoids are clinically proven to reverse signs of aging, but only if used on a CONSTISTENT BASIS!             Remember that retinoids should not be used if you are pregnant.           Discontinue use 1 week prior to waxing, as skin is more likely to tear.

## 2024-07-03 NOTE — PROGRESS NOTES
Subjective:      Patient ID:  Starla Wharton is a 59 y.o. female who presents for   Chief Complaint   Patient presents with    Rash     Pt here for rash     Patient with new area of concern:   Location: back of neck  Previous treatments: Kenalog cream     Pt reports having rash on back of neck for over 2 months. Pt has uses Kenalog cream PRN but it no longer helps.       Rash      Had a rash on b/l dorsal forearms and posterior neck. Used triamcinolone cream which was not helping. Was extremely pruritic  Now has improved and is more mild  Sister has AD    Review of Systems   Skin:  Positive for itching and rash.       Objective:   Physical Exam   Constitutional: She appears well-developed and well-nourished.   Neurological: She is alert and oriented to person, place, and time.   Psychiatric: She has a normal mood and affect.   Skin:   Areas Examined (abnormalities noted in diagram):   Neck Inspection Performed  RUE Inspected  LUE Inspection Performed                 Diagram Legend     Erythematous scaling macule/papule c/w actinic keratosis       Vascular papule c/w angioma      Pigmented verrucoid papule/plaque c/w seborrheic keratosis      Yellow umbilicated papule c/w sebaceous hyperplasia      Irregularly shaped tan macule c/w lentigo     1-2 mm smooth white papules consistent with Milia      Movable subcutaneous cyst with punctum c/w epidermal inclusion cyst      Subcutaneous movable cyst c/w pilar cyst      Firm pink to brown papule c/w dermatofibroma      Pedunculated fleshy papule(s) c/w skin tag(s)      Evenly pigmented macule c/w junctional nevus     Mildly variegated pigmented, slightly irregular-bordered macule c/w mildly atypical nevus      Flesh colored to evenly pigmented papule c/w intradermal nevus       Pink pearly papule/plaque c/w basal cell carcinoma      Erythematous hyperkeratotic cursted plaque c/w SCC      Surgical scar with no sign of skin cancer recurrence      Open and closed comedones       Inflammatory papules and pustules      Verrucoid papule consistent consistent with wart     Erythematous eczematous patches and plaques     Dystrophic onycholytic nail with subungual debris c/w onychomycosis     Umbilicated papule    Erythematous-base heme-crusted tan verrucoid plaque consistent with inflamed seborrheic keratosis     Erythematous Silvery Scaling Plaque c/w Psoriasis     See annotation      Assessment / Plan:        Atopic dermatitis, unspecified type  - counseled on dry skin care  - failed triamcinolone. Will rx protopic  -     tacrolimus (PROTOPIC) 0.1 % ointment; Apply topically 2 (two) times daily. Apply to areas of eczema  Dispense: 60 g; Refill: 3    Acne vulgaris  -     tretinoin (RETIN-A) 0.05 % cream; Apply topically every evening. Start 2-3 times weekly then increase up to every night after 2-3 weeks if skin is not too dry. Apply pea sized amount to entire face  Dispense: 20 g; Refill: 3             Follow up if symptoms worsen or fail to improve.

## 2024-07-31 ENCOUNTER — TELEPHONE (OUTPATIENT)
Dept: INTERNAL MEDICINE | Facility: CLINIC | Age: 59
End: 2024-07-31
Payer: MEDICARE

## 2024-08-26 ENCOUNTER — HOSPITAL ENCOUNTER (OUTPATIENT)
Dept: RADIOLOGY | Facility: OTHER | Age: 59
Discharge: HOME | End: 2024-08-26
Attending: STUDENT IN AN ORGANIZED HEALTH CARE EDUCATION/TRAINING PROGRAM
Payer: MEDICARE

## 2024-08-26 DIAGNOSIS — Z12.31 SCREENING MAMMOGRAM FOR BREAST CANCER: ICD-10-CM

## 2024-08-26 PROCEDURE — 77067 SCR MAMMO BI INCL CAD: CPT | Mod: TC

## 2024-09-05 DIAGNOSIS — E78.5 HYPERLIPIDEMIA, UNSPECIFIED HYPERLIPIDEMIA TYPE: ICD-10-CM

## 2024-09-05 RX ORDER — ATORVASTATIN CALCIUM 40 MG/1
40 TABLET, FILM COATED ORAL DAILY
Qty: 90 TABLET | Refills: 2 | Status: SHIPPED | OUTPATIENT
Start: 2024-09-05

## 2024-09-05 NOTE — TELEPHONE ENCOUNTER
No care due was identified.  [facility] Embedded Care Due Messages. Reference number: [id number].   9/05/2024 5:08:39 AM CDT

## 2024-09-05 NOTE — TELEPHONE ENCOUNTER
Refill Decision Note   Starla Wharton  is requesting a refill authorization.  Brief Assessment and Rationale for Refill:  Approve     Medication Therapy Plan:         Comments:     Note composed:11:51 AM 09/05/2024

## 2024-09-07 ENCOUNTER — HOSPITAL ENCOUNTER (EMERGENCY)
Facility: OTHER | Age: 59
Discharge: HOME | End: 2024-09-07
Attending: EMERGENCY MEDICINE
Payer: MEDICARE

## 2024-09-07 VITALS
BODY MASS INDEX: 39.15 KG/M2 | TEMPERATURE: 98 F | SYSTOLIC BLOOD PRESSURE: 135 MMHG | OXYGEN SATURATION: 97 % | RESPIRATION RATE: 17 BRPM | HEIGHT: 65 IN | WEIGHT: 235 LBS | DIASTOLIC BLOOD PRESSURE: 61 MMHG | HEART RATE: 86 BPM

## 2024-09-07 DIAGNOSIS — E04.1 THYROID NODULE: ICD-10-CM
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
## 2024-09-07 DIAGNOSIS — S16.1XXA STRAIN OF NECK MUSCLE, INITIAL ENCOUNTER: ICD-10-CM

## 2024-09-07 LAB
CTP QC/QA: YES
CTP QC/QA: YES
POC MOLECULAR INFLUENZA A AGN: NEGATIVE
POC MOLECULAR INFLUENZA B AGN: NEGATIVE
SARS-COV-2 RDRP RESP QL NAA+PROBE: NEGATIVE

## 2024-09-07 PROCEDURE — 87635 SARS-COV-2 COVID-19 AMP PRB: CPT | Performed by: PHYSICIAN ASSISTANT

## 2024-09-07 PROCEDURE — 99285 EMERGENCY DEPT VISIT HI MDM: CPT | Mod: 25

## 2024-09-07 PROCEDURE — 25000003 PHARM REV CODE 250: Performed by: PHYSICIAN ASSISTANT

## 2024-09-07 RX ORDER — LIDOCAINE 50 MG/G
2 PATCH TOPICAL
Status: DISCONTINUED | OUTPATIENT
Start: 2024-09-07 | End: 2024-09-07 | Stop reason: HOSPADM

## 2024-09-07 RX ORDER — LIDOCAINE 50 MG/G
1 PATCH TOPICAL DAILY
Qty: 15 PATCH | Refills: 0 | Status: SHIPPED | OUTPATIENT
Start: 2024-09-07

## 2024-09-07 RX ADMIN — LIDOCAINE 5% 2 PATCH: 700 PATCH TOPICAL at 01:09

## 2024-09-07 NOTE — DISCHARGE INSTRUCTIONS
Please follow-up with the primary care physician on Monday.  You were found to have a small thyroid nodule on the CT scan.  You will need follow up for this.    Use Lidoderm patch to your neck.  You may continue taking Tylenol at home for pain.  You may try heating pad when you are not using the lidocaine patch.  Follow up with your back and spine specialist as you may require MRI if your symptoms persist or worsen.  Return to the ER with concerning or worsening symptoms
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
## 2024-09-09 ENCOUNTER — PATIENT OUTREACH (OUTPATIENT)
Dept: EMERGENCY MEDICINE | Facility: OTHER | Age: 59
End: 2024-09-09
Payer: MEDICARE

## 2024-09-09 ENCOUNTER — TELEPHONE (OUTPATIENT)
Dept: INTERNAL MEDICINE | Facility: CLINIC | Age: 59
End: 2024-09-09
Payer: MEDICARE

## 2024-09-09 NOTE — TELEPHONE ENCOUNTER
----- Message from Magalys Nassar LPN sent at 9/9/2024 11:07 AM CDT -----  Regarding: Discuss Results  Good Morning,     I spoke to this patient this morning after most recent ED visit. She declined assistance with scheduling a Hospital F/U, but requested I send a message to the clinic she would like to discuss CT results from ED visit with MD directly. Pt specifically asked me to request she receive a call directly from provider as she stated she does not want to discuss her results with providers nurse. I informed her I would relay this message.     Thanks!

## 2024-09-13 DIAGNOSIS — K21.9 GASTROESOPHAGEAL REFLUX DISEASE, UNSPECIFIED WHETHER ESOPHAGITIS PRESENT: ICD-10-CM

## 2024-09-16 RX ORDER — OMEPRAZOLE 40 MG/1
40 CAPSULE, DELAYED RELEASE ORAL DAILY
Qty: 90 CAPSULE | Refills: 1 | Status: SHIPPED | OUTPATIENT
Start: 2024-09-16

## 2024-09-18 ENCOUNTER — OFFICE VISIT (OUTPATIENT)
Dept: INTERNAL MEDICINE | Facility: CLINIC | Age: 59
End: 2024-09-18
Payer: MEDICARE

## 2024-09-18 ENCOUNTER — PATIENT MESSAGE (OUTPATIENT)
Dept: INTERNAL MEDICINE | Facility: CLINIC | Age: 59
End: 2024-09-18

## 2024-09-18 VITALS
WEIGHT: 235 LBS | BODY MASS INDEX: 39.11 KG/M2 | DIASTOLIC BLOOD PRESSURE: 61 MMHG | SYSTOLIC BLOOD PRESSURE: 135 MMHG | OXYGEN SATURATION: 98 % | HEART RATE: 76 BPM

## 2024-09-18 DIAGNOSIS — E04.1 THYROID NODULE: ICD-10-CM

## 2024-09-18 DIAGNOSIS — Z79.4 TYPE 2 DIABETES MELLITUS WITH BOTH EYES AFFECTED BY RETINOPATHY WITHOUT MACULAR EDEMA, WITH LONG-TERM CURRENT USE OF INSULIN, UNSPECIFIED RETINOPATHY SEVERITY: ICD-10-CM

## 2024-09-18 DIAGNOSIS — M54.2 NECK PAIN: Primary | ICD-10-CM

## 2024-09-18 DIAGNOSIS — R29.898 LEG FATIGUE: ICD-10-CM

## 2024-09-18 DIAGNOSIS — E11.319 TYPE 2 DIABETES MELLITUS WITH BOTH EYES AFFECTED BY RETINOPATHY WITHOUT MACULAR EDEMA, WITH LONG-TERM CURRENT USE OF INSULIN, UNSPECIFIED RETINOPATHY SEVERITY: ICD-10-CM

## 2024-09-18 PROCEDURE — 99213 OFFICE O/P EST LOW 20 MIN: CPT | Mod: 95,,, | Performed by: STUDENT IN AN ORGANIZED HEALTH CARE EDUCATION/TRAINING PROGRAM

## 2024-09-18 PROCEDURE — 3075F SYST BP GE 130 - 139MM HG: CPT | Mod: CPTII,95,, | Performed by: STUDENT IN AN ORGANIZED HEALTH CARE EDUCATION/TRAINING PROGRAM

## 2024-09-18 PROCEDURE — 3078F DIAST BP <80 MM HG: CPT | Mod: CPTII,95,, | Performed by: STUDENT IN AN ORGANIZED HEALTH CARE EDUCATION/TRAINING PROGRAM

## 2024-09-18 PROCEDURE — 3061F NEG MICROALBUMINURIA REV: CPT | Mod: CPTII,95,, | Performed by: STUDENT IN AN ORGANIZED HEALTH CARE EDUCATION/TRAINING PROGRAM

## 2024-09-18 PROCEDURE — 3044F HG A1C LEVEL LT 7.0%: CPT | Mod: CPTII,95,, | Performed by: STUDENT IN AN ORGANIZED HEALTH CARE EDUCATION/TRAINING PROGRAM

## 2024-09-18 PROCEDURE — 4010F ACE/ARB THERAPY RXD/TAKEN: CPT | Mod: CPTII,95,, | Performed by: STUDENT IN AN ORGANIZED HEALTH CARE EDUCATION/TRAINING PROGRAM

## 2024-09-18 PROCEDURE — 1159F MED LIST DOCD IN RCRD: CPT | Mod: CPTII,95,, | Performed by: STUDENT IN AN ORGANIZED HEALTH CARE EDUCATION/TRAINING PROGRAM

## 2024-09-18 PROCEDURE — 3066F NEPHROPATHY DOC TX: CPT | Mod: CPTII,95,, | Performed by: STUDENT IN AN ORGANIZED HEALTH CARE EDUCATION/TRAINING PROGRAM

## 2024-09-18 PROCEDURE — 3008F BODY MASS INDEX DOCD: CPT | Mod: CPTII,95,, | Performed by: STUDENT IN AN ORGANIZED HEALTH CARE EDUCATION/TRAINING PROGRAM

## 2024-09-18 PROCEDURE — G2211 COMPLEX E/M VISIT ADD ON: HCPCS | Mod: 95,,, | Performed by: STUDENT IN AN ORGANIZED HEALTH CARE EDUCATION/TRAINING PROGRAM
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
## 2024-09-19 ENCOUNTER — TELEPHONE (OUTPATIENT)
Dept: INTERNAL MEDICINE | Facility: CLINIC | Age: 59
End: 2024-09-19
Payer: MEDICARE

## 2024-09-19 NOTE — TELEPHONE ENCOUNTER
----- Message from Rashad Justice sent at 9/19/2024  1:45 PM CDT -----  Name of Who is Calling:JIA QUINTERO [[id number]]                   What is the request in detail:PT wants a call back from janki she just missed your call she is available Oct 2 after 10am  or Oct 4th after 10am                   Can the clinic reply by [facility]: No                   What Number to Call Back [facility] not [facility]:[phone]

## 2024-09-28 ENCOUNTER — PATIENT MESSAGE (OUTPATIENT)
Dept: INTERNAL MEDICINE | Facility: CLINIC | Age: 59
End: 2024-09-28
Payer: MEDICARE

## 2024-09-28 ENCOUNTER — HOSPITAL ENCOUNTER (OUTPATIENT)
Dept: RADIOLOGY | Facility: OTHER | Age: 59
Discharge: HOME | End: 2024-09-28
Attending: STUDENT IN AN ORGANIZED HEALTH CARE EDUCATION/TRAINING PROGRAM
Payer: MEDICARE

## 2024-09-28 DIAGNOSIS — E04.1 THYROID NODULE: ICD-10-CM

## 2024-09-28 PROCEDURE — 76536 US EXAM OF HEAD AND NECK: CPT | Mod: 26,,, | Performed by: RADIOLOGY

## 2024-09-28 PROCEDURE — 76536 US EXAM OF HEAD AND NECK: CPT | Mod: TC

## 2024-09-30 NOTE — TELEPHONE ENCOUNTER
EPIC lagged, and encounter was mistakenly taken by two people. Communicated between us and resolved issue.

## 2024-10-19 DIAGNOSIS — I10 ESSENTIAL HYPERTENSION: ICD-10-CM

## 2024-10-19 NOTE — TELEPHONE ENCOUNTER
Care Due:                  Date            Visit Type   Department     Provider  --------------------------------------------------------------------------------                                ESTABLISHED                              PATIENT -    [facility] INTERNAL  Last Visit: 09-      [facility]       Meghan Perez  Next Visit: None Scheduled  None         None Found                                                            Last  Test          Frequency    Reason                     Performed    Due Date  --------------------------------------------------------------------------------    HBA1C.......  6 months...  empagliflozin............  06- 12-    [facility] Embedded Care Due Messages. Reference number: [id number].   10/19/2024 5:08:56 AM CDT

## 2024-10-20 RX ORDER — LISINOPRIL 20 MG/1
20 TABLET ORAL DAILY
Qty: 90 TABLET | Refills: 2 | Status: SHIPPED | OUTPATIENT
Start: 2024-10-20 | End: 2025-07-17

## 2024-10-20 RX ORDER — TIRZEPATIDE 15 MG/.5ML
INJECTION, SOLUTION SUBCUTANEOUS
Qty: 2 ML | Refills: 5 | Status: SHIPPED | OUTPATIENT
Start: 2024-10-20

## 2024-10-20 NOTE — TELEPHONE ENCOUNTER
Provider Staff:  Action required for this patient    Requires labs      Please see care gap opportunities below in Care Due Message.    Thanks!  [facility]     Appointments      Date Provider   Last Visit   9/18/2024 Meghan Perez MD   Next Visit   Visit date not found Meghan Perez MD     Refill Decision Note   Starla Wharton  is requesting a refill authorization.  Brief Assessment and Rationale for Refill:  Approve     Medication Therapy Plan:        Comments:     Note composed:12:27 AM 10/20/2024

## 2024-10-28 ENCOUNTER — LAB VISIT (OUTPATIENT)
Dept: LAB | Facility: OTHER | Age: 59
End: 2024-10-28
Attending: NURSE PRACTITIONER
Payer: MEDICARE

## 2024-10-28 ENCOUNTER — TELEPHONE (OUTPATIENT)
Dept: INTERNAL MEDICINE | Facility: CLINIC | Age: 59
End: 2024-10-28
Payer: MEDICARE

## 2024-10-28 DIAGNOSIS — Z79.4 TYPE 2 DIABETES MELLITUS WITH BOTH EYES AFFECTED BY RETINOPATHY WITHOUT MACULAR EDEMA, WITH LONG-TERM CURRENT USE OF INSULIN, UNSPECIFIED RETINOPATHY SEVERITY: ICD-10-CM

## 2024-10-28 DIAGNOSIS — E11.319 TYPE 2 DIABETES MELLITUS WITH BOTH EYES AFFECTED BY RETINOPATHY WITHOUT MACULAR EDEMA, WITH LONG-TERM CURRENT USE OF INSULIN, UNSPECIFIED RETINOPATHY SEVERITY: ICD-10-CM

## 2024-10-28 LAB
ESTIMATED AVG GLUCOSE: 154 MG/DL (ref 68–131)
HBA1C MFR BLD: 7 % (ref 4–5.6)

## 2024-10-28 PROCEDURE — 36415 COLL VENOUS BLD VENIPUNCTURE: CPT | Performed by: NURSE PRACTITIONER

## 2024-10-28 PROCEDURE — 83036 HEMOGLOBIN GLYCOSYLATED A1C: CPT | Performed by: NURSE PRACTITIONER

## 2024-10-29 ENCOUNTER — PATIENT MESSAGE (OUTPATIENT)
Dept: INTERNAL MEDICINE | Facility: CLINIC | Age: 59
End: 2024-10-29
Payer: MEDICARE

## 2024-11-01 ENCOUNTER — PATIENT OUTREACH (OUTPATIENT)
Dept: EMERGENCY MEDICINE | Facility: HOSPITAL | Age: 59
End: 2024-11-01

## 2024-11-15 DIAGNOSIS — E11.42 DIABETIC POLYNEUROPATHY ASSOCIATED WITH TYPE 2 DIABETES MELLITUS: ICD-10-CM

## 2024-11-15 RX ORDER — GABAPENTIN 300 MG/1
600 CAPSULE ORAL 3 TIMES DAILY
Qty: 540 CAPSULE | Refills: 1 | Status: SHIPPED | OUTPATIENT
Start: 2024-11-15 | End: 2025-05-14

## 2024-11-21 ENCOUNTER — TELEPHONE (OUTPATIENT)
Dept: INTERNAL MEDICINE | Facility: CLINIC | Age: 59
End: 2024-11-21
Payer: MEDICARE

## 2024-11-21 NOTE — TELEPHONE ENCOUNTER
For screening she wouldn't be due yet. If having symptoms, recommend appointment to discuss, please assist with scheduling. Thank you!

## 2024-11-21 NOTE — TELEPHONE ENCOUNTER
Do you recommend sooner?      MAY 18  2033 Colorectal Cancer Screening (Colonoscopy - Preferred) (Every 10 Years)  Last completed: May 18, 2023

## 2024-11-21 NOTE — TELEPHONE ENCOUNTER
----- Message from Gael sent at 11/21/2024 11:28 AM CST -----  Regarding: Orders  Name of Who is Calling:  Patient          What is the request in detail:  Patient would lime to speak with Dr. Perez about a colonoscopy            Can the clinic reply by [facility]: No            What Number to Call Back if not in [facility]: [phone]

## 2024-11-22 ENCOUNTER — TELEPHONE (OUTPATIENT)
Dept: INTERNAL MEDICINE | Facility: CLINIC | Age: 59
End: 2024-11-22
Payer: MEDICARE

## 2024-11-22 NOTE — TELEPHONE ENCOUNTER
"Spoke with patient. Dr. Perez's orders read back. States she is not experiencing any changes with bowels or abd/GI symptoms at this time. Patient expressed understanding and gratitude for phone call.  Call ended.      "  Meghan Perez MD [facility] A Staff19 hours ago (3:58 PM)       For screening she wouldn't be due yet. If having symptoms, recommend appointment to discuss, please assist with scheduling. Thank you!         Note     Alejandro Doran MA routed conversation to Meghan Perez MD21 hours ago (2:34 PM)     Alejandro Doran MA21 hours ago (2:34 PM)     KR  Do you recommend sooner?        MAY 18  2033 Colorectal Cancer Screening (Colonoscopy - Preferred) (Every 10 Years)  Last completed: May 18, 2023             Note     Alejandro Doran MA21 hours ago (2:30 PM)     KR  ----- Message from [employer] sent at 11/21/2024 11:28 AM CST -----  Regarding: Orders  Name of Who is Calling:  Patient              What is the request in detail:  Patient would lime to speak with Dr. Perez about a colonoscopy                 Can the clinic reply by [facility]: No                 What Number to Call Back if not in [facility]:[phone]                "  "

## 2024-11-26 ENCOUNTER — TELEPHONE (OUTPATIENT)
Dept: INTERNAL MEDICINE | Facility: CLINIC | Age: 59
End: 2024-11-26
Payer: MEDICARE

## 2024-11-26 RX ORDER — INSULIN GLARGINE 300 [IU]/ML
INJECTION, SOLUTION SUBCUTANEOUS
Qty: 9 ML | Refills: 6 | Status: SHIPPED | OUTPATIENT
Start: 2024-11-26

## 2024-11-26 NOTE — TELEPHONE ENCOUNTER
----- Message from Diamante sent at 11/26/2024 11:09 AM CST -----  Contact: Starla [phone]  Would like to receive medical advice.    Would they like a call back or a response via [facility]:  call back    Additional information:  Starla is calling to speak to the provider or staff. Pt states Ms. Ramirez discontinued the pt's insulin and would like to discuss why was that done. Please call Starla back for advice      Starla states this is a very urgent message.

## 2024-12-05 DIAGNOSIS — Z79.4 TYPE 2 DIABETES MELLITUS WITH COMPLICATION, WITH LONG-TERM CURRENT USE OF INSULIN: Primary | ICD-10-CM

## 2024-12-05 DIAGNOSIS — E11.8 TYPE 2 DIABETES MELLITUS WITH COMPLICATION, WITH LONG-TERM CURRENT USE OF INSULIN: Primary | ICD-10-CM

## 2024-12-05 DIAGNOSIS — Z79.4 TYPE 2 DIABETES MELLITUS WITH RETINOPATHY, WITH LONG-TERM CURRENT USE OF INSULIN, MACULAR EDEMA PRESENCE UNSPECIFIED, UNSPECIFIED LATERALITY, UNSPECIFIED RETINOPATHY SEVERITY: ICD-10-CM

## 2024-12-05 DIAGNOSIS — E11.319 TYPE 2 DIABETES MELLITUS WITH RETINOPATHY, WITH LONG-TERM CURRENT USE OF INSULIN, MACULAR EDEMA PRESENCE UNSPECIFIED, UNSPECIFIED LATERALITY, UNSPECIFIED RETINOPATHY SEVERITY: ICD-10-CM

## 2024-12-05 NOTE — TELEPHONE ENCOUNTER
No care due was identified.  [facility] Embedded Care Due Messages. Reference number: [id number].   12/05/2024 10:56:07 AM CST

## 2024-12-05 NOTE — TELEPHONE ENCOUNTER
Refill Routing Note   Medication(s) are not appropriate for processing by [facility] for the following reason(s):        Required labs abnormal    [facility] action(s):  Defer             Pharmacist review requested: Yes     Appointments  past 12m or future 3m with PCP    Date Provider   Last Visit   9/18/2024 Meghan Perez MD   Next Visit   Visit date not found Meghan Perez MD   ED visits in past 90 days: 1        Note composed:2:43 PM 12/05/2024

## 2024-12-05 NOTE — TELEPHONE ENCOUNTER
Refill Decision Note   Starla Wharton  is requesting a refill authorization.  Brief Assessment and Rationale for Refill:  Approve     Medication Therapy Plan:  No dose adjustment recommended per renal fxn.       Pharmacist review requested: Yes   Extended chart review required: Yes   Comments:     Note composed:3:21 PM 12/05/2024

## 2024-12-06 ENCOUNTER — CLINICAL SUPPORT (OUTPATIENT)
Dept: DIABETES | Facility: CLINIC | Age: 59
End: 2024-12-06
Payer: MEDICARE

## 2024-12-06 DIAGNOSIS — Z79.4 TYPE 2 DIABETES MELLITUS WITH COMPLICATION, WITH LONG-TERM CURRENT USE OF INSULIN: ICD-10-CM

## 2024-12-06 DIAGNOSIS — E11.8 TYPE 2 DIABETES MELLITUS WITH COMPLICATION, WITH LONG-TERM CURRENT USE OF INSULIN: ICD-10-CM

## 2024-12-06 PROCEDURE — 99999 PR PBB SHADOW E&M-EST. PATIENT-LVL I: CPT | Mod: PBBFAC,,, | Performed by: DIETITIAN, REGISTERED
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
## 2024-12-08 NOTE — TELEPHONE ENCOUNTER
No care due was identified.  [facility] Embedded Care Due Messages. Reference number: [id number].   9/13/2024 5:08:44 AM CDT  
Showering allowed/Stairs allowed/Walking - Indoors allowed/No heavy lifting/straining/Walking - Outdoors allowed/Follow Instructions Provided by your Surgical Team

## 2024-12-09 ENCOUNTER — OFFICE VISIT (OUTPATIENT)
Dept: INTERNAL MEDICINE | Facility: CLINIC | Age: 59
End: 2024-12-09
Payer: MEDICARE

## 2024-12-09 ENCOUNTER — IMMUNIZATION (OUTPATIENT)
Dept: INTERNAL MEDICINE | Facility: CLINIC | Age: 59
End: 2024-12-09
Payer: MEDICARE

## 2024-12-09 VITALS
DIASTOLIC BLOOD PRESSURE: 60 MMHG | WEIGHT: 232.81 LBS | HEIGHT: 65 IN | OXYGEN SATURATION: 98 % | HEART RATE: 96 BPM | BODY MASS INDEX: 38.79 KG/M2 | SYSTOLIC BLOOD PRESSURE: 102 MMHG

## 2024-12-09 DIAGNOSIS — E66.01 CLASS 2 SEVERE OBESITY DUE TO EXCESS CALORIES WITH SERIOUS COMORBIDITY AND BODY MASS INDEX (BMI) OF 39.0 TO 39.9 IN ADULT: ICD-10-CM

## 2024-12-09 DIAGNOSIS — Z79.4 TYPE 2 DIABETES MELLITUS WITH DIABETIC PERIPHERAL ANGIOPATHY WITHOUT GANGRENE, WITH LONG-TERM CURRENT USE OF INSULIN: ICD-10-CM

## 2024-12-09 DIAGNOSIS — E66.812 CLASS 2 SEVERE OBESITY DUE TO EXCESS CALORIES WITH SERIOUS COMORBIDITY AND BODY MASS INDEX (BMI) OF 39.0 TO 39.9 IN ADULT: ICD-10-CM

## 2024-12-09 DIAGNOSIS — E11.51 TYPE 2 DIABETES MELLITUS WITH DIABETIC PERIPHERAL ANGIOPATHY WITHOUT GANGRENE, WITH LONG-TERM CURRENT USE OF INSULIN: ICD-10-CM

## 2024-12-09 DIAGNOSIS — N18.31 STAGE 3A CHRONIC KIDNEY DISEASE: ICD-10-CM

## 2024-12-09 DIAGNOSIS — Z79.4 TYPE 2 DIABETES MELLITUS WITH COMPLICATION, WITH LONG-TERM CURRENT USE OF INSULIN: ICD-10-CM

## 2024-12-09 DIAGNOSIS — K31.84 GASTROPARESIS: ICD-10-CM

## 2024-12-09 DIAGNOSIS — Z23 NEED FOR VACCINATION: Primary | ICD-10-CM

## 2024-12-09 DIAGNOSIS — F32.5 MAJOR DEPRESSIVE DISORDER, SINGLE EPISODE, IN FULL REMISSION: ICD-10-CM

## 2024-12-09 DIAGNOSIS — E78.2 MIXED HYPERLIPIDEMIA: ICD-10-CM

## 2024-12-09 DIAGNOSIS — E11.42 DIABETIC POLYNEUROPATHY ASSOCIATED WITH TYPE 2 DIABETES MELLITUS: ICD-10-CM

## 2024-12-09 DIAGNOSIS — E11.319 TYPE 2 DIABETES MELLITUS WITH BOTH EYES AFFECTED BY RETINOPATHY WITHOUT MACULAR EDEMA, WITH LONG-TERM CURRENT USE OF INSULIN, UNSPECIFIED RETINOPATHY SEVERITY: Primary | ICD-10-CM

## 2024-12-09 DIAGNOSIS — I10 PRIMARY HYPERTENSION: ICD-10-CM

## 2024-12-09 DIAGNOSIS — Z79.4 TYPE 2 DIABETES MELLITUS WITH BOTH EYES AFFECTED BY RETINOPATHY WITHOUT MACULAR EDEMA, WITH LONG-TERM CURRENT USE OF INSULIN, UNSPECIFIED RETINOPATHY SEVERITY: Primary | ICD-10-CM

## 2024-12-09 DIAGNOSIS — N18.32 STAGE 3B CHRONIC KIDNEY DISEASE: ICD-10-CM
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
## 2024-12-09 DIAGNOSIS — E11.8 TYPE 2 DIABETES MELLITUS WITH COMPLICATION, WITH LONG-TERM CURRENT USE OF INSULIN: ICD-10-CM

## 2024-12-09 PROCEDURE — 1160F RVW MEDS BY RX/DR IN RCRD: CPT | Mod: CPTII,S$GLB,, | Performed by: NURSE PRACTITIONER

## 2024-12-09 PROCEDURE — 3008F BODY MASS INDEX DOCD: CPT | Mod: CPTII,S$GLB,, | Performed by: NURSE PRACTITIONER

## 2024-12-09 PROCEDURE — 4010F ACE/ARB THERAPY RXD/TAKEN: CPT | Mod: CPTII,S$GLB,, | Performed by: NURSE PRACTITIONER

## 2024-12-09 PROCEDURE — 3074F SYST BP LT 130 MM HG: CPT | Mod: CPTII,S$GLB,, | Performed by: NURSE PRACTITIONER

## 2024-12-09 PROCEDURE — 3078F DIAST BP <80 MM HG: CPT | Mod: CPTII,S$GLB,, | Performed by: NURSE PRACTITIONER

## 2024-12-09 PROCEDURE — 99999 PR PBB SHADOW E&M-EST. PATIENT-LVL IV: CPT | Mod: PBBFAC,,, | Performed by: NURSE PRACTITIONER

## 2024-12-09 PROCEDURE — 1159F MED LIST DOCD IN RCRD: CPT | Mod: CPTII,S$GLB,, | Performed by: NURSE PRACTITIONER

## 2024-12-09 PROCEDURE — 99214 OFFICE O/P EST MOD 30 MIN: CPT | Mod: S$GLB,,, | Performed by: NURSE PRACTITIONER

## 2024-12-09 PROCEDURE — 3066F NEPHROPATHY DOC TX: CPT | Mod: CPTII,S$GLB,, | Performed by: NURSE PRACTITIONER

## 2024-12-09 PROCEDURE — 3061F NEG MICROALBUMINURIA REV: CPT | Mod: CPTII,S$GLB,, | Performed by: NURSE PRACTITIONER

## 2024-12-09 PROCEDURE — 3051F HG A1C>EQUAL 7.0%<8.0%: CPT | Mod: CPTII,S$GLB,, | Performed by: NURSE PRACTITIONER

## 2024-12-09 PROCEDURE — 95251 CONT GLUC MNTR ANALYSIS I&R: CPT | Mod: S$GLB,,, | Performed by: NURSE PRACTITIONER

## 2024-12-09 RX ORDER — BLOOD-GLUCOSE SENSOR
EACH MISCELLANEOUS
Qty: 9 EACH | Refills: 3 | Status: SHIPPED | OUTPATIENT
Start: 2024-12-09

## 2024-12-09 RX ORDER — BLOOD-GLUCOSE TRANSMITTER
EACH MISCELLANEOUS
Qty: 1 EACH | Refills: 3 | Status: SHIPPED | OUTPATIENT
Start: 2024-12-09

## 2024-12-09 NOTE — PATIENT INSTRUCTIONS
Follow up in 4 months w/Irielle   A1c cmp in 4 months   Influenza today     Lab Results   Component Value Date    HGBA1C 7.0 (H) 10/28/2024     Goal less than 7%    [email or web address]  Eat fit imelda  Aicentpal imelda  [email or web address]    my[employer]gr imelda     Goal  no higher than 180     Toujeo 28 units twice a day   Mounjaro 15 mg weekly   Humalog as needed 180-230+2 ,etc.  Jardaince 10 mg daily
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
## 2025-01-08 NOTE — TELEPHONE ENCOUNTER
----- Message from Shayy Mancilla sent at 1/9/2023 10:18 AM CST -----  Regarding: refill  Type:  RX Refill Request    Who Called:  rafael   Refill or New Rx:refill   RX Name and Strength:gabapentin (NEURONTIN) 300 MG capsule  How is the patient currently taking it?   Is this a 30 day or 90 day RX:  [facility]  Local or Mail Order: local   Ordering Provider:  Would the patient rather a call back or a response via [facility]? Call   Best Call Back Number:[phone]  Additional Information:          08-Jan-2025 05:43

## 2025-01-28 ENCOUNTER — OFFICE VISIT (OUTPATIENT)
Dept: INTERNAL MEDICINE | Facility: CLINIC | Age: 60
End: 2025-01-28
Payer: MEDICARE

## 2025-01-28 VITALS
HEART RATE: 79 BPM | OXYGEN SATURATION: 99 % | SYSTOLIC BLOOD PRESSURE: 110 MMHG | HEIGHT: 65 IN | BODY MASS INDEX: 38.98 KG/M2 | DIASTOLIC BLOOD PRESSURE: 60 MMHG | WEIGHT: 233.94 LBS

## 2025-01-28 DIAGNOSIS — I10 PRIMARY HYPERTENSION: ICD-10-CM

## 2025-01-28 DIAGNOSIS — E11.319 TYPE 2 DIABETES MELLITUS WITH BOTH EYES AFFECTED BY RETINOPATHY WITHOUT MACULAR EDEMA, WITH LONG-TERM CURRENT USE OF INSULIN, UNSPECIFIED RETINOPATHY SEVERITY: Primary | ICD-10-CM

## 2025-01-28 DIAGNOSIS — Z79.4 TYPE 2 DIABETES MELLITUS WITH BOTH EYES AFFECTED BY RETINOPATHY WITHOUT MACULAR EDEMA, WITH LONG-TERM CURRENT USE OF INSULIN, UNSPECIFIED RETINOPATHY SEVERITY: Primary | ICD-10-CM

## 2025-01-28 DIAGNOSIS — E78.2 MIXED HYPERLIPIDEMIA: ICD-10-CM

## 2025-01-28 DIAGNOSIS — E55.9 VITAMIN D DEFICIENCY: ICD-10-CM

## 2025-01-28 DIAGNOSIS — J34.3 HYPERTROPHY OF NASAL TURBINATES: ICD-10-CM

## 2025-01-28 DIAGNOSIS — Z00.00 HEALTH MAINTENANCE EXAMINATION: ICD-10-CM

## 2025-01-28 DIAGNOSIS — D63.8 ANEMIA OF CHRONIC DISEASE: ICD-10-CM

## 2025-01-28 PROCEDURE — 99999 PR PBB SHADOW E&M-EST. PATIENT-LVL V: CPT | Mod: PBBFAC,,, | Performed by: STUDENT IN AN ORGANIZED HEALTH CARE EDUCATION/TRAINING PROGRAM

## 2025-01-28 PROCEDURE — 99214 OFFICE O/P EST MOD 30 MIN: CPT | Mod: S$GLB,,, | Performed by: STUDENT IN AN ORGANIZED HEALTH CARE EDUCATION/TRAINING PROGRAM

## 2025-01-28 PROCEDURE — 4010F ACE/ARB THERAPY RXD/TAKEN: CPT | Mod: CPTII,S$GLB,, | Performed by: STUDENT IN AN ORGANIZED HEALTH CARE EDUCATION/TRAINING PROGRAM

## 2025-01-28 PROCEDURE — 1159F MED LIST DOCD IN RCRD: CPT | Mod: CPTII,S$GLB,, | Performed by: STUDENT IN AN ORGANIZED HEALTH CARE EDUCATION/TRAINING PROGRAM

## 2025-01-28 PROCEDURE — 3008F BODY MASS INDEX DOCD: CPT | Mod: CPTII,S$GLB,, | Performed by: STUDENT IN AN ORGANIZED HEALTH CARE EDUCATION/TRAINING PROGRAM

## 2025-01-28 PROCEDURE — 3078F DIAST BP <80 MM HG: CPT | Mod: CPTII,S$GLB,, | Performed by: STUDENT IN AN ORGANIZED HEALTH CARE EDUCATION/TRAINING PROGRAM

## 2025-01-28 PROCEDURE — G2211 COMPLEX E/M VISIT ADD ON: HCPCS | Mod: S$GLB,,, | Performed by: STUDENT IN AN ORGANIZED HEALTH CARE EDUCATION/TRAINING PROGRAM

## 2025-01-28 PROCEDURE — 3074F SYST BP LT 130 MM HG: CPT | Mod: CPTII,S$GLB,, | Performed by: STUDENT IN AN ORGANIZED HEALTH CARE EDUCATION/TRAINING PROGRAM

## 2025-01-28 RX ORDER — AZELASTINE 1 MG/ML
2 SPRAY, METERED NASAL 2 TIMES DAILY
Qty: 30 ML | Refills: 2 | Status: SHIPPED | OUTPATIENT
Start: 2025-01-28 | End: 2026-01-28

## 2025-01-28 NOTE — PROGRESS NOTES
Subjective:       Patient ID: Starla Wharton is a 59 y.o. female.    Chief Complaint: Hypertrophy of nasal turbinates [J34.3]    Patient is established with me, here today for the following:    T2DM on insulin, peripheral neuropathy, retinopathy, gastroparesis, CKD3, HLD, HTN, GERD, vitamin D deficiency, sickle cell trait     History of Present Illness      DIABETES:  She reports stable weight with a 1 pound gain over the holidays. She continues Jardiance and insulin management, taking 28 units in the morning and 28 units in the evening. She experiences foot pain due to hammer toe, with discomfort when wearing shoes despite using diabetic shoes. The condition impacts her daily activities, including [employer] attendance where she needs to change shoes afterwards due to discomfort.    HEADACHES:  She reports experiencing headaches for approximately 2 months, characterized by left-sided, dull pain that comes and goes. Tylenol provides relief by inducing sleep. She denies light sensitivity, sound sensitivity, nausea, or vomiting associated with the headaches.    ENT:  She reports left-sided nasal swelling with a bump in her nose.      ROS:  General: +weight gain  Gastrointestinal: -nausea, -vomiting  Neurological: +headache        Health maintenance -   Colonoscopy performed MAY2023. Recommended repeat in 5 years.   Denies family history of colorectal cancer.  Mammogram BI-RADS 1 in AUG2024.   History of prior abnormal mammogram.  Biopsy showed benign fatty tissue.  Family history of breast cancer.  Denies family history of ovarian cancer.  Hysterectomy due to fibroids.   Denies history of prior abnormal pap smears.  Denies family history of osteoporosis.  Family history of cardiac disease.  UTD on Tdap, COVID primary/booster, PCV13, PCV20, shingles, influenza vaccinations.  Due for COVID, RSV vaccinations.  Never smoker.  Denies drug use.  Completed HIV and hepatitis C screening.     Wt Readings from Last 10 Encounters:  "  01/28/25 106.1 kg (233 lb 14.5 oz)   12/09/24 105.6 kg (232 lb 12.9 oz)   09/18/24 106.6 kg (235 lb 0.2 oz)   09/07/24 106.6 kg (235 lb)   07/01/24 109.2 kg (240 lb 11.9 oz)   06/06/24 110.4 kg (243 lb 6.2 oz)   03/21/24 105.2 kg (231 lb 14.8 oz)   02/27/24 107.2 kg (236 lb 5.3 oz)   02/22/24 107.2 kg (236 lb 5.3 oz)   02/16/24 107.8 kg (237 lb 10.5 oz)      CKD -  Lab Results   Component Value Date    CREATININE 1.5 (H) 06/06/2024    CREATININE 1.7 (H) 05/24/2024    CREATININE 1.3 02/02/2024     Lab Results   Component Value Date     06/06/2024    K 4.6 06/06/2024    CO2 22 (L) 06/06/2024     Prot/Creat Ratio, Urine   Date Value Ref Range Status   06/06/2024 0.10 0.00 - 0.20 Final     Anemia -  Noted with elevated FLC NOV2023  E-consulted hematology  "Contingency: Refer to Hematology clinic in case of Hb < 10 g/dl in which case she may need a bone marrow biopsy. "  Lab Results   Component Value Date    HGB 10.6 (L) 05/24/2024    HCT 34.9 (L) 05/24/2024    MCV 83 05/24/2024    RDW 15.3 (H) 05/24/2024     Lab Results   Component Value Date    IRON 46 02/02/2024    TRANSFERRIN 209 02/02/2024    TIBC 309 02/02/2024    FESATURATED 15 (L) 02/02/2024      Lab Results   Component Value Date    FERRITIN 132 02/02/2024     Lab Results   Component Value Date    URNNECYC35 664 10/11/2023     Lab Results   Component Value Date    FOLATE 9.5 10/11/2023            T2DM -   Currently taking:   - Toujeo 28 qAM and 28 units qHS  - Jardiance 10 mg   - Mounjaro 15 mg weekly  Following with ROSARIO Ramirez for endocrinology  Following with Chantal for diabetes management  Taking ASA 81 mg daily  Using Dexcom CGM for glucose monitoring  UTD on foot exam.  UTD on eye exam.  Lab Results   Component Value Date    HGBA1C 7.0 (H) 10/28/2024    HGBA1C 6.5 (H) 06/25/2024    HGBA1C 7.0 (H) 05/24/2024     Lab Results   Component Value Date    MICALBCREAT 12.2 05/24/2024     HLD -   Endorses taking atorvastatin as directed  Lab Results "   Component Value Date    CHOL 127 06/25/2024     Lab Results   Component Value Date    TRIG 70 06/25/2024     Lab Results   Component Value Date    LDLCALC 70.0 06/25/2024     Lab Results   Component Value Date    HDL 43 06/25/2024     Current Outpatient Medications   Medication Instructions    aspirin 81 mg, Daily    atorvastatin (LIPITOR) 40 mg, Oral, Daily    azelastine (ASTELIN) 274 mcg, Nasal, 2 times daily    blood-glucose meter,continuous (DEXCOM G6 ) Misc 1 Device, Misc.(Non-Drug; Combo Route), Once    blood-glucose sensor (DEXCOM G6 SENSOR) Kenzie Use daily for continuous glucose monitoring, change sensor every 10 days.    blood-glucose transmitter (DEXCOM G6 TRANSMITTER) Kenzie Use with Dexcom sensor, change every 90 days.    bromfenac (PROLENSA) 0.07 % Drop Place 1 drop into both eyes daily    bromfenac (PROLENSA) 0.07 % Drop Place 1 drop into both eyes daily    bromfenac (PROLENSA) 0.07 % Drop Instill 1 drop into both eyes daily    ciprofloxacin HCl (CILOXAN) 0.3 % ophthalmic solution Place 1 drop into the right eye 4 (four) times daily for 4 days Then stop.    gabapentin (NEURONTIN) 600 mg, Oral, 3 times daily    hydroCHLOROthiazide (HYDRODIURIL) 25 mg, Oral, Daily    insulin glargine, TOUJEO, (TOUJEO SOLOSTAR U-300 INSULIN) 300 unit/mL (1.5 mL) InPn pen Inject 28-36 units twice a day , max daily 72 units    insulin glargine, TOUJEO, (TOUJEO SOLOSTAR U-300 INSULIN) 300 unit/mL (1.5 mL) InPn pen Inject 28-36 units under the skin twice a day. Max 72 units.    insulin lispro (HUMALOG KWIKPEN INSULIN) 100 unit/mL pen Inject up to 4 x a day, 180-230+2, 231-280+4, 281-330+6, 331-380+8, >380+10. Max daily 40 units.    JARDIANCE 25 mg, Oral, Daily    lisinopriL (PRINIVIL,ZESTRIL) 20 mg, Oral, Daily    MOUNJARO 15 mg/0.5 mL PnIj INJECT 15MG INTO THE SKIN EVERY SEVEN DAYS    nepafenac (ILEVRO) 0.3 % DrpS Instill 1 drop in right eye  daily    omeprazole (PRILOSEC) 40 mg, Oral, Daily    pen needle, diabetic  "(BD ULTRA-FINE MAR PEN NEEDLE) 32 gauge x 5/32" Ndle Use to inject insulin 5 times daily    prednisoLONE acetate (PRED FORTE) 1 % DrpS Place 1 drop into the left eye 3 (three) times daily    prednisoLONE acetate (PRED FORTE) 1 % DrpS Place 1 drop into the left eye 3 (three) times daily     Objective:      Vitals:    01/28/25 1528   BP: 110/60   Pulse: 79   SpO2: 99%   Weight: 106.1 kg (233 lb 14.5 oz)   Height: 5' 5" (1.651 m)   PainSc: 0-No pain     Body mass index is 38.92 kg/m².    Physical Exam  Vitals reviewed.   Constitutional:       General: She is not in acute distress.     Appearance: Normal appearance. She is not ill-appearing or diaphoretic.   HENT:      Head: Normocephalic and atraumatic.      Right Ear: Tympanic membrane, ear canal and external ear normal. There is no impacted cerumen.      Left Ear: Tympanic membrane, ear canal and external ear normal. There is no impacted cerumen.      Nose: Congestion present. No rhinorrhea.      Right Turbinates: Swollen. Not pale.      Left Turbinates: Swollen. Not pale.      Mouth/Throat:      Mouth: Mucous membranes are moist.      Pharynx: Oropharynx is clear. No oropharyngeal exudate or posterior oropharyngeal erythema.   Eyes:      General: No scleral icterus.        Right eye: No discharge.         Left eye: No discharge.      Conjunctiva/sclera: Conjunctivae normal.   Neck:      Thyroid: No thyromegaly or thyroid tenderness.      Trachea: Trachea normal.   Cardiovascular:      Rate and Rhythm: Normal rate and regular rhythm.      Heart sounds: Normal heart sounds. No murmur heard.     No friction rub. No gallop.   Pulmonary:      Effort: Pulmonary effort is normal. No respiratory distress.      Breath sounds: Normal breath sounds. No stridor. No wheezing, rhonchi or rales.   Musculoskeletal:      Cervical back: Neck supple.   Lymphadenopathy:      Head:      Right side of head: No submandibular or posterior auricular adenopathy.      Left side of head: No " submandibular or posterior auricular adenopathy.      Cervical: No cervical adenopathy.      Right cervical: No superficial, deep or posterior cervical adenopathy.     Left cervical: No superficial, deep or posterior cervical adenopathy.      Upper Body:      Right upper body: No supraclavicular adenopathy.      Left upper body: No supraclavicular adenopathy.   Skin:     General: Skin is warm and dry.      Comments: Color WNL   Neurological:      Mental Status: She is alert. Mental status is at baseline.   Psychiatric:         Mood and Affect: Mood normal.         Behavior: Behavior normal.         Assessment:       1. Hypertrophy of nasal turbinates    2. 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 Health maintenance examination    4. Primary hypertension    5. Mixed hyperlipidemia    6. Anemia of chronic disease    7. Vitamin D deficiency        Plan:   Hypertrophy of nasal turbinates  -     Ambulatory referral/consult to ENT; Future; Expected date: 02/04/2025  -     azelastine (ASTELIN) 137 mcg (0.1 %) nasal spray; 2 sprays (274 mcg total) by Nasal route 2 (two) times daily.  Dispense: 30 mL; Refill: 2    Type 2 diabetes mellitus with both eyes affected by retinopathy without macular edema, with long-term current use of insulin, unspecified retinopathy severity  -     Comprehensive Metabolic Panel; Future; Expected date: 03/30/2025  -     Hemoglobin A1C; Future; Expected date: 03/30/2025  -     Microalbumin/Creatinine Ratio, Urine; Future; Expected date: 03/30/2025  -     empagliflozin (JARDIANCE) 25 mg tablet; Take 1 tablet (25 mg total) by mouth once daily.  Dispense: 90 tablet; Refill: 3    Health maintenance examination  -     Comprehensive Metabolic Panel; Future; Expected date: 03/30/2025  -     TSH; Future; Expected date: 03/30/2025  -     Lipid Panel; Future; Expected date:  03/30/2025  -     Hemoglobin A1C; Future; Expected date: 03/30/2025  -     CBC Auto Differential; Future; Expected date: 03/30/2025  -     Microalbumin/Creatinine Ratio, Urine; Future; Expected date: 03/30/2025  -     Vitamin D; Future; Expected date: 03/30/2025    Primary hypertension  -     TSH; Future; Expected date: 03/30/2025  -     Ambulatory referral/consult to Sleep Disorders; Future; Expected date: 02/04/2025    Mixed hyperlipidemia  -     Lipid Panel; Future; Expected date: 03/30/2025    Anemia of chronic disease  -     CBC Auto Differential; Future; Expected date: 03/30/2025    Vitamin D deficiency  -     Vitamin D; Future; Expected date: 03/30/2025      Assessment & Plan    IMPRESSION:  - Assessed nasal symptoms and headaches, suspecting possible swollen nasal turbinate  - Considered sinus-related etiology for headaches due to unilateral presentation and associated nasal swelling  - Considered migraine and cluster headaches, but less likely based on symptom presentation  - Will initiate antihistamine nasal spray to reduce nasal turbinate swelling and potentially alleviate headaches  - Evaluated diabetes management, noting overall positive trend in weight loss  - Considered increasing Jardiance dose to improve glycemic control without increasing insulin    OBESITY, CLASS 2:  - Starla's weight has fluctuated since September, with an overall decrease of 2 lbs, despite reporting a 1-pound gain over the holiday season.    DIABETES:  - Increased Jardiance dosage from 10 mg to 25 mg daily and sent updated prescription to the pharmacy.  - Requested fasting labs for A1C check.  - Recorded current insulin doses: 28 units of Tresiba BID.  - Educated the patient on the importance of RSV vaccine for adults with diabetes.    HAMMER TOE:  - Noted and visually confirmed the patient's report of pain and discomfort in the right foot due to hammer toe.  - Starla has a scheduled appointment with orthopedist on the 30th to  discuss treatment options, including possible surgery.  - Recommend using protective bandages and considering wider shoes with a taller toe box to alleviate discomfort in the meantime.    NASAL SWELLING:  - Noted the patient's report of a bump or swelling in the nose present for about 2 months.  - Upon exam, observed swelling of the left nasal turbinate.  - Suspected a swollen nasal turbinate or possibly a nasal polyp.  - Explained nasal anatomy, including nasal turbinates and their relation to sinus passages.  - Provided information on nasal polyps and their potential impact on breathing.  - Prescribed azelastine nasal spray.  - Referred the patient to ENT for evaluation of possible turbinate issue and advised follow-up as scheduled.    HEADACHE:  - Noted the patient's report of left-sided headaches for about 2 months, with pain radiating downwards.  - Assessed headache characteristics: dull pain, no light or sound sensitivity, no nausea or vomiting, typically lasting less than 1 hour.  - Suspected sinus-related headache due to nasal swelling and discussed the potential link.  - Prescribed azelastine nasal spray to reduce nasal swelling, which may help alleviate the headaches.  - Advised the patient to monitor symptoms and report if no improvement occurs.    HYPERLIPIDEMIA:  - Ordered fasting lab work for tomorrow to check cholesterol levels.    SLEEP STUDY:  - Referred to Sleep Medicine for home sleep study.  - Advised follow up after completing home sleep study as arranged by Sleep Medicine.    COVID-19 VACCINATION:  - Explained the rationale for annual COVID boosters due to virus mutation.         Meghan Perez MD  1/28/2025

## 2025-01-29 ENCOUNTER — LAB VISIT (OUTPATIENT)
Dept: LAB | Facility: OTHER | Age: 60
End: 2025-01-29
Attending: STUDENT IN AN ORGANIZED HEALTH CARE EDUCATION/TRAINING PROGRAM
Payer: MEDICARE

## 2025-01-29 DIAGNOSIS — E55.9 VITAMIN D DEFICIENCY: ICD-10-CM

## 2025-01-29 DIAGNOSIS — E11.319 TYPE 2 DIABETES MELLITUS WITH BOTH EYES AFFECTED BY RETINOPATHY WITHOUT MACULAR EDEMA, WITH LONG-TERM CURRENT USE OF INSULIN, UNSPECIFIED RETINOPATHY SEVERITY: ICD-10-CM

## 2025-01-29 DIAGNOSIS — I10 PRIMARY HYPERTENSION: ICD-10-CM

## 2025-01-29 DIAGNOSIS — Z79.4 TYPE 2 DIABETES MELLITUS WITH BOTH EYES AFFECTED BY RETINOPATHY WITHOUT MACULAR EDEMA, WITH LONG-TERM CURRENT USE OF INSULIN, UNSPECIFIED RETINOPATHY SEVERITY: ICD-10-CM

## 2025-01-29 DIAGNOSIS — Z00.00 HEALTH MAINTENANCE EXAMINATION: ICD-10-CM

## 2025-01-29 DIAGNOSIS — E78.2 MIXED HYPERLIPIDEMIA: ICD-10-CM

## 2025-01-29 DIAGNOSIS — D63.8 ANEMIA OF CHRONIC DISEASE: ICD-10-CM

## 2025-01-29 LAB
25(OH)D3+25(OH)D2 SERPL-MCNC: 25 NG/ML (ref 30–96)
ALBUMIN SERPL BCP-MCNC: 3.4 G/DL (ref 3.5–5.2)
ALBUMIN/CREAT UR: 4.1 UG/MG (ref 0–30)
ALP SERPL-CCNC: 115 U/L (ref 40–150)
ALT SERPL W/O P-5'-P-CCNC: 16 U/L (ref 10–44)
ANION GAP SERPL CALC-SCNC: 7 MMOL/L (ref 8–16)
AST SERPL-CCNC: 14 U/L (ref 10–40)
BASOPHILS # BLD AUTO: 0.04 K/UL (ref 0–0.2)
BASOPHILS NFR BLD: 0.6 % (ref 0–1.9)
BILIRUB SERPL-MCNC: 0.5 MG/DL (ref 0.1–1)
BUN SERPL-MCNC: 36 MG/DL (ref 6–20)
CALCIUM SERPL-MCNC: 9.6 MG/DL (ref 8.7–10.5)
CHLORIDE SERPL-SCNC: 107 MMOL/L (ref 95–110)
CHOLEST SERPL-MCNC: 141 MG/DL (ref 120–199)
CHOLEST/HDLC SERPL: 3.5 {RATIO} (ref 2–5)
CO2 SERPL-SCNC: 28 MMOL/L (ref 23–29)
CREAT SERPL-MCNC: 1.4 MG/DL (ref 0.5–1.4)
CREAT UR-MCNC: 123.3 MG/DL (ref 15–325)
DIFFERENTIAL METHOD BLD: ABNORMAL
EOSINOPHIL # BLD AUTO: 0.1 K/UL (ref 0–0.5)
EOSINOPHIL NFR BLD: 1.4 % (ref 0–8)
ERYTHROCYTE [DISTWIDTH] IN BLOOD BY AUTOMATED COUNT: 15.6 % (ref 11.5–14.5)
EST. GFR  (NO RACE VARIABLE): 43 ML/MIN/1.73 M^2
ESTIMATED AVG GLUCOSE: 151 MG/DL (ref 68–131)
GLUCOSE SERPL-MCNC: 92 MG/DL (ref 70–110)
HBA1C MFR BLD: 6.9 % (ref 4–5.6)
HCT VFR BLD AUTO: 38.4 % (ref 37–48.5)
HDLC SERPL-MCNC: 40 MG/DL (ref 40–75)
HDLC SERPL: 28.4 % (ref 20–50)
HGB BLD-MCNC: 11.9 G/DL (ref 12–16)
IMM GRANULOCYTES # BLD AUTO: 0.02 K/UL (ref 0–0.04)
IMM GRANULOCYTES NFR BLD AUTO: 0.3 % (ref 0–0.5)
LDLC SERPL CALC-MCNC: 88.4 MG/DL (ref 63–159)
LYMPHOCYTES # BLD AUTO: 2.4 K/UL (ref 1–4.8)
LYMPHOCYTES NFR BLD: 37.5 % (ref 18–48)
MCH RBC QN AUTO: 25.6 PG (ref 27–31)
MCHC RBC AUTO-ENTMCNC: 31 G/DL (ref 32–36)
MCV RBC AUTO: 83 FL (ref 82–98)
MICROALBUMIN UR DL<=1MG/L-MCNC: 5 UG/ML
MONOCYTES # BLD AUTO: 0.4 K/UL (ref 0.3–1)
MONOCYTES NFR BLD: 6.2 % (ref 4–15)
NEUTROPHILS # BLD AUTO: 3.4 K/UL (ref 1.8–7.7)
NEUTROPHILS NFR BLD: 54 % (ref 38–73)
NONHDLC SERPL-MCNC: 101 MG/DL
NRBC BLD-RTO: 0 /100 WBC
PLATELET # BLD AUTO: 300 K/UL (ref 150–450)
PMV BLD AUTO: 9.4 FL (ref 9.2–12.9)
POTASSIUM SERPL-SCNC: 3.8 MMOL/L (ref 3.5–5.1)
PROT SERPL-MCNC: 7.9 G/DL (ref 6–8.4)
RBC # BLD AUTO: 4.64 M/UL (ref 4–5.4)
SODIUM SERPL-SCNC: 142 MMOL/L (ref 136–145)
TRIGL SERPL-MCNC: 63 MG/DL (ref 30–150)
TSH SERPL DL<=0.005 MIU/L-ACNC: 2.07 UIU/ML (ref 0.4–4)
WBC # BLD AUTO: 6.27 K/UL (ref 3.9–12.7)

## 2025-01-29 PROCEDURE — 85025 COMPLETE CBC W/AUTO DIFF WBC: CPT | Performed by: STUDENT IN AN ORGANIZED HEALTH CARE EDUCATION/TRAINING PROGRAM

## 2025-01-29 PROCEDURE — 36415 COLL VENOUS BLD VENIPUNCTURE: CPT | Performed by: STUDENT IN AN ORGANIZED HEALTH CARE EDUCATION/TRAINING PROGRAM

## 2025-01-29 PROCEDURE — 82043 UR ALBUMIN QUANTITATIVE: CPT | Performed by: STUDENT IN AN ORGANIZED HEALTH CARE EDUCATION/TRAINING PROGRAM

## 2025-01-29 PROCEDURE — 80061 LIPID PANEL: CPT | Performed by: STUDENT IN AN ORGANIZED HEALTH CARE EDUCATION/TRAINING PROGRAM

## 2025-01-29 PROCEDURE — 84443 ASSAY THYROID STIM HORMONE: CPT | Performed by: STUDENT IN AN ORGANIZED HEALTH CARE EDUCATION/TRAINING PROGRAM

## 2025-01-29 PROCEDURE — 82306 VITAMIN D 25 HYDROXY: CPT | Performed by: STUDENT IN AN ORGANIZED HEALTH CARE EDUCATION/TRAINING PROGRAM

## 2025-01-29 PROCEDURE — 80053 COMPREHEN METABOLIC PANEL: CPT | Performed by: STUDENT IN AN ORGANIZED HEALTH CARE EDUCATION/TRAINING PROGRAM

## 2025-01-29 PROCEDURE — 83036 HEMOGLOBIN GLYCOSYLATED A1C: CPT | Performed by: STUDENT IN AN ORGANIZED HEALTH CARE EDUCATION/TRAINING PROGRAM

## 2025-01-31 ENCOUNTER — HOSPITAL ENCOUNTER (OUTPATIENT)
Dept: RADIOLOGY | Facility: HOSPITAL | Age: 60
Discharge: HOME | End: 2025-01-31
Attending: ORTHOPAEDIC SURGERY
Payer: MEDICARE

## 2025-01-31 ENCOUNTER — OFFICE VISIT (OUTPATIENT)
Dept: ORTHOPEDICS | Facility: CLINIC | Age: 60
End: 2025-01-31
Payer: MEDICARE

## 2025-01-31 VITALS — BODY MASS INDEX: 38.98 KG/M2 | HEIGHT: 65 IN | WEIGHT: 233.94 LBS

## 2025-01-31 DIAGNOSIS — M79.674 PAIN IN RIGHT TOE(S): ICD-10-CM

## 2025-01-31 DIAGNOSIS — M20.41 HAMMER TOE OF RIGHT FOOT: Primary | ICD-10-CM

## 2025-01-31 PROCEDURE — 3066F NEPHROPATHY DOC TX: CPT | Mod: CPTII,S$GLB,, | Performed by: ORTHOPAEDIC SURGERY

## 2025-01-31 PROCEDURE — 99213 OFFICE O/P EST LOW 20 MIN: CPT | Mod: S$GLB,,, | Performed by: ORTHOPAEDIC SURGERY

## 2025-01-31 PROCEDURE — 1159F MED LIST DOCD IN RCRD: CPT | Mod: CPTII,S$GLB,, | Performed by: ORTHOPAEDIC SURGERY

## 2025-01-31 PROCEDURE — 99999 PR PBB SHADOW E&M-EST. PATIENT-LVL IV: CPT | Mod: PBBFAC,,, | Performed by: ORTHOPAEDIC SURGERY

## 2025-01-31 PROCEDURE — 73630 X-RAY EXAM OF FOOT: CPT | Mod: TC,RT

## 2025-01-31 PROCEDURE — 3061F NEG MICROALBUMINURIA REV: CPT | Mod: CPTII,S$GLB,, | Performed by: ORTHOPAEDIC SURGERY

## 2025-01-31 PROCEDURE — 3044F HG A1C LEVEL LT 7.0%: CPT | Mod: CPTII,S$GLB,, | Performed by: ORTHOPAEDIC SURGERY

## 2025-01-31 PROCEDURE — 4010F ACE/ARB THERAPY RXD/TAKEN: CPT | Mod: CPTII,S$GLB,, | Performed by: ORTHOPAEDIC SURGERY

## 2025-01-31 PROCEDURE — 1160F RVW MEDS BY RX/DR IN RCRD: CPT | Mod: CPTII,S$GLB,, | Performed by: ORTHOPAEDIC SURGERY

## 2025-01-31 PROCEDURE — 73630 X-RAY EXAM OF FOOT: CPT | Mod: 26,RT,, | Performed by: RADIOLOGY

## 2025-01-31 PROCEDURE — 3008F BODY MASS INDEX DOCD: CPT | Mod: CPTII,S$GLB,, | Performed by: ORTHOPAEDIC SURGERY

## 2025-01-31 NOTE — PROGRESS NOTES
Starla Wharton  Returns today for follow-up.  This is a 59-year-old female who had a previous right big toe cheilectomy for hallux rigidus and arthritis by me about 18 months ago.  She reports she is doing well with the big toe but comes in today because of a progressively worsening right 2nd hammertoe causing pain even in comfortable shoes.    Examination: She walks in today with a normal gait.  Inspection of the right foot reveals hammering and clawing of all of her lesser toes.  The 2nd toe has significant dorsal hyperkeratotic lesion over the PIP joint.  On sitting exam she has a moderately fixed deformity of the 2nd toe with significant tenderness over the PIP joint.  She is neurovascularly intact.      Imaging: I ordered and reviewed an x-ray of the right foot today.  X-rays reveal significant arthritis of the big toe MTP joint and shows the hammering and clawing of the lesser toes.    Impression:  1. Hammer toe of right foot, 2nd toe  X-Ray Foot Complete Right        Recommendation: She would like to have the hammertoe addressed surgically so we are going to set her up for a right 2nd hammertoe repair in April.  She will return for preoperative H&P and consent

## 2025-03-05 ENCOUNTER — TELEPHONE (OUTPATIENT)
Dept: ORTHOPEDICS | Facility: CLINIC | Age: 60
End: 2025-03-05
Payer: MEDICARE

## 2025-03-05 NOTE — TELEPHONE ENCOUNTER
I spoke with pt,notified the patient that  said yes he can do her 2nd and 3rd toe on the same day.    Patient verbalized understanding         ----- Message from Dmitry Clarke MD sent at 3/5/2025  9:35 AM CST -----  Regarding: RE: PT'S REQUESTING A CALL BACK REGARDING IF HER THIRD TOE CAN BE DONE WITH THE SECOND TOE?  Contact: pt  Yes, that would be ok  ----- Message -----  From: Danielle Kirkland MA  Sent: 3/5/2025   8:59 AM CST  To: Dmitry Clarke MD  Subject: FW: PT'S REQUESTING A CALL BACK REGARDING IF#    I spoke with pt,she wants to know if you can do her third toe along with her second toe on the same foot ?  ----- Message -----  From: Soledad Reyes  Sent: 3/5/2025   8:43 AM CST  To: [facility]ME[facility] Staff  Subject: PT'S REQUESTING A CALL BACK REGARDING IF HER#    Pt only wants a call back from nurse or doctor.. Confirmed contact info below:Contact Name: Starla WhartonPhone Number: [phone]

## 2025-03-16 ENCOUNTER — HOSPITAL ENCOUNTER (EMERGENCY)
Facility: OTHER | Age: 60
Discharge: HOME | End: 2025-03-16
Attending: EMERGENCY MEDICINE
Payer: MEDICARE

## 2025-03-16 VITALS
SYSTOLIC BLOOD PRESSURE: 129 MMHG | WEIGHT: 232 LBS | HEART RATE: 77 BPM | BODY MASS INDEX: 38.65 KG/M2 | RESPIRATION RATE: 15 BRPM | TEMPERATURE: 98 F | HEIGHT: 65 IN | OXYGEN SATURATION: 99 % | DIASTOLIC BLOOD PRESSURE: 71 MMHG

## 2025-03-16 DIAGNOSIS — J32.9 SINUSITIS, UNSPECIFIED CHRONICITY, UNSPECIFIED LOCATION: Primary | ICD-10-CM

## 2025-03-16 DIAGNOSIS — R05.9 COUGH: ICD-10-CM

## 2025-03-16 PROCEDURE — 87635 SARS-COV-2 COVID-19 AMP PRB: CPT | Performed by: EMERGENCY MEDICINE

## 2025-03-16 PROCEDURE — 99284 EMERGENCY DEPT VISIT MOD MDM: CPT

## 2025-03-16 RX ORDER — FLUTICASONE PROPIONATE 50 MCG
1 SPRAY, SUSPENSION (ML) NASAL 2 TIMES DAILY PRN
Qty: 15 G | Refills: 0 | Status: SHIPPED | OUTPATIENT
Start: 2025-03-16 | End: 2025-03-16

## 2025-03-16 RX ORDER — BENZONATATE 100 MG/1
100 CAPSULE ORAL 3 TIMES DAILY PRN
Qty: 20 CAPSULE | Refills: 0 | Status: SHIPPED | OUTPATIENT
Start: 2025-03-16 | End: 2025-03-16

## 2025-03-16 RX ORDER — AZITHROMYCIN 250 MG/1
250 TABLET, FILM COATED ORAL DAILY
Qty: 6 TABLET | Refills: 0 | Status: SHIPPED | OUTPATIENT
Start: 2025-03-16

## 2025-03-16 RX ORDER — BENZONATATE 100 MG/1
100 CAPSULE ORAL 3 TIMES DAILY PRN
Qty: 20 CAPSULE | Refills: 0 | Status: SHIPPED | OUTPATIENT
Start: 2025-03-16 | End: 2025-03-26

## 2025-03-16 RX ORDER — GUAIFENESIN 600 MG/1
600 TABLET, EXTENDED RELEASE ORAL 2 TIMES DAILY
Qty: 20 TABLET | Refills: 0 | Status: SHIPPED | OUTPATIENT
Start: 2025-03-16 | End: 2025-03-26

## 2025-03-16 RX ORDER — GUAIFENESIN 600 MG/1
600 TABLET, EXTENDED RELEASE ORAL 2 TIMES DAILY
Qty: 20 TABLET | Refills: 0 | Status: SHIPPED | OUTPATIENT
Start: 2025-03-16 | End: 2025-03-16

## 2025-03-16 RX ORDER — CETIRIZINE HYDROCHLORIDE 10 MG/1
10 TABLET ORAL DAILY
Qty: 30 TABLET | Refills: 11 | Status: SHIPPED | OUTPATIENT
Start: 2025-03-16 | End: 2026-03-16

## 2025-03-16 RX ORDER — CETIRIZINE HYDROCHLORIDE 10 MG/1
10 TABLET ORAL DAILY
Qty: 30 TABLET | Refills: 11 | Status: SHIPPED | OUTPATIENT
Start: 2025-03-16 | End: 2025-03-16

## 2025-03-16 RX ORDER — FLUTICASONE PROPIONATE 50 MCG
1 SPRAY, SUSPENSION (ML) NASAL 2 TIMES DAILY PRN
Qty: 15 G | Refills: 0 | Status: SHIPPED | OUTPATIENT
Start: 2025-03-16

## 2025-03-16 NOTE — Clinical Note
"Starla Bazzia" Dio was seen and treated in our emergency department on 3/16/2025.  She may return to work on 03/19/2025.       If you have any questions or concerns, please don't hesitate to call.      Gem Herron, ANDRESP"

## 2025-03-16 NOTE — FIRST PROVIDER EVALUATION
Emergency Department TeleTriage Encounter Note      CHIEF COMPLAINT    Chief Complaint   Patient presents with    URI     Congestion and cough since Friday. No distress.       VITAL SIGNS   Initial Vitals [03/16/25 1035]   BP Pulse Resp Temp SpO2   139/64 76 18 97.6 °F (36.4 °C) 100 %      MAP       --            ALLERGIES    Review of patient's allergies indicates:   Allergen Reactions    Naprosyn [naproxen]        PROVIDER TRIAGE NOTE  This is a teletriage evaluation of a 59 y.o. female presenting to the ED complaining of URI symptoms since Friday.     Alert, no distress.     Initial orders will be placed and care will be transferred to an alternate provider when patient is roomed for a full evaluation. Any additional orders and the final disposition will be determined by that provider.         ORDERS  Labs Reviewed   POCT INFLUENZA A/B MOLECULAR   SARS-COV-2 RDRP GENE       ED Orders (720h ago, onward)      Start Ordered     Status Ordering Provider    03/16/25 1039 03/16/25 1038  POCT Influenza A/B Molecular  Once         Ordered GEORGE FONSECA    03/16/25 1039 03/16/25 1038  POCT COVID-19 Rapid Screening  Once         Ordered GEORGE FONSECA              Virtual Visit Note: The provider triage portion of this emergency department evaluation and documentation was performed via [employer], a HIPAA-compliant telemedicine application, in concert with a tele-presenter in the room. A face to face patient evaluation with one of my colleagues will occur once the patient is placed in an emergency department room.      DISCLAIMER: This note was prepared with [employer] voice recognition transcription software. Garbled syntax, mangled pronouns, and other bizarre constructions may be attributed to that software system.

## 2025-03-16 NOTE — ED PROVIDER NOTES
Source of History:  Patient    Chief complaint:  URI (Congestion and cough since Friday. No distress.)      HPI:  Starla Wharton is a 58 y/o F with PMHx pf T2DM( on insulin), Stage 3 CKD, and environmental allergies presents to ER for 2 days of congestion and runny nose. Has frontal sinus pressure, dry mouth and frontal temporal HA.  She took an at home COVID test and was negative. She has tried tylenol, Advil cold and flu, multiple tea types, and cough drops with no relief. She does not have a humidifier at home. She has no known sick contacts. Has felt feverish and SOB with exertion d/t only breathing through her mouth.  Not associated with any chest pain.  Denies N/V/D, constipation, neck pain above baseline, dizziness, meningeal signs.     This is the extent to the patients complaints today here in the emergency department.    PMH:  As per HPI and below:  Past Medical History:   Diagnosis Date    Diabetes mellitus     Hypertension     Personal history of colonic polyps 06/12/2017     Past Surgical History:   Procedure Laterality Date    CHEILECTOMY Right 7/26/2023    Procedure: CHEILECTOMY big toe;  Surgeon: Dmitry Clarke MD;  Location: [facility];  Service: Orthopedics;  Laterality: Right;    COLONOSCOPY N/A 5/18/2023    Procedure: COLONOSCOPY;  Surgeon: Marian Schneider MD;  Location: [address]);  Service: Endoscopy;  Laterality: N/A;  gastroporesis-2 days full liquid-1 day clears-suprep-inst mail-tb-pt states does not use portal-tb    pre call done 5/12/23 -egh    HYSTERECTOMY      fibroids    LAPAROSCOPIC CHOLECYSTECTOMY      MYOMECTOMY      PANCREAS BIOPSY      WISDOM TOOTH EXTRACTION         Social History[1]  Review of patient's allergies indicates:   Allergen Reactions    Naprosyn [naproxen]        ROS: As per HPI and below:  General:  Subjective fever/chills, body aches  Eyes: No visual changes.  ENT:  Cough, sinus pressure  Head:  headache.    Chest:  Shortness breath  Cardiovascular:  "No chest pain.  Abdomen: No abdominal pain.  No nausea or vomiting.   Genito-Urinary: No abnormal urination.  Neurologic: No focal weakness.  No numbness.  MSK: no back pain.  Integument: No rashes or lesions.  Hematologic: No easy bruising.  Endocrine: No excessive thirst or urination.    Physical Exam:    /71 (BP Location: Right arm, Patient Position: Sitting)   Pulse 77   Temp 98 °F (36.7 °C)   Resp 15   Ht 5' 5" (1.651 m)   Wt 105.2 kg (232 lb)   SpO2 99%   Breastfeeding No   BMI 38.61 kg/m²   Vitals:    03/16/25 1035 03/16/25 1232   BP: 139/64 129/71   Pulse: 76 77   Resp: 18 15   Temp: 97.6 °F (36.4 °C) 98 °F (36.7 °C)   TempSrc: Oral    SpO2: 100% 99%   Weight: 105.2 kg (232 lb)    Height: 5' 5" (1.651 m)        Nursing note and vital signs reviewed.  Appearance: No acute distress.  Well-appearing, nontoxic  Eyes:  No conjunctival injection.  Extraocular muscles are intact.  ENT: Oropharynx erythema. No tonsillar exudate or swelling. Uvula midline and normal. No elevation of posterior oropharynx.  MM are pink and moist.   Bilateral TM's are pearly grey.  Frontal sinus pain with palpation, no erythema or swelling.  Lymph: No cervical lymphadenopathy.   Chest/ Respiratory:  Clear to auscultation bilaterally.  Good air movement.  No wheezes.  No rhonchi. No rales. No accessory muscle use.  Cardiovascular:  Regular rate and rhythm.  No murmurs. No gallops. No rubs.  Musculoskeletal: Neck supple.  No meningismus.  Skin: No rashes seen.  Good turgor.  No abrasions.  No ecchymoses.  Neuro: alert and oriented x3,  no focal neurological deficits.  Psych: Appropriate, conversant    Labs that have been ordered have been independently reviewed and interpreted by myself.  Abnormal Labs Reviewed - No abnormal labs to display    No orders to display         Initial Impression/ Differential Dx:  Differential Diagnosis includes, but is not limited to:  otitis media/external, bacterial sinusitis, allergic " rhinitis, influenza, bacterial/viral pharyngitis, bacterial/viral pneumonia, covid-19, strep, URI, bronchitis      MDM:    Medical Decision Making  59-year-old female presents to the emergency department with URI symptoms that began on Friday, reports cough/congestion, sinus pressure and a headache, unrelieved with any over-the-counter medications.  She is negative for COVID and flu in the emergency department.  On exam she had some frontal sinus pain with palpation.  She also endorses a headache, there was no evidence of meningeal signs, patient is also afebrile.  I have a low concern for meningitis at this time.  This is likely sinusitis, will discharge home with symptomatic medications.  Will also discharge home with azithromycin to cover for any bacterial component.  I did discuss if he develops any worsening symptoms or any other concerns she can return to emergency department for re-evaluation.  She stated understanding.    Amount and/or Complexity of Data Reviewed  Labs: ordered.    Risk  OTC drugs.  Prescription drug management.             Diagnostic Impression:    1. Sinusitis, unspecified chronicity, unspecified location    2. Cough         ED Disposition Condition    Discharge Stable            ED Prescriptions       Medication Sig Dispense Start Date End Date Auth. Provider    fluticasone propionate (FLONASE) 50 mcg/actuation nasal spray  (Status: Discontinued) 1 spray (50 mcg total) by Each Nostril route 2 (two) times daily as needed for Rhinitis. 15 g 3/16/2025 3/16/2025 Gem Herron FNP    guaiFENesin (MUCINEX) 600 mg 12 hr tablet  (Status: Discontinued) Take 1 tablet (600 mg total) by mouth 2 (two) times daily. for 10 days 20 tablet 3/16/2025 3/16/2025 Gem Herron FNP    cetirizine (ZYRTEC) 10 MG tablet  (Status: Discontinued) Take 1 tablet (10 mg total) by mouth once daily. 30 tablet 3/16/2025 3/16/2025 Gem Herron FNP    benzonatate (TESSALON) 100 MG capsule  (Status: Discontinued)  Take 1 capsule (100 mg total) by mouth 3 (three) times daily as needed for Cough. 20 capsule 3/16/2025 3/16/2025 Gem Herron FNP    benzonatate (TESSALON) 100 MG capsule Take 1 capsule (100 mg total) by mouth 3 (three) times daily as needed for Cough. 20 capsule 3/16/2025 3/26/2025 Gem Herron FNP    cetirizine (ZYRTEC) 10 MG tablet Take 1 tablet (10 mg total) by mouth once daily. 30 tablet 3/16/2025 3/16/2026 Gem Herron FNP    fluticasone propionate (FLONASE) 50 mcg/actuation nasal spray 1 spray (50 mcg total) by Each Nostril route 2 (two) times daily as needed for Rhinitis. 15 g 3/16/2025 -- Gem Herron FNP    guaiFENesin (MUCINEX) 600 mg 12 hr tablet Take 1 tablet (600 mg total) by mouth 2 (two) times daily. for 10 days 20 tablet 3/16/2025 3/26/2025 Gem Herron FNP    azithromycin (Z-HAILEY) 250 MG tablet Take 1 tablet (250 mg total) by mouth once daily. Take first 2 tablets together, then 1 every day until finished. 6 tablet 3/16/2025 -- Gem Herron FNP          Follow-up Information       Follow up With Specialties Details Why Contact Info    Meghan Perez MD Internal Medicine Schedule an appointment as soon as possible for a visit in 3 days  [address]  [address]  [place] [facility] [address]  [phone]      [facility] Emergency Dept Emergency Medicine Go to  If symptoms worsen [address] [phone]                   [1]   Social History  Tobacco Use    Smoking status: Never    Smokeless tobacco: Never   Substance Use Topics    Alcohol use: Yes     Comment: socially    Drug use: No        Gem Herron FNP  03/16/25 [id number]

## 2025-03-17 ENCOUNTER — PATIENT OUTREACH (OUTPATIENT)
Facility: OTHER | Age: 60
End: 2025-03-17
Payer: MEDICARE

## 2025-03-17 NOTE — PROGRESS NOTES
Patient was seen in the ED on 3/16/25. Phoned patient to assist with Post ED Discharge Navigation. Patient declined assistance scheduling a PCP follow-up at this time.  Lalo Crouch

## 2025-03-19 DIAGNOSIS — K21.9 GASTROESOPHAGEAL REFLUX DISEASE, UNSPECIFIED WHETHER ESOPHAGITIS PRESENT: ICD-10-CM

## 2025-03-19 RX ORDER — OMEPRAZOLE 40 MG/1
40 CAPSULE, DELAYED RELEASE ORAL DAILY
Qty: 90 CAPSULE | Refills: 1 | Status: SHIPPED | OUTPATIENT
Start: 2025-03-19

## 2025-03-19 NOTE — TELEPHONE ENCOUNTER
No care due was identified.  [facility] Embedded Care Due Messages. Reference number: [id number].   3/19/2025 5:08:40 AM CDT

## 2025-03-24 DIAGNOSIS — Z12.31 VISIT FOR SCREENING MAMMOGRAM: Primary | ICD-10-CM

## 2025-03-24 DIAGNOSIS — Z78.0 MENOPAUSE: ICD-10-CM

## 2025-03-26 ENCOUNTER — HOSPITAL ENCOUNTER (OUTPATIENT)
Dept: RADIOLOGY | Facility: OTHER | Age: 60
Discharge: HOME | End: 2025-03-26
Attending: OBSTETRICS & GYNECOLOGY
Payer: MEDICARE

## 2025-03-26 DIAGNOSIS — Z78.0 MENOPAUSE: ICD-10-CM

## 2025-03-26 PROCEDURE — 77080 DXA BONE DENSITY AXIAL: CPT | Mod: TC

## 2025-03-26 PROCEDURE — 77080 DXA BONE DENSITY AXIAL: CPT | Mod: 26,,, | Performed by: RADIOLOGY

## 2025-03-27 DIAGNOSIS — M20.41 HAMMER TOE OF RIGHT FOOT: Primary | ICD-10-CM

## 2025-03-31 ENCOUNTER — OFFICE VISIT (OUTPATIENT)
Dept: ORTHOPEDICS | Facility: CLINIC | Age: 60
End: 2025-03-31
Payer: MEDICARE

## 2025-03-31 VITALS — WEIGHT: 229.25 LBS | BODY MASS INDEX: 38.2 KG/M2 | HEIGHT: 65 IN

## 2025-03-31 DIAGNOSIS — M20.41 HAMMER TOE OF RIGHT FOOT: Primary | ICD-10-CM

## 2025-03-31 DIAGNOSIS — M20.40 HAMMER TOE, UNSPECIFIED LATERALITY: ICD-10-CM

## 2025-03-31 PROCEDURE — 99999 PR PBB SHADOW E&M-EST. PATIENT-LVL III: CPT | Mod: PBBFAC,,, | Performed by: PHYSICIAN ASSISTANT

## 2025-03-31 PROCEDURE — 99499 UNLISTED E&M SERVICE: CPT | Mod: S$GLB,,, | Performed by: PHYSICIAN ASSISTANT

## 2025-03-31 RX ORDER — METHOCARBAMOL 750 MG/1
1500 TABLET, FILM COATED ORAL EVERY 8 HOURS PRN
Qty: 20 TABLET | Refills: 0 | Status: SHIPPED | OUTPATIENT
Start: 2025-03-31

## 2025-03-31 RX ORDER — DEXTROMETHORPHAN HYDROBROMIDE, GUAIFENESIN 5; 100 MG/5ML; MG/5ML
650 LIQUID ORAL EVERY 6 HOURS PRN
Qty: 30 TABLET | Refills: 0 | Status: SHIPPED | OUTPATIENT
Start: 2025-03-31

## 2025-03-31 RX ORDER — OXYCODONE HYDROCHLORIDE 5 MG/1
5 TABLET ORAL EVERY 6 HOURS PRN
Qty: 20 TABLET | Refills: 0 | Status: SHIPPED | OUTPATIENT
Start: 2025-03-31

## 2025-03-31 NOTE — H&P
HPI  Patient is here today for pre-operative visit in preparation for right 2nd and 3rd hammer toe repair performed by Dr. Clarke on 4/16/2025.  Patient was last seen and treated in the clinic on 1/31/2025.  There has been no significant change in medical status since last visit. Patient has not acute complaints today other than well controlled pain to her hammer toes.      Medications: I have reviewed medication list in the chart at the time of this encounter.     Review of patient's allergies indicates:   Allergen Reactions    Naprosyn [naproxen]       Past Medical History:   Diagnosis Date    Diabetes mellitus     Hypertension     Personal history of colonic polyps 06/12/2017     Past Surgical History:   Procedure Laterality Date    CHEILECTOMY Right 7/26/2023    Procedure: CHEILECTOMY big toe;  Surgeon: Dmitry Clarke MD;  Location: [facility];  Service: Orthopedics;  Laterality: Right;    COLONOSCOPY N/A 5/18/2023    Procedure: COLONOSCOPY;  Surgeon: Marian Schneider MD;  Location: [address]);  Service: Endoscopy;  Laterality: N/A;  gastroporesis-2 days full liquid-1 day clears-suprep-inst mail-tb-pt states does not use portal-tb    pre call done 5/12/23 -egh    HYSTERECTOMY      fibroids    LAPAROSCOPIC CHOLECYSTECTOMY      MYOMECTOMY      PANCREAS BIOPSY      WISDOM TOOTH EXTRACTION         Review of Systems   Musculoskeletal:  Positive for joint pain. Negative for falls.       Physical Exam  Cardiovascular:      Pulses:           Dorsalis pedis pulses are 2+ on the right side.   Musculoskeletal:      Right ankle: Normal.      Left ankle: Normal.      Right foot: Deformity (2nd and 3rd hammer toe) present. No bony tenderness.      Left foot: No bony tenderness.      Comments: Pes planus noted on right.            Imaging:  I have independently interpreted and reviewed past right foot x-rays.    1. Hammer toe of right foot    2. Hammer toe, unspecified laterality       - Proceed with surgery as  planned and scheduled. See separate pre op note.  - Medications discussed in full detail. Rx sent for bedside delivery. She cannot take NSAIDs due to history of GORDO. She has plenty of gabapentin at home.  - Proper pre op instructions provided in full detail. Hibiclens and post op shoe provided today. She has cane at home. Heel walk for 2 weeks. Avoid pressure on balls of feet.  - Follow up in 2 weeks with me for post op.  - All of her questions were answered today. Patient knows to call or message clinic with any questions or concerns.      Orders Placed This Encounter    acetaminophen (TYLENOL) 650 MG TbSR    oxyCODONE (ROXICODONE) 5 MG immediate release tablet    methocarbamoL (ROBAXIN) 750 MG Tab        Future Appointments   Date Time Provider Department Center   4/7/2025 10:00 AM LAB, SAME DAY [facility]DRAW [facility]   4/16/2025  9:20 AM Yudith Welch, NP [facility] SLEEP [facility]   4/21/2025  3:30 PM Prudence Ramirez, APRN, FNP [facility] IM [facility] PCW   5/5/2025 10:00 AM Sabiha Mckeon PA-C [facility] ORTHO [facility] Ort   6/6/2025 10:00 AM Chantal Shrestha RD, [facility]     Future cases for Starla Wharton [[id number]]       Case ID Status Date Time Manjinder Procedure Provider Location    [id number] [facility] 4/16/2025  7:30 AM [address], HAMMER TOE, 2nd toe Dmitry Clarke MD [[id number]] [facility] OR            Sabiha Mckeon PA-C  Orthopedic Surgery  [facility]

## 2025-03-31 NOTE — H&P (VIEW-ONLY)
HPI  Patient is here today for pre-operative visit in preparation for right 2nd and 3rd hammer toe repair performed by Dr. Clarke on 4/16/2025.  Patient was last seen and treated in the clinic on 1/31/2025.  There has been no significant change in medical status since last visit. Patient has not acute complaints today other than well controlled pain to her hammer toes.      Medications: I have reviewed medication list in the chart at the time of this encounter.     Review of patient's allergies indicates:   Allergen Reactions    Naprosyn [naproxen]       Past Medical History:   Diagnosis Date    Diabetes mellitus     Hypertension     Personal history of colonic polyps 06/12/2017     Past Surgical History:   Procedure Laterality Date    CHEILECTOMY Right 7/26/2023    Procedure: CHEILECTOMY big toe;  Surgeon: Dmitry Clarke MD;  Location: [facility];  Service: Orthopedics;  Laterality: Right;    COLONOSCOPY N/A 5/18/2023    Procedure: COLONOSCOPY;  Surgeon: Marian Schneider MD;  Location: [address]);  Service: Endoscopy;  Laterality: N/A;  gastroporesis-2 days full liquid-1 day clears-suprep-inst mail-tb-pt states does not use portal-tb    pre call done 5/12/23 -egh    HYSTERECTOMY      fibroids    LAPAROSCOPIC CHOLECYSTECTOMY      MYOMECTOMY      PANCREAS BIOPSY      WISDOM TOOTH EXTRACTION         Review of Systems   Musculoskeletal:  Positive for joint pain. Negative for falls.       Physical Exam  Cardiovascular:      Pulses:           Dorsalis pedis pulses are 2+ on the right side.   Musculoskeletal:      Right ankle: Normal.      Left ankle: Normal.      Right foot: Deformity (2nd and 3rd hammer toe) present. No bony tenderness.      Left foot: No bony tenderness.      Comments: Pes planus noted on right.            Imaging:  I have independently interpreted and reviewed past right foot x-rays.    1. Hammer toe of right foot    2. Hammer toe, unspecified laterality       - Proceed with surgery as  planned and scheduled. See separate pre op note.  - Medications discussed in full detail. Rx sent for bedside delivery. She cannot take NSAIDs due to history of GORDO. She has plenty of gabapentin at home.  - Proper pre op instructions provided in full detail. Hibiclens and post op shoe provided today. She has cane at home. Heel walk for 2 weeks. Avoid pressure on balls of feet.  - Follow up in 2 weeks with me for post op.  - All of her questions were answered today. Patient knows to call or message clinic with any questions or concerns.      Orders Placed This Encounter    acetaminophen (TYLENOL) 650 MG TbSR    oxyCODONE (ROXICODONE) 5 MG immediate release tablet    methocarbamoL (ROBAXIN) 750 MG Tab        Future Appointments   Date Time Provider Department Center   4/7/2025 10:00 AM LAB, SAME DAY [facility]DRAW [facility]   4/16/2025  9:20 AM Yudith Welch, NP [facility] SLEEP [facility]   4/21/2025  3:30 PM Prudence Ramirez, APRN, FNP [facility] IM Marlo Pollard PCW   5/5/2025 10:00 AM Sabiha Mckeon PA-C [facility] ORTHO Marlo Pollard Ort   6/6/2025 10:00 AM Chantal Shrestha RD, [facility]     Future cases for Starla Wharton [[id number]]       Case ID Status Date Time Manjinder Procedure Provider Location    [id number] [facility] 4/16/2025  7:30 AM [address], HAMMER TOE, 2nd toe Dmitry Clarke MD [[id number]] [facility] OR            Sabiha Mckeon PA-C  Orthopedic Surgery  [facility]

## 2025-03-31 NOTE — H&P (VIEW-ONLY)
HPI  Patient is here today for pre-operative visit in preparation for right 2nd and 3rd hammer toe repair performed by Dr. Clarke on 4/16/2025.  Patient was last seen and treated in the clinic on 1/31/2025.  There has been no significant change in medical status since last visit. Patient has not acute complaints today other than well controlled pain to her hammer toes.      Medications: I have reviewed medication list in the chart at the time of this encounter.     Review of patient's allergies indicates:   Allergen Reactions    Naprosyn [naproxen]       Past Medical History:   Diagnosis Date    Diabetes mellitus     Hypertension     Personal history of colonic polyps 06/12/2017     Past Surgical History:   Procedure Laterality Date    CHEILECTOMY Right 7/26/2023    Procedure: CHEILECTOMY big toe;  Surgeon: Dmitry Clarke MD;  Location: [facility];  Service: Orthopedics;  Laterality: Right;    COLONOSCOPY N/A 5/18/2023    Procedure: COLONOSCOPY;  Surgeon: Marian Schneider MD;  Location: [address]);  Service: Endoscopy;  Laterality: N/A;  gastroporesis-2 days full liquid-1 day clears-suprep-inst mail-tb-pt states does not use portal-tb    pre call done 5/12/23 -egh    HYSTERECTOMY      fibroids    LAPAROSCOPIC CHOLECYSTECTOMY      MYOMECTOMY      PANCREAS BIOPSY      WISDOM TOOTH EXTRACTION         Review of Systems   Musculoskeletal:  Positive for joint pain. Negative for falls.       Physical Exam  Cardiovascular:      Pulses:           Dorsalis pedis pulses are 2+ on the right side.   Musculoskeletal:      Right ankle: Normal.      Left ankle: Normal.      Right foot: Deformity (2nd and 3rd hammer toe) present. No bony tenderness.      Left foot: No bony tenderness.      Comments: Pes planus noted on right.            Imaging:  I have independently interpreted and reviewed past right foot x-rays.    1. Hammer toe of right foot    2. Hammer toe, unspecified laterality       - Proceed with surgery as  planned and scheduled. See separate pre op note.  - Medications discussed in full detail. Rx sent for bedside delivery. She cannot take NSAIDs due to history of GORDO. She has plenty of gabapentin at home.  - Proper pre op instructions provided in full detail. Hibiclens and post op shoe provided today. She has cane at home. Heel walk for 2 weeks. Avoid pressure on balls of feet.  - Follow up in 2 weeks with me for post op.  - All of her questions were answered today. Patient knows to call or message clinic with any questions or concerns.      Orders Placed This Encounter    acetaminophen (TYLENOL) 650 MG TbSR    oxyCODONE (ROXICODONE) 5 MG immediate release tablet    methocarbamoL (ROBAXIN) 750 MG Tab        Future Appointments   Date Time Provider Department Center   4/7/2025 10:00 AM LAB, SAME DAY [facility] LABDRAW [facility]   4/16/2025  9:20 AM Yudith Welch, NP [facility] SLEEP [facility]   4/21/2025  3:30 PM Prudence Ramirez, APRN, FNP [facility] IM Marlo Pollard PCW   5/5/2025 10:00 AM Sabiha Mckeon PA-C [facility] ORTHO [facility] Ort   6/6/2025 10:00 AM Chantal Shrestha RD, [facility]     Future cases for Starla Wharton [[id number]]       Case ID Status Date Time Manjinder Procedure Provider Location    [id number] [facility] 4/16/2025  7:30 AM [address], HAMMER TOE, 2nd toe Dmitry Clarke MD [[id number]] [facility] OR            Sabiha Mckeon PA-C  Orthopedic Surgery  [facility]

## 2025-03-31 NOTE — PROGRESS NOTES
Patient is here today for pre-operative visit in preparation for right 2nd and 3rd hammer toe repair performed by Dr. Clarke on 4/16/2025.  Patient was last seen and treated in the clinic on 1/31/2025.  There has been no significant change in medical status since last visit. Patient has not acute complaints today other than well controlled pain to her hammer toes.     Allergies, Medications, past medical and surgical history reviewed.     Focused examination performed. See H&P from this encounter.     Patient did surgeon in clinic today. All questions answered.      Patient verbalized an understanding to the education and goals. Patient has displayed readiness to engage in care and is ready to proceed with surgery.  Patient reports her family is able and ready to provide assistance at home after discharge.      Patient has cane at home. Post op shoe dispensed in clinic today.     Pre, cordelia, and post operative procedure and expectations were discussed.  Questions were answered. The patient has been educated and is ready to proceed with surgery.  We discussed surgical outcomes, plans, procedures, pre, cordelia, and post operative expectations and care. The risks, benefits and alternatives to surgery were discussed with the patient; these include bleeding, infection, vessel/nerve damage, pain, numbness, tingling, complex regional pain syndrome, hardware/surgical failure if used, need for further surgery, malunion, nonunion, DVT, PE, arthritis and death. Patient also understands that the risks of surgery may be greater for some patients due to health or lifestyle issues, such as a current condition or a history of heart disease, diabetes, obesity, clotting disorders, recurrent infections, smoking, sedentary lifestyle, or noncompliance with medications, therapy, or followup. Patient states an understanding and wishes to proceed with surgery.   All questions were answered.  No guarantees were implied or stated.  Surgical  and consent was/were obtained.    Medications ordered for bedside delivery. We discussed proper use of each medication.    The patient will contact us if the have any questions or concerns or if there are changes in their medical condition prior to surgery.    Future cases for Montrell Whartona [[id number]]       Case ID Status Date Time Manjinder Procedure Provider Location    [id number] [facility] 4/16/2025  7:30 AM [address], HAMMER TOE, 2nd toe Dmitry Clarke MD [[id number]] [facility] OR            Sabiha Mckeon PA-C  Orthopedic Surgery  [facility]

## 2025-04-07 ENCOUNTER — PATIENT MESSAGE (OUTPATIENT)
Dept: PREADMISSION TESTING | Facility: HOSPITAL | Age: 60
End: 2025-04-07
Payer: MEDICARE

## 2025-04-07 ENCOUNTER — LAB VISIT (OUTPATIENT)
Dept: LAB | Facility: OTHER | Age: 60
End: 2025-04-07
Attending: NURSE PRACTITIONER
Payer: MEDICARE

## 2025-04-07 ENCOUNTER — PATIENT MESSAGE (OUTPATIENT)
Dept: INTERNAL MEDICINE | Facility: CLINIC | Age: 60
End: 2025-04-07
Payer: MEDICARE

## 2025-04-07 DIAGNOSIS — N18.31 STAGE 3A CHRONIC KIDNEY DISEASE: ICD-10-CM

## 2025-04-07 DIAGNOSIS — I10 PRIMARY HYPERTENSION: ICD-10-CM

## 2025-04-07 DIAGNOSIS — E11.42 DIABETIC POLYNEUROPATHY ASSOCIATED WITH TYPE 2 DIABETES MELLITUS: ICD-10-CM

## 2025-04-07 LAB
ALBUMIN SERPL BCP-MCNC: 3.5 G/DL (ref 3.5–5.2)
ALP SERPL-CCNC: 109 UNIT/L (ref 40–150)
ALT SERPL W/O P-5'-P-CCNC: 20 UNIT/L (ref 10–44)
ANION GAP (OHS): 10 MMOL/L (ref 8–16)
AST SERPL-CCNC: 16 UNIT/L (ref 11–45)
BILIRUB SERPL-MCNC: 0.6 MG/DL (ref 0.1–1)
BUN SERPL-MCNC: 32 MG/DL (ref 6–20)
CALCIUM SERPL-MCNC: 9.1 MG/DL (ref 8.7–10.5)
CHLORIDE SERPL-SCNC: 107 MMOL/L (ref 95–110)
CO2 SERPL-SCNC: 26 MMOL/L (ref 23–29)
CREAT SERPL-MCNC: 1.3 MG/DL (ref 0.5–1.4)
EAG (OHS): 146 MG/DL (ref 68–131)
GFR SERPLBLD CREATININE-BSD FMLA CKD-EPI: 47 ML/MIN/1.73/M2
GLUCOSE SERPL-MCNC: 92 MG/DL (ref 70–110)
HBA1C MFR BLD: 6.7 % (ref 4–5.6)
POTASSIUM SERPL-SCNC: 4.1 MMOL/L (ref 3.5–5.1)
PROT SERPL-MCNC: 8 GM/DL (ref 6–8.4)
SODIUM SERPL-SCNC: 143 MMOL/L (ref 136–145)

## 2025-04-07 PROCEDURE — 82947 ASSAY GLUCOSE BLOOD QUANT: CPT

## 2025-04-07 PROCEDURE — 36415 COLL VENOUS BLD VENIPUNCTURE: CPT

## 2025-04-07 PROCEDURE — 83036 HEMOGLOBIN GLYCOSYLATED A1C: CPT

## 2025-04-07 NOTE — ANESTHESIA PAT ROS NOTE
04/07/2025  Starla Wharton is a 59 y.o., female.      Pre-op Assessment    I have reviewed the Patient Summary Reports.       I have reviewed the Medications.     Review of Systems  Anesthesia Hx:  No problems with previous Anesthesia   History of prior surgery of interest to airway management or planning:  Previous anesthesia: MAC, Nerve Block, General Lap nilda 8/31/18 with general anesthesia.      for Right popliteal and adductor single shot 7/26/23.  Procedure performed at an [facility].  Left eye cataract 9/10/24 with MAC.  Airway issues documented on chart review include GETA        Social:  Alcohol Use, Non-Smoker       Cardiovascular:  Exercise tolerance: good   Hypertension                                          Renal/:  Chronic Renal Disease, CKD                Hepatic/GI:        Hx of cholecystectomy, pancreas biopsy Taking GLP-1 Agonists Instructed to Hold for 7 Days           Musculoskeletal:     Hammer toe of right foot, 2nd toe            OB/GYN/PEDS:  Hx of hysterectomy, myomectomy           Neurological:    Neuromuscular Disease,       Diabetic polyneuropathy                            Endocrine:  Diabetes, type 2   A1C 6.9, BMI-38.15      Obesity / BMI > 30  Psych:  Psychiatric History                   Past Medical History:   Diagnosis Date    Diabetes mellitus     Hypertension     Personal history of colonic polyps 06/12/2017     Past Surgical History:   Procedure Laterality Date    CHEILECTOMY Right 7/26/2023    Procedure: CHEILECTOMY big toe;  Surgeon: Dmitry Clarke MD;  Location: [facility];  Service: Orthopedics;  Laterality: Right;    COLONOSCOPY N/A 5/18/2023    Procedure: COLONOSCOPY;  Surgeon: Marian Schneider MD;  Location: [address]);  Service: Endoscopy;  Laterality: N/A;  gastroporesis-2 days full liquid-1 day clears-suprep-inst mail-tb-pt states does  "not use portal-tb    pre call done 5/12/23 -[facility]    HYSTERECTOMY      fibroids    LAPAROSCOPIC CHOLECYSTECTOMY      MYOMECTOMY      PANCREAS BIOPSY      WISDOM TOOTH EXTRACTION           Anesthesia Assessment: Preoperative EQUATION    Planned Procedure: Procedure(s) (LRB):  CORRECTION, HAMMER TOE, 2nd toe (Right)  Requested Anesthesia Type:Choice  Surgeon: Dmitry Clarke MD  Service: Orthopedics  Known or anticipated Date of Surgery:4/16/2025    Surgeon notes: reviewed    Electronic QUestionnaire Assessment completed via nurse interview with patient.        Triage considerations:     The patient has no apparent active cardiac condition (No unstable coronary Syndrome such as severe unstable angina or recent [<1 month] myocardial infarction, decompensated CHF, severe valvular   disease or significant arrhythmia)    Previous anesthesia records:GETA, MAC, Nerve block for post-op pain, and No problems    Last PCP note: 3-6 months ago , within [facility]   Subspecialty notes: Ortho, Opthalmology    Other important co-morbidities: DM2, HTN, Obesity, and CKD       Tests already available:  Results have been reviewed.             Instructions given. (See in Nurse's note)    Optimization:  Anesthesia Preop Clinic Assessment Not Indicated    Medical Opinion Not Indicated       Sub-specialist consult indicated: No       Plan:   Consultation:to be determined   Patient  has previously scheduled Medical Appointment:    Navigation: Okay to proceed at [facility]    Ht: 5'5"  Wt: 229lbs  BMI: 38.15  "

## 2025-04-09 ENCOUNTER — PATIENT MESSAGE (OUTPATIENT)
Dept: INTERNAL MEDICINE | Facility: CLINIC | Age: 60
End: 2025-04-09
Payer: MEDICARE

## 2025-04-10 ENCOUNTER — TELEPHONE (OUTPATIENT)
Dept: ORTHOPEDICS | Facility: CLINIC | Age: 60
End: 2025-04-10
Payer: MEDICARE

## 2025-04-15 ENCOUNTER — ANESTHESIA EVENT (OUTPATIENT)
Dept: SURGERY | Facility: HOSPITAL | Age: 60
End: 2025-04-15
Payer: MEDICARE

## 2025-04-15 ENCOUNTER — TELEPHONE (OUTPATIENT)
Dept: ORTHOPEDICS | Facility: CLINIC | Age: 60
End: 2025-04-15
Payer: MEDICARE

## 2025-04-15 NOTE — TELEPHONE ENCOUNTER
I spoke with pt,confirmed surgery arrival time of 5:30am. [facility] A,nothing to eat or drink after midnight.    Patient verbalized understanding.

## 2025-04-16 ENCOUNTER — HOSPITAL ENCOUNTER (OUTPATIENT)
Facility: HOSPITAL | Age: 60
Discharge: HOME | End: 2025-04-16
Attending: ORTHOPAEDIC SURGERY | Admitting: ORTHOPAEDIC SURGERY
Payer: MEDICARE

## 2025-04-16 ENCOUNTER — ANESTHESIA EVENT (OUTPATIENT)
Dept: SURGERY | Facility: HOSPITAL | Age: 60
End: 2025-04-16
Payer: MEDICARE

## 2025-04-16 ENCOUNTER — TELEPHONE (OUTPATIENT)
Dept: ORTHOPEDICS | Facility: CLINIC | Age: 60
End: 2025-04-16
Payer: MEDICARE

## 2025-04-16 ENCOUNTER — ANESTHESIA (OUTPATIENT)
Dept: SURGERY | Facility: HOSPITAL | Age: 60
End: 2025-04-16
Payer: MEDICARE

## 2025-04-16 VITALS
BODY MASS INDEX: 38.32 KG/M2 | OXYGEN SATURATION: 96 % | TEMPERATURE: 98 F | HEIGHT: 65 IN | RESPIRATION RATE: 15 BRPM | WEIGHT: 230 LBS | HEART RATE: 74 BPM | DIASTOLIC BLOOD PRESSURE: 62 MMHG | SYSTOLIC BLOOD PRESSURE: 132 MMHG

## 2025-04-16 DIAGNOSIS — M20.41 HAMMER TOE OF RIGHT FOOT: Primary | ICD-10-CM

## 2025-04-16 DIAGNOSIS — M20.41 HAMMER TOE OF RIGHT FOOT: ICD-10-CM

## 2025-04-16 DIAGNOSIS — T81.31XA POSTOPERATIVE WOUND DEHISCENCE, INITIAL ENCOUNTER: ICD-10-CM

## 2025-04-16 DIAGNOSIS — S93.104A DISLOCATION OF PHALANX OF RIGHT FOOT, INITIAL ENCOUNTER: ICD-10-CM

## 2025-04-16 LAB
POCT GLUCOSE: 118 MG/DL (ref 70–110)
POCT GLUCOSE: 126 MG/DL (ref 70–110)

## 2025-04-16 PROCEDURE — 27201423 OPTIME MED/SURG SUP & DEVICES STERILE SUPPLY: Performed by: ORTHOPAEDIC SURGERY

## 2025-04-16 PROCEDURE — 82962 GLUCOSE BLOOD TEST: CPT | Performed by: ORTHOPAEDIC SURGERY

## 2025-04-16 PROCEDURE — 37000009 HC ANESTHESIA EA ADD 15 MINS: Performed by: ORTHOPAEDIC SURGERY

## 2025-04-16 PROCEDURE — 64447 NJX AA&/STRD FEMORAL NRV IMG: CPT | Performed by: STUDENT IN AN ORGANIZED HEALTH CARE EDUCATION/TRAINING PROGRAM

## 2025-04-16 PROCEDURE — 94761 N-INVAS EAR/PLS OXIMETRY MLT: CPT

## 2025-04-16 PROCEDURE — 37000008 HC ANESTHESIA 1ST 15 MINUTES: Performed by: ORTHOPAEDIC SURGERY

## 2025-04-16 PROCEDURE — 99900035 HC TECH TIME PER 15 MIN (STAT)

## 2025-04-16 PROCEDURE — 25000003 PHARM REV CODE 250: Performed by: NURSE ANESTHETIST, CERTIFIED REGISTERED

## 2025-04-16 PROCEDURE — 71000033 HC RECOVERY, INTIAL HOUR: Performed by: ORTHOPAEDIC SURGERY

## 2025-04-16 PROCEDURE — 71000015 HC POSTOP RECOV 1ST HR: Performed by: ORTHOPAEDIC SURGERY

## 2025-04-16 PROCEDURE — C1713 ANCHOR/SCREW BN/BN,TIS/BN: HCPCS | Performed by: ORTHOPAEDIC SURGERY

## 2025-04-16 PROCEDURE — 63600175 PHARM REV CODE 636 W HCPCS: Performed by: STUDENT IN AN ORGANIZED HEALTH CARE EDUCATION/TRAINING PROGRAM

## 2025-04-16 PROCEDURE — 25000003 PHARM REV CODE 250: Performed by: PHYSICIAN ASSISTANT

## 2025-04-16 PROCEDURE — 28285 REPAIR OF HAMMERTOE: CPT | Mod: T6,T7,, | Performed by: ORTHOPAEDIC SURGERY

## 2025-04-16 PROCEDURE — 64445 NJX AA&/STRD SCIATIC NRV IMG: CPT | Performed by: STUDENT IN AN ORGANIZED HEALTH CARE EDUCATION/TRAINING PROGRAM

## 2025-04-16 PROCEDURE — 36000706: Performed by: ORTHOPAEDIC SURGERY

## 2025-04-16 PROCEDURE — 63600175 PHARM REV CODE 636 W HCPCS: Performed by: PHYSICIAN ASSISTANT

## 2025-04-16 PROCEDURE — 36000707: Performed by: ORTHOPAEDIC SURGERY

## 2025-04-16 PROCEDURE — 63600175 PHARM REV CODE 636 W HCPCS: Performed by: NURSE ANESTHETIST, CERTIFIED REGISTERED

## 2025-04-16 PROCEDURE — 63600175 PHARM REV CODE 636 W HCPCS: Performed by: ORTHOPAEDIC SURGERY

## 2025-04-16 RX ORDER — DEXAMETHASONE SODIUM PHOSPHATE 4 MG/ML
INJECTION, SOLUTION INTRA-ARTICULAR; INTRALESIONAL; INTRAMUSCULAR; INTRAVENOUS; SOFT TISSUE
Status: DISCONTINUED | OUTPATIENT
Start: 2025-04-16 | End: 2025-04-16

## 2025-04-16 RX ORDER — MIDAZOLAM HYDROCHLORIDE 1 MG/ML
1 INJECTION, SOLUTION INTRAMUSCULAR; INTRAVENOUS
Status: DISCONTINUED | OUTPATIENT
Start: 2025-04-16 | End: 2025-04-16 | Stop reason: HOSPADM

## 2025-04-16 RX ORDER — FENTANYL CITRATE 50 UG/ML
100 INJECTION, SOLUTION INTRAMUSCULAR; INTRAVENOUS
Status: DISCONTINUED | OUTPATIENT
Start: 2025-04-16 | End: 2025-04-16 | Stop reason: HOSPADM

## 2025-04-16 RX ORDER — ACETAMINOPHEN 500 MG
1000 TABLET ORAL
Status: COMPLETED | OUTPATIENT
Start: 2025-04-16 | End: 2025-04-16

## 2025-04-16 RX ORDER — FENTANYL CITRATE 50 UG/ML
INJECTION, SOLUTION INTRAMUSCULAR; INTRAVENOUS
Status: DISCONTINUED | OUTPATIENT
Start: 2025-04-16 | End: 2025-04-16

## 2025-04-16 RX ORDER — ROPIVACAINE HYDROCHLORIDE 5 MG/ML
INJECTION, SOLUTION EPIDURAL; INFILTRATION; PERINEURAL
Status: DISCONTINUED | OUTPATIENT
Start: 2025-04-16 | End: 2025-04-16

## 2025-04-16 RX ORDER — HALOPERIDOL LACTATE 5 MG/ML
0.5 INJECTION, SOLUTION INTRAMUSCULAR EVERY 10 MIN PRN
Status: DISCONTINUED | OUTPATIENT
Start: 2025-04-16 | End: 2025-04-16 | Stop reason: HOSPADM

## 2025-04-16 RX ORDER — CEFAZOLIN 2 G/1
2 INJECTION, POWDER, FOR SOLUTION INTRAMUSCULAR; INTRAVENOUS
Status: COMPLETED | OUTPATIENT
Start: 2025-04-16 | End: 2025-04-16

## 2025-04-16 RX ORDER — FENTANYL CITRATE 50 UG/ML
25 INJECTION, SOLUTION INTRAMUSCULAR; INTRAVENOUS EVERY 5 MIN PRN
Status: DISCONTINUED | OUTPATIENT
Start: 2025-04-16 | End: 2025-04-16 | Stop reason: HOSPADM

## 2025-04-16 RX ORDER — PROPOFOL 10 MG/ML
VIAL (ML) INTRAVENOUS CONTINUOUS PRN
Status: DISCONTINUED | OUTPATIENT
Start: 2025-04-16 | End: 2025-04-16

## 2025-04-16 RX ORDER — SODIUM CHLORIDE 0.9 % (FLUSH) 0.9 %
10 SYRINGE (ML) INJECTION
Status: DISCONTINUED | OUTPATIENT
Start: 2025-04-16 | End: 2025-04-16 | Stop reason: HOSPADM

## 2025-04-16 RX ORDER — DEXMEDETOMIDINE HYDROCHLORIDE 100 UG/ML
INJECTION, SOLUTION INTRAVENOUS
Status: DISCONTINUED | OUTPATIENT
Start: 2025-04-16 | End: 2025-04-16

## 2025-04-16 RX ORDER — MUPIROCIN 20 MG/G
OINTMENT TOPICAL 2 TIMES DAILY
Status: DISCONTINUED | OUTPATIENT
Start: 2025-04-16 | End: 2025-04-16 | Stop reason: HOSPADM

## 2025-04-16 RX ORDER — FAMOTIDINE 10 MG/ML
INJECTION, SOLUTION INTRAVENOUS
Status: DISCONTINUED | OUTPATIENT
Start: 2025-04-16 | End: 2025-04-16

## 2025-04-16 RX ORDER — HYDROCODONE BITARTRATE AND ACETAMINOPHEN 5; 325 MG/1; MG/1
1 TABLET ORAL EVERY 4 HOURS PRN
Status: DISCONTINUED | OUTPATIENT
Start: 2025-04-16 | End: 2025-04-16 | Stop reason: HOSPADM

## 2025-04-16 RX ORDER — OXYCODONE HYDROCHLORIDE 5 MG/1
5 TABLET ORAL
Status: DISCONTINUED | OUTPATIENT
Start: 2025-04-16 | End: 2025-04-16 | Stop reason: HOSPADM

## 2025-04-16 RX ORDER — CELECOXIB 200 MG/1
400 CAPSULE ORAL
Status: DISCONTINUED | OUTPATIENT
Start: 2025-04-16 | End: 2025-04-16 | Stop reason: HOSPADM

## 2025-04-16 RX ORDER — ONDANSETRON HYDROCHLORIDE 2 MG/ML
4 INJECTION, SOLUTION INTRAVENOUS DAILY PRN
Status: DISCONTINUED | OUTPATIENT
Start: 2025-04-16 | End: 2025-04-16 | Stop reason: HOSPADM

## 2025-04-16 RX ORDER — ONDANSETRON HYDROCHLORIDE 2 MG/ML
INJECTION, SOLUTION INTRAVENOUS
Status: DISCONTINUED | OUTPATIENT
Start: 2025-04-16 | End: 2025-04-16

## 2025-04-16 RX ORDER — GLUCAGON 1 MG
1 KIT INJECTION
Status: DISCONTINUED | OUTPATIENT
Start: 2025-04-16 | End: 2025-04-16 | Stop reason: HOSPADM

## 2025-04-16 RX ADMIN — SODIUM CHLORIDE: 9 INJECTION, SOLUTION INTRAVENOUS at 06:04

## 2025-04-16 RX ADMIN — MIDAZOLAM 2 MG: 1 INJECTION INTRAMUSCULAR; INTRAVENOUS at 07:04

## 2025-04-16 RX ADMIN — CEFAZOLIN 2 G: 2 INJECTION, POWDER, FOR SOLUTION INTRAMUSCULAR; INTRAVENOUS at 07:04

## 2025-04-16 RX ADMIN — FENTANYL CITRATE 100 MCG: 50 INJECTION, SOLUTION INTRAMUSCULAR; INTRAVENOUS at 07:04

## 2025-04-16 RX ADMIN — FENTANYL CITRATE 25 MCG: 50 INJECTION, SOLUTION INTRAMUSCULAR; INTRAVENOUS at 07:04

## 2025-04-16 RX ADMIN — PROPOFOL 100 MCG/KG/MIN: 10 INJECTION, EMULSION INTRAVENOUS at 07:04

## 2025-04-16 RX ADMIN — DEXMEDETOMIDINE 10 MCG: 100 INJECTION, SOLUTION, CONCENTRATE INTRAVENOUS at 07:04

## 2025-04-16 RX ADMIN — ACETAMINOPHEN 1000 MG: 500 TABLET ORAL at 05:04

## 2025-04-16 RX ADMIN — FAMOTIDINE 20 MG: 10 INJECTION, SOLUTION INTRAVENOUS at 07:04

## 2025-04-16 RX ADMIN — ROPIVACAINE HYDROCHLORIDE 30 ML: 5 INJECTION EPIDURAL; INFILTRATION; PERINEURAL at 07:04

## 2025-04-16 RX ADMIN — ROPIVACAINE HYDROCHLORIDE 10 ML: 5 INJECTION, SOLUTION EPIDURAL; INFILTRATION; PERINEURAL at 07:04

## 2025-04-16 RX ADMIN — DEXAMETHASONE SODIUM PHOSPHATE 8 MG: 4 INJECTION, SOLUTION INTRAMUSCULAR; INTRAVENOUS at 07:04

## 2025-04-16 RX ADMIN — ONDANSETRON 4 MG: 2 INJECTION INTRAMUSCULAR; INTRAVENOUS at 08:04

## 2025-04-16 NOTE — OP NOTE
Operative report   Date of surgery: 04/16/2025    Preop diagnosis:   1. Right 2nd hammertoe   2. Right 3rd hammertoe     Postop diagnosis: Same     Procedure:   1. Right 2nd hammertoe repair, PIP resection arthroplasty and Ossiofiber implant  2. Right 3rd hammertoe repair, PIP resection arthroplasty and Ossiofiber implant    Anesthesia: Regional block     Surgeon: Dr. Clarke   Assistant: Dr. Graham    Complications: None   Estimated blood loss: None  Specimens: None     Implants:  Ossiofiber hammertoe implants    Procedure in detail:  After regional anesthesia was administered in the preop holding area, the patient was brought to the operating room and placed on the operating table in a supine position.  The patient's right leg was prepped and draped in a sterile fashion.  A sterile tourniquet was placed around the right calf.  The right leg was exsanguinated with an Esmarch bandage and the tourniquet was elevated to 250 mmHg.  An elliptical incision was made on the dorsum of the right 2nd toe centered around the hyperkeratosis and ulcer over the PIP joint.  Full-thickness wedge of skin was excised.  Capsulotomy of the PIP joint was performed exposing the end of the proximal phalanx of the base of the middle phalanx.  A microsagittal saw was used to remove the articular surface of the proximal phalanx in the middle phalanx.  The flexor tenotomy was performed through the resection arthroplasty site.  Attention was then turned to the 3rd toe where the same exposure and resection arthroplasty was performed.  At this point both wounds were well irrigated.   holes were made with a guidewire for the Ossiofiber implants.  Drill holes that were then made through the  holes to accommodate a medium sized hammertoe implant on the 2nd toe and a small hammertoe implant on the 3rd toe.  The implants were placed in the proximal phalanx of both toes and the middle phalanx of both toes was then reduced onto the  implants.  Good fixation was obtained.  The wounds were again irrigated.  The skin incisions were closed with interrupted 3-0 nylon sutures.  A sterile compressive dressing was placed in the operating room and the patient was returned to the postop holding area in stable condition.

## 2025-04-16 NOTE — ANESTHESIA POSTPROCEDURE EVALUATION
Anesthesia Post Evaluation    Patient: Starla Wharton    Procedure(s) Performed: Procedure(s) (LRB):  CORRECTION, HAMMER TOE, 2nd and 3rd toe (Right)    Final Anesthesia Type: general      Patient location during evaluation: PACU  Patient participation: Yes- Able to Participate  Level of consciousness: awake and alert  Post-procedure vital signs: reviewed and stable  Pain management: adequate  Airway patency: patent  LIAM mitigation strategies: Multimodal analgesia  PONV status at discharge: No PONV  Anesthetic complications: no      Cardiovascular status: blood pressure returned to baseline and hemodynamically stable  Respiratory status: unassisted  Hydration status: euvolemic  Follow-up not needed.              Vitals Value Taken Time   /62 04/16/25 10:01   Temp 36.6 °C (97.9 °F) 04/16/25 10:00   Pulse 73 04/16/25 10:05   Resp 11 04/16/25 10:05   SpO2 96 % 04/16/25 10:05   Vitals shown include unfiled device data.      Event Time   Out of Recovery 09:21:00         Pain/Cole Score: Pain Rating Prior to Med Admin: 0 (4/16/2025  5:56 AM)  Cole Score: 9 (4/16/2025  9:31 AM)
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
## 2025-04-16 NOTE — DISCHARGE SUMMARY
[place] - Surgery (Hospital)  Discharge Note  Short Stay    Procedure(s) (LRB):  CORRECTION, HAMMER TOE, 2nd and 3rd toe (Right)      OUTCOME: Patient tolerated treatment/procedure well without complication and is now ready for discharge.    DISPOSITION: Home or Self Care    FINAL DIAGNOSIS:  Hammer toe of right foot    FOLLOWUP: In clinic    DISCHARGE INSTRUCTIONS:    Discharge Procedure Orders   Diet general     Call MD for:  temperature >100.4     Call MD for:  persistent nausea and vomiting     Call MD for:  severe uncontrolled pain     Call MD for:  difficulty breathing, headache or visual disturbances     Call MD for:  redness, tenderness, or signs of infection (pain, swelling, redness, odor or green/yellow discharge around incision site)     Call MD for:  hives     Call MD for:  persistent dizziness or light-headedness     Call MD for:  extreme fatigue     Leave dressing on - Keep it clean, dry, and intact until clinic visit     Weight bearing restrictions (specify)   Order Comments: May weight bear as tolerated, but limit amount of pressure you put on the right forefoot. Do your best to keep weight on the right heel as much as possible.        TIME SPENT ON DISCHARGE: 20 minutes
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
## 2025-04-16 NOTE — TELEPHONE ENCOUNTER
I spoke with pt,she stated that she is in the ER and  is there with her.        ----- Message from Rena sent at 4/16/2025  7:49 AM CDT -----  Name of Caller: Nature of Call: requesting urgent call back Best Call Back Number: [phone] Additional Information:JIA QUINTERO calling regarding Patient Advice, The PT, who had surgery this morning stating she is bleeding heavy through the gauze and is wanting to speak to the staff as soon as possible to be advised. Patient is on the way back to the hospital due to the heavy bleeding.

## 2025-04-16 NOTE — CARE UPDATE
"Patient returned to outpatient surgery center following discharge with bleeding and wound dehiscence from operative site where she underwent right hammertoe correction surgery earlier this morning. Patient was at home, and it is unclear if she suffered some form of minor trauma to the toe. She states she may have "bumped" it.     Upon arrival back to the surgery center, the patients wound and right second toe were evaluated by Dr. Clarke and myself. The toe appeared to be dislocated with wound reexposure. We applied 4x4 gauze over the prior operative site with subsequent adequate hemostasis. The patient was placed in a well padded soft dressing and post op shoe. She has been booked and consented for wound closure and possible pinning of the right second toe for tomorrow, 4/17. The patient understood all of the available alternatives, risks of additional surgery, and would like to proceed with the recommended treatment. All questions were addressed and answered.     RONEL Graham MD  Orthopaedic Surgery   Resident Physician, PGY-1  04/16/2025    "

## 2025-04-16 NOTE — ANESTHESIA PROCEDURE NOTES
Popliteal single shot    Patient location during procedure: pre-op   Block not for primary anesthetic.  Reason for block: at surgeon's request and post-op pain management   Post-op Pain Location: right foot   Start time: 4/16/2025 7:17 AM  Timeout: 4/16/2025 7:16 AM   End time: 4/16/2025 7:20 AM    Staffing  Authorizing Provider: Linnette Colvin MD  Performing Provider: Linnette Colvin MD    Staffing  Performed by: Linnette Colvin MD  Authorized by: Linnette Colvin MD    Preanesthetic Checklist  Completed: patient identified, IV checked, site marked, risks and benefits discussed, surgical consent, monitors and equipment checked, pre-op evaluation and timeout performed  Peripheral Block  Patient position: supine  Prep: ChloraPrep  Patient monitoring: heart rate, cardiac monitor, continuous pulse ox, continuous capnometry and frequent blood pressure checks  Block type: popliteal  Laterality: right  Injection technique: single shot  Needle  Needle type: Stimuplex   Needle gauge: 21 G  Needle length: 4 in  Needle localization: anatomical landmarks and ultrasound guidance   -ultrasound image captured on disc.  Assessment  Injection assessment: negative aspiration, negative parasthesia and local visualized surrounding nerve  Paresthesia pain: none  Heart rate change: no  Slow fractionated injection: yes  Pain Tolerance: comfortable throughout block and no complaints  Medications:    Medications: ropivacaine (NAROPIN) injection 0.5% - Perineural   30 mL - 4/16/2025 7:18:00 AM    Additional Notes  VSS.  [facility] RN monitoring vitals throughout procedure.  Patient tolerated procedure well.

## 2025-04-16 NOTE — BRIEF OP NOTE
[facility] - Surgery ([facility])  Brief Operative Note    Surgery Date: 4/16/2025     Surgeons and Role:     * Dmitry Clarke MD - Primary    Assisting Surgeon: None    Pre-op Diagnosis:  Hammer toe of right foot [M20.41]    Post-op Diagnosis:  Post-Op Diagnosis Codes:     * Hammer toe of right foot [M20.41]    Procedure(s) (LRB):  CORRECTION, HAMMER TOE, 2nd and 3rd toe (Right)    Anesthesia: Choice    Operative Findings: see op note     Estimated Blood Loss: * No values recorded between 4/16/2025  7:27 AM and 4/16/2025  8:41 AM *         Specimens:   Specimen (24h ago, onward)      None            * No specimens in log *        Discharge Note    OUTCOME: Patient tolerated treatment/procedure well without complication and is now ready for discharge.    DISPOSITION: Home or Self Care    FINAL DIAGNOSIS:  Hammer toe of right foot    FOLLOWUP: In clinic    DISCHARGE INSTRUCTIONS:    Discharge Procedure Orders   Diet general     Call MD for:  temperature >100.4     Call MD for:  persistent nausea and vomiting     Call MD for:  severe uncontrolled pain     Call MD for:  difficulty breathing, headache or visual disturbances     Call MD for:  redness, tenderness, or signs of infection (pain, swelling, redness, odor or green/yellow discharge around incision site)     Call MD for:  hives     Call MD for:  persistent dizziness or light-headedness     Call MD for:  extreme fatigue     Leave dressing on - Keep it clean, dry, and intact until clinic visit     Weight bearing restrictions (specify)   Order Comments: May weight bear as tolerated, but limit amount of pressure you put on the right forefoot. Do your best to keep weight on the right heel as much as possible.

## 2025-04-16 NOTE — TRANSFER OF CARE
"Anesthesia Transfer of Care Note    Patient: Starla Wharton    Procedure(s) Performed: Procedure(s) (LRB):  CORRECTION, HAMMER TOE, 2nd and 3rd toe (Right)    Patient location: PACU    Anesthesia Type: general and regional    Transport from OR: Transported from OR on 100% O2 by closed face mask with adequate spontaneous ventilation    Post pain: adequate analgesia    Post assessment: no apparent anesthetic complications and tolerated procedure well    Post vital signs: stable    Level of consciousness: awake and responds to stimulation    Nausea/Vomiting: no nausea/vomiting    Complications: none    Transfer of care protocol was followed    Last vitals: Visit Vitals  /61 (BP Location: Right arm)   Pulse 76   Temp 36.7 °C (98.1 °F) (Tympanic)   Resp 16   Ht 5' 5" (1.651 m)   Wt 104.3 kg (230 lb)   SpO2 99%   Breastfeeding No   BMI 38.27 kg/m²     "

## 2025-04-16 NOTE — ANESTHESIA PROCEDURE NOTES
Adductor canal single shot    Patient location during procedure: pre-op   Block not for primary anesthetic.  Reason for block: at surgeon's request and post-op pain management   Post-op Pain Location: right foot   Start time: 4/16/2025 7:17 AM  Timeout: 4/16/2025 7:16 AM   End time: 4/16/2025 7:20 AM    Staffing  Authorizing Provider: Linnette Colvin MD  Performing Provider: Linnette Colvin MD    Staffing  Performed by: Linnette Colvin MD  Authorized by: Linnette Colvin MD    Preanesthetic Checklist  Completed: patient identified, IV checked, site marked, risks and benefits discussed, surgical consent, monitors and equipment checked, pre-op evaluation and timeout performed  Peripheral Block  Patient position: supine  Prep: ChloraPrep  Patient monitoring: heart rate, cardiac monitor, continuous pulse ox, continuous capnometry and frequent blood pressure checks  Block type: adductor canal  Laterality: right  Injection technique: single shot  Needle  Needle type: Stimuplex   Needle gauge: 21 G  Needle length: 4 in  Needle localization: anatomical landmarks and ultrasound guidance   -ultrasound image captured on disc.  Assessment  Injection assessment: negative aspiration, negative parasthesia and local visualized surrounding nerve  Paresthesia pain: none  Heart rate change: no  Slow fractionated injection: yes  Pain Tolerance: comfortable throughout block and no complaints  Medications:    Medications: ropivacaine (NAROPIN) injection 0.5% - Perineural   10 mL - 4/16/2025 7:19:00 AM    Additional Notes  VSS.  [facility] RN monitoring vitals throughout procedure.  Patient tolerated procedure well.

## 2025-04-16 NOTE — PLAN OF CARE
Discharge instructions reviewed and pt verbalizes understanding. Pain control appropriate. Dressing is clean, dry, and intact. VSS and afebrile. Cane at bedside. Meds delivered to bedside. Pt ambulatory. AVS complete.

## 2025-04-16 NOTE — PLAN OF CARE
Patient resting comfortably, all questions answered. Call light within reach. Waiting on anesthesia, site endy, update H& P . Dexcom in place and accurate with POC glucose. Cane at bedside with belongings- will place in locker.

## 2025-04-17 ENCOUNTER — HOSPITAL ENCOUNTER (OUTPATIENT)
Facility: HOSPITAL | Age: 60
Discharge: HOME | End: 2025-04-17
Attending: ORTHOPAEDIC SURGERY | Admitting: ORTHOPAEDIC SURGERY
Payer: MEDICARE

## 2025-04-17 ENCOUNTER — ANESTHESIA (OUTPATIENT)
Dept: SURGERY | Facility: HOSPITAL | Age: 60
End: 2025-04-17
Payer: MEDICARE

## 2025-04-17 VITALS
HEART RATE: 72 BPM | WEIGHT: 230 LBS | BODY MASS INDEX: 38.32 KG/M2 | TEMPERATURE: 98 F | SYSTOLIC BLOOD PRESSURE: 121 MMHG | HEIGHT: 65 IN | OXYGEN SATURATION: 100 % | RESPIRATION RATE: 12 BRPM | DIASTOLIC BLOOD PRESSURE: 60 MMHG

## 2025-04-17 DIAGNOSIS — T81.31XA POSTOPERATIVE WOUND DEHISCENCE, INITIAL ENCOUNTER: ICD-10-CM

## 2025-04-17 DIAGNOSIS — M20.41 HAMMER TOE OF RIGHT FOOT: ICD-10-CM

## 2025-04-17 DIAGNOSIS — S93.104A DISLOCATION OF PHALANX OF RIGHT FOOT, INITIAL ENCOUNTER: ICD-10-CM

## 2025-04-17 DIAGNOSIS — S93.104D DISLOCATION OF PHALANX OF RIGHT FOOT, SUBSEQUENT ENCOUNTER: ICD-10-CM

## 2025-04-17 DIAGNOSIS — T81.31XD POSTOPERATIVE WOUND DEHISCENCE, SUBSEQUENT ENCOUNTER: Primary | ICD-10-CM

## 2025-04-17 LAB — POCT GLUCOSE: 140 MG/DL (ref 70–110)

## 2025-04-17 PROCEDURE — 63600175 PHARM REV CODE 636 W HCPCS: Performed by: STUDENT IN AN ORGANIZED HEALTH CARE EDUCATION/TRAINING PROGRAM

## 2025-04-17 PROCEDURE — D9220A PRA ANESTHESIA: Mod: ANES,,, | Performed by: STUDENT IN AN ORGANIZED HEALTH CARE EDUCATION/TRAINING PROGRAM

## 2025-04-17 PROCEDURE — D9220A PRA ANESTHESIA: Mod: CRNA,,, | Performed by: NURSE ANESTHETIST, CERTIFIED REGISTERED

## 2025-04-17 PROCEDURE — 71000033 HC RECOVERY, INTIAL HOUR: Performed by: ORTHOPAEDIC SURGERY

## 2025-04-17 PROCEDURE — 99900035 HC TECH TIME PER 15 MIN (STAT)

## 2025-04-17 PROCEDURE — 25000003 PHARM REV CODE 250: Performed by: NURSE ANESTHETIST, CERTIFIED REGISTERED

## 2025-04-17 PROCEDURE — 82962 GLUCOSE BLOOD TEST: CPT | Performed by: ORTHOPAEDIC SURGERY

## 2025-04-17 PROCEDURE — 71000015 HC POSTOP RECOV 1ST HR: Performed by: ORTHOPAEDIC SURGERY

## 2025-04-17 PROCEDURE — 25000003 PHARM REV CODE 250: Performed by: PHYSICIAN ASSISTANT

## 2025-04-17 PROCEDURE — 94761 N-INVAS EAR/PLS OXIMETRY MLT: CPT

## 2025-04-17 PROCEDURE — 36000706: Performed by: ORTHOPAEDIC SURGERY

## 2025-04-17 PROCEDURE — 36000707: Performed by: ORTHOPAEDIC SURGERY

## 2025-04-17 PROCEDURE — 37000009 HC ANESTHESIA EA ADD 15 MINS: Performed by: ORTHOPAEDIC SURGERY

## 2025-04-17 PROCEDURE — 37000008 HC ANESTHESIA 1ST 15 MINUTES: Performed by: ORTHOPAEDIC SURGERY

## 2025-04-17 PROCEDURE — 27201423 OPTIME MED/SURG SUP & DEVICES STERILE SUPPLY: Performed by: ORTHOPAEDIC SURGERY

## 2025-04-17 PROCEDURE — 63600175 PHARM REV CODE 636 W HCPCS: Performed by: NURSE ANESTHETIST, CERTIFIED REGISTERED

## 2025-04-17 RX ORDER — DEXAMETHASONE SODIUM PHOSPHATE 4 MG/ML
INJECTION, SOLUTION INTRA-ARTICULAR; INTRALESIONAL; INTRAMUSCULAR; INTRAVENOUS; SOFT TISSUE
Status: DISCONTINUED | OUTPATIENT
Start: 2025-04-17 | End: 2025-04-17

## 2025-04-17 RX ORDER — ONDANSETRON 4 MG/1
8 TABLET, ORALLY DISINTEGRATING ORAL EVERY 8 HOURS PRN
Status: DISCONTINUED | OUTPATIENT
Start: 2025-04-17 | End: 2025-04-17 | Stop reason: HOSPADM

## 2025-04-17 RX ORDER — FENTANYL CITRATE 50 UG/ML
INJECTION, SOLUTION INTRAMUSCULAR; INTRAVENOUS
Status: DISCONTINUED | OUTPATIENT
Start: 2025-04-17 | End: 2025-04-17

## 2025-04-17 RX ORDER — ACETAMINOPHEN 500 MG
1000 TABLET ORAL
Status: COMPLETED | OUTPATIENT
Start: 2025-04-17 | End: 2025-04-17

## 2025-04-17 RX ORDER — FENTANYL CITRATE 50 UG/ML
25 INJECTION, SOLUTION INTRAMUSCULAR; INTRAVENOUS EVERY 5 MIN PRN
Status: DISCONTINUED | OUTPATIENT
Start: 2025-04-17 | End: 2025-04-17 | Stop reason: HOSPADM

## 2025-04-17 RX ORDER — OXYCODONE HYDROCHLORIDE 5 MG/1
5 TABLET ORAL
Status: DISCONTINUED | OUTPATIENT
Start: 2025-04-17 | End: 2025-04-17 | Stop reason: HOSPADM

## 2025-04-17 RX ORDER — LIDOCAINE HYDROCHLORIDE 20 MG/ML
INJECTION INTRAVENOUS
Status: DISCONTINUED | OUTPATIENT
Start: 2025-04-17 | End: 2025-04-17

## 2025-04-17 RX ORDER — HYDROCODONE BITARTRATE AND ACETAMINOPHEN 5; 325 MG/1; MG/1
1 TABLET ORAL EVERY 4 HOURS PRN
Status: DISCONTINUED | OUTPATIENT
Start: 2025-04-17 | End: 2025-04-17 | Stop reason: HOSPADM

## 2025-04-17 RX ORDER — MUPIROCIN 20 MG/G
OINTMENT TOPICAL 2 TIMES DAILY
Status: DISCONTINUED | OUTPATIENT
Start: 2025-04-17 | End: 2025-04-17 | Stop reason: HOSPADM

## 2025-04-17 RX ORDER — PROPOFOL 10 MG/ML
VIAL (ML) INTRAVENOUS
Status: DISCONTINUED | OUTPATIENT
Start: 2025-04-17 | End: 2025-04-17

## 2025-04-17 RX ORDER — SODIUM CHLORIDE 0.9 % (FLUSH) 0.9 %
10 SYRINGE (ML) INJECTION
Status: DISCONTINUED | OUTPATIENT
Start: 2025-04-17 | End: 2025-04-17 | Stop reason: HOSPADM

## 2025-04-17 RX ORDER — DEXMEDETOMIDINE HYDROCHLORIDE 100 UG/ML
INJECTION, SOLUTION INTRAVENOUS
Status: DISCONTINUED | OUTPATIENT
Start: 2025-04-17 | End: 2025-04-17

## 2025-04-17 RX ORDER — CELECOXIB 200 MG/1
400 CAPSULE ORAL
Status: DISCONTINUED | OUTPATIENT
Start: 2025-04-17 | End: 2025-04-17 | Stop reason: HOSPADM

## 2025-04-17 RX ORDER — PROPOFOL 10 MG/ML
VIAL (ML) INTRAVENOUS CONTINUOUS PRN
Status: DISCONTINUED | OUTPATIENT
Start: 2025-04-17 | End: 2025-04-17

## 2025-04-17 RX ORDER — CEFAZOLIN 2 G/1
2 INJECTION, POWDER, FOR SOLUTION INTRAMUSCULAR; INTRAVENOUS
Status: DISCONTINUED | OUTPATIENT
Start: 2025-04-17 | End: 2025-04-17 | Stop reason: HOSPADM

## 2025-04-17 RX ORDER — HALOPERIDOL LACTATE 5 MG/ML
0.5 INJECTION, SOLUTION INTRAMUSCULAR EVERY 10 MIN PRN
Status: DISCONTINUED | OUTPATIENT
Start: 2025-04-17 | End: 2025-04-17 | Stop reason: HOSPADM

## 2025-04-17 RX ORDER — MIDAZOLAM HYDROCHLORIDE 1 MG/ML
INJECTION INTRAMUSCULAR; INTRAVENOUS
Status: DISCONTINUED | OUTPATIENT
Start: 2025-04-17 | End: 2025-04-17

## 2025-04-17 RX ORDER — LIDOCAINE HYDROCHLORIDE 20 MG/ML
INJECTION, SOLUTION EPIDURAL; INFILTRATION; INTRACAUDAL; PERINEURAL
Status: COMPLETED | OUTPATIENT
Start: 2025-04-17 | End: 2025-04-17

## 2025-04-17 RX ORDER — ONDANSETRON HYDROCHLORIDE 2 MG/ML
INJECTION, SOLUTION INTRAVENOUS
Status: DISCONTINUED | OUTPATIENT
Start: 2025-04-17 | End: 2025-04-17

## 2025-04-17 RX ADMIN — SODIUM CHLORIDE: 9 INJECTION, SOLUTION INTRAVENOUS at 07:04

## 2025-04-17 RX ADMIN — LIDOCAINE HYDROCHLORIDE 60 MG: 20 INJECTION INTRAVENOUS at 07:04

## 2025-04-17 RX ADMIN — FENTANYL CITRATE 25 MCG: 50 INJECTION, SOLUTION INTRAMUSCULAR; INTRAVENOUS at 07:04

## 2025-04-17 RX ADMIN — ACETAMINOPHEN 1000 MG: 500 TABLET ORAL at 06:04

## 2025-04-17 RX ADMIN — DEXTROSE MONOHYDRATE 2 G: 50 INJECTION, SOLUTION INTRAVENOUS at 07:04

## 2025-04-17 RX ADMIN — DEXMEDETOMIDINE 8 MCG: 100 INJECTION, SOLUTION, CONCENTRATE INTRAVENOUS at 07:04

## 2025-04-17 RX ADMIN — ONDANSETRON 4 MG: 2 INJECTION INTRAMUSCULAR; INTRAVENOUS at 07:04

## 2025-04-17 RX ADMIN — PROPOFOL 30 MG: 10 INJECTION, EMULSION INTRAVENOUS at 07:04

## 2025-04-17 RX ADMIN — DEXAMETHASONE SODIUM PHOSPHATE 4 MG: 4 INJECTION, SOLUTION INTRAMUSCULAR; INTRAVENOUS at 07:04

## 2025-04-17 RX ADMIN — MIDAZOLAM HYDROCHLORIDE 2 MG: 2 INJECTION, SOLUTION INTRAMUSCULAR; INTRAVENOUS at 07:04

## 2025-04-17 RX ADMIN — PROPOFOL 125 MCG/KG/MIN: 10 INJECTION, EMULSION INTRAVENOUS at 07:04

## 2025-04-17 RX ADMIN — LIDOCAINE HYDROCHLORIDE 20 ML: 20 INJECTION, SOLUTION EPIDURAL; INFILTRATION; INTRACAUDAL; PERINEURAL at 07:04

## 2025-04-17 NOTE — OP NOTE
Operative report  Date of surgery: 04/17/2025    Preop diagnosis:  1. Recurrent right 2nd hammertoe after repair one day prior  2. Open wound right 2nd toe     Postop diagnosis: Same     Procedure:   1. Repair right 2nd hammertoe with reduction and pinning  2. Removal implant right 2nd toe    Anesthesia:  Ankle block     Surgeon: Dr. Clarke   Assistant: Dr. Graham    Complications: None  Estimated blood loss: None   Specimens:  Implant right 2nd toe     Indication for procedure:   This is a 60-year-old female on who I performed right 2nd and 3rd hammertoe repairs yesterday (04/17/2025) under regional block.  She was discharged home on the day of surgery yesterday and was up in her kitchen after she got home when she noticed significant bleeding from the dressings of her right foot.   She reported that she did not not know if she had hit the toe against something because she was still numb from her block.  She came back into the outpatient surgery Center and I evaluated her toe and it was obvious that there was some trauma as the sutures were popped and the toe was plantar flexed and dislocated off of the hammertoe implant.  I am taking her back to surgery today to reduce her toe with pinning as well as closure of the wound dehiscence.    Procedure in detail:  The patient was brought to the operating room where anesthesia performed an ankle block on the operating table in a supine position.  The patient's right leg was prepped and draped in a sterile fashion.  I exsanguinated with the foot with an Esmarch and this was left around the ankle as a tourniquet.  I removed the popped sutures and irrigated the wound.  On exploration of the wound I noted a small fragment of bone that appeared to be from the dorsal aspect of the middle phalanx suggesting that the implant fracture through the dorsal aspect of the middle phalanx.  I elected to remove the implant from the proximal phalanx pin the toe with a 0.062 K-wire.   The wound was again well irrigated.  The skin incision was closed with interrupted 3-0 nylon sutures.  A Jurgan ball was placed on the end of the pin.  A sterile compressive dressing was placed and the patient was returned to the postop holding area in stable condition.

## 2025-04-17 NOTE — BRIEF OP NOTE
[facility] ([facility])  Brief Operative Note    Surgery Date: 4/17/2025     Surgeons and Role:     * Dmitry Clarke MD - Primary    Assisting Surgeon: None    Pre-op Diagnosis:  Postoperative wound dehiscence, initial encounter [T81.31XA]  Dislocation of phalanx of right foot, initial encounter [S93.104A]  Hammer toe of right foot [M20.41]    Post-op Diagnosis:  Post-Op Diagnosis Codes:     * Postoperative wound dehiscence, initial encounter [T81.31XA]     * Dislocation of phalanx of right foot, initial encounter [S93.104A]     * Hammer toe of right foot [M20.41]    Procedure(s) (LRB):  CORRECTION, HAMMER TOE, 2nd toe (Right)  CLOSURE, WOUND, 2nd toe (Right)    Anesthesia: Choice    Operative Findings: see op note     Estimated Blood Loss: * No values recorded between 4/17/2025  6:54 AM and 4/17/2025  7:51 AM *         Specimens:   Specimen (24h ago, onward)      None            * No specimens in log *        Discharge Note    OUTCOME: Patient tolerated treatment/procedure well without complication and is now ready for discharge.    DISPOSITION: Home or Self Care    FINAL DIAGNOSIS:  Wound dehiscence, surgical    FOLLOWUP: In clinic  Pin will be removed in clinic in approximately 3 weeks.     DISCHARGE INSTRUCTIONS:    Discharge Procedure Orders   Diet general     Call MD for:  temperature >100.4     Call MD for:  persistent nausea and vomiting     Call MD for:  severe uncontrolled pain     Call MD for:  difficulty breathing, headache or visual disturbances     Call MD for:  redness, tenderness, or signs of infection (pain, swelling, redness, odor or green/yellow discharge around incision site)     Call MD for:  hives     Call MD for:  persistent dizziness or light-headedness     Call MD for:  extreme fatigue     Change dressing (specify)   Order Comments: Dressing change: you may remove surgical dressing on 4/22. Keep clean and dry until then.     Weight bearing restrictions (specify)   Order Comments:  Heel walking only for right lower extremity. No rolling over the toes.

## 2025-04-17 NOTE — CARE UPDATE
RN spoke with Dr. Tan, anesthesiologist about pts last GFR of 47. MD states hold ordered celebrex.

## 2025-04-17 NOTE — DISCHARGE SUMMARY
[facility] - Surgery (Hospital)  Discharge Note  Short Stay    Procedure(s) (LRB):  CORRECTION, HAMMER TOE, 2nd toe (Right)  CLOSURE, WOUND, 2nd toe (Right)      OUTCOME: Patient tolerated treatment/procedure well without complication and is now ready for discharge.    DISPOSITION: Home or Self Care    FINAL DIAGNOSIS:  Wound dehiscence, surgical    FOLLOWUP: In clinic    DISCHARGE INSTRUCTIONS:    Discharge Procedure Orders   Diet general     Call MD for:  temperature >100.4     Call MD for:  persistent nausea and vomiting     Call MD for:  severe uncontrolled pain     Call MD for:  difficulty breathing, headache or visual disturbances     Call MD for:  redness, tenderness, or signs of infection (pain, swelling, redness, odor or green/yellow discharge around incision site)     Call MD for:  hives     Call MD for:  persistent dizziness or light-headedness     Call MD for:  extreme fatigue     Change dressing (specify)   Order Comments: Dressing change: you may remove surgical dressing on 4/22. Keep clean and dry until then.     Weight bearing restrictions (specify)   Order Comments: Heel walking only for right lower extremity. No rolling over the toes.        TIME SPENT ON DISCHARGE: 20 minutes

## 2025-04-17 NOTE — ANESTHESIA PROCEDURE NOTES
Peripheral Block    Patient location during procedure: OR    Reason for block: primary anesthetic    Diagnosis: right foot hammer toe   Start time: 4/17/2025 7:02 AM  Timeout: 4/17/2025 7:02 AM   End time: 4/17/2025 7:15 AM    Staffing  Authorizing Provider: Winnie Tan MD  Performing Provider: Winnie Tan MD    Staffing  Performed by: Winnie Tan MD  Authorized by: Winnie Tan MD    Preanesthetic Checklist  Completed: patient identified, IV checked, site marked, risks and benefits discussed, surgical consent, monitors and equipment checked, pre-op evaluation and timeout performed  Peripheral Block  Patient position: supine  Prep: ChloraPrep  Patient monitoring: heart rate, cardiac monitor, continuous pulse ox, continuous capnometry and frequent blood pressure checks  Block type: ankle - saphenous, ankle - superficial peroneal, ankle - sural and ankle - tibial (Ankle - deep peroneal)  Laterality: right  Injection technique: single shot  Needle  Needle type: Stimuplex   Needle gauge: 22 G  Needle length: 2 in  Needle localization: anatomical landmarks     Assessment  Injection assessment: negative aspiration and negative parasthesia  Paresthesia pain: none  Heart rate change: no  Slow fractionated injection: yes  Pain Tolerance: comfortable throughout block and no complaints  Medications:    Medications: lidocaine (PF) 20 mg/mL (2%) injection - Other   20 mL - 4/17/2025 7:15:00 AM    Additional Notes  See anesthesia record for vitals.  Block performed under sedation.  Patient tolerated well.

## 2025-04-17 NOTE — TRANSFER OF CARE
"Anesthesia Transfer of Care Note    Patient: Starla Wharton    Procedure(s) Performed: Procedure(s) (LRB):  CORRECTION, HAMMER TOE, 2nd toe (Right)  CLOSURE, WOUND, 2nd toe (Right)    Patient location: PACU    Anesthesia Type: MAC    Transport from OR: Transported from OR on 6-10 L/min O2 by face mask with adequate spontaneous ventilation    Post pain: adequate analgesia    Post assessment: no apparent anesthetic complications    Post vital signs: stable    Level of consciousness: alert, awake and oriented    Nausea/Vomiting: no nausea/vomiting    Complications: none    Transfer of care protocol was followed      Last vitals: Visit Vitals  BP (!) 141/74 (BP Location: Right arm, Patient Position: Lying)   Pulse 79   Temp 36.7 °C (98.1 °F) (Oral)   Resp 16   Ht 5' 5" (1.651 m)   Wt 104.3 kg (230 lb)   SpO2 100%   Breastfeeding No   BMI 38.27 kg/m²     "

## 2025-04-18 ENCOUNTER — PATIENT MESSAGE (OUTPATIENT)
Dept: INTERNAL MEDICINE | Facility: CLINIC | Age: 60
End: 2025-04-18
Payer: MEDICARE

## 2025-04-19 RX ORDER — TIRZEPATIDE 15 MG/.5ML
INJECTION, SOLUTION SUBCUTANEOUS
Qty: 2 ML | Refills: 5 | Status: SHIPPED | OUTPATIENT
Start: 2025-04-19

## 2025-04-21 LAB — POCT GLUCOSE: 143 MG/DL (ref 70–110)

## 2025-04-21 NOTE — ANESTHESIA POSTPROCEDURE EVALUATION
Anesthesia Post Evaluation    Patient: Starla Wharton    Procedure(s) Performed: Procedure(s) (LRB):  CORRECTION, HAMMER TOE, 2nd toe (Right)  CLOSURE, WOUND, 2nd toe (Right)    Final Anesthesia Type: general (native airway + regional)      Patient location during evaluation: PACU  Patient participation: Yes- Able to Participate  Level of consciousness: awake and alert and oriented  Post-procedure vital signs: reviewed and stable  Pain management: adequate  Airway patency: patent    PONV status at discharge: No PONV  Anesthetic complications: no      Cardiovascular status: blood pressure returned to baseline  Respiratory status: unassisted, spontaneous ventilation and room air  Hydration status: euvolemic  Follow-up not needed.              Vitals Value Taken Time   /60 04/17/25 08:33   Temp 36.6 °C (97.9 °F) 04/17/25 08:30   Pulse 77 04/17/25 08:33   Resp 37 04/17/25 08:32   SpO2 98 % 04/17/25 08:33   Vitals shown include unfiled device data.      Event Time   Out of Recovery 08:20:00         Pain/Cole Score: No data recorded

## 2025-04-22 ENCOUNTER — PATIENT MESSAGE (OUTPATIENT)
Dept: ORTHOPEDICS | Facility: CLINIC | Age: 60
End: 2025-04-22
Payer: MEDICARE

## 2025-04-22 ENCOUNTER — TELEPHONE (OUTPATIENT)
Dept: INTERNAL MEDICINE | Facility: CLINIC | Age: 60
End: 2025-04-22
Payer: MEDICARE

## 2025-04-22 NOTE — TELEPHONE ENCOUNTER
----- Message from LETICIA Dawson FNP sent at 4/21/2025  9:32 AM CDT -----  Regarding: change to virtual  Contact: Pt [phone]  Hi Please change to virtual  ----- Message -----  From: Radha Schmidt  Sent: 4/21/2025   9:16 AM CDT  To: James Foss Staff    Would like to receive medical advice.Would they like a call back or a response via [facility]:  call back Additional information:  Pt is calling to see if her follow up appt can be scheduled as virtual.  It was for today but she canceled it because she is just getting out the hospital.

## 2025-04-22 NOTE — TELEPHONE ENCOUNTER
Spoke to pt. Pt requested an audio appt due to recently having surgery on foot.   Scheduled audio appt on 4/30/25

## 2025-04-23 ENCOUNTER — OFFICE VISIT (OUTPATIENT)
Dept: ORTHOPEDICS | Facility: CLINIC | Age: 60
End: 2025-04-23
Payer: MEDICARE

## 2025-04-23 ENCOUNTER — PATIENT MESSAGE (OUTPATIENT)
Dept: INTERNAL MEDICINE | Facility: CLINIC | Age: 60
End: 2025-04-23
Payer: MEDICARE

## 2025-04-23 DIAGNOSIS — M20.41 HAMMER TOE OF RIGHT FOOT: ICD-10-CM

## 2025-04-23 DIAGNOSIS — Z87.39 S/P HAMMER TOE CORRECTION: Primary | ICD-10-CM

## 2025-04-23 DIAGNOSIS — M20.40 HAMMER TOE, UNSPECIFIED LATERALITY: ICD-10-CM

## 2025-04-23 DIAGNOSIS — Z98.890 S/P HAMMER TOE CORRECTION: Primary | ICD-10-CM

## 2025-04-23 PROCEDURE — 99999 PR PBB SHADOW E&M-EST. PATIENT-LVL II: CPT | Mod: PBBFAC,,, | Performed by: PHYSICIAN ASSISTANT

## 2025-04-23 NOTE — PROGRESS NOTES
Subjective:   Patient is s/p right 2nd and 3rd hammer toe repair performed by Dr. Clarke on 4/16/2025 who was taken back to the OR the next day for repair of 2nd hammer toe repair who presents today in Ortho clinic toe swelling.     History of Present Illness    CHIEF COMPLAINT:  Patient presents today for post-operative follow-up of hammer toe repair of 2nd and 3rd left toes.    POST-OPERATIVE COURSE:  She experienced complications where sutures ripped through and required replacement. She used peroxide to clean the wound after removing dressing, unaware this was not recommended for wound care. She reports pain with ambulation and expresses anxiety about external pin placement and potential complications from inadvertent contact with the pin.    CURRENT MEDICATIONS:  She primarily uses Tylenol for pain management and has taken muscle relaxer once or twice for cramping symptoms.                 Medications: I have reviewed medication list in the chart at the time of this encounter.     Review of patient's allergies indicates:   Allergen Reactions    Naprosyn [naproxen]       Past Medical History:   Diagnosis Date    Diabetes mellitus     Hypertension     Personal history of colonic polyps 06/12/2017     Past Surgical History:   Procedure Laterality Date    CHEILECTOMY Right 07/26/2023    Procedure: CHEILECTOMY big toe;  Surgeon: Dmitry Clarke MD;  Location: [facility] OR;  Service: Orthopedics;  Laterality: Right;    CLOSURE OF WOUND Right 4/17/2025    Procedure: CLOSURE, WOUND, 2nd toe;  Surgeon: Dmitry Clarke MD;  Location: [facility] OR;  Service: Orthopedics;  Laterality: Right;    COLONOSCOPY N/A 05/18/2023    Procedure: COLONOSCOPY;  Surgeon: Marian Schneider MD;  Location: [address]);  Service: Endoscopy;  Laterality: N/A;  gastroporesis-2 days full liquid-1 day clears-suprep-inst mail-tb-pt states does not use portal-tb    pre call done 5/12/23 -egh    CORRECTION OF HAMMER TOE Right 04/16/2025     CORRECTION OF HAMMER TOE Right 4/16/2025    Procedure: CORRECTION, HAMMER TOE, 2nd and 3rd toe;  Surgeon: Dmitry Clarke MD;  Location: [facility] OR;  Service: Orthopedics;  Laterality: Right;  Ossiofiber hammertoe implant    CORRECTION OF HAMMER TOE Right 4/17/2025    Procedure: CORRECTION, HAMMER TOE, 2nd toe;  Surgeon: Dmitry Clarke MD;  Location: [facility] OR;  Service: Orthopedics;  Laterality: Right;    HYSTERECTOMY      fibroids    LAPAROSCOPIC CHOLECYSTECTOMY      MYOMECTOMY      PANCREAS BIOPSY      WISDOM TOOTH EXTRACTION         Review of Systems   Cardiovascular:  Positive for leg swelling (2nd and 3rd left toe swelling).   Musculoskeletal:  Positive for joint pain.       Objective:     Physical Exam  Cardiovascular:      Pulses:           Dorsalis pedis pulses are 2+ on the right side.   Musculoskeletal:      Right ankle: No swelling or deformity. No tenderness. Normal range of motion.      Right foot: Decreased range of motion. Swelling and tenderness present.      Comments: Pin noted to left 2nd toe. Edema to 2nd and 3rd toe with reduce ROM, which is typical s/p repair. Nylon sutures in place.   No erythema, induration, fluctuance, drainage, odor, or ulcers.   She ambulates well with aid of cane.                       Assessment:       1. S/P hammer toe correction    2. Hammer toe of right foot, 2nd toe    3. Hammer toe, unspecified laterality       Plan:     Assessment and plan:  - No signs of infection or wound dehiscence. Patient reassured.  - Patient's postoperative course is progressing appropriately. There are no post op complications today. Edema is typical. Recommend frequent elevation of foot above the level of th heart.  - The patient was advised to keep the incision clean and dry for the next 24 hours after which they may wash the area with soap in the shower. Instructed to not submerge in water (bathing, lake, river, etc.) until the incision is completely healed. Avoid hydrogen  peroxide.  - The patient may continue to use diabetic shoe.   - Pain medication: continue. She declines refills today.  - Follow up in about next week.   - Patient instructed to call or message clinic with problems/concerns.       Sabiha Mckeon PA-C  Orthopedic Surgery  [facility]          Future Appointments   Date Time Provider Department Center          4/30/2025  9:30 AM Prudence Ramirez APRN, FNP [facility] IM [facility]   5/5/2025 10:00 AM Sabiha Mckeon PA-C [facility] ORTHO [facility]   6/6/2025 10:00 AM [facility]rest[facility]       Sabiha Mckeon PA-C  Orthopedic Surgery  [facility]

## 2025-04-29 NOTE — PROGRESS NOTES
Audio Only Telehealth Visit     The patient location is: home   The chief complaint leading to consultation is: type 2 diabetes   Visit type: Virtual visit with audio only (telephone)  Total time spent in medical discussion with patient: 5 minutes  Total time spent on date of the encounter:10 minutes       The reason for the audio only service rather than synchronous audio and video virtual visit was related to technical difficulties or patient preference/necessity.       Each patient to whom I provide medical services by telemedicine is:  (1) informed of the relationship between the physician and patient and the respective role of any other health care provider with respect to management of the patient; and (2) notified that they may decline to receive medical services by telemedicine and may withdraw from such care at any time. Patient verbally consented to receive this service via voice-only telephone call.    Overwhelmed helping a neighbor    Recent cold/ST     Wound dehiscence   4/17/25 hammer toe correction, 2nd/3rd toes       HPI: type 2 diabetes  H/o gastroparesis-tolerating mounjaro  Works with Chantal Shrestha RD  On MDI , injections 4x a day max  Testing 4 x a day  Patient is willing and able to use the device  Demonstrated an understanding of the technology and is motivated to use CGM  Patient expected to adhere to a comprehensive diabetes treatment plan and patient has adequate medical supervision  Patient experiences multiple impaired awareness of hypoglycemia (hypoglycemia unawareness)    Uses dexcom g6    Jardiance 25 mg qd  Tujeo 28-36 units bid  Humalog prn 180-230+2, etc.  Mounjaro 15 mg weekly     Assessment and plan:    1. Type 2 diabetes mellitus with diabetic peripheral angiopathy without gangrene, with long-term current use of insulin  F/u in 3 months  A1c prior   Continue regimen  Doing well with dexcom cgm  A1c below goal  Goal < 7%  Has refills      2. Type 2 diabetes mellitus with both  eyes affected by retinopathy without macular edema, with long-term current use of insulin, unspecified retinopathy severity  F/u with retinal specialist  Stable       3. Diabetic polyneuropathy associated with type 2 diabetes mellitus  F/u with podiatry  stable      4. Stage 3a chronic kidney disease  Avoid hypoglycemia  On arb/acei   On sglt2i       5. Class 2 severe obesity due to excess calories with serious comorbidity and body mass index (BMI) of 39.0 to 39.9 in adult  On mounjaro  Encourage exercise 3-5 x a week       6. Primary hypertension  Bp controlled  On sglt2i, arb/acei   Managed per pcp      7. Mixed hyperlipidemia  On statin  Goal < 100-ldl  stable      8. Hallux rigidus, right foot  F/u with podiatry  Recent surgery   Recovering /healing      9. Anemia of chronic disease  May skew A1c, lowering affect                                 This service was not originating from a related E/M service provided within the previous 7 days nor will  to an E/M service or procedure within the next 24 hours or my soonest available appointment.  Prevailing standard of care was able to be met in this audio-only visit.

## 2025-04-30 ENCOUNTER — OFFICE VISIT (OUTPATIENT)
Dept: INTERNAL MEDICINE | Facility: CLINIC | Age: 60
End: 2025-04-30
Payer: MEDICARE

## 2025-04-30 ENCOUNTER — PATIENT MESSAGE (OUTPATIENT)
Dept: INTERNAL MEDICINE | Facility: CLINIC | Age: 60
End: 2025-04-30

## 2025-04-30 DIAGNOSIS — M20.21 HALLUX RIGIDUS, RIGHT FOOT: ICD-10-CM

## 2025-04-30 DIAGNOSIS — E66.01 CLASS 2 SEVERE OBESITY DUE TO EXCESS CALORIES WITH SERIOUS COMORBIDITY AND BODY MASS INDEX (BMI) OF 39.0 TO 39.9 IN ADULT: ICD-10-CM

## 2025-04-30 DIAGNOSIS — D63.8 ANEMIA OF CHRONIC DISEASE: ICD-10-CM

## 2025-04-30 DIAGNOSIS — Z79.4 TYPE 2 DIABETES MELLITUS WITH BOTH EYES AFFECTED BY RETINOPATHY WITHOUT MACULAR EDEMA, WITH LONG-TERM CURRENT USE OF INSULIN, UNSPECIFIED RETINOPATHY SEVERITY: ICD-10-CM

## 2025-04-30 DIAGNOSIS — N18.31 STAGE 3A CHRONIC KIDNEY DISEASE: ICD-10-CM

## 2025-04-30 DIAGNOSIS — E78.2 MIXED HYPERLIPIDEMIA: ICD-10-CM

## 2025-04-30 DIAGNOSIS — I10 PRIMARY HYPERTENSION: ICD-10-CM

## 2025-04-30 DIAGNOSIS — E11.319 TYPE 2 DIABETES MELLITUS WITH BOTH EYES AFFECTED BY RETINOPATHY WITHOUT MACULAR EDEMA, WITH LONG-TERM CURRENT USE OF INSULIN, UNSPECIFIED RETINOPATHY SEVERITY: ICD-10-CM

## 2025-04-30 DIAGNOSIS — R05.1 ACUTE COUGH: ICD-10-CM

## 2025-04-30 DIAGNOSIS — Z79.4 TYPE 2 DIABETES MELLITUS WITH DIABETIC PERIPHERAL ANGIOPATHY WITHOUT GANGRENE, WITH LONG-TERM CURRENT USE OF INSULIN: Primary | ICD-10-CM

## 2025-04-30 DIAGNOSIS — E11.51 TYPE 2 DIABETES MELLITUS WITH DIABETIC PERIPHERAL ANGIOPATHY WITHOUT GANGRENE, WITH LONG-TERM CURRENT USE OF INSULIN: Primary | ICD-10-CM

## 2025-04-30 DIAGNOSIS — E66.812 CLASS 2 SEVERE OBESITY DUE TO EXCESS CALORIES WITH SERIOUS COMORBIDITY AND BODY MASS INDEX (BMI) OF 39.0 TO 39.9 IN ADULT: ICD-10-CM

## 2025-04-30 DIAGNOSIS — E11.42 DIABETIC POLYNEUROPATHY ASSOCIATED WITH TYPE 2 DIABETES MELLITUS: ICD-10-CM

## 2025-04-30 PROBLEM — R05.9 COUGH: Status: ACTIVE | Noted: 2025-04-30

## 2025-04-30 PROCEDURE — 3061F NEG MICROALBUMINURIA REV: CPT | Mod: CPTII,93,, | Performed by: NURSE PRACTITIONER

## 2025-04-30 PROCEDURE — 3066F NEPHROPATHY DOC TX: CPT | Mod: CPTII,93,, | Performed by: NURSE PRACTITIONER

## 2025-04-30 PROCEDURE — 3044F HG A1C LEVEL LT 7.0%: CPT | Mod: CPTII,93,, | Performed by: NURSE PRACTITIONER

## 2025-04-30 PROCEDURE — 4010F ACE/ARB THERAPY RXD/TAKEN: CPT | Mod: CPTII,93,, | Performed by: NURSE PRACTITIONER

## 2025-04-30 PROCEDURE — 98016 BRIEF COMUNICAJ TECH-BSD SVC: CPT | Mod: 93,,, | Performed by: NURSE PRACTITIONER

## 2025-04-30 RX ORDER — PHENOL 1.4 %
AEROSOL, SPRAY (ML) MUCOUS MEMBRANE
Qty: 117 ML | Refills: 1 | Status: SHIPPED | OUTPATIENT
Start: 2025-04-30

## 2025-04-30 RX ORDER — BENZONATATE 100 MG/1
100 CAPSULE ORAL 3 TIMES DAILY PRN
Qty: 30 CAPSULE | Refills: 0 | Status: SHIPPED | OUTPATIENT
Start: 2025-04-30 | End: 2025-05-10

## 2025-05-05 ENCOUNTER — OFFICE VISIT (OUTPATIENT)
Dept: ORTHOPEDICS | Facility: CLINIC | Age: 60
End: 2025-05-05
Payer: MEDICARE

## 2025-05-05 DIAGNOSIS — Z87.39 S/P HAMMER TOE CORRECTION: Primary | ICD-10-CM

## 2025-05-05 DIAGNOSIS — J02.9 SORE THROAT: Primary | ICD-10-CM

## 2025-05-05 DIAGNOSIS — Z98.890 S/P HAMMER TOE CORRECTION: Primary | ICD-10-CM

## 2025-05-05 NOTE — PROGRESS NOTES
HPI: Patient presents for post op visit s/p right 2nd and 3rd hammer toe repair performed by Dr. Clarke on 4/16/2025 who was taken back to the OR the next day for repair of 2nd hammer toe repair. Patient's pain is very well controlled.  The patient is wearing soft slipper and has been compliant with weight bearing restrictions and elevation instructions.      Exam:   Constitution: NAD, nontoxic, Ambulating well with slipper.  Skin: Nylon sutures and hammer toe pin in 2nd toe noted. Incision is clean and dry without tenderness, drainage, fluctuance, edema, induration, or erythema.  MSK: ROM limited since surgery.      Assessment and plan:  - Dressing and sutures removed today without issues. No signs of infection or wound dehiscence.  - Patient's postoperative course is progressing appropriately. There are no post op complications today.  - The patient was advised to keep the incision clean and dry for the next 24 hours after which they may wash the area with soap in the shower. Instructed to not submerge in water (bathing, lake, river, etc.) until the incision is completely healed.   - The patient should continue to dress 2nd toe and wear sandle.   - Pain medication: no refills needed.  - Follow up next week with Dr. Clarke for pin removal.  - Patient instructed to call or message clinic with problems/concerns.       Sabiha Mckeon PA-C  Orthopedic Surgery  [facility]    Future Appointments   Date Time Provider Department Center   5/12/2025 10:15 AM Dmitry Clarke MD [facility] ORTHO [facility]   6/6/2025 10:00 AM [facility]antal Shrestha RD, [facility]

## 2025-05-06 ENCOUNTER — PATIENT MESSAGE (OUTPATIENT)
Dept: INTERNAL MEDICINE | Facility: CLINIC | Age: 60
End: 2025-05-06
Payer: MEDICARE

## 2025-05-12 ENCOUNTER — OFFICE VISIT (OUTPATIENT)
Dept: ORTHOPEDICS | Facility: CLINIC | Age: 60
End: 2025-05-12
Payer: MEDICARE

## 2025-05-12 DIAGNOSIS — Z09 FOLLOW-UP EXAMINATION AFTER ORTHOPEDIC SURGERY: Primary | ICD-10-CM

## 2025-05-12 PROCEDURE — 1160F RVW MEDS BY RX/DR IN RCRD: CPT | Mod: CPTII,S$GLB,, | Performed by: ORTHOPAEDIC SURGERY

## 2025-05-12 PROCEDURE — 1159F MED LIST DOCD IN RCRD: CPT | Mod: CPTII,S$GLB,, | Performed by: ORTHOPAEDIC SURGERY

## 2025-05-12 PROCEDURE — 3066F NEPHROPATHY DOC TX: CPT | Mod: CPTII,S$GLB,, | Performed by: ORTHOPAEDIC SURGERY

## 2025-05-12 PROCEDURE — 3061F NEG MICROALBUMINURIA REV: CPT | Mod: CPTII,S$GLB,, | Performed by: ORTHOPAEDIC SURGERY

## 2025-05-12 PROCEDURE — 4010F ACE/ARB THERAPY RXD/TAKEN: CPT | Mod: CPTII,S$GLB,, | Performed by: ORTHOPAEDIC SURGERY

## 2025-05-12 PROCEDURE — 3044F HG A1C LEVEL LT 7.0%: CPT | Mod: CPTII,S$GLB,, | Performed by: ORTHOPAEDIC SURGERY

## 2025-05-12 PROCEDURE — 99024 POSTOP FOLLOW-UP VISIT: CPT | Mod: S$GLB,,, | Performed by: ORTHOPAEDIC SURGERY

## 2025-05-12 PROCEDURE — 99999 PR PBB SHADOW E&M-EST. PATIENT-LVL III: CPT | Mod: PBBFAC,,, | Performed by: ORTHOPAEDIC SURGERY

## 2025-05-12 NOTE — PROGRESS NOTES
Starla Wharton  Returns today for follow-up.  It has been almost four weeks since her right 2nd and 3rd hammertoe repairs.  She returns today and reports that her pain is well controlled.  I had her come in today mainly to get the pin out of her 2nd toe.    Examination: The incisions on both toes are well healed.  She still has some swelling of both the 2nd and 3rd toes.  I removed the pin from the 2nd toe without difficulty.     Impression:  1. Right 2nd and 3rd hammertoe repairs, 04/16/2025          Recommendation:  She can progress shoe wear as swelling allows and weight-bearing as pain allows.    Follow-up in four weeks

## 2025-05-18 PROBLEM — N18.32 STAGE 3B CHRONIC KIDNEY DISEASE: Status: RESOLVED | Noted: 2024-12-09 | Resolved: 2025-05-18

## 2025-05-31 DIAGNOSIS — E78.5 HYPERLIPIDEMIA, UNSPECIFIED HYPERLIPIDEMIA TYPE: ICD-10-CM

## 2025-05-31 DIAGNOSIS — E11.42 DIABETIC POLYNEUROPATHY ASSOCIATED WITH TYPE 2 DIABETES MELLITUS: ICD-10-CM

## 2025-06-02 RX ORDER — GABAPENTIN 300 MG/1
600 CAPSULE ORAL 3 TIMES DAILY
Qty: 540 CAPSULE | Refills: 1 | Status: SHIPPED | OUTPATIENT
Start: 2025-06-02 | End: 2025-11-29

## 2025-06-02 RX ORDER — ATORVASTATIN CALCIUM 40 MG/1
40 TABLET, FILM COATED ORAL DAILY
Qty: 90 TABLET | Refills: 2 | Status: SHIPPED | OUTPATIENT
Start: 2025-06-02

## 2025-06-06 ENCOUNTER — CLINICAL SUPPORT (OUTPATIENT)
Dept: DIABETES | Facility: CLINIC | Age: 60
End: 2025-06-06
Payer: MEDICARE

## 2025-06-06 VITALS — WEIGHT: 231.94 LBS | BODY MASS INDEX: 38.64 KG/M2 | HEIGHT: 65 IN

## 2025-06-06 DIAGNOSIS — Z79.4 TYPE 2 DIABETES MELLITUS WITH COMPLICATION, WITH LONG-TERM CURRENT USE OF INSULIN: Primary | ICD-10-CM

## 2025-06-06 DIAGNOSIS — E11.8 TYPE 2 DIABETES MELLITUS WITH COMPLICATION, WITH LONG-TERM CURRENT USE OF INSULIN: Primary | ICD-10-CM

## 2025-06-13 ENCOUNTER — OFFICE VISIT (OUTPATIENT)
Dept: ORTHOPEDICS | Facility: CLINIC | Age: 60
End: 2025-06-13
Payer: MEDICARE

## 2025-06-13 VITALS — BODY MASS INDEX: 38.64 KG/M2 | WEIGHT: 231.94 LBS | HEIGHT: 65 IN

## 2025-06-13 DIAGNOSIS — Z09 FOLLOW-UP EXAMINATION AFTER ORTHOPEDIC SURGERY: ICD-10-CM

## 2025-06-13 DIAGNOSIS — Z09 FOLLOW-UP EXAMINATION AFTER ORTHOPEDIC SURGERY: Primary | ICD-10-CM

## 2025-06-13 PROCEDURE — 99999 PR PBB SHADOW E&M-EST. PATIENT-LVL III: CPT | Mod: PBBFAC,,, | Performed by: ORTHOPAEDIC SURGERY

## 2025-06-13 RX ORDER — DICLOFENAC SODIUM 10 MG/G
2 GEL TOPICAL 4 TIMES DAILY
Qty: 100 G | Refills: 2 | Status: SHIPPED | OUTPATIENT
Start: 2025-06-13

## 2025-06-13 NOTE — PROGRESS NOTES
Starla Wharton  Returns today for follow-up.  It has been about eight weeks since her right 2nd and 3rd hammertoe repair.  Her last visit was on 05/12/2025 at which time I took the pin out of her 2nd toe.  She returns today and reports that she is not having any significant pain but is somewhat dissatisfied at this point with the 2nd toe.  She states that it is sticking up and it is too long.    Examination:  On standing inspection of the 2nd and 3rd toes are sitting down in a good position but in a resting position the 2nd toe does stick up more than the other toes.  There is no significant tenderness about the toes.  Her incisions are well healed.    Impression:  1. Follow-up examination after orthopedic surgery  diclofenac sodium (VOLTAREN ARTHRITIS PAIN) 1 % Gel      2. Right 2nd and 3rd hammertoe repairs, 04/16/2025          Recommendation:  I reassured her that her toe is sitting in a good position when she is standing.  I also reassured her that the swelling will go down but it is going to take some time.    Follow-up in eight weeks if necessary

## 2025-06-20 DIAGNOSIS — E11.65 TYPE 2 DIABETES MELLITUS WITH HYPERGLYCEMIA, WITH LONG-TERM CURRENT USE OF INSULIN: ICD-10-CM

## 2025-06-20 DIAGNOSIS — Z79.4 TYPE 2 DIABETES MELLITUS WITH HYPERGLYCEMIA, WITH LONG-TERM CURRENT USE OF INSULIN: ICD-10-CM

## 2025-06-20 RX ORDER — INSULIN LISPRO 100 [IU]/ML
INJECTION, SOLUTION INTRAVENOUS; SUBCUTANEOUS
Qty: 15 ML | Refills: 11 | Status: CANCELLED | OUTPATIENT
Start: 2025-06-20

## 2025-06-20 RX ORDER — INSULIN LISPRO 100 [IU]/ML
INJECTION, SOLUTION INTRAVENOUS; SUBCUTANEOUS
Qty: 15 ML | Refills: 6 | Status: SHIPPED | OUTPATIENT
Start: 2025-06-20

## 2025-06-20 NOTE — TELEPHONE ENCOUNTER
Medication refill requestCommunicationRequesting an RX refill or new RX.Is this a refill or new RX: newRX name and strength (copy/paste from chart):  insulin lispro (HUMALOG KWIKPEN INSULIN) 100 unit/mL penIs this a 30 day or 90 day RX:Pharmacy name and phone # (copy/paste from chart):  [facility] [address] [address]Gcwon: [phone] Fax: [phone] called and call back number:  Patient

## 2025-06-20 NOTE — TELEPHONE ENCOUNTER
No care due was identified.  [facility] Embedded Care Due Messages. Reference number: [id number].   6/20/2025 11:39:57 AM CDT

## 2025-06-26 DIAGNOSIS — I10 ESSENTIAL HYPERTENSION: ICD-10-CM

## 2025-06-26 RX ORDER — HYDROCHLOROTHIAZIDE 25 MG/1
25 TABLET ORAL DAILY
Qty: 90 TABLET | Refills: 3 | Status: SHIPPED | OUTPATIENT
Start: 2025-06-26

## 2025-06-26 NOTE — TELEPHONE ENCOUNTER
No care due was identified.  [facility] Embedded Care Due Messages. Reference number: [id number].   6/26/2025 5:09:10 AM CDT

## 2025-07-18 ENCOUNTER — PATIENT MESSAGE (OUTPATIENT)
Dept: INTERNAL MEDICINE | Facility: CLINIC | Age: 60
End: 2025-07-18
Payer: MEDICARE

## 2025-07-18 ENCOUNTER — LAB VISIT (OUTPATIENT)
Dept: LAB | Facility: OTHER | Age: 60
End: 2025-07-18
Attending: NURSE PRACTITIONER
Payer: MEDICARE

## 2025-07-18 DIAGNOSIS — E11.51 TYPE 2 DIABETES MELLITUS WITH DIABETIC PERIPHERAL ANGIOPATHY WITHOUT GANGRENE, WITH LONG-TERM CURRENT USE OF INSULIN: ICD-10-CM

## 2025-07-18 DIAGNOSIS — Z79.4 TYPE 2 DIABETES MELLITUS WITH DIABETIC PERIPHERAL ANGIOPATHY WITHOUT GANGRENE, WITH LONG-TERM CURRENT USE OF INSULIN: ICD-10-CM

## 2025-07-18 LAB
EAG (OHS): 146 MG/DL (ref 68–131)
HBA1C MFR BLD: 6.7 % (ref 4–5.6)

## 2025-07-18 PROCEDURE — 36415 COLL VENOUS BLD VENIPUNCTURE: CPT

## 2025-07-18 PROCEDURE — 83036 HEMOGLOBIN GLYCOSYLATED A1C: CPT

## 2025-07-21 DIAGNOSIS — I10 ESSENTIAL HYPERTENSION: ICD-10-CM

## 2025-07-21 RX ORDER — LISINOPRIL 20 MG/1
20 TABLET ORAL DAILY
Qty: 90 TABLET | Refills: 2 | Status: SHIPPED | OUTPATIENT
Start: 2025-07-21 | End: 2026-04-17

## 2025-07-21 NOTE — TELEPHONE ENCOUNTER
No care due was identified.  [facility] Embedded Care Due Messages. Reference number: [id number].   7/21/2025 5:08:38 AM CDT

## 2025-07-28 ENCOUNTER — OFFICE VISIT (OUTPATIENT)
Dept: INTERNAL MEDICINE | Facility: CLINIC | Age: 60
End: 2025-07-28
Payer: MEDICARE

## 2025-07-28 VITALS
HEIGHT: 65 IN | HEART RATE: 82 BPM | WEIGHT: 229.94 LBS | BODY MASS INDEX: 38.31 KG/M2 | SYSTOLIC BLOOD PRESSURE: 102 MMHG | OXYGEN SATURATION: 95 % | DIASTOLIC BLOOD PRESSURE: 60 MMHG

## 2025-07-28 DIAGNOSIS — K31.84 GASTROPARESIS: ICD-10-CM

## 2025-07-28 DIAGNOSIS — M20.41 HAMMER TOE OF RIGHT FOOT: ICD-10-CM

## 2025-07-28 DIAGNOSIS — N18.31 STAGE 3A CHRONIC KIDNEY DISEASE: ICD-10-CM

## 2025-07-28 DIAGNOSIS — E11.51 TYPE 2 DIABETES MELLITUS WITH DIABETIC PERIPHERAL ANGIOPATHY WITHOUT GANGRENE, WITH LONG-TERM CURRENT USE OF INSULIN: ICD-10-CM

## 2025-07-28 DIAGNOSIS — E11.319 TYPE 2 DIABETES MELLITUS WITH BOTH EYES AFFECTED BY RETINOPATHY WITHOUT MACULAR EDEMA, WITH LONG-TERM CURRENT USE OF INSULIN, UNSPECIFIED RETINOPATHY SEVERITY: Primary | ICD-10-CM

## 2025-07-28 DIAGNOSIS — M79.671 RIGHT FOOT PAIN: ICD-10-CM

## 2025-07-28 DIAGNOSIS — F32.5 MAJOR DEPRESSIVE DISORDER, SINGLE EPISODE, IN FULL REMISSION: ICD-10-CM

## 2025-07-28 DIAGNOSIS — E11.42 DIABETIC POLYNEUROPATHY ASSOCIATED WITH TYPE 2 DIABETES MELLITUS: ICD-10-CM

## 2025-07-28 DIAGNOSIS — E66.812 CLASS 2 SEVERE OBESITY DUE TO EXCESS CALORIES WITH SERIOUS COMORBIDITY AND BODY MASS INDEX (BMI) OF 39.0 TO 39.9 IN ADULT: ICD-10-CM

## 2025-07-28 DIAGNOSIS — Z79.4 TYPE 2 DIABETES MELLITUS WITH BOTH EYES AFFECTED BY RETINOPATHY WITHOUT MACULAR EDEMA, WITH LONG-TERM CURRENT USE OF INSULIN, UNSPECIFIED RETINOPATHY SEVERITY: Primary | ICD-10-CM

## 2025-07-28 DIAGNOSIS — Z12.31 ENCOUNTER FOR SCREENING MAMMOGRAM FOR MALIGNANT NEOPLASM OF BREAST: ICD-10-CM

## 2025-07-28 DIAGNOSIS — E78.2 MIXED HYPERLIPIDEMIA: ICD-10-CM

## 2025-07-28 DIAGNOSIS — Z79.4 TYPE 2 DIABETES MELLITUS WITH DIABETIC PERIPHERAL ANGIOPATHY WITHOUT GANGRENE, WITH LONG-TERM CURRENT USE OF INSULIN: ICD-10-CM

## 2025-07-28 DIAGNOSIS — E66.01 CLASS 2 SEVERE OBESITY DUE TO EXCESS CALORIES WITH SERIOUS COMORBIDITY AND BODY MASS INDEX (BMI) OF 39.0 TO 39.9 IN ADULT: ICD-10-CM

## 2025-07-28 PROCEDURE — 3061F NEG MICROALBUMINURIA REV: CPT | Mod: CPTII,S$GLB,, | Performed by: NURSE PRACTITIONER

## 2025-07-28 PROCEDURE — 3044F HG A1C LEVEL LT 7.0%: CPT | Mod: CPTII,S$GLB,, | Performed by: NURSE PRACTITIONER

## 2025-07-28 PROCEDURE — 1160F RVW MEDS BY RX/DR IN RCRD: CPT | Mod: CPTII,S$GLB,, | Performed by: NURSE PRACTITIONER

## 2025-07-28 PROCEDURE — 95251 CONT GLUC MNTR ANALYSIS I&R: CPT | Mod: S$GLB,,, | Performed by: NURSE PRACTITIONER

## 2025-07-28 PROCEDURE — 1159F MED LIST DOCD IN RCRD: CPT | Mod: CPTII,S$GLB,, | Performed by: NURSE PRACTITIONER

## 2025-07-28 PROCEDURE — 4010F ACE/ARB THERAPY RXD/TAKEN: CPT | Mod: CPTII,S$GLB,, | Performed by: NURSE PRACTITIONER

## 2025-07-28 PROCEDURE — 3008F BODY MASS INDEX DOCD: CPT | Mod: CPTII,S$GLB,, | Performed by: NURSE PRACTITIONER

## 2025-07-28 PROCEDURE — 99214 OFFICE O/P EST MOD 30 MIN: CPT | Mod: S$GLB,,, | Performed by: NURSE PRACTITIONER

## 2025-07-28 PROCEDURE — 3078F DIAST BP <80 MM HG: CPT | Mod: CPTII,S$GLB,, | Performed by: NURSE PRACTITIONER

## 2025-07-28 PROCEDURE — 99999 PR PBB SHADOW E&M-EST. PATIENT-LVL V: CPT | Mod: PBBFAC,,, | Performed by: NURSE PRACTITIONER

## 2025-07-28 PROCEDURE — 3066F NEPHROPATHY DOC TX: CPT | Mod: CPTII,S$GLB,, | Performed by: NURSE PRACTITIONER

## 2025-07-28 PROCEDURE — 3074F SYST BP LT 130 MM HG: CPT | Mod: CPTII,S$GLB,, | Performed by: NURSE PRACTITIONER

## 2025-07-28 NOTE — PATIENT INSTRUCTIONS
Follow up in 4 months w/Irielle   A1c prior -4 months   Mammogram 2025  Xray - R foot today 3 view       Lab Results   Component Value Date    HGBA1C 6.7 (H) 07/18/2025     [email or web address]  Eat fit imelda  Myfitnesspal imelda  [email or web address]   MySugr imelda  Glucose guide imelda  [email or web address] exercises       Toujeo 28 units twice a day   Humalog as needed  180-230+2, 231-280+4, 281-330+6, 331-380+8, >380+10   Jardiance 25 mg daily   Mounjaro 15 mg weekly     Dexcom g6
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
## 2025-07-29 ENCOUNTER — TELEPHONE (OUTPATIENT)
Dept: INTERNAL MEDICINE | Facility: CLINIC | Age: 60
End: 2025-07-29
Payer: MEDICARE

## 2025-07-29 ENCOUNTER — HOSPITAL ENCOUNTER (OUTPATIENT)
Dept: RADIOLOGY | Facility: OTHER | Age: 60
Discharge: HOME | End: 2025-07-29
Attending: NURSE PRACTITIONER
Payer: MEDICARE

## 2025-07-29 DIAGNOSIS — M79.671 RIGHT FOOT PAIN: ICD-10-CM

## 2025-07-29 DIAGNOSIS — M79.671 RIGHT FOOT PAIN: Primary | ICD-10-CM

## 2025-07-29 PROCEDURE — 73630 X-RAY EXAM OF FOOT: CPT | Mod: TC,RT

## 2025-07-29 PROCEDURE — 73630 X-RAY EXAM OF FOOT: CPT | Mod: 26,RT,, | Performed by: RADIOLOGY

## 2025-07-30 NOTE — TELEPHONE ENCOUNTER
Pt said she had her xray today and she has a podiatrist that she will see soon.  .    ----- Message from LETICIA Dawson FNP sent at 7/29/2025  2:23 PM CDT -----  Regarding: x ray  Please have pt make an appointment with podiatry  Nothing remarkable     See below:    FINDINGS:  Postoperative changes of 2nd and 3rd toe hammertoe correction.  Second toe K-wire has been removed.  No acute fracture or dislocation.  Mild deformity of the 5th metatarsal in keeping with remote healed fracture.  Lisfranc articulation is congruent.  Severe degenerative changes are noted at the hallux MTP joint.  Plantar calcaneal spur and Achilles enthesopathy noted.

## 2025-08-26 ENCOUNTER — OFFICE VISIT (OUTPATIENT)
Dept: ORTHOPEDICS | Facility: CLINIC | Age: 60
End: 2025-08-26
Payer: MEDICARE

## 2025-08-26 VITALS — WEIGHT: 229.94 LBS | HEIGHT: 65 IN | BODY MASS INDEX: 38.31 KG/M2

## 2025-08-26 DIAGNOSIS — Z09 FOLLOW-UP EXAMINATION AFTER ORTHOPEDIC SURGERY: Primary | ICD-10-CM

## 2025-08-26 DIAGNOSIS — E11.42 DIABETIC POLYNEUROPATHY ASSOCIATED WITH TYPE 2 DIABETES MELLITUS: ICD-10-CM

## 2025-08-26 PROCEDURE — 99212 OFFICE O/P EST SF 10 MIN: CPT | Mod: S$GLB,,, | Performed by: ORTHOPAEDIC SURGERY

## 2025-08-26 PROCEDURE — 99999 PR PBB SHADOW E&M-EST. PATIENT-LVL III: CPT | Mod: PBBFAC,,, | Performed by: ORTHOPAEDIC SURGERY

## 2025-08-26 PROCEDURE — 3066F NEPHROPATHY DOC TX: CPT | Mod: CPTII,S$GLB,, | Performed by: ORTHOPAEDIC SURGERY

## 2025-08-26 PROCEDURE — 4010F ACE/ARB THERAPY RXD/TAKEN: CPT | Mod: CPTII,S$GLB,, | Performed by: ORTHOPAEDIC SURGERY

## 2025-08-26 PROCEDURE — 3044F HG A1C LEVEL LT 7.0%: CPT | Mod: CPTII,S$GLB,, | Performed by: ORTHOPAEDIC SURGERY

## 2025-08-26 PROCEDURE — 1159F MED LIST DOCD IN RCRD: CPT | Mod: CPTII,S$GLB,, | Performed by: ORTHOPAEDIC SURGERY

## 2025-08-26 PROCEDURE — 3061F NEG MICROALBUMINURIA REV: CPT | Mod: CPTII,S$GLB,, | Performed by: ORTHOPAEDIC SURGERY

## 2025-08-26 PROCEDURE — 3008F BODY MASS INDEX DOCD: CPT | Mod: CPTII,S$GLB,, | Performed by: ORTHOPAEDIC SURGERY

## 2025-08-27 ENCOUNTER — HOSPITAL ENCOUNTER (OUTPATIENT)
Dept: RADIOLOGY | Facility: OTHER | Age: 60
Discharge: HOME | End: 2025-08-27
Attending: NURSE PRACTITIONER
Payer: MEDICARE

## 2025-08-27 DIAGNOSIS — Z12.31 ENCOUNTER FOR SCREENING MAMMOGRAM FOR MALIGNANT NEOPLASM OF BREAST: ICD-10-CM

## 2025-08-27 PROCEDURE — 77067 SCR MAMMO BI INCL CAD: CPT | Mod: TC

## (undated) DEVICE — DRESSING XEROFORM NONADH 1X8IN

## (undated) DEVICE — GLOVE BIOGEL PI MICRO SZ 7.5

## (undated) DEVICE — SPONGE COTTON TRAY 4X4IN

## (undated) DEVICE — UNDERGLOVES BIOGEL PI SIZE 8

## (undated) DEVICE — DRAPE T EXTRM SURG 121X128X90

## (undated) DEVICE — COVER LIGHT HANDLE 80/CA

## (undated) DEVICE — BANDAGE MATRIX HK LOOP 4IN 5YD

## (undated) DEVICE — BANDAGE ESMARK ELASTIC ST 4X9

## (undated) DEVICE — BLADE SAGITTAL FINE 5.5 X 18.5

## (undated) DEVICE — BLADE MICRO REC SMALL CROSS

## (undated) DEVICE — TRAY MINOR ORTHO OMC

## (undated) DEVICE — SOL NACL IRR 1000ML BTL

## (undated) DEVICE — SUT VICRYL CTD 2-0 GI 27 SH

## (undated) DEVICE — BLADE SURG #15 CARBON STEEL

## (undated) DEVICE — ELECTRODE REM PLYHSV RETURN 9

## (undated) DEVICE — SUT ETHILON 3-0 PS2 18 BLK

## (undated) DEVICE — SUT VICRYL 3-0 27 SH

## (undated) DEVICE — GAUZE SPONGE 4X4 12PLY

## (undated) DEVICE — BLADE MICRO REC. LARGE CROSS C

## (undated) DEVICE — DRAPE TOP 53X102IN

## (undated) DEVICE — GLOVE SENSICARE PI MICRO 7.5

## (undated) DEVICE — BANDAGE DERMACEA STRETCH 4X1IN

## (undated) DEVICE — BANDAGE ESMARK 6X12

## (undated) DEVICE — PENCIL ROCKER SWITCH 10FT CORD

## (undated) DEVICE — SPONGE GAUZE 16PLY 4X4

## (undated) DEVICE — STOCKINETTE TUBULAR 2PL 6 X 4

## (undated) DEVICE — GAUZE CNFRM STRL 4INX4.1YD

## (undated) DEVICE — PAD CAST SPECIALIST STRL 4

## (undated) DEVICE — GLOVE SENSICARE PI GRN 8

## (undated) DEVICE — DRESSING XEROFORM FOIL PK 1X8

## (undated) DEVICE — GLOVE BIOGEL ORTHOPEDIC 7.5

## (undated) DEVICE — COVER CAMERA OPERATING ROOM

## (undated) DEVICE — Device

## (undated) DEVICE — SHOE POST-OP WOMEN/LG

## (undated) DEVICE — HOSE DUAL W/CPC CONNECTORS